# Patient Record
Sex: FEMALE | Race: WHITE | Employment: OTHER | URBAN - METROPOLITAN AREA
[De-identification: names, ages, dates, MRNs, and addresses within clinical notes are randomized per-mention and may not be internally consistent; named-entity substitution may affect disease eponyms.]

---

## 2021-10-24 ENCOUNTER — APPOINTMENT (OUTPATIENT)
Dept: GENERAL RADIOLOGY | Facility: CLINIC | Age: 66
DRG: 233 | End: 2021-10-24
Payer: MEDICARE

## 2021-10-24 ENCOUNTER — APPOINTMENT (OUTPATIENT)
Dept: CT IMAGING | Facility: CLINIC | Age: 66
DRG: 233 | End: 2021-10-24
Payer: MEDICARE

## 2021-10-24 ENCOUNTER — HOSPITAL ENCOUNTER (INPATIENT)
Age: 66
LOS: 17 days | Discharge: NURSING, REHAB OR LONG-TERM CARE | DRG: 233 | End: 2021-11-10
Attending: SPECIALIST | Admitting: STUDENT IN AN ORGANIZED HEALTH CARE EDUCATION/TRAINING PROGRAM
Payer: MEDICARE

## 2021-10-24 DIAGNOSIS — I89.0 LYMPHEDEMA OF BOTH LOWER EXTREMITIES: ICD-10-CM

## 2021-10-24 DIAGNOSIS — M80.88XA PATHOLOGICAL FRACTURE OF THORACIC VERTEBRA DUE TO SECONDARY OSTEOPOROSIS (HCC): ICD-10-CM

## 2021-10-24 DIAGNOSIS — I21.4 NON-ST ELEVATION MI (NSTEMI) (HCC): ICD-10-CM

## 2021-10-24 DIAGNOSIS — M51.35 DDD (DEGENERATIVE DISC DISEASE), THORACOLUMBAR: ICD-10-CM

## 2021-10-24 DIAGNOSIS — Z95.1 S/P CABG X 2: Primary | ICD-10-CM

## 2021-10-24 DIAGNOSIS — M54.50 CHRONIC BILATERAL LOW BACK PAIN WITHOUT SCIATICA: ICD-10-CM

## 2021-10-24 DIAGNOSIS — G89.29 CHRONIC BILATERAL LOW BACK PAIN WITHOUT SCIATICA: ICD-10-CM

## 2021-10-24 DIAGNOSIS — M43.10 DEGENERATIVE SPONDYLOLISTHESIS: ICD-10-CM

## 2021-10-24 DIAGNOSIS — I50.9 ACUTE ON CHRONIC CONGESTIVE HEART FAILURE, UNSPECIFIED HEART FAILURE TYPE (HCC): ICD-10-CM

## 2021-10-24 PROBLEM — E66.09 CLASS 1 OBESITY DUE TO EXCESS CALORIES WITH SERIOUS COMORBIDITY AND BODY MASS INDEX (BMI) OF 33.0 TO 33.9 IN ADULT: Status: ACTIVE | Noted: 2021-10-24

## 2021-10-24 LAB
-: ABNORMAL
ABSOLUTE EOS #: 0.2 K/UL (ref 0–0.4)
ABSOLUTE IMMATURE GRANULOCYTE: ABNORMAL K/UL (ref 0–0.3)
ABSOLUTE LYMPH #: 0.7 K/UL (ref 1–4.8)
ABSOLUTE MONO #: 0.4 K/UL (ref 0.1–1.2)
AMORPHOUS: ABNORMAL
ANION GAP SERPL CALCULATED.3IONS-SCNC: 12 MMOL/L (ref 9–17)
BACTERIA: ABNORMAL
BASOPHILS # BLD: 2 % (ref 0–2)
BASOPHILS ABSOLUTE: 0.2 K/UL (ref 0–0.2)
BILIRUBIN URINE: ABNORMAL
BNP INTERPRETATION: ABNORMAL
BUN BLDV-MCNC: 15 MG/DL (ref 8–23)
BUN/CREAT BLD: ABNORMAL (ref 9–20)
CALCIUM SERPL-MCNC: 9.6 MG/DL (ref 8.6–10.4)
CASTS UA: ABNORMAL /LPF (ref 0–2)
CHLORIDE BLD-SCNC: 102 MMOL/L (ref 98–107)
CO2: 24 MMOL/L (ref 20–31)
COLOR: YELLOW
COMMENT UA: ABNORMAL
CREAT SERPL-MCNC: 0.8 MG/DL (ref 0.5–0.9)
CRYSTALS, UA: ABNORMAL /HPF
D-DIMER QUANTITATIVE: 1.6 MG/L FEU
DIFFERENTIAL TYPE: ABNORMAL
EKG ATRIAL RATE: 88 BPM
EKG P AXIS: 30 DEGREES
EKG P-R INTERVAL: 144 MS
EKG Q-T INTERVAL: 412 MS
EKG QRS DURATION: 122 MS
EKG QTC CALCULATION (BAZETT): 498 MS
EKG R AXIS: -64 DEGREES
EKG T AXIS: -35 DEGREES
EKG VENTRICULAR RATE: 88 BPM
EOSINOPHILS RELATIVE PERCENT: 2 % (ref 1–4)
EPITHELIAL CELLS UA: ABNORMAL /HPF (ref 0–5)
GFR AFRICAN AMERICAN: >60 ML/MIN
GFR NON-AFRICAN AMERICAN: >60 ML/MIN
GFR SERPL CREATININE-BSD FRML MDRD: ABNORMAL ML/MIN/{1.73_M2}
GFR SERPL CREATININE-BSD FRML MDRD: ABNORMAL ML/MIN/{1.73_M2}
GLUCOSE BLD-MCNC: 175 MG/DL (ref 70–99)
GLUCOSE BLD-MCNC: 212 MG/DL (ref 65–105)
GLUCOSE BLD-MCNC: 502 MG/DL (ref 65–105)
GLUCOSE URINE: NEGATIVE
HCT VFR BLD CALC: 35.8 % (ref 36–46)
HEMOGLOBIN: 10.8 G/DL (ref 12–16)
IMMATURE GRANULOCYTES: ABNORMAL %
INR BLD: 1.1
KETONES, URINE: ABNORMAL
LACTIC ACID: 1.1 MMOL/L (ref 0.5–2.2)
LACTIC ACID: 1.4 MMOL/L (ref 0.5–2.2)
LEUKOCYTE ESTERASE, URINE: NEGATIVE
LYMPHOCYTES # BLD: 6 % (ref 24–44)
MCH RBC QN AUTO: 23.9 PG (ref 26–34)
MCHC RBC AUTO-ENTMCNC: 30.2 G/DL (ref 31–37)
MCV RBC AUTO: 79.2 FL (ref 80–100)
MONOCYTES # BLD: 3 % (ref 2–11)
MUCUS: ABNORMAL
NITRITE, URINE: NEGATIVE
NRBC AUTOMATED: ABNORMAL PER 100 WBC
OTHER OBSERVATIONS UA: ABNORMAL
PARTIAL THROMBOPLASTIN TIME: 29 SEC (ref 21.3–31.3)
PARTIAL THROMBOPLASTIN TIME: 37.2 SEC (ref 23.9–33.8)
PDW BLD-RTO: 16.9 % (ref 12.5–15.4)
PH UA: 5.5 (ref 5–8)
PLATELET # BLD: 411 K/UL (ref 140–450)
PLATELET ESTIMATE: ABNORMAL
PMV BLD AUTO: 7.1 FL (ref 6–12)
POTASSIUM SERPL-SCNC: 4.3 MMOL/L (ref 3.7–5.3)
PRO-BNP: 4016 PG/ML
PROTEIN UA: ABNORMAL
PROTHROMBIN TIME: 11.7 SEC (ref 9.4–12.6)
RBC # BLD: 4.53 M/UL (ref 4–5.2)
RBC # BLD: ABNORMAL 10*6/UL
RBC UA: ABNORMAL /HPF (ref 0–2)
RENAL EPITHELIAL, UA: ABNORMAL /HPF
SARS-COV-2, RAPID: NOT DETECTED
SEG NEUTROPHILS: 87 % (ref 36–66)
SEGMENTED NEUTROPHILS ABSOLUTE COUNT: 10.1 K/UL (ref 1.8–7.7)
SODIUM BLD-SCNC: 138 MMOL/L (ref 135–144)
SPECIFIC GRAVITY UA: 1.02 (ref 1–1.03)
SPECIMEN DESCRIPTION: NORMAL
TRICHOMONAS: ABNORMAL
TROPONIN INTERP: ABNORMAL
TROPONIN INTERP: ABNORMAL
TROPONIN T: ABNORMAL NG/ML
TROPONIN T: ABNORMAL NG/ML
TROPONIN, HIGH SENSITIVITY: 103 NG/L (ref 0–14)
TROPONIN, HIGH SENSITIVITY: 104 NG/L (ref 0–14)
TURBIDITY: CLEAR
URINE HGB: ABNORMAL
UROBILINOGEN, URINE: NORMAL
WBC # BLD: 11.6 K/UL (ref 3.5–11)
WBC # BLD: ABNORMAL 10*3/UL
WBC UA: ABNORMAL /HPF (ref 0–5)
YEAST: ABNORMAL

## 2021-10-24 PROCEDURE — 6370000000 HC RX 637 (ALT 250 FOR IP): Performed by: INTERNAL MEDICINE

## 2021-10-24 PROCEDURE — 84484 ASSAY OF TROPONIN QUANT: CPT

## 2021-10-24 PROCEDURE — 6360000002 HC RX W HCPCS: Performed by: INTERNAL MEDICINE

## 2021-10-24 PROCEDURE — 81001 URINALYSIS AUTO W/SCOPE: CPT

## 2021-10-24 PROCEDURE — 83880 ASSAY OF NATRIURETIC PEPTIDE: CPT

## 2021-10-24 PROCEDURE — 96374 THER/PROPH/DIAG INJ IV PUSH: CPT

## 2021-10-24 PROCEDURE — 83605 ASSAY OF LACTIC ACID: CPT

## 2021-10-24 PROCEDURE — 80048 BASIC METABOLIC PNL TOTAL CA: CPT

## 2021-10-24 PROCEDURE — 72100 X-RAY EXAM L-S SPINE 2/3 VWS: CPT

## 2021-10-24 PROCEDURE — 99223 1ST HOSP IP/OBS HIGH 75: CPT | Performed by: INTERNAL MEDICINE

## 2021-10-24 PROCEDURE — 2060000000 HC ICU INTERMEDIATE R&B

## 2021-10-24 PROCEDURE — 85520 HEPARIN ASSAY: CPT

## 2021-10-24 PROCEDURE — 2580000003 HC RX 258: Performed by: EMERGENCY MEDICINE

## 2021-10-24 PROCEDURE — 87635 SARS-COV-2 COVID-19 AMP PRB: CPT

## 2021-10-24 PROCEDURE — 71260 CT THORAX DX C+: CPT

## 2021-10-24 PROCEDURE — 93005 ELECTROCARDIOGRAM TRACING: CPT | Performed by: EMERGENCY MEDICINE

## 2021-10-24 PROCEDURE — 85730 THROMBOPLASTIN TIME PARTIAL: CPT

## 2021-10-24 PROCEDURE — 6370000000 HC RX 637 (ALT 250 FOR IP): Performed by: SPECIALIST

## 2021-10-24 PROCEDURE — 82947 ASSAY GLUCOSE BLOOD QUANT: CPT

## 2021-10-24 PROCEDURE — 85610 PROTHROMBIN TIME: CPT

## 2021-10-24 PROCEDURE — 6370000000 HC RX 637 (ALT 250 FOR IP): Performed by: EMERGENCY MEDICINE

## 2021-10-24 PROCEDURE — 6360000004 HC RX CONTRAST MEDICATION: Performed by: EMERGENCY MEDICINE

## 2021-10-24 PROCEDURE — 2580000003 HC RX 258: Performed by: SPECIALIST

## 2021-10-24 PROCEDURE — 99285 EMERGENCY DEPT VISIT HI MDM: CPT

## 2021-10-24 PROCEDURE — 96375 TX/PRO/DX INJ NEW DRUG ADDON: CPT

## 2021-10-24 PROCEDURE — 85025 COMPLETE CBC W/AUTO DIFF WBC: CPT

## 2021-10-24 PROCEDURE — 36415 COLL VENOUS BLD VENIPUNCTURE: CPT

## 2021-10-24 PROCEDURE — 85379 FIBRIN DEGRADATION QUANT: CPT

## 2021-10-24 PROCEDURE — 71045 X-RAY EXAM CHEST 1 VIEW: CPT

## 2021-10-24 PROCEDURE — 6360000002 HC RX W HCPCS: Performed by: EMERGENCY MEDICINE

## 2021-10-24 RX ORDER — IPRATROPIUM BROMIDE AND ALBUTEROL SULFATE 2.5; .5 MG/3ML; MG/3ML
1 SOLUTION RESPIRATORY (INHALATION) ONCE
Status: COMPLETED | OUTPATIENT
Start: 2021-10-24 | End: 2021-10-24

## 2021-10-24 RX ORDER — HEPARIN SODIUM 10000 [USP'U]/100ML
1000 INJECTION, SOLUTION INTRAVENOUS CONTINUOUS
Status: DISCONTINUED | OUTPATIENT
Start: 2021-10-24 | End: 2021-10-24

## 2021-10-24 RX ORDER — HEPARIN SODIUM 10000 [USP'U]/100ML
5-30 INJECTION, SOLUTION INTRAVENOUS CONTINUOUS
Status: DISCONTINUED | OUTPATIENT
Start: 2021-10-24 | End: 2021-10-28

## 2021-10-24 RX ORDER — SODIUM CHLORIDE 0.9 % (FLUSH) 0.9 %
5-40 SYRINGE (ML) INJECTION EVERY 12 HOURS SCHEDULED
Status: DISCONTINUED | OUTPATIENT
Start: 2021-10-24 | End: 2021-10-28 | Stop reason: SDUPTHER

## 2021-10-24 RX ORDER — ACETAMINOPHEN 650 MG/1
650 SUPPOSITORY RECTAL EVERY 6 HOURS PRN
Status: DISCONTINUED | OUTPATIENT
Start: 2021-10-24 | End: 2021-11-02

## 2021-10-24 RX ORDER — NITROGLYCERIN 0.4 MG/1
0.4 TABLET SUBLINGUAL EVERY 5 MIN PRN
Status: DISCONTINUED | OUTPATIENT
Start: 2021-10-24 | End: 2021-11-02

## 2021-10-24 RX ORDER — ONDANSETRON 2 MG/ML
4 INJECTION INTRAMUSCULAR; INTRAVENOUS EVERY 6 HOURS PRN
Status: DISCONTINUED | OUTPATIENT
Start: 2021-10-24 | End: 2021-11-02

## 2021-10-24 RX ORDER — POLYETHYLENE GLYCOL 3350 17 G/17G
17 POWDER, FOR SOLUTION ORAL DAILY PRN
Status: DISCONTINUED | OUTPATIENT
Start: 2021-10-24 | End: 2021-11-02

## 2021-10-24 RX ORDER — HEPARIN SODIUM 1000 [USP'U]/ML
2000 INJECTION, SOLUTION INTRAVENOUS; SUBCUTANEOUS PRN
Status: DISCONTINUED | OUTPATIENT
Start: 2021-10-24 | End: 2021-10-28

## 2021-10-24 RX ORDER — ONDANSETRON 4 MG/1
4 TABLET, ORALLY DISINTEGRATING ORAL EVERY 8 HOURS PRN
Status: DISCONTINUED | OUTPATIENT
Start: 2021-10-24 | End: 2021-11-02

## 2021-10-24 RX ORDER — SODIUM CHLORIDE 9 MG/ML
1000 INJECTION, SOLUTION INTRAVENOUS CONTINUOUS
Status: DISCONTINUED | OUTPATIENT
Start: 2021-10-24 | End: 2021-10-24

## 2021-10-24 RX ORDER — SODIUM CHLORIDE 9 MG/ML
25 INJECTION, SOLUTION INTRAVENOUS PRN
Status: DISCONTINUED | OUTPATIENT
Start: 2021-10-24 | End: 2021-11-02

## 2021-10-24 RX ORDER — HEPARIN SODIUM 1000 [USP'U]/ML
4000 INJECTION, SOLUTION INTRAVENOUS; SUBCUTANEOUS ONCE
Status: COMPLETED | OUTPATIENT
Start: 2021-10-24 | End: 2021-10-24

## 2021-10-24 RX ORDER — ASPIRIN 81 MG/1
81 TABLET, CHEWABLE ORAL DAILY
Status: DISCONTINUED | OUTPATIENT
Start: 2021-10-25 | End: 2021-11-02

## 2021-10-24 RX ORDER — 0.9 % SODIUM CHLORIDE 0.9 %
70 INTRAVENOUS SOLUTION INTRAVENOUS ONCE
Status: COMPLETED | OUTPATIENT
Start: 2021-10-24 | End: 2021-10-24

## 2021-10-24 RX ORDER — HEPARIN SODIUM 1000 [USP'U]/ML
2000 INJECTION, SOLUTION INTRAVENOUS; SUBCUTANEOUS PRN
Status: DISCONTINUED | OUTPATIENT
Start: 2021-10-24 | End: 2021-10-24

## 2021-10-24 RX ORDER — ACETAMINOPHEN 325 MG/1
650 TABLET ORAL EVERY 6 HOURS PRN
Status: DISCONTINUED | OUTPATIENT
Start: 2021-10-24 | End: 2021-10-28 | Stop reason: SDUPTHER

## 2021-10-24 RX ORDER — HEPARIN SODIUM 1000 [USP'U]/ML
4000 INJECTION, SOLUTION INTRAVENOUS; SUBCUTANEOUS PRN
Status: DISCONTINUED | OUTPATIENT
Start: 2021-10-24 | End: 2021-10-28

## 2021-10-24 RX ORDER — OXYCODONE HYDROCHLORIDE AND ACETAMINOPHEN 5; 325 MG/1; MG/1
1 TABLET ORAL ONCE
Status: COMPLETED | OUTPATIENT
Start: 2021-10-24 | End: 2021-10-24

## 2021-10-24 RX ORDER — FUROSEMIDE 10 MG/ML
20 INJECTION INTRAMUSCULAR; INTRAVENOUS 2 TIMES DAILY
Status: COMPLETED | OUTPATIENT
Start: 2021-10-24 | End: 2021-10-29

## 2021-10-24 RX ORDER — FUROSEMIDE 10 MG/ML
40 INJECTION INTRAMUSCULAR; INTRAVENOUS ONCE
Status: COMPLETED | OUTPATIENT
Start: 2021-10-24 | End: 2021-10-24

## 2021-10-24 RX ORDER — SODIUM CHLORIDE 0.9 % (FLUSH) 0.9 %
10 SYRINGE (ML) INJECTION PRN
Status: DISCONTINUED | OUTPATIENT
Start: 2021-10-24 | End: 2021-10-24

## 2021-10-24 RX ORDER — SODIUM CHLORIDE 0.9 % (FLUSH) 0.9 %
5-40 SYRINGE (ML) INJECTION PRN
Status: DISCONTINUED | OUTPATIENT
Start: 2021-10-24 | End: 2021-10-28 | Stop reason: SDUPTHER

## 2021-10-24 RX ORDER — HEPARIN SODIUM 1000 [USP'U]/ML
4000 INJECTION, SOLUTION INTRAVENOUS; SUBCUTANEOUS ONCE
Status: DISCONTINUED | OUTPATIENT
Start: 2021-10-24 | End: 2021-10-24

## 2021-10-24 RX ORDER — METHYLPREDNISOLONE SODIUM SUCCINATE 125 MG/2ML
125 INJECTION, POWDER, LYOPHILIZED, FOR SOLUTION INTRAMUSCULAR; INTRAVENOUS ONCE
Status: COMPLETED | OUTPATIENT
Start: 2021-10-24 | End: 2021-10-24

## 2021-10-24 RX ORDER — HEPARIN SODIUM 1000 [USP'U]/ML
4000 INJECTION, SOLUTION INTRAVENOUS; SUBCUTANEOUS PRN
Status: DISCONTINUED | OUTPATIENT
Start: 2021-10-24 | End: 2021-10-24

## 2021-10-24 RX ORDER — HEPARIN SODIUM 10000 [USP'U]/100ML
5-30 INJECTION, SOLUTION INTRAVENOUS CONTINUOUS
Status: DISCONTINUED | OUTPATIENT
Start: 2021-10-24 | End: 2021-10-24

## 2021-10-24 RX ORDER — ASPIRIN 81 MG/1
324 TABLET, CHEWABLE ORAL ONCE
Status: COMPLETED | OUTPATIENT
Start: 2021-10-24 | End: 2021-10-24

## 2021-10-24 RX ADMIN — INSULIN LISPRO 9 UNITS: 100 INJECTION, SOLUTION INTRAVENOUS; SUBCUTANEOUS at 22:00

## 2021-10-24 RX ADMIN — Medication 10 ML: at 10:20

## 2021-10-24 RX ADMIN — FUROSEMIDE 40 MG: 10 INJECTION, SOLUTION INTRAMUSCULAR; INTRAVENOUS at 11:04

## 2021-10-24 RX ADMIN — HEPARIN SODIUM 4000 UNITS: 1000 INJECTION INTRAVENOUS; SUBCUTANEOUS at 17:55

## 2021-10-24 RX ADMIN — IOPAMIDOL 80 ML: 755 INJECTION, SOLUTION INTRAVENOUS at 10:19

## 2021-10-24 RX ADMIN — FUROSEMIDE 20 MG: 10 INJECTION, SOLUTION INTRAMUSCULAR; INTRAVENOUS at 17:55

## 2021-10-24 RX ADMIN — IPRATROPIUM BROMIDE AND ALBUTEROL SULFATE 1 AMPULE: .5; 2.5 SOLUTION RESPIRATORY (INHALATION) at 09:33

## 2021-10-24 RX ADMIN — ASPIRIN 324 MG: 81 TABLET, CHEWABLE ORAL at 11:03

## 2021-10-24 RX ADMIN — SODIUM CHLORIDE 70 ML: 9 INJECTION, SOLUTION INTRAVENOUS at 10:20

## 2021-10-24 RX ADMIN — SODIUM CHLORIDE 1000 ML: 9 INJECTION, SOLUTION INTRAVENOUS at 07:21

## 2021-10-24 RX ADMIN — METHYLPREDNISOLONE SODIUM SUCCINATE 125 MG: 125 INJECTION, POWDER, FOR SOLUTION INTRAMUSCULAR; INTRAVENOUS at 09:32

## 2021-10-24 RX ADMIN — HEPARIN SODIUM 11 UNITS/KG/HR: 10000 INJECTION, SOLUTION INTRAVENOUS at 17:55

## 2021-10-24 RX ADMIN — OXYCODONE HYDROCHLORIDE AND ACETAMINOPHEN 1 TABLET: 5; 325 TABLET ORAL at 07:09

## 2021-10-24 RX ADMIN — ACETAMINOPHEN 650 MG: 325 TABLET ORAL at 22:06

## 2021-10-24 ASSESSMENT — PAIN SCALES - GENERAL
PAINLEVEL_OUTOF10: 10
PAINLEVEL_OUTOF10: 5
PAINLEVEL_OUTOF10: 3
PAINLEVEL_OUTOF10: 4
PAINLEVEL_OUTOF10: 4
PAINLEVEL_OUTOF10: 2
PAINLEVEL_OUTOF10: 4
PAINLEVEL_OUTOF10: 10
PAINLEVEL_OUTOF10: 3

## 2021-10-24 ASSESSMENT — PAIN DESCRIPTION - ORIENTATION: ORIENTATION: LEFT;RIGHT;DISTAL

## 2021-10-24 ASSESSMENT — PAIN DESCRIPTION - LOCATION
LOCATION: FOOT;LEG
LOCATION: BACK

## 2021-10-24 ASSESSMENT — PAIN DESCRIPTION - PAIN TYPE: TYPE: ACUTE PAIN

## 2021-10-24 NOTE — ED PROVIDER NOTES
Previous Medications    No medications on file       ALLERGIES     is allergic to amoxicillin, amoxicillin-pot clavulanate, atorvastatin, cefuroxime axetil, clavulanic acid, codeine, dextroamphetamine, adhesive tape, and cephalosporins. FAMILY HISTORY     has no family status information on file. family history is not on file. SOCIAL HISTORY          PHYSICAL EXAM     INITIAL VITALS:  oral temperature is 98.3 °F (36.8 °C). Her pulse is 90. Her oxygen saturation is 91%. Physical Exam  Vitals and nursing note reviewed. Constitutional:       Appearance: She is well-developed. HENT:      Head: Normocephalic and atraumatic. Nose: Nose normal.   Eyes:      Extraocular Movements: Extraocular movements intact. Pupils: Pupils are equal, round, and reactive to light. Cardiovascular:      Rate and Rhythm: Normal rate and regular rhythm. Heart sounds: Normal heart sounds. No murmur heard. Pulmonary:      Effort: Pulmonary effort is normal. No respiratory distress. Breath sounds: Normal breath sounds. Abdominal:      General: Bowel sounds are normal. There is no distension. Palpations: Abdomen is soft. Tenderness: There is no abdominal tenderness. Musculoskeletal:      Cervical back: Normal range of motion and neck supple. Comments: Patient has vague tenderness in the lumbar area and midline as well as paraspinal region. There is no step-off defect. There is no CVA tenderness. No evidence of cauda equina syndrome. Patient is not very cooperative with motor and sensory examination and lower extremities due to pain but there are no focal neurological deficits. Skin:     General: Skin is warm and dry. Neurological:      General: No focal deficit present. Mental Status: She is alert and oriented to person, place, and time. GCS: GCS eye subscore is 4. GCS verbal subscore is 5. GCS motor subscore is 6.            DIFFERENTIAL DIAGNOSIS/ MDM:     Acute exacerbation of chronic low back pain, fracture, UTI    DIAGNOSTIC RESULTS     EKG: All EKG's are interpreted by the Emergency Department Physician who either signs or Co-signs this chart in the absence of a cardiologist.    None obtained    RADIOLOGY:   I reviewed the radiologist interpretations:  XR LUMBAR SPINE (2-3 VIEWS)    (Results Pending)       No results found. LABS:  Labs Reviewed   CBC WITH AUTO DIFFERENTIAL   BASIC METABOLIC PANEL W/ REFLEX TO MG FOR LOW K   URINE RT REFLEX TO CULTURE       Her lab results are pending at this time    EMERGENCY DEPARTMENT COURSE:   Vitals:    Vitals:    10/24/21 0649   Pulse: 90   Temp: 98.3 °F (36.8 °C)   TempSrc: Oral   SpO2: 91%     -------------------------   , Temp: 98.3 °F (36.8 °C), Pulse: 90,      Orders Placed This Encounter   Medications    0.9 % sodium chloride infusion    oxyCODONE-acetaminophen (PERCOCET) 5-325 MG per tablet 1 tablet       During the ED course, patient will be given normal saline infusion and Percocet 1 tablet orally. Her diagnostic work-up is pending at this time. Case will be turned over to oncoming physician at 7:15 AM due to shift change. I have reviewed the disposition diagnosis with the patient and or their family/guardian. I have answered their questions and given discharge instructions. They voiced understanding of these instructions and did not have any further questions or complaints. PROCEDURES:  None    FINAL IMPRESSION    No diagnosis found. DISPOSITION/PLAN   DISPOSITION          PATIENT REFERRED TO:  No follow-up provider specified. DISCHARGE MEDICATIONS:  New Prescriptions    No medications on file       (Please note that portions of this note were completed with a voice recognition program.  Efforts were made to edit the dictations but occasionally words are mis-transcribed.)    Gibran Jackson MD,, MD, F.A.C.E.P.   Attending Emergency Physician      Gibran Jackson MD  10/24/21 4554

## 2021-10-24 NOTE — ED NOTES
Dressings removed from bilat lower extremeties, pt c/o pain with movement , decreased O@ sat at room air, pt unable to tolerate activity,  at bedside     Derrek Ahn RN  10/24/21 0679

## 2021-10-24 NOTE — PROGRESS NOTES
Pt admitted to room from [facility] ED. Oriented to room, call light and bed mechanics. 2L NC in use. Side rails up x2. Call light within reach. Orders reviewed. Will continue to monitor closely.

## 2021-10-24 NOTE — H&P
[facility]  Office: [address], DO, Mary Kate Press, DO, Carol Cushing, DO, Isidro Leon Blood, DO, Porfirio Henry MD, Perlita Robles MD, David Linder MD, Annie Romo MD, Demario Corbin MD, Dee Castillo MD, Ashley Way MD, Colleen Coffman MD, Chung Ron, DO, Mel Terrazas, DO, Asael Evans MD,  Gena Siemens, DO, Everett Yates MD, Abelardo Pineda MD, Graciela Negrete MD, Ana M Tyler MD, Maria Guadalupe Schaffer MD, Carissa Shoemaker MD, Marie Saunders [facility], [facility], CNP, Candice Waller, CNP, Orquidea Singh, CNS, Flaca Moyer, CNP, Chang Ace, CNP, Morteza Tejada, CNP, Tatiana Oconnell, CNP, Jones Jane, CNP, Summer Vaughn, CNP, Roylene Goodell, PA-C, Taylor Salmeron, [facility], Barrett Gee, CNP, Claudia Jimenez, CNP, Nilsa Holland, CNP, Saray Wyatt, CNP, Winsome Rothman, CNP, Mikal Kelly, CNP, Kassie Magdaleno, CNP         [address]    HISTORY AND PHYSICAL EXAMINATION            Date:   10/24/2021  Patient name:  Abdoul Ellsworth  Date of admission:  10/24/2021  6:48 AM  MRN:   [id number]  Account:  [de-identified]  YOB: 1955  PCP:    Saray Wyatt MD  Room:   2034/2034-01  Code Status:    Full Code    Chief Complaint:     Chief Complaint   Patient presents with    Back Pain       History Obtained From:     patient, electronic medical record, Quality of history:  poor historian    History of Present Illness:     Abdoul Ellsworth is a 61 y.o. Non- / non  female who presents with Back Pain   and is admitted to the hospital for the management of NSTEMI (non-ST elevated myocardial infarction) ([facility] [address] This 61 yof presents with low back pain to the point where she could not get out of recliner. She said she felt a pop while transferring from bed to wheelchair. It is unclear when that was. Starting 2 days pta she had cp and sob, along with nausea. Also c/o hip pain and said all her pain was due to fibromyalgia.   Found to have troponin levels~100. Past Medical History:     Past Medical History:   Diagnosis Date    Asthma     Diabetes mellitus ([facility] [address]     Fibromyalgia     Headache     Lymphedema of both lower extremities     Myasthenia gravis ([facility] [address]         Past Surgical History:     Past Surgical History:   Procedure Laterality Date    CARPAL TUNNEL RELEASE      FRACTURE SURGERY      TOE AMPUTATION          Medications Prior to Admission:     Prior to Admission medications    Not on File        Allergies:     Amoxicillin, Amoxicillin-pot clavulanate, Atorvastatin, Cefuroxime axetil, Clavulanic acid, Codeine, Dextroamphetamine, Adhesive tape, and Cephalosporins    Social History:     Tobacco:    reports that she has never smoked. She has never used smokeless tobacco.  Alcohol:      reports previous alcohol use. Drug Use:  reports previous drug use. Family History:     Family History   Problem Relation Age of Onset    Coronary Art Dis Mother     Kidney Disease Mother        Review of Systems:     Positive and Negative as described in HPI.     CONSTITUTIONAL:  negative for fevers, chills, sweats, fatigue, weight loss  HEENT:  negative for vision, hearing changes, runny nose, throat pain  RESPIRATORY:  negative for cough, congestion, wheezing  CARDIOVASCULAR:  negative for palpitations  GASTROINTESTINAL:  negative for vomiting, diarrhea, constipation, change in bowel habits, abdominal pain   GENITOURINARY:  negative for difficulty of urination, burning with urination, frequency   INTEGUMENT:  negative for rash, skin lesions, easy bruising   HEMATOLOGIC/LYMPHATIC:  positive for swelling/edema -sees lymphedema clinic  ALLERGIC/IMMUNOLOGIC:  negative for urticaria , itching  ENDOCRINE:  negative increase in drinking, increase in urination, hot or cold intolerance  MUSCULOSKELETAL:  negative joint pains, muscle aches, swelling of joints  NEUROLOGICAL:  negative for headaches, dizziness, lightheadedness, numbness, pain, tingling MCHC 30.2 (L) 31 - 37 g/dL    RDW 16.9 (H) 12.5 - 15.4 %    Platelets 838 149 - 911 k/uL    MPV 7.1 6.0 - 12.0 fL    NRBC Automated NOT REPORTED per 100 WBC    Differential Type NOT REPORTED     Immature Granulocytes NOT REPORTED 0 %    Absolute Immature Granulocyte NOT REPORTED 0.00 - 0.30 k/uL    WBC Morphology NOT REPORTED     RBC Morphology NOT REPORTED     Platelet Estimate NOT REPORTED     Seg Neutrophils 87 (H) 36 - 66 %    Lymphocytes 6 (L) 24 - 44 %    Monocytes 3 2 - 11 %    Eosinophils % 2 1 - 4 %    Basophils 2 0 - 2 %    Segs Absolute 10.10 (H) 1.8 - 7.7 k/uL    Absolute Lymph # 0.70 (L) 1.0 - 4.8 k/uL    Absolute Mono # 0.40 0.1 - 1.2 k/uL    Absolute Eos # 0.20 0.0 - 0.4 k/uL    Basophils Absolute 0.20 0.0 - 0.2 k/uL   Basic Metabolic Panel w/ Reflex to MG    Collection Time: 10/24/21  7:19 AM   Result Value Ref Range    Glucose 175 (H) 70 - 99 mg/dL    BUN 15 8 - 23 mg/dL    CREATININE 0.80 0.50 - 0.90 mg/dL    Bun/Cre Ratio NOT REPORTED 9 - 20    Calcium 9.6 8.6 - 10.4 mg/dL    Sodium 138 135 - 144 mmol/L    Potassium 4.3 3.7 - 5.3 mmol/L    Chloride 102 98 - 107 mmol/L    CO2 24 20 - 31 mmol/L    Anion Gap 12 9 - 17 mmol/L    GFR Non-African American >60 >60 mL/min    GFR African American >60 >60 mL/min    GFR Comment          GFR Staging NOT REPORTED    Brain Natriuretic Peptide    Collection Time: 10/24/21  7:19 AM   Result Value Ref Range    Pro-BNP 4,016 (H) <300 pg/mL    BNP Interpretation NOT REPORTED    Lactic Acid    Collection Time: 10/24/21  7:19 AM   Result Value Ref Range    Lactic Acid 1.4 0.5 - 2.2 mmol/L   Troponin    Collection Time: 10/24/21  7:19 AM   Result Value Ref Range    Troponin, High Sensitivity 104 (HH) 0 - 14 ng/L    Troponin T NOT REPORTED <0.03 ng/mL    Troponin Interp NOT REPORTED    Urinalysis Reflex to Culture    Collection Time: 10/24/21  8:18 AM    Specimen: Urine, clean catch   Result Value Ref Range    Color, UA Yellow Yellow    Turbidity UA Clear Clear Glucose, Ur NEGATIVE NEGATIVE    Bilirubin Urine MODERATE (A) NEGATIVE    Ketones, Urine LARGE (A) NEGATIVE    Specific Gravity, UA 1.022 1.005 - 1.030    Urine Hgb LARGE (A) NEGATIVE    pH, UA 5.5 5.0 - 8.0    Protein, UA 2+ (A) NEGATIVE    Urobilinogen, Urine Normal Normal    Nitrite, Urine NEGATIVE NEGATIVE    Leukocyte Esterase, Urine NEGATIVE NEGATIVE    Urinalysis Comments Microscopic performed on uncentrifuged urine -     Microscopic Urinalysis    Collection Time: 10/24/21  8:18 AM   Result Value Ref Range    -          WBC, UA None 0 - 5 /HPF    RBC, UA 0 TO 2 0 - 2 /HPF    Casts UA NOT REPORTED 0 - 2 /LPF    Crystals, UA NOT REPORTED None /HPF    Epithelial Cells UA 2 TO 5 0 - 5 /HPF    Renal Epithelial, UA NOT REPORTED 0 /HPF    Bacteria, UA FEW (A) None    Mucus, UA NOT REPORTED None    Trichomonas, UA NOT REPORTED None    Amorphous, UA 1+ (A) None    Other Observations UA (A) NOT REQ. Utilizing a urinalysis as the only screening method to exclude a potential uropathogen can be unreliable in many patient populations. Rapid screening tests are less sensitive than culture and if UTI is a clinical possibility, culture should be considered despite a negative urinalysis.     Yeast, UA NOT REPORTED None   EKG 12 Lead    Collection Time: 10/24/21  9:23 AM   Result Value Ref Range    Ventricular Rate 88 BPM    Atrial Rate 88 BPM    P-R Interval 144 ms    QRS Duration 122 ms    Q-T Interval 412 ms    QTc Calculation (Bazett) 498 ms    P Axis 30 degrees    R Axis -64 degrees    T Axis -35 degrees   D-Dimer, Quantitative    Collection Time: 10/24/21  9:30 AM   Result Value Ref Range    D-Dimer, Quant 1.60 mg/L FEU   Lactic Acid    Collection Time: 10/24/21  9:30 AM   Result Value Ref Range    Lactic Acid 1.1 0.5 - 2.2 mmol/L   Protime-INR    Collection Time: 10/24/21  9:30 AM   Result Value Ref Range    Protime 11.7 9.4 - 12.6 sec    INR 1.1    APTT    Collection Time: 10/24/21  9:30 AM   Result Value Ref Range PTT 29.0 21.3 - 31.3 sec   Troponin    Collection Time: 10/24/21  9:30 AM   Result Value Ref Range    Troponin, High Sensitivity 103 (HH) 0 - 14 ng/L    Troponin T NOT REPORTED <0.03 ng/mL    Troponin Interp NOT REPORTED    COVID-19, Rapid    Collection Time: 10/24/21  9:31 AM    Specimen: Nasopharyngeal Swab   Result Value Ref Range    Specimen Description . NASOPHARYNGEAL SWAB     SARS-CoV-2, Rapid Not Detected Not Detected   POC Glucose Fingerstick    Collection Time: 10/24/21  4:50 PM   Result Value Ref Range    POC Glucose 212 (H) 65 - 105 mg/dL       Imaging/Diagnostics:  XR LUMBAR SPINE (2-3 VIEWS)    Result Date: 10/24/2021  LUMBAR SPINE: 1. Age indeterminate, probably nonacute, compression fracture of L5 vertebral body with about 50% decrease height. Please correlate with point tenderness. MRI may also be considered for age characterization if deemed clinically necessary. 2. T12 and L1 kyphoplasty. CHEST X-RAY: 1. Poor inspiratory afford with significantly decreased lung volumes. 2. Patchy opacities left lower lung probably due to crowding of vessels with or without underlying developing infiltrate. Please correlate with auscultation. Short interval follow-up may also be considered. XR CHEST PORTABLE    Result Date: 10/24/2021  LUMBAR SPINE: 1. Age indeterminate, probably nonacute, compression fracture of L5 vertebral body with about 50% decrease height. Please correlate with point tenderness. MRI may also be considered for age characterization if deemed clinically necessary. 2. T12 and L1 kyphoplasty. CHEST X-RAY: 1. Poor inspiratory afford with significantly decreased lung volumes. 2. Patchy opacities left lower lung probably due to crowding of vessels with or without underlying developing infiltrate. Please correlate with auscultation. Short interval follow-up may also be considered. CT CHEST PULMONARY EMBOLISM W CONTRAST    Result Date: 10/24/2021  1.  No evidence for acute pulmonary embolism. 2. Scattered bibasilar patchy subsegmental atelectasis and trace bilateral pleural effusions. 3. Borderline cardiomegaly. 4. Large areas of geographic ground-glass attenuation interspersed with more lucent areas probably combination of air trapping and pulmonary interstitial edema. 5. Cholelithiasis. Assessment :      Hospital Problems         Last Modified POA    * (Principal) NSTEMI (non-ST elevated myocardial infarction) ([facility] [address] 10/24/2021 Yes    Type 2 diabetes mellitus with hyperglycemia, without long-term current use of insulin ([facility] [address] 10/24/2021 Yes    Class 1 obesity due to excess calories with serious comorbidity and body mass index (BMI) of 33.0 to 33.9 in adult 10/24/2021 Yes    Lymphedema of both lower extremities 10/24/2021 Yes    CHF with unknown LVEF ([facility] [address] 10/24/2021 Yes          Plan:     Patient status inpatient in the Progressive Unit/Step down    1. Cardio consult  2. npo after midnight for cath vs stress  3. Diuresis with lasix  4. Check echo  5. Will need pt/ot once bedrest lifted  6. Heparin drip for nstemi  7. Consider mri back to assess acuity of lumbar fracture, but at present need to address cardiac issues  8. Monitor/control glucose  9. Need accurate home med list-nothing listed at all, pharmacy closed    Consultations:   IP CONSULT TO CARDIOLOGY     Patient is admitted as inpatient status because of co-morbidities listed above, severity of signs and symptoms as outlined, requirement for current medical therapies and most importantly because of direct risk to patient if care not provided in a hospital setting. Expected length of stay > 48 hours.     Ramana Albright DO  10/24/2021  5:25 PM    Copy sent to Dr. Clemencia Mcguire MD

## 2021-10-24 NOTE — ED NOTES
Pt given blankets, updated on plan of care     [facility], RN  10/24/21 [id number]

## 2021-10-24 NOTE — ED PROVIDER NOTES
[facility]  [address] [id number]  Phone: [phone]        Pt Name: Tabatha Pettit  MRN: [id number]  [place] 1955  Date of evaluation: 10/24/21      CHIEF COMPLAINT     Chief Complaint   Patient presents with    Back Pain         HISTORY OF PRESENT ILLNESS  (Location/Symptom, Timing/Onset, Context/Setting, Quality, Duration, Modifying Factors, Severity.)    Tabatha Pettit is a 61 y.o. female who presents low back pain. She has a history of fibromyalgia and chronic back pain. He states the pain has been worse over the last 3 days. She normally takes Percocet to control her pain. Since last night she was not able to get up to take her Percocet. Patient denies any fall or injury. On arrival here she rated her pain as a 10 out of 10. Pain is in the low back and both buttocks area. Is not complaining of having pain over the spinous process. Denies any radiation of the pain to the lower extremities. Patient has a history of lymphedema and had a right great toe amputation January 2021. She is wheelchair-bound. Normally she is able to transfer herself from the wheelchair to the bathroom and wheelchair to bed and bed to wheelchair. With the worsening of her back pain she has not been able to transfer herself. On arrival here she had been incontinent of urine because she had not been able to get to the bathroom. She has not eaten much over the weekend because she has not been able to get food. She states that last night she ran out of the water that she had near her bed stand and so she had no water to drink. She lives at an independent senior living facility. Patient also has had wounds to bilateral lower extremities with ulcerations in the past. On 7/7/2021 she was followed through wound care clinic at [facility]. She had 3 open wounds that resolved 8/27/2021. Since that time she has been going for rehab at [facility] for management of the lymphedema.  She has swelling and pitting edema bilateral lower extremities with seeping blisters. Wounds have healed. She goes Mondays Wednesdays and Fridays and has compression wraps placed on bilateral lower extremities. Because she was not able to get up to go to the bathroom the dressings for the compression wraps became soiled with urine. I resumed care this patient from Dr. Rae Marrero. Patient had x-rays taken of the lumbar spine and blood work drawn for me to follow-up on. I expressed my concerns to the patient that if she is not able to get up and transfer to take care of her self that she really is not safe to go home and live independently until her back pain improves. She does have urinary incontinence but she can tell when she needs to go to the bathroom. REVIEW OF SYSTEMS    (2-9 systems for level 4, 10 or more for level 5)     Review of Systems please refer to Dr. Hernadez [facility] chart for review of systems. PAST MEDICAL HISTORY    has a past medical history of Asthma, Diabetes mellitus ([facility] [address] Fibromyalgia, Headache, Lymphedema of both lower extremities, and Myasthenia gravis ([facility] [address] SURGICAL HISTORY      has a past surgical history that includes Toe amputation; Carpal tunnel release; and fracture surgery. [address]       Current Discharge Medication List      CONTINUE these medications which have NOT CHANGED    Details   gabapentin (NEURONTIN) 300 MG capsule Take 300 mg by mouth 3 times daily.       insulin aspart protamine-insulin aspart (NOVOLOG MIX 70/30 FLEXPEN) (70-30) 100 UNIT/ML injection Inject into the skin 2 times daily (with meals) Inject 55 units at breakfast and 45 units at dinner subcutaneously      predniSONE (DELTASONE) 10 MG tablet Take 10 mg by mouth daily       albuterol sulfate HFA (VENTOLIN HFA) 108 (90 Base) MCG/ACT inhaler Inhale 2 puffs into the lungs every 4 hours as needed for Wheezing or Shortness of Breath      FLUoxetine (PROZAC) 40 MG capsule Take 40 mg by mouth every morning      fluticasone (FLONASE) 50 MCG/ACT nasal spray 1 spray by Each Nostril route daily      metoprolol succinate (TOPROL XL) 25 MG extended release tablet Take 25 mg by mouth daily      nystatin (MYCOSTATIN) 615719 UNIT/GM powder Apply topically 2 times daily Apply to affected area twice daily      furosemide (LASIX) 40 MG tablet Take 40-80 mg by mouth See Admin Instructions Take 1 tablet by mouth every morning, 2 tablets mid day and 1 tablet at bedtime. acetaminophen (TYLENOL) 325 MG tablet Take 650 mg by mouth every 6 hours as needed for Pain      metFORMIN (GLUCOPHAGE) 1000 MG tablet Take 1,000 mg by mouth 2 times daily (with meals)      famotidine (PEPCID) 40 MG tablet Take 40 mg by mouth nightly      vitamin D (CHOLECALCIFEROL) 50 MCG (2000 UT) TABS tablet Take 2,000 Units by mouth daily      ascorbic acid (VITAMIN C) 500 MG tablet Take 500 mg by mouth daily      oxyCODONE-acetaminophen (PERCOCET) 5-325 MG per tablet Take 1 tablet by mouth every 12 hours as needed for Pain. apixaban (ELIQUIS) 5 MG TABS tablet Take 5 mg by mouth 2 times daily      rosuvastatin (CRESTOR) 10 MG tablet Take 10 mg by mouth daily             ALLERGIES     is allergic to amoxicillin, amoxicillin-pot clavulanate, atorvastatin, cefuroxime axetil, clavulanic acid, codeine, dextroamphetamine, adhesive tape, and cephalosporins. FAMILY HISTORY     She indicated that her mother is . family history includes Coronary Art Dis in her mother; Kidney Disease in her mother. SOCIAL HISTORY      reports that she has never smoked. She has never used smokeless tobacco. She reports previous alcohol use. She reports previous drug use. PHYSICAL EXAM    (up to 7 for level 4, 8 or more for level 5)   INITIAL VITALS:  height is 5' 5\" (1.651 m) and weight is 90.3 kg (199 lb). Her oral temperature is 98.4 °F (36.9 °C). Her blood pressure is 101/55 (abnormal) and her pulse is 73. Her respiration is 18 and oxygen saturation is 96%.       Physical Exam  Constitutional:       General: She is in acute distress. Appearance: She is obese. Comments: Patient has low back pain that is limiting her ability to transfer from her bed to wheelchair. She was given Percocet 1 tablet here in the emergency department. She states they brought her pain down from a 10 to a 5. HENT:      Head: Normocephalic and atraumatic. Eyes:      Extraocular Movements: Extraocular movements intact. Pupils: Pupils are equal, round, and reactive to light. Cardiovascular:      Rate and Rhythm: Normal rate and regular rhythm. Pulses:           Dorsalis pedis pulses are 2+ on the right side and 2+ on the left side. Heart sounds: Normal heart sounds. Comments: Chronic lymphedema bilateral lower extremities with pitting edema and seeping blisters. Pulmonary:      Effort: Pulmonary effort is normal. No tachypnea, bradypnea, accessory muscle usage, prolonged expiration, respiratory distress or retractions. Breath sounds: Normal breath sounds and air entry. Abdominal:      General: Bowel sounds are normal.      Palpations: Abdomen is soft. Tenderness: There is no abdominal tenderness. There is no right CVA tenderness, left CVA tenderness, guarding or rebound. Negative signs include Gaffney's sign, Rovsing's sign and McBurney's sign. Musculoskeletal:      Cervical back: Normal range of motion and neck supple. No signs of trauma or tenderness. No pain with movement, spinous process tenderness or muscular tenderness. Lumbar back: Tenderness present. No bony tenderness. Decreased range of motion. Back:       Right lower le+ Pitting Edema present. Left lower le+ Pitting Edema present. Comments: Weakness bilateral lower extremities. Unable to lift them off the bed. Strong dorsalis pedal pulses bilateral to palpation. She has had an amputation of the right great toe. Pressure dressings were removed.  Skin is intact with no new open wounds. Patient does have pitting edema. Neurological:      Mental Status: She is alert. GCS: GCS eye subscore is 4. GCS verbal subscore is 5. GCS motor subscore is 6. Sensory: Sensation is intact. Motor: Weakness present. Gait: Gait abnormal.      Comments: Weakness bilateral lower extremities         DIFFERENTIAL DIAGNOSIS/ MDM:     Chronic back pain. Cauda equina syndrome not present. UTI. Patient developed hypoxia and shortness of breath. Troponin is elevated consistent with a non-ST elevation MI. Congestive heart failure. Possible PE.    DIAGNOSTIC RESULTS     EKG: All EKG's are interpreted by the Emergency Department Physician who either signs or Co-signs this chart in the absence of a cardiologist.      Interpreted by Elva Carr MD     Rhythm: normal sinus   Rate: normal  Axis: normal  Ectopy: none  Conduction: Right bundle branch block, left anterior fascicular block. ST Segments: no acute change  T Waves: Inverted T waves leads II, III, aVF, V1 V2 V3 V4  Q Waves: Leads II, III and aVF, V1 V2 V3 V4. Clinical Impression: normal sinus rhythm with right bundle branch block and left anterior fascicular block. Injury inferior leads age undetermined. No previous EKG to compare to. T wave abnormality with lateral ischemia. Baseline sway V5        RADIOLOGY:      XR LUMBAR SPINE (2-3 VIEWS)    Result Date: 10/24/2021  EXAMINATION: THREE XRAY VIEWS OF THE LUMBAR SPINE; ONE XRAY VIEW OF THE CHEST 10/24/2021 7:04 am COMPARISON: Chest x-ray 03/09/2013 HISTORY: ORDERING SYSTEM PROVIDED HISTORY: Intractable low back pain TECHNOLOGIST PROVIDED HISTORY: Intractable low back pain Reason for Exam: pt states chronic low back pain Acuity: Chronic Type of Exam: Ongoing FINDINGS: Lumbar spine: Normal lumbar lordosis. T12 and L1 kyphoplasty noted. There is about 50% decrease height of L5 vertebral body compatible with compression fracture. Age indeterminate.   Probably nonacute but should be correlated with point tenderness. MRI would be helpful for optimal characterization. Multilevel facet arthropathy. SI joints show degenerative changes. No paraspinal mass. Atherosclerotic calcifications in the aorta. Chest x-ray: Low lung volumes. Patchy opacities left lower lung possibly crowding of vessels but developing infiltrate not excluded. Short interval follow-up advised. Also correlate with auscultation. No pleural effusion or pneumothorax. Bones grossly intact. LUMBAR SPINE: 1. Age indeterminate, probably nonacute, compression fracture of L5 vertebral body with about 50% decrease height. Please correlate with point tenderness. MRI may also be considered for age characterization if deemed clinically necessary. 2. T12 and L1 kyphoplasty. CHEST X-RAY: 1. Poor inspiratory afford with significantly decreased lung volumes. 2. Patchy opacities left lower lung probably due to crowding of vessels with or without underlying developing infiltrate. Please correlate with auscultation. Short interval follow-up may also be considered. XR CHEST PORTABLE    Result Date: 10/24/2021  EXAMINATION: THREE XRAY VIEWS OF THE LUMBAR SPINE; ONE XRAY VIEW OF THE CHEST 10/24/2021 7:04 am COMPARISON: Chest x-ray 03/09/2013 HISTORY: ORDERING SYSTEM PROVIDED HISTORY: Intractable low back pain TECHNOLOGIST PROVIDED HISTORY: Intractable low back pain Reason for Exam: pt states chronic low back pain Acuity: Chronic Type of Exam: Ongoing FINDINGS: Lumbar spine: Normal lumbar lordosis. T12 and L1 kyphoplasty noted. There is about 50% decrease height of L5 vertebral body compatible with compression fracture. Age indeterminate. Probably nonacute but should be correlated with point tenderness. MRI would be helpful for optimal characterization. Multilevel facet arthropathy. SI joints show degenerative changes. No paraspinal mass. Atherosclerotic calcifications in the aorta. Chest x-ray: Low lung volumes. Patchy opacities left lower lung possibly crowding of vessels but developing infiltrate not excluded. Short interval follow-up advised. Also correlate with auscultation. No pleural effusion or pneumothorax. Bones grossly intact. LUMBAR SPINE: 1. Age indeterminate, probably nonacute, compression fracture of L5 vertebral body with about 50% decrease height. Please correlate with point tenderness. MRI may also be considered for age characterization if deemed clinically necessary. 2. T12 and L1 kyphoplasty. CHEST X-RAY: 1. Poor inspiratory afford with significantly decreased lung volumes. 2. Patchy opacities left lower lung probably due to crowding of vessels with or without underlying developing infiltrate. Please correlate with auscultation. Short interval follow-up may also be considered. CT CHEST PULMONARY EMBOLISM W CONTRAST    Result Date: 10/24/2021  EXAMINATION: CTA OF THE CHEST 10/24/2021 10:12 am TECHNIQUE: CTA of the chest was performed after the administration of intravenous contrast.  Multiplanar reformatted images are provided for review. MIP images are provided for review. Dose modulation, iterative reconstruction, and/or weight based adjustment of the mA/kV was utilized to reduce the radiation dose to as low as reasonably achievable. COMPARISON: None. HISTORY: ORDERING SYSTEM PROVIDED HISTORY: Shortness of breath proxy is, elevated D-dimer, TECHNOLOGIST PROVIDED HISTORY: Shortness of breath proxy is, elevated D-dimer, Decision Support Exception - unselect if not a suspected or confirmed emergency medical condition->Emergency Medical Condition (MA) Reason for Exam: d dimer 1.6, sob Acuity: Acute Type of Exam: Initial FINDINGS: Pulmonary Arteries: Pulmonary arteries show no evidence of intraluminal filling defect to suggest pulmonary embolism. Main pulmonary artery is normal in caliber. Mediastinum: Cardiomegaly. Normal caliber aorta. Mild atherosclerotic disease.   No significant lymphadenopathy. Thyroid gland and esophagus grossly normal. Lungs/pleura: Decreased lung volumes. Patchy subsegmental atelectasis at lung bases manifested by scattered thick and thin bandlike consolidative densities along with some confluent subpleural densities. This involve both lower lobes as well as the inferior lingula. There is trace bilateral pleural fluid. The rest of lungs demonstrates geographic areas of ground-glass attenuation with interspersed uninvolved areas. Probable combination of interstitial edema and air trapping. No pneumothorax. Upper Abdomen: Cholelithiasis. No adrenal mass. Soft Tissues/Bones: No acute abnormality of the bones. The superficial soft tissues show no significant abnormalities. 1. No evidence for acute pulmonary embolism. 2. Scattered bibasilar patchy subsegmental atelectasis and trace bilateral pleural effusions. 3. Borderline cardiomegaly. 4. Large areas of geographic ground-glass attenuation interspersed with more lucent areas probably combination of air trapping and pulmonary interstitial edema. 5. Cholelithiasis. Interpretation per the Radiologist below, if available at the time of this note:      XR LUMBAR SPINE (2-3 VIEWS)    Result Date: 10/24/2021  EXAMINATION: THREE XRAY VIEWS OF THE LUMBAR SPINE; ONE XRAY VIEW OF THE CHEST 10/24/2021 7:04 am COMPARISON: Chest x-ray 03/09/2013 HISTORY: ORDERING SYSTEM PROVIDED HISTORY: Intractable low back pain TECHNOLOGIST PROVIDED HISTORY: Intractable low back pain Reason for Exam: pt states chronic low back pain Acuity: Chronic Type of Exam: Ongoing FINDINGS: Lumbar spine: Normal lumbar lordosis. T12 and L1 kyphoplasty noted. There is about 50% decrease height of L5 vertebral body compatible with compression fracture. Age indeterminate. Probably nonacute but should be correlated with point tenderness. MRI would be helpful for optimal characterization. Multilevel facet arthropathy.   SI joints show degenerative auscultation. No pleural effusion or pneumothorax. Bones grossly intact. LUMBAR SPINE: 1. Age indeterminate, probably nonacute, compression fracture of L5 vertebral body with about 50% decrease height. Please correlate with point tenderness. MRI may also be considered for age characterization if deemed clinically necessary. 2. T12 and L1 kyphoplasty. CHEST X-RAY: 1. Poor inspiratory afford with significantly decreased lung volumes. 2. Patchy opacities left lower lung probably due to crowding of vessels with or without underlying developing infiltrate. Please correlate with auscultation. Short interval follow-up may also be considered. LABS:  Results for orders placed or performed during the hospital encounter of 10/24/21   COVID-19, Rapid    Specimen: Nasopharyngeal Swab   Result Value Ref Range    Specimen Description . NASOPHARYNGEAL SWAB     SARS-CoV-2, Rapid Not Detected Not Detected   CBC Auto Differential   Result Value Ref Range    WBC 11.6 (H) 3.5 - 11.0 k/uL    RBC 4.53 4.0 - 5.2 m/uL    Hemoglobin 10.8 (L) 12.0 - 16.0 g/dL    Hematocrit 35.8 (L) 36 - 46 %    MCV 79.2 (L) 80 - 100 fL    MCH 23.9 (L) 26 - 34 pg    MCHC 30.2 (L) 31 - 37 g/dL    RDW 16.9 (H) 12.5 - 15.4 %    Platelets 607 573 - 557 k/uL    MPV 7.1 6.0 - 12.0 fL    NRBC Automated NOT REPORTED per 100 WBC    Differential Type NOT REPORTED     Immature Granulocytes NOT REPORTED 0 %    Absolute Immature Granulocyte NOT REPORTED 0.00 - 0.30 k/uL    WBC Morphology NOT REPORTED     RBC Morphology NOT REPORTED     Platelet Estimate NOT REPORTED     Seg Neutrophils 87 (H) 36 - 66 %    Lymphocytes 6 (L) 24 - 44 %    Monocytes 3 2 - 11 %    Eosinophils % 2 1 - 4 %    Basophils 2 0 - 2 %    Segs Absolute 10.10 (H) 1.8 - 7.7 k/uL    Absolute Lymph # 0.70 (L) 1.0 - 4.8 k/uL    Absolute Mono # 0.40 0.1 - 1.2 k/uL    Absolute Eos # 0.20 0.0 - 0.4 k/uL    Basophils Absolute 0.20 0.0 - 0.2 k/uL   Basic Metabolic Panel w/ Reflex to MG   Result Value Ref Range    Glucose 175 (H) 70 - 99 mg/dL    BUN 15 8 - 23 mg/dL    CREATININE 0.80 0.50 - 0.90 mg/dL    Bun/Cre Ratio NOT REPORTED 9 - 20    Calcium 9.6 8.6 - 10.4 mg/dL    Sodium 138 135 - 144 mmol/L    Potassium 4.3 3.7 - 5.3 mmol/L    Chloride 102 98 - 107 mmol/L    CO2 24 20 - 31 mmol/L    Anion Gap 12 9 - 17 mmol/L    GFR Non-African American >60 >60 mL/min    GFR African American >60 >60 mL/min    GFR Comment          GFR Staging NOT REPORTED    Urinalysis Reflex to Culture    Specimen: Urine, clean catch   Result Value Ref Range    Color, UA Yellow Yellow    Turbidity UA Clear Clear    Glucose, Ur NEGATIVE NEGATIVE    Bilirubin Urine MODERATE (A) NEGATIVE    Ketones, Urine LARGE (A) NEGATIVE    Specific Gravity, UA 1.022 1.005 - 1.030    Urine Hgb LARGE (A) NEGATIVE    pH, UA 5.5 5.0 - 8.0    Protein, UA 2+ (A) NEGATIVE    Urobilinogen, Urine Normal Normal    Nitrite, Urine NEGATIVE NEGATIVE    Leukocyte Esterase, Urine NEGATIVE NEGATIVE    Urinalysis Comments Microscopic performed on uncentrifuged urine -     Microscopic Urinalysis   Result Value Ref Range    -          WBC, UA None 0 - 5 /HPF    RBC, UA 0 TO 2 0 - 2 /HPF    Casts UA NOT REPORTED 0 - 2 /LPF    Crystals, UA NOT REPORTED None /HPF    Epithelial Cells UA 2 TO 5 0 - 5 /HPF    Renal Epithelial, UA NOT REPORTED 0 /HPF    Bacteria, UA FEW (A) None    Mucus, UA NOT REPORTED None    Trichomonas, UA NOT REPORTED None    Amorphous, UA 1+ (A) None    Other Observations UA (A) NOT REQ. Utilizing a urinalysis as the only screening method to exclude a potential uropathogen can be unreliable in many patient populations. Rapid screening tests are less sensitive than culture and if UTI is a clinical possibility, culture should be considered despite a negative urinalysis.     Yeast, UA NOT REPORTED None   D-Dimer, Quantitative   Result Value Ref Range    D-Dimer, Quant 1.60 mg/L FEU   Brain Natriuretic Peptide   Result Value Ref Range    Pro-BNP 4,016 (H) <300 pg/mL    BNP Interpretation NOT REPORTED    Lactic Acid   Result Value Ref Range    Lactic Acid 1.4 0.5 - 2.2 mmol/L   Troponin   Result Value Ref Range    Troponin, High Sensitivity 104 (HH) 0 - 14 ng/L    Troponin T NOT REPORTED <0.03 ng/mL    Troponin Interp NOT REPORTED    Lactic Acid   Result Value Ref Range    Lactic Acid 1.1 0.5 - 2.2 mmol/L   Protime-INR   Result Value Ref Range    Protime 11.7 9.4 - 12.6 sec    INR 1.1    APTT   Result Value Ref Range    PTT 29.0 21.3 - 31.3 sec   Troponin   Result Value Ref Range    Troponin, High Sensitivity 103 (HH) 0 - 14 ng/L    Troponin T NOT REPORTED <0.03 ng/mL    Troponin Interp NOT REPORTED    APTT   Result Value Ref Range    PTT 37.2 (H) 23.9 - 33.8 sec   Anti-Xa Unfract Heparin   Result Value Ref Range    Anti-XA Unfrac Heparin 0.83 (HH) 0.30 - 0.70 IU/L   Anti-Xa Unfract Heparin   Result Value Ref Range    Anti-XA Unfrac Heparin 0.57 0.30 - 0.70 IU/L   CBC   Result Value Ref Range    WBC 11.6 (H) 3.5 - 11.3 k/uL    RBC 3.77 (L) 3.95 - 5.11 m/uL    Hemoglobin 8.8 (L) 11.9 - 15.1 g/dL    Hematocrit 30.7 (L) 36.3 - 47.1 %    MCV 81.4 (L) 82.6 - 102.9 fL    MCH 23.3 (L) 25.2 - 33.5 pg    MCHC 28.7 28.4 - 34.8 g/dL    RDW 15.1 (H) 11.8 - 14.4 %    Platelets 843 763 - 900 k/uL    MPV 9.7 8.1 - 13.5 fL    NRBC Automated 0.2 (H) 0.0 per 100 WBC   Comprehensive Metabolic Panel w/ Reflex to MG   Result Value Ref Range    Glucose 340 (H) 70 - 99 mg/dL    BUN 17 8 - 23 mg/dL    CREATININE 0.91 (H) 0.50 - 0.90 mg/dL    Bun/Cre Ratio 19 9 - 20    Calcium 8.5 (L) 8.6 - 10.4 mg/dL    Sodium 140 135 - 144 mmol/L    Potassium 4.5 3.7 - 5.3 mmol/L    Chloride 102 98 - 107 mmol/L    CO2 23 20 - 31 mmol/L    Anion Gap 15 9 - 17 mmol/L    Alkaline Phosphatase 83 35 - 104 U/L    ALT <5 (L) 5 - 33 U/L    AST 9 <32 U/L    Total Bilirubin 0.34 0.3 - 1.2 mg/dL    Total Protein 5.3 (L) 6.4 - 8.3 g/dL    Albumin 2.9 (L) 3.5 - 5.2 g/dL    Albumin/Globulin Ratio NOT REPORTED 1.0 - 2.5    GFR Non-African American >60 >60 mL/min    GFR African American >60 >60 mL/min    GFR Comment          GFR Staging NOT REPORTED    Lipid Panel   Result Value Ref Range    Cholesterol 128 <200 mg/dL    HDL 40 (L) >40 mg/dL    LDL Cholesterol 67 0 - 130 mg/dL    Chol/HDL Ratio 3.2 <5    Triglycerides 107 <150 mg/dL    VLDL NOT REPORTED 1 - 30 mg/dL   Anti-Xa Unfract Heparin   Result Value Ref Range    Anti-XA Unfrac Heparin 0.49 0.30 - 0.70 IU/L   Troponin   Result Value Ref Range    Troponin, High Sensitivity 82 (HH) 0 - 14 ng/L    Troponin T NOT REPORTED <0.03 ng/mL    Troponin Interp NOT REPORTED    CBC   Result Value Ref Range    WBC 10.8 3.5 - 11.3 k/uL    RBC 3.53 (L) 3.95 - 5.11 m/uL    Hemoglobin 8.2 (L) 11.9 - 15.1 g/dL    Hematocrit 29.4 (L) 36.3 - 47.1 %    MCV 83.3 82.6 - 102.9 fL    MCH 23.2 (L) 25.2 - 33.5 pg    MCHC 27.9 (L) 28.4 - 34.8 g/dL    RDW 15.4 (H) 11.8 - 14.4 %    Platelets 876 025 - 083 k/uL    MPV 10.1 8.1 - 13.5 fL    NRBC Automated 0.2 (H) 0.0 per 100 WBC   Basic Metabolic Panel w/ Reflex to MG   Result Value Ref Range    Glucose 307 (H) 70 - 99 mg/dL    BUN 21 8 - 23 mg/dL    CREATININE 0.77 0.50 - 0.90 mg/dL    Bun/Cre Ratio 27 (H) 9 - 20    Calcium 8.1 (L) 8.6 - 10.4 mg/dL    Sodium 139 135 - 144 mmol/L    Potassium 4.1 3.7 - 5.3 mmol/L    Chloride 105 98 - 107 mmol/L    CO2 26 20 - 31 mmol/L    Anion Gap 8 (L) 9 - 17 mmol/L    GFR Non-African American >60 >60 mL/min    GFR African American >60 >60 mL/min    GFR Comment          GFR Staging NOT REPORTED    Anti-Xa Unfract Heparin   Result Value Ref Range    Anti-XA Unfrac Heparin 0.27 (L) 0.30 - 0.70 IU/L   POC Glucose Fingerstick   Result Value Ref Range    POC Glucose 212 (H) 65 - 105 mg/dL   POC Glucose Fingerstick   Result Value Ref Range    POC Glucose 502 (HH) 65 - 105 mg/dL   POC Glucose Fingerstick   Result Value Ref Range    POC Glucose 299 (H) 65 - 105 mg/dL   POC Glucose Fingerstick   Result Value Ref Range POC Glucose 279 (H) 65 - 105 mg/dL   POC Glucose Fingerstick   Result Value Ref Range    POC Glucose 333 (H) 65 - 105 mg/dL   POC Glucose Fingerstick   Result Value Ref Range    POC Glucose 290 (H) 65 - 105 mg/dL   POC Glucose Fingerstick   Result Value Ref Range    POC Glucose 274 (H) 65 - 105 mg/dL   POC Glucose Fingerstick   Result Value Ref Range    POC Glucose 335 (H) 65 - 105 mg/dL   EKG 12 Lead   Result Value Ref Range    Ventricular Rate 88 BPM    Atrial Rate 88 BPM    P-R Interval 144 ms    QRS Duration 122 ms    Q-T Interval 412 ms    QTc Calculation (Bazett) 498 ms    P Axis 30 degrees    R Axis -64 degrees    T Axis -35 degrees   EKG 12 lead   Result Value Ref Range    Ventricular Rate 83 BPM    Atrial Rate 83 BPM    P-R Interval 152 ms    QRS Duration 128 ms    Q-T Interval 420 ms    QTc Calculation (Bazett) 493 ms    P Axis 34 degrees    R Axis -38 degrees    T Axis -33 degrees         EMERGENCY DEPARTMENT COURSE:   Vitals:    Vitals:    10/26/21 0820 10/26/21 0900 10/26/21 1000 10/26/21 1100   BP: 130/61 132/66 (!) 91/50 (!) 101/55   Pulse: 74 78 75 73   Resp:       Temp:       TempSrc:       SpO2:  98% 98% 96%   Weight:       Height:         -------------------------  BP: (!) 101/55, Temp: 98.4 °F (36.9 °C), Pulse: 73, Resp: 18      RE-EVALUATION:  9:12 AM EDT when we were getting the patient up to do a trial ambulation and removed her oxygen her O2 sats dropped to 83-84%. This was associated with some expiratory wheezing bilateral.  Patient now reports that she has been having some shortness of breath since Wednesday, 10/20/2021. Is associated with a dry cough and postnasal drip. She also has felt a little tickle in her throat. She has been Covid vaccinated both doses. She states today she noticed a little bit of left upper chest pain. This is worse with her turning or moving. She thought it was part of her fibromyalgia. Patient does have a history of asthma.   We did not get the patient up to ambulate because of the hypoxia. She also had increased pain in her low back just with sitting her up in the bed. 10:33 AM EDT patient's troponin is elevated at 104. EKG did show inferior cardiac injury with ischemia lateral leads. Patient has a non-ST elevation MI. She was started on cardiac heparin protocol. Has elevated D-dimer. We will get a CTA of the chest to rule out PE secondary to her hypoxia. 10:37 AM EDT patient takes Eliquis so the heparin order is being held until we have the results of the PTT PT/INR. Patient was given Lasix 40 mg IV push. Also give her some aspirin. Patient stopped taking her Lasix because she was having difficulty getting up to go to the bathroom. 1100 no beds available at [facility], [address] [place] [address], or [address] [place] [address].  11:36 AM EDT with the house supervisor at [facility].  They will try and find a bed for the patient. Page placed for the hospitalist.  CONSULTS:  12:13 PM EDT blood accepted patient for transfer to [facility].  We are waiting for an available bed. There may be a delay in having an available bed. PROCEDURES:  CRITICAL CARE: There was a high probability of clinically significant/life threatening deterioration in this patient's condition which required my urgent intervention. Total critical care time was 45 minutes. This excludes any time for separately reportable procedures. FINAL IMPRESSION      1. Non-ST elevation MI (NSTEMI) ([address]    2. Chronic bilateral low back pain without sciatica    3. Lymphedema of both lower extremities    4. Acute on chronic congestive heart failure, unspecified heart failure type ([address]          DISPOSITION/PLAN   DISPOSITION    Transferred to [facility]    CONDITION ON DISPOSITION:   stable    PATIENT REFERRED TO:  No follow-up provider specified.  Pt was transferred to [facility] and will follow up with her PCP when discharged    DISCHARGE MEDICATIONS:  Current Discharge Medication List          (Please note that portions of this note were completed with a voicerecognition program.  Efforts were made to edit the dictations but occasionally words are mis-transcribed. )    Sun Finley DO, , MD, F.A.C.E.P.   Attending Emergency Medicine Physician        Rashad Medrano DO  10/26/21 0766

## 2021-10-25 LAB
ALBUMIN SERPL-MCNC: 2.9 G/DL (ref 3.5–5.2)
ALBUMIN/GLOBULIN RATIO: ABNORMAL (ref 1–2.5)
ALP BLD-CCNC: 83 U/L (ref 35–104)
ALT SERPL-CCNC: <5 U/L (ref 5–33)
ANION GAP SERPL CALCULATED.3IONS-SCNC: 15 MMOL/L (ref 9–17)
ANTI-XA UNFRAC HEPARIN: 0.49 IU/L (ref 0.3–0.7)
ANTI-XA UNFRAC HEPARIN: 0.57 IU/L (ref 0.3–0.7)
ANTI-XA UNFRAC HEPARIN: 0.83 IU/L (ref 0.3–0.7)
AST SERPL-CCNC: 9 U/L
BILIRUB SERPL-MCNC: 0.34 MG/DL (ref 0.3–1.2)
BUN BLDV-MCNC: 17 MG/DL (ref 8–23)
BUN/CREAT BLD: 19 (ref 9–20)
CALCIUM SERPL-MCNC: 8.5 MG/DL (ref 8.6–10.4)
CHLORIDE BLD-SCNC: 102 MMOL/L (ref 98–107)
CHOLESTEROL/HDL RATIO: 3.2
CHOLESTEROL: 128 MG/DL
CO2: 23 MMOL/L (ref 20–31)
CREAT SERPL-MCNC: 0.91 MG/DL (ref 0.5–0.9)
EKG ATRIAL RATE: 83 BPM
EKG P AXIS: 34 DEGREES
EKG P-R INTERVAL: 152 MS
EKG Q-T INTERVAL: 420 MS
EKG QRS DURATION: 128 MS
EKG QTC CALCULATION (BAZETT): 493 MS
EKG R AXIS: -38 DEGREES
EKG T AXIS: -33 DEGREES
EKG VENTRICULAR RATE: 83 BPM
GFR AFRICAN AMERICAN: >60 ML/MIN
GFR NON-AFRICAN AMERICAN: >60 ML/MIN
GFR SERPL CREATININE-BSD FRML MDRD: ABNORMAL ML/MIN/{1.73_M2}
GFR SERPL CREATININE-BSD FRML MDRD: ABNORMAL ML/MIN/{1.73_M2}
GLUCOSE BLD-MCNC: 279 MG/DL (ref 65–105)
GLUCOSE BLD-MCNC: 290 MG/DL (ref 65–105)
GLUCOSE BLD-MCNC: 299 MG/DL (ref 65–105)
GLUCOSE BLD-MCNC: 333 MG/DL (ref 65–105)
GLUCOSE BLD-MCNC: 340 MG/DL (ref 70–99)
HCT VFR BLD CALC: 30.7 % (ref 36.3–47.1)
HDLC SERPL-MCNC: 40 MG/DL
HEMOGLOBIN: 8.8 G/DL (ref 11.9–15.1)
LDL CHOLESTEROL: 67 MG/DL (ref 0–130)
LV EF: 70 %
LVEF MODALITY: NORMAL
MCH RBC QN AUTO: 23.3 PG (ref 25.2–33.5)
MCHC RBC AUTO-ENTMCNC: 28.7 G/DL (ref 28.4–34.8)
MCV RBC AUTO: 81.4 FL (ref 82.6–102.9)
NRBC AUTOMATED: 0.2 PER 100 WBC
PDW BLD-RTO: 15.1 % (ref 11.8–14.4)
PLATELET # BLD: 318 K/UL (ref 138–453)
PMV BLD AUTO: 9.7 FL (ref 8.1–13.5)
POTASSIUM SERPL-SCNC: 4.5 MMOL/L (ref 3.7–5.3)
RBC # BLD: 3.77 M/UL (ref 3.95–5.11)
SODIUM BLD-SCNC: 140 MMOL/L (ref 135–144)
TOTAL PROTEIN: 5.3 G/DL (ref 6.4–8.3)
TRIGL SERPL-MCNC: 107 MG/DL
TROPONIN INTERP: ABNORMAL
TROPONIN T: ABNORMAL NG/ML
TROPONIN, HIGH SENSITIVITY: 82 NG/L (ref 0–14)
VLDLC SERPL CALC-MCNC: ABNORMAL MG/DL (ref 1–30)
WBC # BLD: 11.6 K/UL (ref 3.5–11.3)

## 2021-10-25 PROCEDURE — 80061 LIPID PANEL: CPT

## 2021-10-25 PROCEDURE — 36415 COLL VENOUS BLD VENIPUNCTURE: CPT

## 2021-10-25 PROCEDURE — 6370000000 HC RX 637 (ALT 250 FOR IP): Performed by: INTERNAL MEDICINE

## 2021-10-25 PROCEDURE — 2060000000 HC ICU INTERMEDIATE R&B

## 2021-10-25 PROCEDURE — 99232 SBSQ HOSP IP/OBS MODERATE 35: CPT | Performed by: INTERNAL MEDICINE

## 2021-10-25 PROCEDURE — 80053 COMPREHEN METABOLIC PANEL: CPT

## 2021-10-25 PROCEDURE — 93306 TTE W/DOPPLER COMPLETE: CPT

## 2021-10-25 PROCEDURE — 85027 COMPLETE CBC AUTOMATED: CPT

## 2021-10-25 PROCEDURE — 94761 N-INVAS EAR/PLS OXIMETRY MLT: CPT

## 2021-10-25 PROCEDURE — 82947 ASSAY GLUCOSE BLOOD QUANT: CPT

## 2021-10-25 PROCEDURE — 2700000000 HC OXYGEN THERAPY PER DAY

## 2021-10-25 PROCEDURE — 85520 HEPARIN ASSAY: CPT

## 2021-10-25 PROCEDURE — 93010 ELECTROCARDIOGRAM REPORT: CPT | Performed by: INTERNAL MEDICINE

## 2021-10-25 PROCEDURE — 93005 ELECTROCARDIOGRAM TRACING: CPT | Performed by: INTERNAL MEDICINE

## 2021-10-25 PROCEDURE — 84484 ASSAY OF TROPONIN QUANT: CPT

## 2021-10-25 PROCEDURE — 6360000002 HC RX W HCPCS: Performed by: INTERNAL MEDICINE

## 2021-10-25 RX ORDER — ASCORBIC ACID 500 MG
500 TABLET ORAL DAILY
COMMUNITY

## 2021-10-25 RX ORDER — ROSUVASTATIN CALCIUM 10 MG/1
10 TABLET, COATED ORAL DAILY
COMMUNITY

## 2021-10-25 RX ORDER — CHOLECALCIFEROL (VITAMIN D3) 50 MCG
2000 TABLET ORAL DAILY
COMMUNITY

## 2021-10-25 RX ORDER — INSULIN ASPART 100 [IU]/ML
INJECTION, SUSPENSION SUBCUTANEOUS 2 TIMES DAILY WITH MEALS
COMMUNITY

## 2021-10-25 RX ORDER — OXYCODONE HYDROCHLORIDE AND ACETAMINOPHEN 5; 325 MG/1; MG/1
1 TABLET ORAL EVERY 12 HOURS PRN
Status: ON HOLD | COMMUNITY
End: 2021-11-10 | Stop reason: SDUPTHER

## 2021-10-25 RX ORDER — ALBUTEROL SULFATE 90 UG/1
2 AEROSOL, METERED RESPIRATORY (INHALATION) EVERY 4 HOURS PRN
COMMUNITY

## 2021-10-25 RX ORDER — FLUOXETINE HYDROCHLORIDE 40 MG/1
40 CAPSULE ORAL EVERY MORNING
COMMUNITY

## 2021-10-25 RX ORDER — METOPROLOL SUCCINATE 25 MG/1
25 TABLET, EXTENDED RELEASE ORAL DAILY
COMMUNITY

## 2021-10-25 RX ORDER — DEXTROSE MONOHYDRATE 25 G/50ML
12.5 INJECTION, SOLUTION INTRAVENOUS PRN
Status: DISCONTINUED | OUTPATIENT
Start: 2021-10-25 | End: 2021-11-02

## 2021-10-25 RX ORDER — FUROSEMIDE 40 MG/1
40-80 TABLET ORAL SEE ADMIN INSTRUCTIONS
Status: ON HOLD | COMMUNITY
End: 2021-11-09 | Stop reason: HOSPADM

## 2021-10-25 RX ORDER — GABAPENTIN 300 MG/1
300 CAPSULE ORAL 3 TIMES DAILY
COMMUNITY

## 2021-10-25 RX ORDER — DEXTROSE MONOHYDRATE 50 MG/ML
100 INJECTION, SOLUTION INTRAVENOUS PRN
Status: DISCONTINUED | OUTPATIENT
Start: 2021-10-25 | End: 2021-11-02

## 2021-10-25 RX ORDER — NICOTINE POLACRILEX 4 MG
15 LOZENGE BUCCAL PRN
Status: DISCONTINUED | OUTPATIENT
Start: 2021-10-25 | End: 2021-11-02

## 2021-10-25 RX ORDER — FAMOTIDINE 40 MG/1
40 TABLET, FILM COATED ORAL NIGHTLY
COMMUNITY

## 2021-10-25 RX ORDER — FLUTICASONE PROPIONATE 50 MCG
1 SPRAY, SUSPENSION (ML) NASAL DAILY
COMMUNITY

## 2021-10-25 RX ORDER — PREDNISONE 10 MG/1
10 TABLET ORAL DAILY
COMMUNITY

## 2021-10-25 RX ORDER — ROSUVASTATIN CALCIUM 10 MG/1
20 TABLET, COATED ORAL NIGHTLY
Status: DISCONTINUED | OUTPATIENT
Start: 2021-10-25 | End: 2021-11-05

## 2021-10-25 RX ORDER — NYSTATIN 100000 [USP'U]/G
POWDER TOPICAL 2 TIMES DAILY
COMMUNITY

## 2021-10-25 RX ORDER — ACETAMINOPHEN 325 MG/1
650 TABLET ORAL EVERY 6 HOURS PRN
COMMUNITY

## 2021-10-25 RX ADMIN — INSULIN LISPRO 9 UNITS: 100 INJECTION, SOLUTION INTRAVENOUS; SUBCUTANEOUS at 08:15

## 2021-10-25 RX ADMIN — ACETAMINOPHEN 650 MG: 325 TABLET ORAL at 20:10

## 2021-10-25 RX ADMIN — METOPROLOL TARTRATE 25 MG: 25 TABLET, FILM COATED ORAL at 11:42

## 2021-10-25 RX ADMIN — FUROSEMIDE 20 MG: 10 INJECTION, SOLUTION INTRAMUSCULAR; INTRAVENOUS at 17:02

## 2021-10-25 RX ADMIN — HEPARIN SODIUM 9 UNITS/KG/HR: 10000 INJECTION, SOLUTION INTRAVENOUS at 13:21

## 2021-10-25 RX ADMIN — METOPROLOL TARTRATE 25 MG: 25 TABLET, FILM COATED ORAL at 20:10

## 2021-10-25 RX ADMIN — INSULIN LISPRO 9 UNITS: 100 INJECTION, SOLUTION INTRAVENOUS; SUBCUTANEOUS at 12:16

## 2021-10-25 RX ADMIN — INSULIN LISPRO 5 UNITS: 100 INJECTION, SOLUTION INTRAVENOUS; SUBCUTANEOUS at 20:13

## 2021-10-25 RX ADMIN — FUROSEMIDE 20 MG: 10 INJECTION, SOLUTION INTRAMUSCULAR; INTRAVENOUS at 08:14

## 2021-10-25 RX ADMIN — INSULIN LISPRO 12 UNITS: 100 INJECTION, SOLUTION INTRAVENOUS; SUBCUTANEOUS at 16:57

## 2021-10-25 ASSESSMENT — PAIN SCALES - GENERAL: PAINLEVEL_OUTOF10: 10

## 2021-10-25 NOTE — CONSULTS
Section of Cardiology   Consult Note      Reason for Consult: Elevated troponin  Requesting Physician: Sofiya Albright DO    CHIEF COMPLAINT: Severe back pain    History Obtained From:  patient, electronic medical record, patient's nurse    HISTORY OF PRESENT ILLNESS:      The patient is a 61 y.o. female with significant past medical history of diabetes who presents with severe back pain. Patient lives alone and she was found sitting in her chair. Apparently she has been complaining of severe back pain for couple of days. She denies recent trauma or falls. Incidentally she was found to have vertebral fracture. For some reason her troponin was checked and came back elevated at the same level at 104 and 103. At the time I saw her she was sleepy but arousable. She denies any kind of chest pain or shortness of breath. No fever chills or cough. Denies any history of bleeding. The patient has multiple sclerosis and myasthenia gravis which affect her strength and ambulation. The patient does not see a cardiologist and never been told to have any specific heart disease. She was seen by Dr. Jessica Garcia from my office few years back for clearance for procedure on her legs? .  Previous diagnostic testing for coronary artery disease includes: none. Previous history of cardiac disease includes: hypertension. Coronary artery disease risk factors include: advanced age (older than 54 for men, 72 for women), dyslipidemia, hypertension, obesity (BMI >= 30 kg/m2) and sedentary lifestyle.     Past Medical History:    Past Medical History:   Diagnosis Date    Asthma     Diabetes mellitus ([facility]     Fibromyalgia     Headache     Lymphedema of both lower extremities     Myasthenia gravis ([facility]      Past Surgical History:    Past Surgical History:   Procedure Laterality Date    CARPAL TUNNEL RELEASE      FRACTURE SURGERY      TOE AMPUTATION       Home Medications:  Prior to Admission medications    Not on File Current Medications:    Current Facility-Administered Medications   Medication Dose Route Frequency Provider Last Rate Last Admin    rosuvastatin (CRESTOR) tablet 20 mg  20 mg Oral Nightly Triny Lord MD        metoprolol tartrate (LOPRESSOR) tablet 25 mg  25 mg Oral BID Triny Lord MD        sodium chloride flush 0.9 % injection 5-40 mL  5-40 mL IntraVENous 2 times per day Ramana P Blood, DO        sodium chloride flush 0.9 % injection 5-40 mL  5-40 mL IntraVENous PRN Ramana P Blood, DO        0.9 % sodium chloride infusion  25 mL IntraVENous PRN Ramana P Blood, DO        ondansetron (ZOFRAN-ODT) disintegrating tablet 4 mg  4 mg Oral Q8H PRN Ramana P Blood, DO        Or    ondansetron (ZOFRAN) injection 4 mg  4 mg IntraVENous Q6H PRN Ramana P Blood, DO        acetaminophen (TYLENOL) tablet 650 mg  650 mg Oral Q6H PRN Ramana P Blood, DO   650 mg at 10/24/21 2206    Or    acetaminophen (TYLENOL) suppository 650 mg  650 mg Rectal Q6H PRN Ramana P Blood, DO        polyethylene glycol (GLYCOLAX) packet 17 g  17 g Oral Daily PRN Ramana P Blood, DO        aspirin chewable tablet 81 mg  81 mg Oral Daily Ramana P Blood, DO        heparin (porcine) injection 4,000 Units  4,000 Units IntraVENous PRN Ramana P Blood, DO        heparin (porcine) injection 2,000 Units  2,000 Units IntraVENous PRN Ramana P Blood, DO        heparin 25,000 units in dextrose 5% 250 mL (premix) infusion  5-30 Units/kg/hr IntraVENous Continuous Ramana P Blood, DO 8.1 mL/hr at 10/25/21 0811 9 Units/kg/hr at 10/25/21 0811    nitroGLYCERIN (NITROSTAT) SL tablet 0.4 mg  0.4 mg SubLINGual Q5 Min PRN Ramana P Blood, DO        perflutren lipid microspheres (DEFINITY) injection 1.65 mg  1.5 mL IntraVENous ONCE PRN Ramana P Blood, DO        furosemide (LASIX) injection 20 mg  20 mg IntraVENous BID Ramana P Blood, DO   20 mg at 10/25/21 0814    insulin lispro (HUMALOG) injection vial 0-18 Units  0-18 Units SubCUTAneous TID WC Jus Mckeon MD   9 Units at 10/25/21 0815    insulin lispro (HUMALOG) injection vial 0-9 Units  0-9 Units SubCUTAneous Nightly Jus Mckeon MD   9 Units at 10/24/21 2200    influenza quadrivalent split vaccine (FLUZONE;FLUARIX;FLULAVAL;AFLURIA) injection 0.5 mL  0.5 mL IntraMUSCular Prior to discharge Roderickle Vickey Blood, DO         Allergies:  Amoxicillin, Amoxicillin-pot clavulanate, Atorvastatin, Cefuroxime axetil, Clavulanic acid, Codeine, Dextroamphetamine, Adhesive tape, and Cephalosporins    Social History:    Social History     Socioeconomic History    Marital status: Single     Spouse name: Not on file    Number of children: Not on file    Years of education: Not on file    Highest education level: Not on file   Occupational History    Not on file   Tobacco Use    Smoking status: Never Smoker    Smokeless tobacco: Never Used   Substance and Sexual Activity    Alcohol use: Not Currently    Drug use: Not Currently    Sexual activity: Not on file   Other Topics Concern    Not on file   Social History Narrative    Not on file     Social Determinants of Health     Financial Resource Strain:     Difficulty of Paying Living Expenses:    Food Insecurity:     Worried About Running Out of Food in the Last Year:     [address] in the Last Year:    Transportation Needs:     Lack of Transportation (Medical):      Lack of Transportation (Non-Medical):    Physical Activity:     Days of Exercise per Week:     Minutes of Exercise per Session:    Stress:     Feeling of Stress :    Social Connections:     Frequency of Communication with Friends and Family:     Frequency of Social Gatherings with Friends and Family:     Attends Judaism Services:     Active Member of Clubs or Organizations:     Attends Club or Organization Meetings:     Marital Status:    Intimate Partner Violence:     Fear of Current or Ex-Partner:     Emotionally Abused:     Physically Abused:     Sexually Abused:      Family History:   Family History   Problem Relation Age of Onset    Coronary Art Dis Mother     Kidney Disease Mother        · REVIEW OF SYSTEMS   Pertinent information as above. PHYSICAL EXAM:    Vitals:    VITALS:  BP (!) 99/49   Pulse 82   Temp 98.7 °F (37.1 °C) (Oral)   Resp 14   Ht 5' 5\" (1.651 m)   Wt 199 lb (90.3 kg)   SpO2 94%   BMI 33.12 kg/m²   24HR INTAKE/OUTPUT:      Intake/Output Summary (Last 24 hours) at 10/25/2021 1104  Last data filed at 10/25/2021 0830  Gross per 24 hour   Intake 720 ml   Output 1750 ml   Net -1030 ml       CONSTITUTIONAL:  awake, alert, cooperative, no apparent distress, and appears stated age  EYES: Pupils equal, round and reactive to light, extra ocular muscles intact, sclera clear, conjunctiva normal  ENT:  normocepalic, without obvious abnormality  NECK:  supple, symmetrical, trachea midline, no carotid bruit ,   No  JVD  BACK:  symmetric  LUNGS: Non-labored, good air exchange, clear to auscultation bilaterally, no crackles or wheezing  CARDIOVASCULAR:  Normal apical impulse, regular rate and rhythm, normal S1 and S2, no S3 or S4, and no murmur noted, no rub.  radial and bilateralpresent 2+  ABDOMEN:  No scars, normal bowel sounds, soft, non-distended, non-tender,   MUSCULOSKELETAL:  there is no redness, warmth, or swelling of the joints  Minimal leg edema. NEUROLOGIC:  Awake, alert, oriented to name, place and time. SKIN:  no bruising or bleeding, normal skin color, texture, turgor and no jaundice    DATA:   ECG: Normal sinus rhythm, right bundle branch block, no new ST-T changes.   ECHO: Date:  Ordered but still pending  Stress Test:  Not performed to date  Angiography:  Not performed to date    Cardiology Labs:  Recent Labs     10/24/21  0719 10/24/21  0930   TROPONINT NOT REPORTED NOT REPORTED     Warfarin PT/INR:  Lab Results   Component Value Date    PROTIME 11.7 10/24/2021    INR 1.1 10/24/2021     CBC:  Lab Results   Component Value Date    WBC 11.6 10/25/2021    RBC 3.77 10/25/2021    HGB 8.8 10/25/2021    HCT 30.7 10/25/2021    MCV 81.4 10/25/2021    MCH 23.3 10/25/2021    MCHC 28.7 10/25/2021    RDW 15.1 10/25/2021     10/25/2021    MPV 9.7 10/25/2021     CMP:  Lab Results   Component Value Date     10/25/2021    K 4.5 10/25/2021     10/25/2021    CO2 23 10/25/2021    BUN 17 10/25/2021    CREATININE 0.91 10/25/2021    GFRAA >60 10/25/2021    LABGLOM >60 10/25/2021    GLUCOSE 340 10/25/2021    CALCIUM 8.5 10/25/2021     Magnesium:  No results found for: MG  PTT:    Lab Results   Component Value Date    APTT 37.2 10/24/2021     TSH:  No results found for: TSH  BMP:  Lab Results   Component Value Date     10/25/2021    K 4.5 10/25/2021     10/25/2021    CO2 23 10/25/2021    BUN 17 10/25/2021    LABALBU 2.9 10/25/2021    CREATININE 0.91 10/25/2021    CALCIUM 8.5 10/25/2021    GFRAA >60 10/25/2021    LABGLOM >60 10/25/2021    GLUCOSE 340 10/25/2021     LIVER PROFILE:  Recent Labs     10/25/21  0712   AST 9   ALT <5*   LABALBU 2.9*   ALKPHOS 83   BILITOT 0.34   PROT 5.3*   ALBUMIN NOT REPORTED     FLP:    Lab Results   Component Value Date    CHOL 128 10/25/2021    TRIG 107 10/25/2021    HDL 40 10/25/2021    LDLCHOLESTEROL 67 10/25/2021             IMPRESSION  1. Elevated troponin from unknown significance and etiology, patient has no angina and the EKG appears to be stable without ST-T changes. 2.  Insulin-dependent diabetes mellitus  3. Hyperlipidemia  4.   Peripheral vascular disease    Patient Active Problem List   Diagnosis    [facility] (non-ST elevated myocardial infarction) [facility]    Type 2 diabetes mellitus with hyperglycemia, without long-term current use of insulin (HCC)    Class 1 obesity due to excess calories with serious comorbidity and body mass index (BMI) of 33.0 to 33.9 in adult    Lymphedema of both lower extremities    CHF with unknown LVEF ([facility]           RECOMMENDATIONS:     Continue current medications  Management plan was discussed with patient     Troponin was checked twice in a row yesterday morning and then for the last 24 hours normal troponin level so I will added to the morning lab and depending on the results we will decide the next step. In the meanwhile keep her on heparin and aspirin and add Crestor and Lopressor to her regimen. Awaiting echocardiogram.  I do not see immediate need for invasive cardiac work-up however the patient has multiple risk factors for coronary artery disease and may benefit from stress testing after stabilization of her overall status  I discussed the plan with the patient and asked if she would go for invasive cardiac work-up if that needed and she said she wants to think about it. Discussed with the patient's nurse  Prognosis is guarded.       Electronically signed by Triny Lord MD on 10/25/2021 at 11:04 AM     CC: Shelby Ballesteros MD

## 2021-10-25 NOTE — PROGRESS NOTES
Transitions of Care Pharmacy Service   Medication Review    The patient's list of current home medications has been reviewed. Source(s) of information: Patient, Violet, [facility] pharmacy, PCP med list    Based on information provided by the above source(s), I have updated the patient's home med list as described below. Please review the ACTION REQUESTED section of this note below for any discrepancies on current hospital orders. PROVIDER ACTION REQUESTED  Medications that need to be addressed by a physician/nurse practitioner:    Medication Action Requested        Home med list is ready for provider review. Please feel free to call me with any questions about this encounter. Thank you. Juanjose Villa, [facility]   Transitions of Care Pharmacy Service  Phone:  [phone]  Fax: [phone]      Electronically signed by Juanjose Villa [facility] on 10/25/2021 at 6:45 PM       Medications Prior to Admission: gabapentin (NEURONTIN) 300 MG capsule, Take 300 mg by mouth 3 times daily.   insulin aspart protamine-insulin aspart (NOVOLOG MIX 70/30 FLEXPEN) (70-30) 100 UNIT/ML injection, Inject into the skin 2 times daily (with meals) Inject 55 units at breakfast and 45 units at dinner subcutaneously  predniSONE (DELTASONE) 10 MG tablet, Take 10 mg by mouth daily   albuterol sulfate HFA (VENTOLIN HFA) 108 (90 Base) MCG/ACT inhaler, Inhale 2 puffs into the lungs every 4 hours as needed for Wheezing or Shortness of Breath  FLUoxetine (PROZAC) 40 MG capsule, Take 40 mg by mouth every morning  fluticasone (FLONASE) 50 MCG/ACT nasal spray, 1 spray by Each Nostril route daily  metoprolol succinate (TOPROL XL) 25 MG extended release tablet, Take 25 mg by mouth daily  nystatin (MYCOSTATIN) 557730 UNIT/GM powder, Apply topically 2 times daily Apply to affected area twice daily  furosemide (LASIX) 40 MG tablet, Take 40-80 mg by mouth See Admin Instructions Take 1 tablet by mouth every morning, 2 tablets mid day and 1 tablet at bedtime. acetaminophen (TYLENOL) 325 MG tablet, Take 650 mg by mouth every 6 hours as needed for Pain  metFORMIN (GLUCOPHAGE) 1000 MG tablet, Take 1,000 mg by mouth 2 times daily (with meals)  famotidine (PEPCID) 40 MG tablet, Take 40 mg by mouth nightly  vitamin D (CHOLECALCIFEROL) 50 MCG (2000 UT) TABS tablet, Take 2,000 Units by mouth daily  ascorbic acid (VITAMIN C) 500 MG tablet, Take 500 mg by mouth daily  oxyCODONE-acetaminophen (PERCOCET) 5-325 MG per tablet, Take 1 tablet by mouth every 12 hours as needed for Pain.   apixaban (ELIQUIS) 5 MG TABS tablet, Take 5 mg by mouth 2 times daily  rosuvastatin (CRESTOR) 10 MG tablet, Take 10 mg by mouth daily

## 2021-10-25 NOTE — CARE COORDINATION
Case Management Initial Discharge Plan  Priya Diane,             Met with:patient to discuss discharge plans. Information verified: address, contacts, phone number, , insurance Yes  Insurance Provider: Cait    Emergency Contact/Next of Kin name & number: bernarda/cousin     Who are involved in patient's support system? self    PCP: Dale Dobson MD  Date of last visit: 3 weeks ago      Discharge Planning    Living Arrangements:        Home has 1 stories  elevator stairs to climb to get into front door, 0stairs to climb to reach second floor  Location of bedroom/bathroom in home main    Patient able to perform ADL's:Independent    Current Services (outpatient & in home) none  DME equipment: Power w/c, power chair, walker, rollator  DME provider: -    Is patient receiving oral anticoagulation therapy? No    If indicated:   Physician managing anticoagulation treatment:   Where does patient obtain lab work for ATC treatment? Potential Assistance Needed:       Patient agreeable to home care: Yes  Freedom of choice provided:  yes    Prior SNF/Rehab Placement and Facility: [address]  Agreeable to SNF/Rehab: Yes  [place] of choice provided: yes     Evaluation: yes    Expected Discharge date:       Patient expects to be discharged to: If home: is the family and/or caregiver wiling & able to provide support at home? no  Who will be providing this support? self    Follow Up Appointment: Best Day/ Time:      Transportation provider: tyson  Transportation arrangements needed for discharge: Yes    Readmission Risk              Risk of Unplanned Readmission:  12             Does patient have a readmission risk score greater than 14?: No  If yes, follow-up appointment must be made within 7 days of discharge.      Goals of Care:       Educated patient on transitional options, provided freedom of choice and are agreeable with plan      Discharge Plan: NSTEMI  Patient lives alone in a senior independence community. Has an elevator to get in residence. All on main level. Patient uses a power W/C, walker, rollator, and power chair. Has had HC in [place] before. SNF's are [facility] and [address] [place] [address] in [facility]. [facility] is [facility] [address]. Patient is agreeable to a SNF. List provided. Did not want to discuss ACP at this time. Will continue to follow. PT/OT to eval but patient is W/C bound, for the most part. Cardiology is managing medically for now. Continue to follow.           Electronically signed by Gene Hernadez RN on 10/25/21 at 12:50 PM EDT

## 2021-10-25 NOTE — PROGRESS NOTES
[facility]  Office: [address], DO, Kourtney Walker, DO, Melchor aFlcon, DO, Jame Tayla Blood, DO, Gianna Rodriguez MD, Ainsley Burnette MD, Shantanu Velez MD, Aime Saenz MD, Hugo Schulte MD, Lisa Carrillo MD, Sima Wong MD, Farrah Berg MD, Fran Peraza, DO, Terrie Duran, DO, Lillian Naranjo MD,  Gab Argueta DO, Yomi Beltre MD, Rae Beltran MD, Litzy Rhodes MD, Abdulaziz Larios MD, Lisa Johnston MD, Sahara Salcedo MD, [facility], [facility], [facility], [facility], Sumaya Mane, CNP, Emerita Gill, CNS, Shireen Ramos, CNP, Mayur Castorena, CNP, Octavio Farfan, CNP, Brendan Mena, CNP, Dewey Chang, CNP, Mely Carpenter, CNP, Gonzalez Campos PA-C, Prateek Rankin, [facility], Starr Joiner, CNP, Julia Garza, CNP, Arnulfo Spencer, CNP, Myriam Allen, CNP, Rox Rutherford, CNP, Saranya Duvall, CNP, Kristi Gandhi [facility]    Progress Note    10/25/2021    9:51 AM    Name:   Bebeto Louis  MRN:     [id number]     Acct:      [de-identified]   Room:   2034/2034-01   Day:  1  Admit Date:  10/24/2021  6:48 AM    PCP:   Myriam Allen MD  Code Status:  Full Code    Subjective:     C/C:   Chief Complaint   Patient presents with    Back Pain     Interval History Status: improved. No cp/sob  Still has some back pain    Brief History:     Bebeto Louis is a 61 y.o. Non- / non  female who presents with Back Pain   and is admitted to the hospital for the management of NSTEMI (non-ST elevated myocardial infarction) ([facility]    This 61 yof presents with low back pain to the point where she could not get out of recliner. She said she felt a pop while transferring from bed to wheelchair. It is unclear when that was. Starting 2 days pta she had cp and sob, along with nausea. Also c/o hip pain and said all her pain was due to fibromyalgia. Found to have troponin levels~100.     Review of Systems:     Constitutional:  negative for chills, fevers, sweats  Respiratory:  negative for cough, dyspnea on exertion, shortness of breath, wheezing  Cardiovascular:  negative for chest pain, chest pressure/discomfort, lower extremity edema, palpitations  Gastrointestinal:  negative for abdominal pain, constipation, diarrhea, nausea, vomiting  Neurological:  negative for dizziness, headache    Medications: Allergies: Allergies   Allergen Reactions    Amoxicillin Diarrhea and Other (See Comments)    Amoxicillin-Pot Clavulanate Diarrhea    Atorvastatin Other (See Comments)     Other reaction(s): Myalgia  Weakness      Cefuroxime Axetil Other (See Comments)    Clavulanic Acid Diarrhea and Other (See Comments)    Codeine Other (See Comments)     Unless suspended in syrup      Dextroamphetamine     Adhesive Tape Rash     Itching     Cephalosporins Rash       Current Meds:   Scheduled Meds:    sodium chloride flush  5-40 mL IntraVENous 2 times per day    aspirin  81 mg Oral Daily    furosemide  20 mg IntraVENous BID    insulin lispro  0-18 Units SubCUTAneous TID WC    insulin lispro  0-9 Units SubCUTAneous Nightly    influenza virus vaccine  0.5 mL IntraMUSCular Prior to discharge     Continuous Infusions:    sodium chloride      heparin (PORCINE) Infusion 9 Units/kg/hr (10/25/21 0811)     PRN Meds: sodium chloride flush, sodium chloride, ondansetron **OR** ondansetron, acetaminophen **OR** acetaminophen, polyethylene glycol, heparin (porcine), heparin (porcine), nitroGLYCERIN, perflutren lipid microspheres    Data:     Past Medical History:   has a past medical history of Asthma, Diabetes mellitus ([facility] Fibromyalgia, Headache, Lymphedema of both lower extremities, and Myasthenia gravis ([facility] Social History:   reports that she has never smoked. She has never used smokeless tobacco. She reports previous alcohol use. She reports previous drug use.      Family History:   Family History   Problem Relation Age of Onset    Coronary Art Dis Mother     Kidney Disease Mother        Vitals:  BP (!) 99/49   Pulse 82   Temp 98.7 °F (37.1 °C) (Oral)   Resp 14   Ht 5' 5\" (1.651 m)   Wt 199 lb (90.3 kg)   SpO2 94%   BMI 33.12 kg/m²   Temp (24hrs), Av.3 °F (36.8 °C), Min:97.5 °F (36.4 °C), Max:98.7 °F (37.1 °C)    Recent Labs     10/24/21  1650 10/24/21  2115 10/25/21  0752   POCGLU 212* 502* 299*       I/O (24Hr): Intake/Output Summary (Last 24 hours) at 10/25/2021 0951  Last data filed at 10/25/2021 0830  Gross per 24 hour   Intake 720 ml   Output 1750 ml   Net -1030 ml       Labs:  Hematology:  Recent Labs     10/24/21  0719 10/24/21  0930 10/25/21  0712   WBC 11.6*  --  11.6*   RBC 4.53  --  3.77*   HGB 10.8*  --  8.8*   HCT 35.8*  --  30.7*   MCV 79.2*  --  81.4*   MCH 23.9*  --  23.3*   MCHC 30.2*  --  28.7   RDW 16.9*  --  15.1*     --  318   MPV 7.1  --  9.7   INR  --  1.1  --    DDIMER  --  1.60  --      Chemistry:  Recent Labs     10/24/21  0719 10/24/21  0930 10/25/21  07     --  140   K 4.3  --  4.5     --  102   CO2 24  --  23   GLUCOSE 175*  --  340*   BUN 15  --  17   CREATININE 0.80  --  0.91*   ANIONGAP 12  --  15   LABGLOM >60  --  >60   GFRAA >60  --  >60   CALCIUM 9.6  --  8.5*   PROBNP 4,016*  --   --    TROPHS 104* 103*  --      Recent Labs     10/24/21  1650 10/24/21  2115 10/25/21  0712 10/25/21  0752   PROT  --   --  5.3*  --    LABALBU  --   --  2.9*  --    AST  --   --  9  --    ALT  --   --  <5*  --    ALKPHOS  --   --  83  --    BILITOT  --   --  0.34  --    POCGLU 212* 502*  --  299*     ABG:No results found for: POCPH, PHART, PH, POCPCO2, NDB8DWL, PCO2, POCPO2, PO2ART, PO2, POCHCO3, REX2DLK, HCO3, NBEA, PBEA, BEART, BE, THGBART, THB, BWO5TDP, YICG2KFG, Y0MWBHEO, O2SAT, FIO2  No results found for: SPECIAL  No results found for: CULTURE    Radiology:  XR LUMBAR SPINE (2-3 VIEWS)    Result Date: 10/24/2021  LUMBAR SPINE: 1.  Age indeterminate, probably nonacute, compression fracture of L5 vertebral body with about 50% decrease height. Please correlate with point tenderness. MRI may also be considered for age characterization if deemed clinically necessary. 2. T12 and L1 kyphoplasty. CHEST X-RAY: 1. Poor inspiratory afford with significantly decreased lung volumes. 2. Patchy opacities left lower lung probably due to crowding of vessels with or without underlying developing infiltrate. Please correlate with auscultation. Short interval follow-up may also be considered. XR CHEST PORTABLE    Result Date: 10/24/2021  LUMBAR SPINE: 1. Age indeterminate, probably nonacute, compression fracture of L5 vertebral body with about 50% decrease height. Please correlate with point tenderness. MRI may also be considered for age characterization if deemed clinically necessary. 2. T12 and L1 kyphoplasty. CHEST X-RAY: 1. Poor inspiratory afford with significantly decreased lung volumes. 2. Patchy opacities left lower lung probably due to crowding of vessels with or without underlying developing infiltrate. Please correlate with auscultation. Short interval follow-up may also be considered. CT CHEST PULMONARY EMBOLISM W CONTRAST    Result Date: 10/24/2021  1. No evidence for acute pulmonary embolism. 2. Scattered bibasilar patchy subsegmental atelectasis and trace bilateral pleural effusions. 3. Borderline cardiomegaly. 4. Large areas of geographic ground-glass attenuation interspersed with more lucent areas probably combination of air trapping and pulmonary interstitial edema. 5. Cholelithiasis.        Physical Examination:        General appearance:  alert, cooperative and no distress  Mental Status:  oriented to person, place and time and normal affect  Lungs:  crackles bilaterally, normal effort  Heart:  regular rate and rhythm, no murmur  Abdomen:  soft, nontender, nondistended, normal bowel sounds, no masses, hepatomegaly, splenomegaly  Extremities:  no redness, tenderness in the calves; chronic edema  Skin:  no gross lesions, rashes, induration    Assessment:        Hospital Problems         Last Modified POA    * (Principal) NSTEMI (non-ST elevated myocardial infarction) ([facility] 10/24/2021 Yes    Type 2 diabetes mellitus with hyperglycemia, without long-term current use of insulin ([facility] 10/24/2021 Yes    Class 1 obesity due to excess calories with serious comorbidity and body mass index (BMI) of 33.0 to 33.9 in adult 10/24/2021 Yes    Lymphedema of both lower extremities 10/24/2021 Yes    CHF with unknown LVEF ([facility] 10/24/2021 Yes          Plan:        1. Cardio eval pending, may need cardiac cath  2. Resume home meds as appropriate-med rec pharmacist is trying to get list  3.  Pt/ot  4. diuresis    Ramana Albright DO  10/25/2021  9:51 AM

## 2021-10-25 NOTE — FLOWSHEET NOTE
Patient sleeping. Offered silent prayer.         10/25/21 1557   Encounter Summary   Services provided to: Patient   Referral/Consult From: [facility] Unknown   Continue Visiting   (10/25/21)   Complexity of Encounter Low   Length of Encounter 15 minutes   Routine   Type Initial   Assessment Sleeping   Intervention Sustaining presence/ Ministry of presence   Outcome Did not respond

## 2021-10-25 NOTE — PLAN OF CARE
Problem: Falls - Risk of:  Goal: Will remain free from falls  Description: Will remain free from falls  Outcome: Ongoing     Problem: Falls - Risk of:  Goal: Absence of physical injury  Description: Absence of physical injury  Outcome: Ongoing     Problem: Cardiac Output - Decreased:  Goal: Hemodynamic stability will improve  Description: Hemodynamic stability will improve  Outcome: Ongoing     Problem: Pain:  Goal: Pain level will decrease  Description: Pain level will decrease  Outcome: Ongoing     Problem: Pain:  Goal: Control of acute pain  Description: Control of acute pain  Outcome: Ongoing     Problem: Pain:  Goal: Control of chronic pain  Description: Control of chronic pain  Outcome: Ongoing     Problem: Tissue Perfusion - Cardiopulmonary, Altered:  Goal: Circulatory function within specified parameters  Description: Circulatory function within specified parameters  Outcome: Ongoing

## 2021-10-25 NOTE — PLAN OF CARE
Problem: Falls - Risk of:  Goal: Will remain free from falls  Description: Will remain free from falls  10/25/2021 0926 by Peggy Orosco RN  Outcome: Ongoing     Problem: Discharge Planning:  Goal: Discharged to appropriate level of care  Description: Discharged to appropriate level of care  Outcome: Ongoing     Problem: Cardiac Output - Decreased:  Goal: Hemodynamic stability will improve  Description: Hemodynamic stability will improve  10/25/2021 0926 by Peggy Orosco RN  Outcome: Ongoing     Problem: Pain:  Goal: Control of acute pain  Description: Control of acute pain  10/25/2021 0926 by Peggy Orosco RN  Outcome: Ongoing

## 2021-10-26 ENCOUNTER — APPOINTMENT (OUTPATIENT)
Dept: MRI IMAGING | Age: 66
DRG: 233 | End: 2021-10-26
Payer: MEDICARE

## 2021-10-26 PROBLEM — M54.9 INTRACTABLE BACK PAIN: Status: ACTIVE | Noted: 2021-10-26

## 2021-10-26 PROBLEM — Z98.890 HISTORY OF KYPHOPLASTY: Status: ACTIVE | Noted: 2021-10-26

## 2021-10-26 PROBLEM — M47.816 FACET ARTHRITIS, DEGENERATIVE, LUMBAR SPINE: Status: ACTIVE | Noted: 2021-10-26

## 2021-10-26 PROBLEM — M80.00XA AGE-RELATED OSTEOPOROSIS WITH CURRENT PATHOLOGICAL FRACTURE: Status: ACTIVE | Noted: 2021-10-26

## 2021-10-26 PROBLEM — I50.33 ACUTE ON CHRONIC DIASTOLIC CONGESTIVE HEART FAILURE (HCC): Status: ACTIVE | Noted: 2021-10-24

## 2021-10-26 PROBLEM — M43.10 DEGENERATIVE SPONDYLOLISTHESIS: Status: ACTIVE | Noted: 2021-10-26

## 2021-10-26 PROBLEM — M80.88XA PATHOLOGICAL FRACTURE OF THORACIC VERTEBRA DUE TO SECONDARY OSTEOPOROSIS (HCC): Status: ACTIVE | Noted: 2021-10-26

## 2021-10-26 PROBLEM — M80.88XA PATHOLOGICAL FRACTURE OF LUMBAR VERTEBRA DUE TO SECONDARY OSTEOPOROSIS (HCC): Status: ACTIVE | Noted: 2021-10-26

## 2021-10-26 PROBLEM — M51.35 DDD (DEGENERATIVE DISC DISEASE), THORACOLUMBAR: Status: ACTIVE | Noted: 2021-10-26

## 2021-10-26 LAB
ANION GAP SERPL CALCULATED.3IONS-SCNC: 8 MMOL/L (ref 9–17)
ANTI-XA UNFRAC HEPARIN: 0.25 IU/L (ref 0.3–0.7)
ANTI-XA UNFRAC HEPARIN: 0.27 IU/L (ref 0.3–0.7)
ANTI-XA UNFRAC HEPARIN: 0.41 IU/L (ref 0.3–0.7)
BUN BLDV-MCNC: 21 MG/DL (ref 8–23)
BUN/CREAT BLD: 27 (ref 9–20)
CALCIUM SERPL-MCNC: 8.1 MG/DL (ref 8.6–10.4)
CHLORIDE BLD-SCNC: 105 MMOL/L (ref 98–107)
CO2: 26 MMOL/L (ref 20–31)
CREAT SERPL-MCNC: 0.77 MG/DL (ref 0.5–0.9)
GFR AFRICAN AMERICAN: >60 ML/MIN
GFR NON-AFRICAN AMERICAN: >60 ML/MIN
GFR SERPL CREATININE-BSD FRML MDRD: ABNORMAL ML/MIN/{1.73_M2}
GFR SERPL CREATININE-BSD FRML MDRD: ABNORMAL ML/MIN/{1.73_M2}
GLUCOSE BLD-MCNC: 267 MG/DL (ref 65–105)
GLUCOSE BLD-MCNC: 274 MG/DL (ref 65–105)
GLUCOSE BLD-MCNC: 307 MG/DL (ref 70–99)
GLUCOSE BLD-MCNC: 335 MG/DL (ref 65–105)
GLUCOSE BLD-MCNC: 335 MG/DL (ref 65–105)
HCT VFR BLD CALC: 29.4 % (ref 36.3–47.1)
HEMOGLOBIN: 8.2 G/DL (ref 11.9–15.1)
MCH RBC QN AUTO: 23.2 PG (ref 25.2–33.5)
MCHC RBC AUTO-ENTMCNC: 27.9 G/DL (ref 28.4–34.8)
MCV RBC AUTO: 83.3 FL (ref 82.6–102.9)
NRBC AUTOMATED: 0.2 PER 100 WBC
PDW BLD-RTO: 15.4 % (ref 11.8–14.4)
PLATELET # BLD: 322 K/UL (ref 138–453)
PMV BLD AUTO: 10.1 FL (ref 8.1–13.5)
POTASSIUM SERPL-SCNC: 4.1 MMOL/L (ref 3.7–5.3)
RBC # BLD: 3.53 M/UL (ref 3.95–5.11)
SODIUM BLD-SCNC: 139 MMOL/L (ref 135–144)
WBC # BLD: 10.8 K/UL (ref 3.5–11.3)

## 2021-10-26 PROCEDURE — 99233 SBSQ HOSP IP/OBS HIGH 50: CPT | Performed by: NURSE PRACTITIONER

## 2021-10-26 PROCEDURE — 97530 THERAPEUTIC ACTIVITIES: CPT

## 2021-10-26 PROCEDURE — 2700000000 HC OXYGEN THERAPY PER DAY

## 2021-10-26 PROCEDURE — 72146 MRI CHEST SPINE W/O DYE: CPT

## 2021-10-26 PROCEDURE — 6370000000 HC RX 637 (ALT 250 FOR IP)

## 2021-10-26 PROCEDURE — 6360000002 HC RX W HCPCS: Performed by: INTERNAL MEDICINE

## 2021-10-26 PROCEDURE — 85027 COMPLETE CBC AUTOMATED: CPT

## 2021-10-26 PROCEDURE — 6370000000 HC RX 637 (ALT 250 FOR IP): Performed by: NURSE PRACTITIONER

## 2021-10-26 PROCEDURE — 6370000000 HC RX 637 (ALT 250 FOR IP): Performed by: INTERNAL MEDICINE

## 2021-10-26 PROCEDURE — 82947 ASSAY GLUCOSE BLOOD QUANT: CPT

## 2021-10-26 PROCEDURE — 94761 N-INVAS EAR/PLS OXIMETRY MLT: CPT

## 2021-10-26 PROCEDURE — 6360000002 HC RX W HCPCS: Performed by: NURSE PRACTITIONER

## 2021-10-26 PROCEDURE — 72148 MRI LUMBAR SPINE W/O DYE: CPT

## 2021-10-26 PROCEDURE — 97167 OT EVAL HIGH COMPLEX 60 MIN: CPT

## 2021-10-26 PROCEDURE — 82306 VITAMIN D 25 HYDROXY: CPT

## 2021-10-26 PROCEDURE — 36415 COLL VENOUS BLD VENIPUNCTURE: CPT

## 2021-10-26 PROCEDURE — 97163 PT EVAL HIGH COMPLEX 45 MIN: CPT

## 2021-10-26 PROCEDURE — 2060000000 HC ICU INTERMEDIATE R&B

## 2021-10-26 PROCEDURE — 2580000003 HC RX 258: Performed by: INTERNAL MEDICINE

## 2021-10-26 PROCEDURE — 80048 BASIC METABOLIC PNL TOTAL CA: CPT

## 2021-10-26 PROCEDURE — 97535 SELF CARE MNGMENT TRAINING: CPT

## 2021-10-26 PROCEDURE — 85520 HEPARIN ASSAY: CPT

## 2021-10-26 RX ORDER — INSULIN ASPART 100 [IU]/ML
45 INJECTION, SUSPENSION SUBCUTANEOUS
Status: DISCONTINUED | OUTPATIENT
Start: 2021-10-26 | End: 2021-10-26 | Stop reason: CLARIF

## 2021-10-26 RX ORDER — PREDNISONE 1 MG/1
10 TABLET ORAL DAILY
Status: DISCONTINUED | OUTPATIENT
Start: 2021-10-26 | End: 2021-11-03

## 2021-10-26 RX ORDER — FLUOXETINE HYDROCHLORIDE 20 MG/1
40 CAPSULE ORAL EVERY MORNING
Status: DISCONTINUED | OUTPATIENT
Start: 2021-10-26 | End: 2021-11-10 | Stop reason: HOSPADM

## 2021-10-26 RX ORDER — INSULIN GLARGINE 100 [IU]/ML
56 INJECTION, SOLUTION SUBCUTANEOUS DAILY
Status: DISCONTINUED | OUTPATIENT
Start: 2021-10-26 | End: 2021-11-07

## 2021-10-26 RX ORDER — INSULIN ASPART 100 [IU]/ML
55 INJECTION, SUSPENSION SUBCUTANEOUS
Status: DISCONTINUED | OUTPATIENT
Start: 2021-10-27 | End: 2021-10-26 | Stop reason: CLARIF

## 2021-10-26 RX ORDER — LORAZEPAM 2 MG/ML
0.5 INJECTION INTRAMUSCULAR ONCE
Status: COMPLETED | OUTPATIENT
Start: 2021-10-26 | End: 2021-10-26

## 2021-10-26 RX ORDER — OXYCODONE HYDROCHLORIDE AND ACETAMINOPHEN 5; 325 MG/1; MG/1
1 TABLET ORAL EVERY 12 HOURS PRN
Status: DISCONTINUED | OUTPATIENT
Start: 2021-10-26 | End: 2021-11-02

## 2021-10-26 RX ORDER — GABAPENTIN 300 MG/1
300 CAPSULE ORAL 3 TIMES DAILY
Status: DISCONTINUED | OUTPATIENT
Start: 2021-10-26 | End: 2021-11-10 | Stop reason: HOSPADM

## 2021-10-26 RX ORDER — METOPROLOL SUCCINATE 25 MG/1
25 TABLET, EXTENDED RELEASE ORAL DAILY
Status: DISCONTINUED | OUTPATIENT
Start: 2021-10-27 | End: 2021-11-02

## 2021-10-26 RX ADMIN — INSULIN LISPRO 9 UNITS: 100 INJECTION, SOLUTION INTRAVENOUS; SUBCUTANEOUS at 08:19

## 2021-10-26 RX ADMIN — HEPARIN SODIUM 11 UNITS/KG/HR: 10000 INJECTION, SOLUTION INTRAVENOUS at 17:34

## 2021-10-26 RX ADMIN — METOPROLOL TARTRATE 25 MG: 25 TABLET, FILM COATED ORAL at 21:22

## 2021-10-26 RX ADMIN — FLUOXETINE 40 MG: 20 CAPSULE ORAL at 10:10

## 2021-10-26 RX ADMIN — INSULIN LISPRO 10 UNITS: 100 INJECTION, SOLUTION INTRAVENOUS; SUBCUTANEOUS at 17:35

## 2021-10-26 RX ADMIN — FUROSEMIDE 20 MG: 10 INJECTION, SOLUTION INTRAMUSCULAR; INTRAVENOUS at 17:35

## 2021-10-26 RX ADMIN — GABAPENTIN 300 MG: 300 CAPSULE ORAL at 21:22

## 2021-10-26 RX ADMIN — ASPIRIN 81 MG: 81 TABLET, CHEWABLE ORAL at 08:20

## 2021-10-26 RX ADMIN — SODIUM CHLORIDE, PRESERVATIVE FREE 10 ML: 5 INJECTION INTRAVENOUS at 08:20

## 2021-10-26 RX ADMIN — GABAPENTIN 300 MG: 300 CAPSULE ORAL at 14:47

## 2021-10-26 RX ADMIN — METFORMIN HYDROCHLORIDE 1000 MG: 500 TABLET ORAL at 17:35

## 2021-10-26 RX ADMIN — PREDNISONE 10 MG: 5 TABLET ORAL at 10:10

## 2021-10-26 RX ADMIN — GABAPENTIN 300 MG: 300 CAPSULE ORAL at 00:55

## 2021-10-26 RX ADMIN — INSULIN LISPRO 12 UNITS: 100 INJECTION, SOLUTION INTRAVENOUS; SUBCUTANEOUS at 12:50

## 2021-10-26 RX ADMIN — HEPARIN SODIUM 2000 UNITS: 1000 INJECTION INTRAVENOUS; SUBCUTANEOUS at 20:25

## 2021-10-26 RX ADMIN — GABAPENTIN 300 MG: 300 CAPSULE ORAL at 08:21

## 2021-10-26 RX ADMIN — FUROSEMIDE 20 MG: 10 INJECTION, SOLUTION INTRAMUSCULAR; INTRAVENOUS at 08:20

## 2021-10-26 RX ADMIN — LORAZEPAM 0.5 MG: 2 INJECTION INTRAMUSCULAR; INTRAVENOUS at 15:07

## 2021-10-26 RX ADMIN — METOPROLOL TARTRATE 25 MG: 25 TABLET, FILM COATED ORAL at 08:20

## 2021-10-26 RX ADMIN — HEPARIN SODIUM 2000 UNITS: 1000 INJECTION INTRAVENOUS; SUBCUTANEOUS at 08:19

## 2021-10-26 RX ADMIN — ACETAMINOPHEN 650 MG: 325 TABLET ORAL at 08:57

## 2021-10-26 RX ADMIN — INSULIN GLARGINE 45 UNITS: 100 INJECTION, SOLUTION SUBCUTANEOUS at 17:36

## 2021-10-26 ASSESSMENT — PAIN SCALES - GENERAL
PAINLEVEL_OUTOF10: 5
PAINLEVEL_OUTOF10: 5

## 2021-10-26 ASSESSMENT — PAIN DESCRIPTION - ONSET: ONSET: GRADUAL

## 2021-10-26 ASSESSMENT — PAIN DESCRIPTION - DESCRIPTORS: DESCRIPTORS: HEADACHE

## 2021-10-26 ASSESSMENT — PAIN - FUNCTIONAL ASSESSMENT: PAIN_FUNCTIONAL_ASSESSMENT: ACTIVITIES ARE NOT PREVENTED

## 2021-10-26 ASSESSMENT — PAIN DESCRIPTION - PAIN TYPE: TYPE: ACUTE PAIN

## 2021-10-26 NOTE — PLAN OF CARE
Problem: Falls - Risk of:  Goal: Will remain free from falls  Description: Will remain free from falls  Outcome: Ongoing  Goal: Absence of physical injury  Description: Absence of physical injury  Outcome: Ongoing     Problem: Discharge Planning:  Goal: Discharged to appropriate level of care  Description: Discharged to appropriate level of care  Outcome: Ongoing     Problem: Cardiac Output - Decreased:  Goal: Hemodynamic stability will improve  Description: Hemodynamic stability will improve  Outcome: Ongoing     Problem: Pain:  Goal: Pain level will decrease  Description: Pain level will decrease  Outcome: Ongoing  Goal: Control of acute pain  Description: Control of acute pain  Outcome: Ongoing  Goal: Control of chronic pain  Description: Control of chronic pain  Outcome: Ongoing     Problem: Tissue Perfusion - Cardiopulmonary, Altered:  Goal: Circulatory function within specified parameters  Description: Circulatory function within specified parameters  Outcome: Ongoing     Problem: Skin Integrity:  Goal: Will show no infection signs and symptoms  Description: Will show no infection signs and symptoms  Outcome: Ongoing  Goal: Absence of new skin breakdown  Description: Absence of new skin breakdown  Outcome: Ongoing     Problem: Pain:  Goal: Pain level will decrease  Description: Pain level will decrease  Outcome: Ongoing  Goal: Control of acute pain  Description: Control of acute pain  Outcome: Ongoing  Goal: Control of chronic pain  Description: Control of chronic pain  Outcome: Ongoing     Problem: IP BALANCE  Goal: LTG - patient will maintain standing balance to allow for completion of daily activities  10/26/2021 1817 by Humble Jean-Baptiste RN  Outcome: Ongoing  10/26/2021 1321 by Jaqui Escobar OT  Outcome: Ongoing

## 2021-10-26 NOTE — CONSULTS
Orthopedic Consult    Requesting Physician: Jenny    CHIEF COMPLAINT: Spine pain    HISTORY OF PRESENT ILLNESS:      The patient is a 61 y.o. female  who presents with evaluated second floor [facility] having required ER admission with myocardial infarction, spine pain. Her spine symptoms began without trauma 1 week ago. She reports lumbar pain with radiation into both lower extremities in a sciatic distribution to her knees. There is been no bowel or bladder sphincter incontinence. Her spine history includes fibromyalgia, osteoporosis, fragility fractures and kyphoplasty. No current complaints in her right or left upper extremity. Also no acute symptoms in her right or left lower extremity although she has diabetic neuropathy in the stocking glove distribution. Her past spine history includes [facility] kyphoplasty vertebral augmentation T12-L1. Rheumatology has also helped her with osteoporosis treatment, evaluation including Prolia injections. No rheumatoid arthritis, lupus, gout.     Past Medical History:    Past Medical History:   Diagnosis Date    Asthma     Diabetes mellitus ([facility]     Fibromyalgia     Headache     Lymphedema of both lower extremities     Myasthenia gravis (HCC)        Past Surgical History:    Past Surgical History:   Procedure Laterality Date    CARPAL TUNNEL RELEASE      FRACTURE SURGERY      TOE AMPUTATION         Medications Prior to Admission:   Current Facility-Administered Medications   Medication Dose Route Frequency Provider Last Rate Last Admin    gabapentin (NEURONTIN) capsule 300 mg  300 mg Oral TID [employer], APRN - CNP   300 mg at 10/26/21 1447    FLUoxetine (PROZAC) capsule 40 mg  40 mg Oral QAM Cherelle Wright APRN - NP   40 mg at 10/26/21 1010    [START ON 10/27/2021] metoprolol succinate (TOPROL XL) extended release tablet 25 mg  25 mg Oral Daily Cherelle Wright APRN - NP        metoprolol tartrate (LOPRESSOR) tablet 25 mg  25 mg Oral BID Cherelle Wright, APRN - NP        predniSONE (DELTASONE) tablet 10 mg  10 mg Oral Daily Cherelle Wright, APRN - NP   10 mg at 10/26/21 1010    miconazole (MICOTIN) 2 % powder   Topical BID PRN Cherelle Wright, APRN - NP        oxyCODONE-acetaminophen (PERCOCET) 5-325 MG per tablet 1 tablet  1 tablet Oral Q12H PRN Cherelle Wright, APRN - NP        insulin glargine (LANTUS) injection vial 56 Units  56 Units SubCUTAneous Daily Cherelle Wright, APRN - NP        And    insulin lispro (HUMALOG) injection vial 10 Units  10 Units SubCUTAneous TID WC Cherelle Wright, APRN - NP        rosuvastatin (CRESTOR) tablet 20 mg  20 mg Oral Nightly Jose Alexander MD   20 mg at 10/25/21 2010    glucose (GLUTOSE) 40 % oral gel 15 g  15 g Oral PRN Ramana P Blood, DO        dextrose 50 % IV solution  12.5 g IntraVENous PRN Ramana P Blood, DO        glucagon (rDNA) injection 1 mg  1 mg IntraMUSCular PRN Ramana P Blood, DO        dextrose 5 % solution  100 mL/hr IntraVENous PRN Ramana P Blood, DO        sodium chloride flush 0.9 % injection 5-40 mL  5-40 mL IntraVENous 2 times per day Ramana P Blood, DO   10 mL at 10/26/21 0820    sodium chloride flush 0.9 % injection 5-40 mL  5-40 mL IntraVENous PRN Ramana P Blood, DO        0.9 % sodium chloride infusion  25 mL IntraVENous PRN Ramana P Blood, DO        ondansetron (ZOFRAN-ODT) disintegrating tablet 4 mg  4 mg Oral Q8H PRN Ramana P Blood, DO        Or    ondansetron (ZOFRAN) injection 4 mg  4 mg IntraVENous Q6H PRN Ramana P Blood, DO        acetaminophen (TYLENOL) tablet 650 mg  650 mg Oral Q6H PRN Ramana P Blood, DO   650 mg at 10/26/21 0857    Or    acetaminophen (TYLENOL) suppository 650 mg  650 mg Rectal Q6H PRN Ramana P Blood, DO        polyethylene glycol (GLYCOLAX) packet 17 g  17 g Oral Daily PRN Ramana P Blood, DO        aspirin chewable tablet 81 mg  81 mg Oral Daily Ramana P Blood, DO   81 mg at 10/26/21 0820  heparin (porcine) injection 4,000 Units  4,000 Units IntraVENous PRN Ramana P Blood, DO        heparin (porcine) injection 2,000 Units  2,000 Units IntraVENous PRN Ramana P Blood, DO   2,000 Units at 10/26/21 0819    heparin 25,000 units in dextrose 5% 250 mL (premix) infusion  5-30 Units/kg/hr IntraVENous Continuous Ramana P Blood, DO 9.9 mL/hr at 10/26/21 1358 11 Units/kg/hr at 10/26/21 1358    nitroGLYCERIN (NITROSTAT) SL tablet 0.4 mg  0.4 mg SubLINGual Q5 Min PRN Ramana P Blood, DO        perflutren lipid microspheres (DEFINITY) injection 1.65 mg  1.5 mL IntraVENous ONCE PRN Ramana P Blood, DO        furosemide (LASIX) injection 20 mg  20 mg IntraVENous BID Raamna P Blood, DO   20 mg at 10/26/21 0820    influenza quadrivalent split vaccine (FLUZONE;FLUARIX;FLULAVAL;AFLURIA) injection 0.5 mL  0.5 mL IntraMUSCular Prior to discharge Mary Novoa Blood, DO             Allergies:  Amoxicillin, Amoxicillin-pot clavulanate, Atorvastatin, Cefuroxime axetil, Clavulanic acid, Codeine, Dextroamphetamine, Adhesive tape, and Cephalosporins    Social History:   Social History     Tobacco Use   Smoking Status Never Smoker   Smokeless Tobacco Never Used     Social History     Substance and Sexual Activity   Alcohol Use Not Currently     Social History     Substance and Sexual Activity   Drug Use Not Currently       Family History:  Family History   Problem Relation Age of Onset    Coronary Art Dis Mother     Kidney Disease Mother          REVIEW OF SYSTEMS:  Gen: Negative for nausea, vomiting, diarrhea, fever, chills, night sweats  Heart: Negative for HTN, palpitations, chest pain  Lungs: Negative for wheezes, asthma or SOB  GI: Negative for nausea, vomiting  Endo: Negative for diabetes  Heme: Negative for DVT       PHYSICAL EXAM:  Patient Vitals for the past 24 hrs:   BP Temp Temp src Pulse Resp SpO2 Weight   10/26/21 1305 -- -- -- -- -- 95 % --   10/26/21 1252 -- -- -- -- -- 96 % --   10/26/21 1215 -- 97.6 °F (36.4 °C) Axillary -- -- -- --   10/26/21 1200 (!) 108/58 97.6 °F (36.4 °C) Axillary 75 16 96 % --   10/26/21 1100 (!) 101/55 -- -- 73 -- 96 % --   10/26/21 1000 (!) 91/50 -- -- 75 -- 98 % --   10/26/21 0900 132/66 -- -- 78 -- 98 % --   10/26/21 0820 130/61 -- -- 74 -- -- --   10/26/21 0800 130/61 98.4 °F (36.9 °C) Oral 72 18 99 % --   10/26/21 0700 (!) 113/55 -- -- 75 -- 98 % --   10/26/21 0400 -- 98.7 °F (37.1 °C) Oral -- 16 -- 199 lb (90.3 kg)   10/26/21 0000 118/75 98.8 °F (37.1 °C) Oral 77 16 99 % --   10/25/21 2000 120/64 98.2 °F (36.8 °C) Oral 84 16 98 % --   10/25/21 1627 -- -- -- -- -- 95 % --   10/25/21 1600 -- 98.6 °F (37 °C) Oral -- -- -- --     Gen: alert and oriented  Head: normorcephalic, atraumatic  Neck: supple  Heart: RRR  Lungs: No audible wheezes  Abdomen: soft  Pelvis: stable  Extremity she shows no evidence of lateralizing radiculopathy or myopathy motor or sensory and reflex C5-S1. Well-healed thoracolumbar kyphoplasty incisions. Thoracic hyperkyphosis. Motion deferred due to pain and cardiac reasons. Right and left upper extremity satisfactory. Shoulder, elbow, wrist, hand without edema, ecchymosis, crepitus, pain with active range. Right and left lower extremity also satisfactory. Hip, knee, ankle, foot without edema, ecchymosis, crepitus, pain with active range. DATA:  CBC:   Lab Results   Component Value Date    WBC 10.8 10/26/2021    HGB 8.2 10/26/2021     10/26/2021     BMP:    Lab Results   Component Value Date     10/26/2021    K 4.1 10/26/2021     10/26/2021    CO2 26 10/26/2021    BUN 21 10/26/2021    CREATININE 0.77 10/26/2021    CALCIUM 8.1 10/26/2021    GLUCOSE 307 10/26/2021     PT/INR:    Lab Results   Component Value Date    PROTIME 11.7 10/24/2021    INR 1.1 10/24/2021     Troponin:  No results found for: [address] [place] [address]    Radiology:     Imaging reviewed. 10-24 CT of the chest for bone windows, lumbar radiographs.   They show kyphoplasty vertebral augmentation T12, L1 with superior T12 MMC extrusion and anterior, inferior T11 vertebral body changes. Not bridging spondylophyhts. Degenerative disc disease. Osteoporosis. Lumbar facet arthritis with degenerative spondylolisthesis L4-5. Acute superior, inferior endplate compression L5 with callus. ASSESSMENT:  Principal Problem:    NSTEMI (non-ST elevated myocardial infarction) ([address]  Active Problems:    Type 2 diabetes mellitus with hyperglycemia, without long-term current use of insulin (HCC)    Class 1 obesity due to excess calories with serious comorbidity and body mass index (BMI) of 33.0 to 33.9 in adult    Lymphedema of both lower extremities    CHF with unknown LVEF ([address]    Pathological fracture of thoracic vertebra due to secondary osteoporosis ([address]    Pathological fracture of lumbar vertebra due to secondary osteoporosis (HCC)    Intractable back pain    Age-related osteoporosis with current pathological fracture    History of kyphoplasty  Resolved Problems:    * No resolved hospital problems. *       PLAN:  1) MRI thoracic and lumbar spine without contrast.  Patient requesting sedation so Ativan available    2. Vitamin D 25-hydroxy level    3. Treatment options for acute fracture reviewed. Nonoperative care via LSO versus surgery. Kyphoplasty vertebral augmentation, biopsy could be beneficial to decrease pain, decrease kyphotic deformity, eliminate the need for spine orthosis, obtain tissue diagnosis. We would only proceed in scheduling if patient wishes to and if medically cleared per cardiology. 4.  Pain control    5. Follow closely with you.   Thank you very much for the consultation      Nivia Noguera MD

## 2021-10-26 NOTE — PROGRESS NOTES
Physical Therapy    Facility/Department: [facility]  Initial Assessment    NAME: Carol Martinez  : 1955  MRN: [id number]    Date of Service: 10/26/2021    Discharge Recommendations:    Pt currently functioning below baseline. Would suggest additional therapy at time of discharge to maximize long term outcomes and prevent re-admission. Please refer to AM-PAC score for current level of function. Carol Martinez is a 61 y.o. female who presents low back pain. She has a history of fibromyalgia and chronic back pain. He states the pain has been worse over the last 3 days. She normally takes Percocet to control her pain. Since last night she was not able to get up to take her Percocet. Patient denies any fall or injury. On arrival here she rated her pain as a 10 out of 10. Pain is in the low back and both buttocks area. Is not complaining of having pain over the spinous process. Denies any radiation of the pain to the lower extremities. Patient has a history of lymphedema and had a right great toe amputation 2021. She is wheelchair-bound. Normally she is able to transfer herself from the wheelchair to the bathroom and wheelchair to bed and bed to wheelchair. With the worsening of her back pain she has not been able to transfer herself. On arrival here she had been incontinent of urine because she had not been able to get to the bathroom. She has not eaten much over the weekend because she has not been able to get food. She states that last night she ran out of the water that she had near her bed stand and so she had no water to drink. She lives at an independent senior living facility. Patient also has had wounds to bilateral lower extremities with ulcerations in the past. On 2021 she was followed through wound care clinic at [facility]. She had 3 open wounds that resolved 2021. Since that time she has been going for rehab at [facility] for management of the lymphedema.  She has swelling and pitting edema bilateral lower extremities with seeping blisters. Wounds have healed. She goes Mondays Wednesdays and Fridays and has compression wraps placed on bilateral lower extremities. Because she was not able to get up to go to the bathroom the dressings for the compression wraps became soiled with urine. I resumed care this patient from Dr. Mauricio Caballero. Patient had x-rays taken of the lumbar spine and blood work drawn for me to follow-up on. I expressed my concerns to the patient that if she is not able to get up and transfer to take care of her self that she really is not safe to go home and live independently until her back pain improves. She does have urinary incontinence but she can tell when she needs to go to the bathroom. PER Pt., she states she has MS. Not in [facility] 82 Hx list.  Informed RN. Assessment   Body structures, Functions, Activity limitations: Decreased functional mobility ; Decreased balance;Decreased cognition;Decreased ROM; Decreased strength;Decreased endurance; Increased pain;Decreased posture;Decreased sensation  Assessment: Pt. is very deconditioned and if social functional info. is correct, is far from her baseline. Will continue to progress as able. Specific instructions for Next Treatment: Co-Tx  Prognosis: Good  Decision Making: High Complexity  PT Education: Goals;PT Role;Plan of Care;General Safety;Home Exercise Program;Gait Training;Transfer Training  Barriers to Learning: questionable cognition  REQUIRES PT FOLLOW UP: Yes  Activity Tolerance  Activity Tolerance: Patient limited by fatigue;Patient limited by pain; Patient limited by endurance  Activity Tolerance: Per chart, pt. has compression fx L5. Pt. c/o back pain during sit to stand transfer. States if she does not have surgery she is to get a back brace for pain control. Patient Diagnosis(es): The primary encounter diagnosis was Non-ST elevation MI (NSTEMI) ([facility]  Diagnoses of Chronic bilateral low back pain without sciatica, Lymphedema of both lower extremities, and Acute on chronic congestive heart failure, unspecified heart failure type [facility]) were also pertinent to this visit. has a past medical history of Asthma, Diabetes mellitus ([facility] Fibromyalgia, Headache, Lymphedema of both lower extremities, and Myasthenia gravis ([facility] has a past surgical history that includes Toe amputation; Carpal tunnel release; and fracture surgery. Restrictions  Restrictions/Precautions  Restrictions/Precautions: Fall Risk  Position Activity Restriction  Other position/activity restrictions: purwick catheter, 2L O2, RUE IV, telemetry, act as ksenia, maintain heels off bed AAT's, alarms  Vision/Hearing  Vision: Impaired (pt states she has drainage/ blurry B eyes (L >Rt) Pt states \"eyelash duct problem. \")  Vision Exceptions: Wears glasses for reading (Note pt is unable to fully open B eyes)  Hearing: Exceptions to [facility]  Hearing Exceptions: Hard of hearing/hearing concerns     Subjective  General  Patient assessed for rehabilitation services?: Yes  Pain Screening  Patient Currently in Pain: Yes          Orientation  Orientation  Overall Orientation Status: Within Functional Limits  Social/Functional History  Social/Functional History  Lives With: Alone  Type of Home: Apartment ([facility] senior apt.)  Home Layout: One level  Home Access: Level entry  Bathroom Shower/Tub: Walk-in shower  Bathroom Toilet: Standard  Bathroom Equipment: Grab bars in shower, Shower chair, Toilet raiser, Grab bars around toilet  Bathroom Accessibility: Wheelchair accessible  Home Equipment: [address] bed, Rolling walker, Alert Button, Reacher, Lift chair  Receives Help From: Family (pt states she has a supportive cousin locally)  ADL Assistance: Independent  Homemaking Assistance: Independent (pt states indep with cooking, cleaning and laundry.   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 (*question accuracy of above information based on eval findings today)  Cognition        Objective     Observation/Palpation  Observation: difficulty opening eyes due to \"eyelash duct\" problem (see VISION section);  large bruise L medial elbow  Edema: bilat. LE reddness/ R > L edema/ dry and flaky skin    PROM RLE (degrees)  RLE General PROM: WFL  AROM RLE (degrees)  RLE General AROM: minimal AROM at ankle;  unable to initiate heel slide in supine  AROM LLE (degrees)  LLE AROM : WFL  Strength RLE  Comment: unable to initiate heel slide supine;  DF 2-  Strength LLE  Comment: 3- /5  Strength Other  Other: * See OT eval for UE ROM/MMT    Tone RLE  RLE Tone: Normotonic  Tone LLE  LLE Tone: Hypotonic  Sensation  Overall Sensation Status: Impaired (N/T hands/feet; neuropathy;  states can feel floor when ambulation)  Bed mobility  Supine to Sit: Maximum assistance;2 Person assistance  Sit to Supine: Maximum assistance;2 Person assistance  Scooting: Maximal assistance;2 Person assistance (sitting EOB to scoot fwd. to square up and get feet on floor, as well as in supine)  Comment: MAX verbal instruction/ tactile asist for hand placement on bed rail to assist, proper log rolling tech, pursed lip breathing, scooting fully to EOB and place BLE's on floor and awareness/assist with lines to increase safety.   Transfers  Sit to Stand: Maximum Assistance;2 Person Assistance (x2 from raised bed with LUCERO GABRIELA)  Stand to sit: Moderate Assistance;2 Person Assistance  Comment: Able to achieve full stand within LUCERO STEDY and max x 2, however difficulty with hip extension and unable to maintain; Unable to transfer pt. to chair as initially planned. [place] pt. is used to transferring from her lift chair, which she states she \"slides\" down into standing and gets over to w/c, which is \"higher than a typical w/c\". Balance  Posture: Fair (kyphotic; fwd. head)  Sitting - Static:  (Max A initially to maintain balance; progressed to min A.)  Standing - Static: Poor  Exercises  Comments: edu. for AROM in bed as able, bilat. UEs/ LEs as able     Plan   Plan  Times per week: 1-2x/day; 5-6days/wk  Specific instructions for Next Treatment: Co-Tx  Current Treatment Recommendations: Strengthening, ROM, Balance Training, Functional Mobility Training, Transfer Training, Gait Training, Endurance Training, Stair training, Safety Education & Training, Home Exercise Program, Patient/Caregiver Education & Training  Safety Devices  Type of devices: All fall risk precautions in place, Gait belt, Bed alarm in place, Patient at risk for falls, Call light within reach, Left in bed, Nurse notified    G-Code       OutComes Score                                                  AM-PAC Score   Raw score- 8           Goals  Short term goals  Time Frame for Short term goals: 12 visits:  Short term goal 1: Pt. to require mod x2 for bed mob. (sleeps in lift chair at home)  Short term goal 2: Pt. to require min Ax2 from raised bed with LUCERO OCHOA  (pt. uses lift chair at home)  Short term goal 3: Pt. to have good initial static sitting balance EOB  Short term goal 4: Pt. to have fair dynamic sitting balance  Short term goal 5: Pt. to tolerate 25+ min. of PT for ther ex x 4ext, core strengthening, mobility training  Patient Goals   Patient goals : regain independence       Therapy Time   Individual Concurrent Group Co-treatment   Time In 1141         Time Out 1238         Minutes 57           Treatment time:  47min.      Co-treatment with OT warranted secondary to decreased safety and independence requiring 2 skilled therapy professionals to address individual discipline's goals. PT addressing pre gait trunk strengthening, weight shifting prior to transfers, transfer training and postural control in sitting.          Sheree Eugene, PT

## 2021-10-26 NOTE — PROGRESS NOTES
[facility]  Office: [address], DO, Дмитрий Miller, DO, Kat Abreu, DO, Levon Albright, DO, Fletcher Gomez MD, Lalitha Sesay MD, Madina Hale MD, Flaco Polk MD, Cj Willis MD, Jenni Young MD, Meghana Gilliam MD, Walter Best MD, Carlos Torres DO, Barrett Reveles DO, Jossy Ibarra MD,  Jocelyn Orona DO, Nicho Miranda MD, Ion Montiel MD, Arnoldo Hoyt MD, Faizan Coello MD, Claudeen Hook, MD, Carlo Blunt MD, Neri Jarrett [facility], [facility], [facility], Criselda Elder, CNP, Marylene Bucy, CNS, Gretchen Bailey, CNP, Jah Garcia, Christopher Garcia, CNP, Paola Fitzgerald, CNP, Fredy Baumann, CNP, Shantel Jerry, CNP, SELMA GradyC, Adriana Bruner, [facility], Ziggy Hadley, CNP, Bogdan Briones, CNP, Annabel Mohan, CNP, Dale Dobson CNP, Morena White, CNP, Lainey Anthony CNP, Deejay Rooney    Progress Note    10/26/2021    4:08 PM    Name:   Priya Diane  MRN:     [id number]     Acct:      [de-identified]   Room:   2034/2034-01  IP Day:  2  Admit Date:  10/24/2021  6:48 AM    PCP:   Dale Dobson MD  Code Status:  Full Code    Subjective:     C/C:   Chief Complaint   Patient presents with    Back Pain     Interval History Status: improved. Patient is still having some back pain but denies any other complaints. VSS     Brief History:     Priya Diane is a 61 y.o. Non- / non  female who presents with low Back Pain and is admitted to the hospital for the management of NSTEMI   She said she felt a pop while transferring from bed to wheelchair. It is unclear when that was but was unable to get out of her recliner. Starting 2 days pta she had cp and sob, along with nausea. Also c/o hip pain and said all her pain was due to fibromyalgia.  troponins were elevated     Review of Systems:     Constitutional:  negative for chills, fevers, sweats  Respiratory:  negative for cough, dyspnea on exertion, shortness of breath, wheezing  Cardiovascular:  negative for chest pain, chest pressure/discomfort, lower extremity edema, palpitations  Gastrointestinal:  negative for abdominal pain, constipation, diarrhea, nausea, vomiting  Neurological:  negative for dizziness, headache  + for back pain   Medications: Allergies:     Allergies   Allergen Reactions    Amoxicillin Diarrhea and Other (See Comments)    Amoxicillin-Pot Clavulanate Diarrhea    Atorvastatin Other (See Comments)     Other reaction(s): Myalgia  Weakness      Cefuroxime Axetil Other (See Comments)    Clavulanic Acid Diarrhea and Other (See Comments)    Codeine Other (See Comments)     Unless suspended in syrup      Dextroamphetamine     Adhesive Tape Rash     Itching     Cephalosporins Rash       Current Meds:   Scheduled Meds:    gabapentin  300 mg Oral TID    FLUoxetine  40 mg Oral QAM    [START ON 10/27/2021] metoprolol succinate  25 mg Oral Daily    metoprolol tartrate  25 mg Oral BID    predniSONE  10 mg Oral Daily    insulin glargine  56 Units SubCUTAneous Daily    And    insulin lispro  10 Units SubCUTAneous TID WC    rosuvastatin  20 mg Oral Nightly    sodium chloride flush  5-40 mL IntraVENous 2 times per day    aspirin  81 mg Oral Daily    furosemide  20 mg IntraVENous BID    influenza virus vaccine  0.5 mL IntraMUSCular Prior to discharge     Continuous Infusions:    dextrose      sodium chloride      heparin (PORCINE) Infusion 11 Units/kg/hr (10/26/21 1358)     PRN Meds: miconazole, oxyCODONE-acetaminophen, glucose, dextrose, glucagon (rDNA), dextrose, sodium chloride flush, sodium chloride, ondansetron **OR** ondansetron, acetaminophen **OR** acetaminophen, polyethylene glycol, heparin (porcine), heparin (porcine), nitroGLYCERIN, perflutren lipid microspheres    Data:     Past Medical History:   has a past medical history of Asthma, Diabetes mellitus ([facility] [address] Fibromyalgia, Headache, Lymphedema of both lower extremities, and Myasthenia gravis ([address] Social History:   reports that she has never smoked. She has never used smokeless tobacco. She reports previous alcohol use. She reports previous drug use. Family History:   Family History   Problem Relation Age of Onset    Coronary Art Dis Mother     Kidney Disease Mother        Vitals:  BP (!) 108/58   Pulse 75   Temp 97.6 °F (36.4 °C) (Axillary)   Resp 16   Ht 5' 5\" (1.651 m)   Wt 199 lb (90.3 kg)   SpO2 95%   BMI 33.12 kg/m²   Temp (24hrs), Av.2 °F (36.8 °C), Min:97.6 °F (36.4 °C), Max:98.8 °F (37.1 °C)    Recent Labs     10/25/21  1618 10/25/21  1939 10/26/21  0739 10/26/21  1150   POCGLU 333* 290* 274* 335*       I/O (24Hr):     Intake/Output Summary (Last 24 hours) at 10/26/2021 1608  Last data filed at 10/26/2021 0643  Gross per 24 hour   Intake 886 ml   Output 900 ml   Net -14 ml       Labs:  Hematology:  Recent Labs     10/24/21  0719 10/24/21  0930 10/25/21  0712 10/26/21  0614   WBC 11.6*  --  11.6* 10.8   RBC 4.53  --  3.77* 3.53*   HGB 10.8*  --  8.8* 8.2*   HCT 35.8*  --  30.7* 29.4*   MCV 79.2*  --  81.4* 83.3   MCH 23.9*  --  23.3* 23.2*   MCHC 30.2*  --  28.7 27.9*   RDW 16.9*  --  15.1* 15.4*     --  318 322   MPV 7.1  --  9.7 10.1   INR  --  1.1  --   --    DDIMER  --  1.60  --   --      Chemistry:  Recent Labs     10/24/21  0719 10/24/21  0930 10/25/21  0712 10/25/21  1113 10/26/21  0614     --  140  --  139   K 4.3  --  4.5  --  4.1     --  102  --  105   CO2 24  --    --     GLUCOSE 175*  --  340*  --  307*   BUN 15  --  -     CREATININE 0.80  --  0.91*  --  0.77   ANIONGAP 12  --  15  --  8*   LABGLOM >60  --  >60  --  >60   GFRAA >60  --  >60  --  >60   CALCIUM 9.6  --  8.5*  --  8.1*   PROBNP 4,016*  --   --   --   --    TROPHS 104* 103*  --  82*  --      Recent Labs     10/24/21  2115 10/25/21  0712 10/25/21  0752 10/25/21  1148 10/25/21  1618 10/25/21  1939 10/26/21  0739 10/26/21  1150   PROT  -- 5.3*  --   --   --   --   --   --    LABALBU  --  2.9*  --   --   --   --   --   --    AST  --  9  --   --   --   --   --   --    ALT  --  <5*  --   --   --   --   --   --    ALKPHOS  --  83  --   --   --   --   --   --    BILITOT  --  0.34  --   --   --   --   --   --    CHOL  --  128  --   --   --   --   --   --    HDL  --  40*  --   --   --   --   --   --    LDLCHOLESTEROL  --  67  --   --   --   --   --   --    CHOLHDLRATIO  --  3.2  --   --   --   --   --   --    TRIG  --  107  --   --   --   --   --   --    VLDL  --  NOT REPORTED  --   --   --   --   --   --    POCGLU   < >  --  299* 279* 333* 290* 274* 335*    < > = values in this interval not displayed. ABG:No results found for: POCPH, PHART, PH, POCPCO2, SGA7VMR, PCO2, POCPO2, PO2ART, PO2, POCHCO3, FXJ9KZF, HCO3, NBEA, PBEA, BEART, BE, THGBART, THB, HDC4YVA, TBAZ2EQK, H9RCWPQG, O2SAT, FIO2  No results found for: SPECIAL  No results found for: CULTURE    Radiology:  XR LUMBAR SPINE (2-3 VIEWS)    Result Date: 10/24/2021  LUMBAR SPINE: 1. Age indeterminate, probably nonacute, compression fracture of L5 vertebral body with about 50% decrease height. Please correlate with point tenderness. MRI may also be considered for age characterization if deemed clinically necessary. 2. T12 and L1 kyphoplasty. CHEST X-RAY: 1. Poor inspiratory afford with significantly decreased lung volumes. 2. Patchy opacities left lower lung probably due to crowding of vessels with or without underlying developing infiltrate. Please correlate with auscultation. Short interval follow-up may also be considered. XR CHEST PORTABLE    Result Date: 10/24/2021  LUMBAR SPINE: 1. Age indeterminate, probably nonacute, compression fracture of L5 vertebral body with about 50% decrease height. Please correlate with point tenderness. MRI may also be considered for age characterization if deemed clinically necessary. 2. T12 and L1 kyphoplasty. CHEST X-RAY: 1.  Poor inspiratory afford with significantly decreased lung volumes. 2. Patchy opacities left lower lung probably due to crowding of vessels with or without underlying developing infiltrate. Please correlate with auscultation. Short interval follow-up may also be considered. CT CHEST PULMONARY EMBOLISM W CONTRAST    Result Date: 10/24/2021  1. No evidence for acute pulmonary embolism. 2. Scattered bibasilar patchy subsegmental atelectasis and trace bilateral pleural effusions. 3. Borderline cardiomegaly. 4. Large areas of geographic ground-glass attenuation interspersed with more lucent areas probably combination of air trapping and pulmonary interstitial edema. 5. Cholelithiasis.        Physical Examination:        General appearance:  alert, cooperative and no distress  Mental Status:  oriented to person, place and time and normal affect  Lungs:  crackles bilaterally, normal effort  Heart:  regular rate and rhythm, no murmur  Abdomen:  soft, nontender, nondistended, normal bowel sounds, no masses, hepatomegaly, splenomegaly  Extremities:  no redness, tenderness in the calves; chronic edema  Skin:  no gross lesions, rashes, induration    Assessment:        Hospital Problems         Last Modified POA    * (Principal) NSTEMI (non-ST elevated myocardial infarction) ([address] 10/24/2021 Yes    Type 2 diabetes mellitus with hyperglycemia, without long-term current use of insulin ([address] 10/24/2021 Yes    Class 1 obesity due to excess calories with serious comorbidity and body mass index (BMI) of 33.0 to 33.9 in adult 10/24/2021 Yes    Lymphedema of both lower extremities 10/24/2021 Yes    Acute on chronic diastolic congestive heart failure ([address] 10/26/2021 Yes    Pathological fracture of thoracic vertebra due to secondary osteoporosis ([address] 10/26/2021 Yes    Pathological fracture of lumbar vertebra due to secondary osteoporosis ([address] 10/26/2021 Yes    Intractable back pain 10/26/2021 Yes    Age-related osteoporosis with current pathological fracture 10/26/2021 Yes    History of kyphoplasty 10/26/2021 Yes    Facet arthritis, degenerative, lumbar spine 10/26/2021 Yes    Degenerative spondylolisthesis 10/26/2021 Yes    DDD (degenerative disc disease), thoracolumbar 10/26/2021 Yes          Plan:        1. Cardiology consult reviewed, no plans for stress test or cardiac cath. trops have started to trend down and she is having no further pain   2. Home med list updated, select home meds continued   3. L5 compression fracture on xray, age indeterminate. Will consult ortho surgery for further evaluation   4. Monitor and control blood sugars: home antihyperglycemic meds continued, will monitor  5. Continue PT/OT   6. Monitor labs, replace electrolytes as needed   7. Continue telemetry monitoring   8. Continue IV lasix 20mg BID for diuresis.  EF 70%, mild LVH and mild diastolic dysfunction on ECHO  9. Plan discussed with patient and staff     CYNDY Sultana NP  10/26/2021  4:08 PM

## 2021-10-26 NOTE — PROGRESS NOTES
[START ON 10/27/2021] metoprolol succinate  25 mg Oral Daily    metoprolol tartrate  25 mg Oral BID    predniSONE  10 mg Oral Daily    insulin glargine  56 Units SubCUTAneous Daily    And    insulin lispro  10 Units SubCUTAneous TID     metFORMIN  1,000 mg Oral BID     rosuvastatin  20 mg Oral Nightly    sodium chloride flush  5-40 mL IntraVENous 2 times per day    aspirin  81 mg Oral Daily    furosemide  20 mg IntraVENous BID    influenza virus vaccine  0.5 mL IntraMUSCular Prior to discharge       IV Infusions (if any):   dextrose      sodium chloride      heparin (PORCINE) Infusion 11 Units/kg/hr (10/26/21 0940)       Diagnostics:   EKG: . ECHO: . Normal LV systolic function with presence of diastolic dysfunction  Ejection fraction: 70%  Stress Test: not obtained. Cardiac Angiography: not obtained. Labs:   CBC:   Recent Labs     10/25/21  0712 10/26/21  0614   WBC 11.6* 10.8   HGB 8.8* 8.2*   HCT 30.7* 29.4*    322     BMP:   Recent Labs     10/25/21  0712 10/26/21  0614    139   K 4.5 4.1   CO2 23 26   BUN 17 21   CREATININE 0.91* 0.77   LABGLOM >60 >60   GLUCOSE 340* 307*     No results found for: BNP  PT/INR:   Recent Labs     10/24/21  0930   PROTIME 11.7   INR 1.1     APTT:  Recent Labs     10/24/21  0930 10/24/21  1657   APTT 29.0 37.2*     CARDIAC ENZYMES:  Recent Labs     10/24/21  0719 10/24/21  0930 10/25/21  1113   TROPONINT NOT REPORTED NOT REPORTED NOT REPORTED     FASTING LIPID PANEL:  Lab Results   Component Value Date    HDL 40 10/25/2021    TRIG 107 10/25/2021     LIVER PROFILE:  Recent Labs     10/25/21  0712   AST 9   ALT <5*   LABALBU 2.9*       ASSESSMENT:  1. Elevated troponin from unknown significance and etiology, patient has no angina and the EKG appears to be stable without ST-T changes. 2.  Insulin-dependent diabetes mellitus  3. Hyperlipidemia  4.   Peripheral vascular disease  Patient Active Problem List   Diagnosis    [facility]    Type 2 diabetes mellitus with hyperglycemia, without long-term current use of insulin (HCC)    Class 1 obesity due to excess calories with serious comorbidity and body mass index (BMI) of 33.0 to 33.9 in adult    Lymphedema of both lower extremities    Acute on chronic diastolic congestive heart failure (HCC)    Pathological fracture of thoracic vertebra due to secondary osteoporosis ([facility]    Pathological fracture of lumbar vertebra due to secondary osteoporosis (HCC)    Intractable back pain    Age-related osteoporosis with current pathological fracture    History of kyphoplasty    Facet arthritis, degenerative, lumbar spine    Degenerative spondylolisthesis    DDD (degenerative disc disease), thoracolumbar       PLAN:    1. Patient admitted to have a stress test and if it is abnormal to proceed with cardiac catheterization. Please see orders. Discussed with patient and nursing.     Yordan Garsia MD, MD

## 2021-10-26 NOTE — FLOWSHEET NOTE
10/26/21 1349   Provider Notification   Reason for Communication Evaluate  (new consult)   Provider Name Dr Yuki Hay   Provider Notification Physician   Method of Communication Page   Response Waiting for response   Dr Yuki Hay paged for notification of new consult. MD returned page, updated by RN.   New order for MRI thoracic spine wo contrast, MRI lumbar spine wo contrast.

## 2021-10-26 NOTE — PROGRESS NOTES
Occupational Therapy   Occupational Therapy Initial Assessment  Date: 10/26/2021   Patient Name: Madhu Quispe  MRN: [id number]     : 1955      RN Yazmin Karimi reports patient is medically stable for therapy treatment this date. Chart reviewed prior to treatment and patient is agreeable for therapy. All lines intact and patient positioned comfortably at end of treatment. All patient needs addressed prior to ending therapy session. Pt currently functioning below baseline. Would suggest additional therapy at time of discharge to maximize long term outcomes and prevent re-admission. Please refer to AM-PAC score for current level of function. Date of Service: 10/26/2021    Discharge Recommendations:  Patient would benefit from continued therapy after discharge  OT Equipment Recommendations  Equipment Needed: Yes  Mobility Devices: ADL Assistive Devices  ADL Assistive Devices: Toileting - 3-in-1 Commode;Sock-Aid Soft;Long-handled Shoe Horn;Long-handled Sponge    Assessment   Performance deficits / Impairments: Decreased functional mobility ; Decreased safe awareness;Decreased balance;Decreased coordination;Decreased ADL status; Decreased strength;Decreased sensation;Decreased fine motor control;Decreased endurance;Decreased high-level IADLs;Decreased ROM; Decreased cognition;Decreased vision/visual deficit; Decreased posture  Assessment: At this time, pt is a high fall risk and requires max assist fo 2 staff with marciano fairbanks when up. Skilled OT is indicated for increasing I and safety as able with ADL and functional performance.   Prognosis: Fair  Decision Making: High Complexity  OT Education: OT Role;Plan of Care;Energy Conservation;Transfer Training  Patient Education: proper bed mob tech, pursed lip breathing, postural control, safety in function, recommendations for continued therapy, benefits of being up OOB as able  REQUIRES OT FOLLOW UP: Yes  Activity Tolerance  Activity Tolerance: Patient limited by fatigue;Patient limited by pain  Activity Tolerance: poor  Safety Devices  Safety Devices in place: Yes  Type of devices: Bed alarm in place;Call light within reach; Left in bed;Patient at risk for falls;Gait belt;Nurse notified (BLE's elevated in bed and pillow under to reduce edema and skin issues as able)           Patient Diagnosis(es): The primary encounter diagnosis was Non[facility] Diagnoses of Chronic bilateral low back pain without sciatica, Lymphedema of both lower extremities, and Acute on chronic congestive heart failure, unspecified heart failure type [facility]) were also pertinent to this visit. has a past medical history of Asthma, Diabetes mellitus ([facility] Fibromyalgia, Headache, Lymphedema of both lower extremities, and Myasthenia gravis ([facility] has a past surgical history that includes Toe amputation; Carpal tunnel release; and fracture surgery. PER H&P: Olivia Cook is a 61 y.o. Non- / non  female who presents with Back Pain   and is admitted to the hospital for the management of NSTEMI (non-ST elevated myocardial infarction) ([facility]    This 61 yof presents with low back pain to the point where she could not get out of recliner. She said she felt a pop while transferring from bed to wheelchair. It is unclear when that was. Starting 2 days pta she had cp and sob, along with nausea. Also c/o hip pain and said all her pain was due to fibromyalgia.   Found to have troponin levels~100     Restrictions  Restrictions/Precautions  Restrictions/Precautions: Fall Risk  Position Activity Restriction  Other position/activity restrictions: purwick catheter, 2L O2, RUE IV, telemetry, act as ksenia, maintain heels off bed AAT's, alarms    Subjective   General  Chart Reviewed: Yes  Patient assessed for rehabilitation services?: Yes  Family / Caregiver Present: No  Patient Currently in Pain: Yes  Pre Treatment Pain Screening  Intervention List: Nurse/Physician assistance (max assist initially to min assist EOB)  Standing Balance: Maximum assistance (x2 in marciano stedy)  Standing Balance  Time: stand ksenia < 15 seconds in marciano stedy  Functional Mobility  Functional Mobility Comments: N/T and not safe or appropriate; sit to stand with max assist of 2 in marciano stedy and lift used for repositioning pt up in bed  Toilet Transfers  Toilet Transfers Comments: N/T and pt with ext cath     ADL  Feeding: Setup (for lunch tray)  Grooming: Setup; Moderate assistance  UE Bathing: Setup; Moderate assistance  LE Bathing: Setup;Dependent/Total  UE Dressing: Setup;Maximum assistance (with hosp gown)  LE Dressing: Setup;Maximum assistance;Dependent/Total (max assist for B socks supine in bed)  Toileting: Dependent/Total (ext cath)  Additional Comments: *pt is DEP when up with use of marciano stedy and max assist of 2 for safety/balance support. Tone RUE  RUE Tone: Normotonic  Tone LUE  LUE Tone: Normotonic  Coordination  Movements Are Fluid And Coordinated: No  Coordination and Movement description: Fine motor impairments     Bed mobility  Supine to Sit: Maximum assistance;2 Person assistance  Sit to Supine: Maximum assistance;2 Person assistance  Scooting: Maximal assistance;2 Person assistance  Comment: MAX verbal instruction/tactile assist for hand placement on bed rail to assist, proper log rolling tech, pursed lip breathing, scooting fully to EOB and place in BLE's on floor, and awareness/assist with lines to increase safety.   Transfers  Stand Step Transfers: Unable to assess (pt not safe or appropriate to sit in chair; max assist of 2 with use of marciano stedy only for repositioning pt up in bed)  Sit to stand: Maximum assistance;2 Person assistance (with bed elevated and sit to stand with use of marciano stedy)  Stand to sit: Maximum assistance;2 Person assistance  Transfer Comments: MAX verbal instruction/tactile assist for B hand placement on marciano stedy, safety features of lift, nose over toes tech,  pursed lip breathing, upright posture, weight shiting, controlled stand to sit to increase overall safety. Cognition  Overall Cognitive Status: Exceptions  Arousal/Alertness: Delayed responses to stimuli  Following Commands: Follows multistep commands with increased time; Follows multistep commands with repitition  Attention Span: Appears intact  Memory: Decreased short term memory  Safety Judgement: Decreased awareness of need for assistance;Decreased awareness of need for safety  Problem Solving: Assistance required to identify errors made;Assistance required to implement solutions;Assistance required to correct errors made;Assistance required to generate solutions;Decreased awareness of errors  Insights: Decreased awareness of deficits  Initiation: Requires cues for some  Sequencing: Requires cues for some  Perception  Overall Perceptual Status: WFL     Sensation  Overall Sensation Status: Impaired (pt c/o B hands/feet paresthesias/neuropathy;  pt states can feel her floor when ambulation)        LUE AROM (degrees)  LUE AROM : WFL  RUE AROM (degrees)  RUE AROM : Exceptions  RUE General AROM: pt's R elbow ROM was restricted due to IV placement; rest WFLS  LUE Strength  Gross LUE Strength: Exceptions to [facility]  LUE Strength Comment: grossly 4/5  RUE Strength  Gross RUE Strength: Exceptions to [facility]  RUE Strength Comment: grossly 4/5 and elbow flexion N/T                   Plan   Plan  Times per week: 4-5x/week 1x/day as ksenia  Current Treatment Recommendations: Strengthening, ROM, Balance Training, Safety Education & Training, Positioning, Endurance Training, Neuromuscular Re-education, Patient/Caregiver Education & Training, Equipment Evaluation, Education, & procurement, Self-Care / ADL, Cognitive/Perceptual Training, Home Management Training, Pain Management                                                    AM-PAC Score   11    Co-treatment with PT warranted secondary to decreased safety and independence requiring 2 skilled therapy professionals to address individual discipline's goals. OT addressing preparation for ADL transfer, sitting and standing balance for increased ADL performance, sitting/activity tolerance, functional reaching, environmental safety/scanning, fall prevention, proper bed mob tech, ability to sequence and follow directions and functional UE strength. Goals  Short term goals  Time Frame for Short term goals: by discharge, pt to demo  Short term goal 1: bed mob tasks with use of rail as needed to max assist of 1. Short term goal 2: UB ADL to min assist/set up and LB ADL to max assist of 1 supine in bed as needed and with bed rail/AE. Short term goal 3: postural control EOB to SBA and ksenia > 15 mins to increase core strength/reduce falls in function. Short term goal 4: ADL transfers with lift/AD as needed to max-mod assist of 1. Short term goal 5: increase in BUE strength by a 1/2 grade to assist with self care tasks and be I with simple BUE HEP with use of handouts. Long term goals  Long term goal 1: Pt to stand with AD and min assist ksenia > 5 mins as able to reduce fall risk with ADL and functional tasks. Long term goal 2: Pt/caregivers to be I with edema mgt, proper positioning, pressure relief, EC/WS and fall prevention tech as well as DME/AE and AD recommendations with use of handouts. Patient Goals   Patient goals : Pt states she wants to be able to transfer on her own!        Therapy Time   Individual Concurrent Group Co-treatment   Time In 2032 (plus 10 min chart review/nursing communication)         Time Out 1225         Minutes 43         Timed Code Treatment Minutes: ERIKA Quiñones

## 2021-10-27 ENCOUNTER — APPOINTMENT (OUTPATIENT)
Dept: NUCLEAR MEDICINE | Age: 66
DRG: 233 | End: 2021-10-27
Payer: MEDICARE

## 2021-10-27 LAB
ANION GAP SERPL CALCULATED.3IONS-SCNC: 11 MMOL/L (ref 9–17)
ANTI-XA UNFRAC HEPARIN: 0.46 IU/L (ref 0.3–0.7)
ANTI-XA UNFRAC HEPARIN: 0.51 IU/L (ref 0.3–0.7)
BUN BLDV-MCNC: 19 MG/DL (ref 8–23)
BUN/CREAT BLD: 24 (ref 9–20)
CALCIUM SERPL-MCNC: 8.2 MG/DL (ref 8.6–10.4)
CHLORIDE BLD-SCNC: 102 MMOL/L (ref 98–107)
CO2: 27 MMOL/L (ref 20–31)
CREAT SERPL-MCNC: 0.78 MG/DL (ref 0.5–0.9)
GFR AFRICAN AMERICAN: >60 ML/MIN
GFR NON-AFRICAN AMERICAN: >60 ML/MIN
GFR SERPL CREATININE-BSD FRML MDRD: ABNORMAL ML/MIN/{1.73_M2}
GFR SERPL CREATININE-BSD FRML MDRD: ABNORMAL ML/MIN/{1.73_M2}
GLUCOSE BLD-MCNC: 142 MG/DL (ref 65–105)
GLUCOSE BLD-MCNC: 143 MG/DL (ref 65–105)
GLUCOSE BLD-MCNC: 145 MG/DL (ref 65–105)
GLUCOSE BLD-MCNC: 206 MG/DL (ref 65–105)
GLUCOSE BLD-MCNC: 217 MG/DL (ref 70–99)
LV EF: 72 %
LVEF MODALITY: NORMAL
POTASSIUM SERPL-SCNC: 4 MMOL/L (ref 3.7–5.3)
SODIUM BLD-SCNC: 140 MMOL/L (ref 135–144)
TROPONIN INTERP: ABNORMAL
TROPONIN T: ABNORMAL NG/ML
TROPONIN, HIGH SENSITIVITY: 50 NG/L (ref 0–14)
VITAMIN D 25-HYDROXY: 23.8 NG/ML (ref 30–100)

## 2021-10-27 PROCEDURE — A9500 TC99M SESTAMIBI: HCPCS | Performed by: INTERNAL MEDICINE

## 2021-10-27 PROCEDURE — 36415 COLL VENOUS BLD VENIPUNCTURE: CPT

## 2021-10-27 PROCEDURE — 6370000000 HC RX 637 (ALT 250 FOR IP): Performed by: NURSE PRACTITIONER

## 2021-10-27 PROCEDURE — 2580000003 HC RX 258: Performed by: INTERNAL MEDICINE

## 2021-10-27 PROCEDURE — 97535 SELF CARE MNGMENT TRAINING: CPT

## 2021-10-27 PROCEDURE — 80048 BASIC METABOLIC PNL TOTAL CA: CPT

## 2021-10-27 PROCEDURE — 99232 SBSQ HOSP IP/OBS MODERATE 35: CPT | Performed by: FAMILY MEDICINE

## 2021-10-27 PROCEDURE — 6370000000 HC RX 637 (ALT 250 FOR IP): Performed by: INTERNAL MEDICINE

## 2021-10-27 PROCEDURE — 78452 HT MUSCLE IMAGE SPECT MULT: CPT

## 2021-10-27 PROCEDURE — APPSS30 APP SPLIT SHARED TIME 16-30 MINUTES: Performed by: NURSE PRACTITIONER

## 2021-10-27 PROCEDURE — 97530 THERAPEUTIC ACTIVITIES: CPT

## 2021-10-27 PROCEDURE — 2500000003 HC RX 250 WO HCPCS: Performed by: NURSE PRACTITIONER

## 2021-10-27 PROCEDURE — 2060000000 HC ICU INTERMEDIATE R&B

## 2021-10-27 PROCEDURE — 93017 CV STRESS TEST TRACING ONLY: CPT

## 2021-10-27 PROCEDURE — 3430000000 HC RX DIAGNOSTIC RADIOPHARMACEUTICAL: Performed by: INTERNAL MEDICINE

## 2021-10-27 PROCEDURE — 6360000002 HC RX W HCPCS: Performed by: INTERNAL MEDICINE

## 2021-10-27 PROCEDURE — 84484 ASSAY OF TROPONIN QUANT: CPT

## 2021-10-27 PROCEDURE — 82947 ASSAY GLUCOSE BLOOD QUANT: CPT

## 2021-10-27 PROCEDURE — 94761 N-INVAS EAR/PLS OXIMETRY MLT: CPT

## 2021-10-27 PROCEDURE — 97112 NEUROMUSCULAR REEDUCATION: CPT

## 2021-10-27 PROCEDURE — 85520 HEPARIN ASSAY: CPT

## 2021-10-27 RX ORDER — NITROGLYCERIN 0.4 MG/1
0.4 TABLET SUBLINGUAL EVERY 5 MIN PRN
Status: ACTIVE | OUTPATIENT
Start: 2021-10-27 | End: 2021-10-27

## 2021-10-27 RX ORDER — SODIUM CHLORIDE 9 MG/ML
500 INJECTION, SOLUTION INTRAVENOUS CONTINUOUS PRN
Status: ACTIVE | OUTPATIENT
Start: 2021-10-27 | End: 2021-10-27

## 2021-10-27 RX ORDER — LIDOCAINE 4 G/G
1 PATCH TOPICAL DAILY
Status: DISCONTINUED | OUTPATIENT
Start: 2021-10-27 | End: 2021-11-02

## 2021-10-27 RX ORDER — SODIUM CHLORIDE 0.9 % (FLUSH) 0.9 %
5-40 SYRINGE (ML) INJECTION PRN
Status: ACTIVE | OUTPATIENT
Start: 2021-10-27 | End: 2021-10-27

## 2021-10-27 RX ORDER — AMINOPHYLLINE DIHYDRATE 25 MG/ML
50 INJECTION, SOLUTION INTRAVENOUS PRN
Status: ACTIVE | OUTPATIENT
Start: 2021-10-27 | End: 2021-10-27

## 2021-10-27 RX ORDER — ATROPINE SULFATE 0.1 MG/ML
0.5 INJECTION INTRAVENOUS EVERY 5 MIN PRN
Status: ACTIVE | OUTPATIENT
Start: 2021-10-27 | End: 2021-10-27

## 2021-10-27 RX ORDER — METOPROLOL TARTRATE 5 MG/5ML
5 INJECTION INTRAVENOUS EVERY 5 MIN PRN
Status: ACTIVE | OUTPATIENT
Start: 2021-10-27 | End: 2021-10-27

## 2021-10-27 RX ORDER — DEXTROMETHORPHAN POLISTIREX 30 MG/5ML
30 SUSPENSION ORAL EVERY 12 HOURS SCHEDULED
Status: DISCONTINUED | OUTPATIENT
Start: 2021-10-27 | End: 2021-11-02

## 2021-10-27 RX ADMIN — ANTI-FUNGAL POWDER MICONAZOLE NITRATE TALC FREE: 1.42 POWDER TOPICAL at 09:22

## 2021-10-27 RX ADMIN — Medication 30 MG: at 21:01

## 2021-10-27 RX ADMIN — Medication 30 MG: at 11:50

## 2021-10-27 RX ADMIN — FUROSEMIDE 20 MG: 10 INJECTION, SOLUTION INTRAMUSCULAR; INTRAVENOUS at 18:28

## 2021-10-27 RX ADMIN — HEPARIN SODIUM 13 UNITS/KG/HR: 10000 INJECTION, SOLUTION INTRAVENOUS at 14:29

## 2021-10-27 RX ADMIN — PREDNISONE 10 MG: 5 TABLET ORAL at 09:23

## 2021-10-27 RX ADMIN — GABAPENTIN 300 MG: 300 CAPSULE ORAL at 13:44

## 2021-10-27 RX ADMIN — INSULIN LISPRO 10 UNITS: 100 INJECTION, SOLUTION INTRAVENOUS; SUBCUTANEOUS at 09:45

## 2021-10-27 RX ADMIN — GABAPENTIN 300 MG: 300 CAPSULE ORAL at 09:23

## 2021-10-27 RX ADMIN — TETRAKIS(2-METHOXYISOBUTYLISOCYANIDE)COPPER(I) TETRAFLUOROBORATE 16.4 MILLICURIE: 1 INJECTION, POWDER, LYOPHILIZED, FOR SOLUTION INTRAVENOUS at 08:23

## 2021-10-27 RX ADMIN — SODIUM CHLORIDE, PRESERVATIVE FREE 10 ML: 5 INJECTION INTRAVENOUS at 08:21

## 2021-10-27 RX ADMIN — SODIUM CHLORIDE, PRESERVATIVE FREE 10 ML: 5 INJECTION INTRAVENOUS at 09:27

## 2021-10-27 RX ADMIN — TETRAKIS(2-METHOXYISOBUTYLISOCYANIDE)COPPER(I) TETRAFLUOROBORATE 39.7 MILLICURIE: 1 INJECTION, POWDER, LYOPHILIZED, FOR SOLUTION INTRAVENOUS at 13:08

## 2021-10-27 RX ADMIN — INSULIN GLARGINE 56 UNITS: 100 INJECTION, SOLUTION SUBCUTANEOUS at 09:45

## 2021-10-27 RX ADMIN — FLUOXETINE 40 MG: 20 CAPSULE ORAL at 09:23

## 2021-10-27 RX ADMIN — OXYCODONE AND ACETAMINOPHEN 1 TABLET: 5; 325 TABLET ORAL at 21:59

## 2021-10-27 RX ADMIN — REGADENOSON 0.4 MG: 0.08 INJECTION, SOLUTION INTRAVENOUS at 08:20

## 2021-10-27 RX ADMIN — INSULIN LISPRO 10 UNITS: 100 INJECTION, SOLUTION INTRAVENOUS; SUBCUTANEOUS at 18:29

## 2021-10-27 RX ADMIN — ACETAMINOPHEN 650 MG: 325 TABLET ORAL at 09:23

## 2021-10-27 RX ADMIN — INSULIN LISPRO 10 UNITS: 100 INJECTION, SOLUTION INTRAVENOUS; SUBCUTANEOUS at 11:50

## 2021-10-27 RX ADMIN — GABAPENTIN 300 MG: 300 CAPSULE ORAL at 21:01

## 2021-10-27 RX ADMIN — FUROSEMIDE 20 MG: 10 INJECTION, SOLUTION INTRAMUSCULAR; INTRAVENOUS at 09:22

## 2021-10-27 RX ADMIN — ASPIRIN 81 MG: 81 TABLET, CHEWABLE ORAL at 09:22

## 2021-10-27 RX ADMIN — METOPROLOL SUCCINATE 25 MG: 25 TABLET, EXTENDED RELEASE ORAL at 09:23

## 2021-10-27 ASSESSMENT — PAIN SCALES - GENERAL
PAINLEVEL_OUTOF10: 0
PAINLEVEL_OUTOF10: 0
PAINLEVEL_OUTOF10: 10
PAINLEVEL_OUTOF10: 4

## 2021-10-27 ASSESSMENT — PAIN DESCRIPTION - PAIN TYPE: TYPE: CHRONIC PAIN

## 2021-10-27 ASSESSMENT — PAIN DESCRIPTION - LOCATION: LOCATION: BACK

## 2021-10-27 NOTE — PLAN OF CARE
Problem: Falls - Risk of:  Goal: Will remain free from falls  Description: Will remain free from falls  10/27/2021 0546 by Aebba Parikh RN  Outcome: Ongoing     Problem: Falls - Risk of:  Goal: Absence of physical injury  Description: Absence of physical injury  10/27/2021 0546 by Abeba Parikh RN  Outcome: Ongoing     Problem: Cardiac Output - Decreased:  Goal: Hemodynamic stability will improve  Description: Hemodynamic stability will improve  10/27/2021 0546 by Abeba Parikh RN  Outcome: Ongoing     Problem: Pain:  Goal: Pain level will decrease  Description: Pain level will decrease  10/27/2021 0546 by Abeba Parikh RN  Outcome: Ongoing     Problem: Pain:  Goal: Control of acute pain  Description: Control of acute pain  10/27/2021 0546 by Abeba Parikh RN  Outcome: Ongoing     Problem: Pain:  Goal: Control of chronic pain  Description: Control of chronic pain  10/27/2021 0546 by Abeba Parikh RN  Outcome: Ongoing     Problem: Skin Integrity:  Goal: Will show no infection signs and symptoms  Description: Will show no infection signs and symptoms  10/27/2021 0546 by Abeba Parikh RN  Outcome: Ongoing     Problem: Skin Integrity:  Goal: Absence of new skin breakdown  Description: Absence of new skin breakdown  10/27/2021 0546 by Abeba Parikh RN  Outcome: Ongoing     Problem: Pain:  Goal: Pain level will decrease  Description: Pain level will decrease  10/27/2021 0546 by Abeba Parikh RN  Outcome: Ongoing

## 2021-10-27 NOTE — FLOWSHEET NOTE
Dr. Lesley Manuel arrived to the patient's bedside for evaluation and was updated on the patient's current status via RN. Patient has completed the first portion of stress test and plan to undergo second portion this afternoon. Awaiting results of stress test to determine plan of care. Will continue to monitor closely.

## 2021-10-27 NOTE — PROGRESS NOTES
Patient tolerated lexiscan stress test with mils side effect. Recovered on cart. Patient awaiting further testing.

## 2021-10-27 NOTE — PROGRESS NOTES
Progress Note    10/27/2021 6:45 AM  Subjective:   Admit Date: 10/24/2021  PCP: Annabel Pulido MD  Date of Discharge: Unknown    Medications:   Scheduled Meds:   gabapentin  300 mg Oral TID    FLUoxetine  40 mg Oral QAM    metoprolol succinate  25 mg Oral Daily    predniSONE  10 mg Oral Daily    insulin glargine  56 Units SubCUTAneous Daily    And    insulin lispro  10 Units SubCUTAneous TID WC    metFORMIN  1,000 mg Oral BID WC    rosuvastatin  20 mg Oral Nightly    sodium chloride flush  5-40 mL IntraVENous 2 times per day    aspirin  81 mg Oral Daily    furosemide  20 mg IntraVENous BID    influenza virus vaccine  0.5 mL IntraMUSCular Prior to discharge     Continuous Infusions:   dextrose      sodium chloride      heparin (PORCINE) Infusion 13 Units/kg/hr (10/26/21 2024)     PRN Meds:miconazole, oxyCODONE-acetaminophen, glucose, dextrose, glucagon (rDNA), dextrose, sodium chloride flush, sodium chloride, ondansetron **OR** ondansetron, acetaminophen **OR** acetaminophen, polyethylene glycol, heparin (porcine), heparin (porcine), nitroGLYCERIN, perflutren lipid microspheres    Diet:   Diet: ADULT DIET; Regular; 4 carb choices (60 gm/meal);  Low Fat/Low Chol/High Fiber/2 gm Na    Subjective:   Systemic or Specific Complaints: Spine pain    Objective:     Patient Vitals for the past 24 hrs:   BP Temp Temp src Pulse Resp SpO2 Weight   10/27/21 0500 -- -- -- -- -- -- 192 lb 9.6 oz (87.4 kg)   10/27/21 0418 121/61 -- -- 74 18 96 % --   10/27/21 0400 -- 97.2 °F (36.2 °C) Temporal -- -- -- --   10/27/21 0300 -- -- -- 76 -- 90 % --   10/27/21 0200 -- -- -- 78 -- 91 % --   10/27/21 0100 -- -- -- 78 -- 91 % --   10/27/21 0000 (!) 141/73 96.6 °F (35.9 °C) Temporal 80 -- 90 % --   10/26/21 2200 -- -- -- 77 -- 92 % --   10/26/21 2100 -- -- -- 77 -- 91 % --   10/26/21 2000 (!) 120/56 96.6 °F (35.9 °C) Temporal 79 -- 90 % --   10/26/21 1644 128/64 98.9 °F (37.2 °C) Oral 83 16 93 % --   10/26/21 1628 128/64 -- -- 83 -- 93 % --   10/26/21 1305 -- -- -- -- -- 95 % --   10/26/21 1252 -- -- -- -- -- 96 % --   10/26/21 1215 -- 97.6 °F (36.4 °C) Axillary -- -- -- --   10/26/21 1200 (!) 108/58 97.6 °F (36.4 °C) Axillary 75 16 96 % --   10/26/21 1100 (!) 101/55 -- -- 73 -- 96 % --   10/26/21 1000 (!) 91/50 -- -- 75 -- 98 % --   10/26/21 0900 132/66 -- -- 78 -- 98 % --   10/26/21 0820 130/61 -- -- 74 -- -- --   10/26/21 0800 130/61 98.4 °F (36.9 °C) Oral 72 18 99 % --   10/26/21 0700 (!) 113/55 -- -- 75 -- 98 % --     I/O last 3 completed shifts: In: 5 [P.O.:240; I.V.:246]  Out: 2150 [Urine:2150]  I/O this shift:  In: -   Out: 550 [Urine:550]      General: alert, appears stated age, cooperative and moderate distress   Wound: Motion: Painful range of Motion in affected extremity   DVT Exam: No evidence of DVT seen on physical exam.  Negative Erwin's sign. No cords or calf tenderness. Additional exam:     Data Review  CBC:   Recent Labs     10/24/21  0719 10/25/21  0712 10/26/21  0614   WBC 11.6* 11.6* 10.8   HGB 10.8* 8.8* 8.2*    318 322     BMP:    Recent Labs     10/25/21  0712 10/26/21  0614 10/27/21  0235    139 140   K 4.5 4.1 4.0    105 102   CO2 23 26 27   BUN 17 21 19   CREATININE 0.91* 0.77 0.78   GLUCOSE 340* 307* 217*     Hepatic:   Recent Labs     10/25/21  0712   AST 9   ALT <5*   BILITOT 0.34   ALKPHOS 83     Troponin: No results for input(s): TROPONINI in the last 72 hours. BNP: No results for input(s): BNP in the last 72 hours. Lipids:   Recent Labs     10/25/21  0712   CHOL 128   HDL 40*     INR:   Recent Labs     10/24/21  0930   INR 1.1         Assessment:   [place] is unchanged from yesterday. Pain is not well controlled. She has no nausea and no vomiting. Current activity is up with assistance  Incision:       Plan:      1: MRI confirms acute pathological osteoporotic L3 fracture. Low signal T1, high signal STIR changes.   L5 fracture is chronic, previous kyphoplasty vertebral augmentation T12, L1.  2:  Continue Deep venous thrombosis prophylaxis  3:  Continue Pain Control  4.   We will schedule for kyphoplasty vertebral augmentation, biopsy if  cleared by cardiology    Electronically signed by Mekhi Nichols MD on 10/27/2021 at 6:45 AM

## 2021-10-27 NOTE — PROGRESS NOTES
fevers, sweats  Respiratory:  negative for dyspnea on exertion, shortness of breath, wheezing. Positive for cough  Cardiovascular:  negative for chest pain, chest pressure/discomfort, lower extremity edema, palpitations  Gastrointestinal:  negative for abdominal pain, constipation, diarrhea, nausea, vomiting  Neurological:  negative for dizziness, headache  + for back pain   Medications: Allergies:     Allergies   Allergen Reactions    Amoxicillin Diarrhea and Other (See Comments)    Amoxicillin-Pot Clavulanate Diarrhea    Atorvastatin Other (See Comments)     Other reaction(s): Myalgia  Weakness      Cefuroxime Axetil Other (See Comments)    Clavulanic Acid Diarrhea and Other (See Comments)    Codeine Other (See Comments)     Unless suspended in syrup      Dextroamphetamine     Adhesive Tape Rash     Itching     Cephalosporins Rash       Current Meds:   Scheduled Meds:    dextromethorphan  30 mg Oral 2 times per day    gabapentin  300 mg Oral TID    FLUoxetine  40 mg Oral QAM    metoprolol succinate  25 mg Oral Daily    predniSONE  10 mg Oral Daily    insulin glargine  56 Units SubCUTAneous Daily    And    insulin lispro  10 Units SubCUTAneous TID WC    metFORMIN  1,000 mg Oral BID WC    rosuvastatin  20 mg Oral Nightly    sodium chloride flush  5-40 mL IntraVENous 2 times per day    aspirin  81 mg Oral Daily    furosemide  20 mg IntraVENous BID    influenza virus vaccine  0.5 mL IntraMUSCular Prior to discharge     Continuous Infusions:    sodium chloride      dextrose      sodium chloride      heparin (PORCINE) Infusion 13 Units/kg/hr (10/26/21 2024)     PRN Meds: sodium chloride flush, sodium chloride, atropine, nitroGLYCERIN, metoprolol, aminophylline, miconazole, oxyCODONE-acetaminophen, glucose, dextrose, glucagon (rDNA), dextrose, sodium chloride flush, sodium chloride, ondansetron **OR** ondansetron, acetaminophen **OR** acetaminophen, polyethylene glycol, heparin (porcine), heparin (porcine), nitroGLYCERIN, perflutren lipid microspheres    Data:     Past Medical History:   has a past medical history of Asthma, Diabetes mellitus ([facility] [address] Fibromyalgia, Headache, Lymphedema of both lower extremities, and Myasthenia gravis ([facility] [address] Social History:   reports that she has never smoked. She has never used smokeless tobacco. She reports previous alcohol use. She reports previous drug use. Family History:   Family History   Problem Relation Age of Onset    Coronary Art Dis Mother     Kidney Disease Mother        Vitals:  BP (!) 128/56   Pulse 93   Temp 96.9 °F (36.1 °C) (Temporal)   Resp 16   Ht 5' 5\" (1.651 m)   Wt 192 lb 9.6 oz (87.4 kg)   SpO2 96%   BMI 32.05 kg/m²   Temp (24hrs), Av.3 °F (36.3 °C), Min:96.6 °F (35.9 °C), Max:98.9 °F (37.2 °C)    Recent Labs     10/26/21  1150 10/26/21  1642 10/26/21  1920 10/27/21  0750   POCGLU 335* 267* 335* 143*       I/O (24Hr):     Intake/Output Summary (Last 24 hours) at 10/27/2021 0948  Last data filed at 10/27/2021 5269  Gross per 24 hour   Intake --   Output 2300 ml   Net -2300 ml       Labs:  Hematology:  Recent Labs     10/25/21  0712 10/26/21  0614   WBC 11.6* 10.8   RBC 3.77* 3.53*   HGB 8.8* 8.2*   HCT 30.7* 29.4*   MCV 81.4* 83.3   MCH 23.3* 23.2*   MCHC 28.7 27.9*   RDW 15.1* 15.4*    322   MPV 9.7 10.1     Chemistry:  Recent Labs     10/25/21  0712 10/25/21  1113 10/26/21  0614 10/27/21  0235     --  139 140   K 4.5  --  4.1 4.0     --  105 102   CO2 23  --  26 27   GLUCOSE 340*  --  307* 217*   BUN 17  --  21 19   CREATININE 0.91*  --  0.77 0.78   ANIONGAP 15  --  8* 11   LABGLOM >60  --  >60 >60   GFRAA >60  --  >60 >60   CALCIUM 8.5*  --  8.1* 8.2*   TROPHS  --  82*  --   --      Recent Labs     10/25/21  0712 10/25/21  0752 10/25/21  1939 10/26/21  0739 10/26/21  1150 10/26/21  1642 10/26/21  1920 10/27/21  0750   PROT 5.3*  --   --   --   --   --   --   --    LABALBU 2.9*  --   --   --   --   --   -- --    AST 9  --   --   --   --   --   --   --    ALT <5*  --   --   --   --   --   --   --    ALKPHOS 83  --   --   --   --   --   --   --    BILITOT 0.34  --   --   --   --   --   --   --    CHOL 128  --   --   --   --   --   --   --    HDL 40*  --   --   --   --   --   --   --    LDLCHOLESTEROL 67  --   --   --   --   --   --   --    CHOLHDLRATIO 3.2  --   --   --   --   --   --   --    TRIG 107  --   --   --   --   --   --   --    VLDL NOT REPORTED  --   --   --   --   --   --   --    POCGLU  --    < > 290* 274* 335* 267* 335* 143*    < > = values in this interval not displayed. ABG:No results found for: POCPH, PHART, PH, POCPCO2, QIC6SWJ, PCO2, POCPO2, PO2ART, PO2, POCHCO3, CKH9SYT, HCO3, NBEA, PBEA, BEART, BE, THGBART, THB, GBG9GHI, YTWM6ZRS, H9LPCYYZ, O2SAT, FIO2  No results found for: SPECIAL  No results found for: CULTURE    Radiology:  XR LUMBAR SPINE (2-3 VIEWS)    Result Date: 10/24/2021  LUMBAR SPINE: 1. Age indeterminate, probably nonacute, compression fracture of L5 vertebral body with about 50% decrease height. Please correlate with point tenderness. MRI may also be considered for age characterization if deemed clinically necessary. 2. T12 and L1 kyphoplasty. CHEST X-RAY: 1. Poor inspiratory afford with significantly decreased lung volumes. 2. Patchy opacities left lower lung probably due to crowding of vessels with or without underlying developing infiltrate. Please correlate with auscultation. Short interval follow-up may also be considered. XR CHEST PORTABLE    Result Date: 10/24/2021  LUMBAR SPINE: 1. Age indeterminate, probably nonacute, compression fracture of L5 vertebral body with about 50% decrease height. Please correlate with point tenderness. MRI may also be considered for age characterization if deemed clinically necessary. 2. T12 and L1 kyphoplasty. CHEST X-RAY: 1. Poor inspiratory afford with significantly decreased lung volumes.  2. Patchy opacities left lower lung probably due to crowding of vessels with or without underlying developing infiltrate. Please correlate with auscultation. Short interval follow-up may also be considered. CT CHEST PULMONARY EMBOLISM W CONTRAST    Result Date: 10/24/2021  1. No evidence for acute pulmonary embolism. 2. Scattered bibasilar patchy subsegmental atelectasis and trace bilateral pleural effusions. 3. Borderline cardiomegaly. 4. Large areas of geographic ground-glass attenuation interspersed with more lucent areas probably combination of air trapping and pulmonary interstitial edema. 5. Cholelithiasis.        Physical Examination:        General appearance:  alert, cooperative and no distress  Mental Status:  oriented to person, place and time and normal affect  Lungs:  Fine crackles bilaterally-less prominent, normal effort  Heart:  regular rate and rhythm, no murmur  Abdomen:  soft, nontender, nondistended, normal bowel sounds, no masses, hepatomegaly, splenomegaly  Extremities:  no redness, tenderness in the calves; chronic edema  Skin:  no gross lesions, rashes, induration    Assessment:        Hospital Problems         Last Modified POA    * (Principal) NSTEMI (non-ST elevated myocardial infarction) ([address] 10/24/2021 Yes    Type 2 diabetes mellitus with hyperglycemia, without long-term current use of insulin ([address] 10/24/2021 Yes    Class 1 obesity due to excess calories with serious comorbidity and body mass index (BMI) of 33.0 to 33.9 in adult 10/24/2021 Yes    Lymphedema of both lower extremities 10/24/2021 Yes    Acute on chronic diastolic congestive heart failure ([address] 10/26/2021 Yes    Pathological fracture of thoracic vertebra due to secondary osteoporosis ([address] 10/26/2021 Yes    Pathological fracture of lumbar vertebra due to secondary osteoporosis ([address] 10/26/2021 Yes    Intractable back pain 10/26/2021 Yes    Age-related osteoporosis with current pathological fracture 10/26/2021 Yes    History of kyphoplasty 10/26/2021 Yes    Facet arthritis, degenerative, lumbar spine 10/26/2021 Yes    Degenerative spondylolisthesis 10/26/2021 Yes    DDD (degenerative disc disease), thoracolumbar 10/26/2021 Yes          Plan:        1. Ortho consult reviewed: MRI confirms acute pathological L3 fracture likely due to osteoporosis and chronic L5 fracture, plans for kyphoplasty if cleared by cardiology  2. Stress test in process as ordered per cardiology   3. Monitor and control blood sugars: blood sugars better today after resuming home antihyperglycemic meds yesterday   4. Continue PT/OT   5. Monitor labs, replace electrolytes as needed   6. Continue telemetry monitoring   7. Acute on chronic diastolic CHF-improving, continue IV lasix 20mg BID for diuresis. EF 70%, mild LVH and mild diastolic dysfunction on ECHO   8. Will add delsym BID for cough   9. Continue Heparin gtt per protocol   10.  Plan discussed with patient and staff     CYNDY Uribe NP  10/27/2021  9:48 AM

## 2021-10-27 NOTE — PROGRESS NOTES
Occupational Therapy  Facility/Department: [facility]  Daily Treatment Note  NAME: Willie Pineda  : 1955  MRN: [id number]      RN Cherie Walters reports patient is medically stable for therapy treatment this date. Chart reviewed prior to treatment and patient is agreeable for therapy. All lines intact and patient positioned comfortably at end of treatment. All patient needs addressed prior to ending therapy session. Pt currently functioning below baseline. Would suggest additional therapy at time of discharge to maximize long term outcomes and prevent re-admission. Please refer to AM-PAC score for current level of function. Date of Service: 10/27/2021    Discharge Recommendations:  Patient would benefit from continued therapy after discharge  OT Equipment Recommendations  ADL Assistive Devices: Toileting - 3-in-1 Commode;Sock-Aid Soft;Long-handled Shoe [place];Long-handled Sponge    Assessment   Performance deficits / Impairments: Decreased functional mobility ; Decreased safe awareness;Decreased balance;Decreased coordination;Decreased ADL status; Decreased strength;Decreased sensation;Decreased fine motor control;Decreased endurance;Decreased high-level IADLs;Decreased ROM; Decreased cognition;Decreased vision/visual deficit; Decreased posture  Assessment: Pt's low back pain is a barrier in tx. Pt is unable to sit up in chair as surfaces of furniture at [address] are too low. At this time, pt requires max assist of 2 with bed elevated to increase height and use of marciano stedy lift. Self care completed with pt this date and requires Mod-Max to DEP with use of 2 staff for safety/balance support and use of lift. Continue with OT POC for maximixing functional I and safety as able and complete teach and train of AE use as apropriate to assist with daily tasks.   Prognosis: Fair  OT Education: OT Role;Plan of Care;Energy Conservation;Transfer Training  Patient Education: edema mgt tech, proper bed mob tech, pursed lip breathing, postural control, safety in function, recommendations for continued therapy, benefits of being up OOB as able, call light use/fall prevention  REQUIRES OT FOLLOW UP: Yes  Activity Tolerance  Activity Tolerance: Patient limited by fatigue;Patient limited by pain  Activity Tolerance: poor  Safety Devices  Safety Devices in place: Yes  Type of devices: Bed alarm in place;Call light within reach; Left in bed;Patient at risk for falls;Gait belt;Nurse notified (BLE's elevated in bed and pillow under each LE to reduce edema and skin issues as able. Pt was edu to complete B ankle AROM/pumping as able and continue to elevate to reduce swelling. Pt with fair understanding.)         Patient Diagnosis(es): The primary encounter diagnosis was Non-ST elevation MI (NSTEMI) ([facility] [address] Diagnoses of Chronic bilateral low back pain without sciatica, Lymphedema of both lower extremities, and Acute on chronic congestive heart failure, unspecified heart failure type [facility]) were also pertinent to this visit. has a past medical history of Asthma, Diabetes mellitus ([facility] [address] Fibromyalgia, Headache, Lymphedema of both lower extremities, and Myasthenia gravis ([facility] [address] has a past surgical history that includes Toe amputation; Carpal tunnel release; and fracture surgery. Restrictions  Restrictions/Precautions  Restrictions/Precautions: Fall Risk  Required Braces or Orthoses?: No  Position Activity Restriction  Other position/activity restrictions: purwick catheter, 3L O2, RUE IV, telemetry, act as ksenia, maintain heels off bed AAT's, alarms, Chronic LBP  Subjective   General  Chart Reviewed: Yes  Patient assessed for rehabilitation services?: Yes  Family / Caregiver Present: No  Pre Treatment Pain Screening  Intervention List: Nurse/Physician notified  Comments / Details: Pt states her back hurts and rates 6-7/10.   Vital Signs  Patient Currently in Pain: Yes   Orientation  Orientation  Overall Orientation Status: Within Functional Limits (slow and dealyed processing; flat affect at times in session and writer was able to get pt to laugh and smile at times as well)  Objective    ADL  Grooming: Maximum assistance (to apply powder under pt's B breasts and abd folds per pt request to redue skin issues)  UE Bathing: Setup; Moderate assistance (for sponge bath seated EOB)  LE Bathing: Unable to assess  UE Dressing: Setup;Maximum assistance (donning clean hosp gown)  LE Dressing: Setup;Dependent/Total;Maximum assistance (max assist for donning B socks supine in bed; x2 staff to  marciano stedy and doff/latesha clean brief)  Toileting: Dependent/Total (ext cath)        Balance  Sitting Balance: Minimal assistance (min to CG due to low back pain seated EOB)  Standing Balance: Maximum assistance (x2 in marciano stedy)  Standing Balance  Time: stand ksenia < 15 seconds in marciano stedy  Functional Mobility  Functional Mobility Comments: N/T and not safe or appropriate; sit to stand with max assist of 2 in marciano stedy and lift used for repositioning pt up in bed only  Toilet Transfers  Toilet Transfers Comments: N/T and pt with ext cath  Bed mobility  Supine to Sit: Maximum assistance;2 Person assistance  Sit to Supine: Maximum assistance;2 Person assistance  Scooting: Maximal assistance;2 Person assistance;Dependent/Total (to boost pt up in bed)  Comment: MAX verbal instruction/tactile assist for hand placement on bed rail, proper log rolling tech, pursed lip breathing, full scoot to EOB to place BLE's on floor, and awareness/assist with all lines to increase safety.   Transfers  Stand Step Transfers: Unable to assess (pt not safe or appropriate to sit in chair/surfaces too low and RN was informed and in agreement; max assist of 2 with use of marciano stedy only for repositioning pt up in bed only)  Sit to stand: Maximum assistance;2 Person assistance (with bed elevated and sit to stand with use of marciano stedy)  Stand to sit: Maximum assistance;2 Person assistance  Transfer Comments: MAX verbal instruction/tactile assist for B hand placement on marciano [place], safety features of lift, nose over toes tech,  pursed lip breathing, upright posture, weight shiting, controlled stand to sit to increase overall safety. Cognition  Overall Cognitive Status: Exceptions  Arousal/Alertness: Delayed responses to stimuli  Following Commands: Follows multistep commands with increased time; Follows multistep commands with repitition  Attention Span: Appears intact  Memory: Decreased short term memory  Safety Judgement: Decreased awareness of need for assistance;Decreased awareness of need for safety  Problem Solving: Assistance required to identify errors made;Assistance required to implement solutions;Assistance required to correct errors made;Assistance required to generate solutions;Decreased awareness of errors  Insights: Decreased awareness of deficits  Initiation: Requires cues for all  Sequencing: Requires cues for some                                         Plan   Plan  Times per week: 4-5x/week 1x/day as ksenia  Current Treatment Recommendations: Strengthening, ROM, Balance Training, Safety Education & Training, Positioning, Endurance Training, Neuromuscular Re-education, Patient/Caregiver Education & Training, Equipment Evaluation, Education, & procurement, Self-Care / ADL, Cognitive/Perceptual Training, Home Management Training, Pain Management                                                    AM-PAC Score   10    Co-treatment with PT warranted secondary to decreased safety and independence requiring 2 skilled therapy professionals to address individual discipline's goals.  OT addressing preparation for ADL transfer, sitting and standing balance for increased ADL performance, sitting/activity tolerance, functional reaching, environmental safety/scanning, fall prevention, proper bed mob tech, ability to sequence and follow directions and functional UE strength. Goals  Short term goals  Time Frame for Short term goals: by discharge, pt to demo  Short term goal 1: bed mob tasks with use of rail as needed to max assist of 1. Short term goal 2: UB ADL to min assist/set up and LB ADL to max assist of 1 supine in bed as needed and with bed rail/AE. Short term goal 3: postural control EOB to SBA and ksenia > 15 mins to increase core strength/reduce falls in function. Short term goal 4: ADL transfers with lift/AD as needed to max-mod assist of 1. Short term goal 5: increase in BUE strength by a 1/2 grade to assist with self care tasks and be I with simple BUE HEP with use of handouts. Long term goals  Long term goal 1: Pt to stand with AD and min assist ksenia > 5 mins as able to reduce fall risk with ADL and functional tasks. Long term goal 2: Pt/caregivers to be I with edema mgt, proper positioning, pressure relief, EC/WS and fall prevention tech as well as DME/AE and AD recommendations with use of handouts. Patient Goals   Patient goals : Pt states she wants to be able to transfer on her own!        Therapy Time   Individual Concurrent Group Co-treatment   Time In [facility] 136         Time Out 1116         Minutes [address], [place]

## 2021-10-27 NOTE — PROGRESS NOTES
Physical Therapy  Facility/Department: [facility] CVICU  Daily Treatment Note  NAME: Tabatah Pettit  : 1955  MRN: [id number]    Date of Service: 10/27/2021    Discharge Recommendations:  Patient would benefit from continued therapy after discharge   Pt currently functioning below baseline. Would suggest additional therapy at time of discharge to maximize long term outcomes and prevent re-admission. Please refer to AM-PAC score for current level of function. Assessment   Body structures, Functions, Activity limitations: Decreased functional mobility ; Decreased balance;Decreased cognition;Decreased ROM; Decreased strength;Decreased endurance; Increased pain;Decreased posture;Decreased sensation  Assessment: Patient demo'd decreased functional mobility, ROM, strength, posture and balance. Patient is appropriate for for continued skilled PT services to address these deficits and help patient return to OF. Prognosis: Good  Decision Making: High Complexity  PT Education: Goals;PT Role;Plan of Care;General Safety;Home Exercise Program;Transfer Training;Pressure Relief;Energy Conservation; Functional Mobility Training  Barriers to Learning: questionable cognition  REQUIRES PT FOLLOW UP: Yes  Activity Tolerance  Activity Tolerance: Patient limited by fatigue;Patient limited by pain; Patient limited by endurance  Activity Tolerance: Per chart, pt. has compression fx L5. Pt. c/o back pain during sit to stand transfer. States if she does not have surgery she is to get a back brace for pain control. Patient Diagnosis(es): The primary encounter diagnosis was [facility] Diagnoses of Chronic bilateral low back pain without sciatica, Lymphedema of both lower extremities, and Acute on chronic congestive heart failure, unspecified heart failure type [facility]) were also pertinent to this visit.      has a past medical history of Asthma, Diabetes mellitus ([facility] Fibromyalgia, Headache, Lymphedema of both lower extremities, and Myasthenia gravis ([facility] has a past surgical history that includes Toe amputation; Carpal tunnel release; and fracture surgery. Restrictions  Restrictions/Precautions  Restrictions/Precautions: Fall Risk  Required Braces or Orthoses?: No  Position Activity Restriction  Other position/activity restrictions: purwick catheter, 2L O2, RUE IV, telemetry, act as ksenia, maintain heels off bed AAT's, alarms, Chronic LBP  Subjective   General  Chart Reviewed: Yes  Response To Previous Treatment: Patient reporting fatigue but able to participate. Family / Caregiver Present: No  Subjective  Subjective: Patient reported she has the 1st part of her stress test this AM already and then 2nd part in the afternoon. General Comment  Comments: RN, Leon Jarrett reported patient was medically stable for PT treatment. Reported patient was a Max x 2 A to transfer to W/C this am with marciano fairbanks. Pain Screening  Patient Currently in Pain: Denies  Vital Signs  Patient Currently in Pain: Denies       Orientation  Orientation  Overall Orientation Status: Within Functional Limits  Cognition   Cognition  Overall Cognitive Status: Exceptions  Following Commands: Follows multistep commands with increased time; Follows multistep commands with repitition  Attention Span: Appears intact  Memory: Decreased short term memory  Safety Judgement: Decreased awareness of need for assistance;Decreased awareness of need for safety  Problem Solving: Assistance required to identify errors made;Assistance required to implement solutions;Assistance required to correct errors made;Assistance required to generate solutions;Decreased awareness of errors  Insights: Decreased awareness of deficits  Initiation: Requires cues for some  Sequencing: Requires cues for some  Objective   Bed mobility  Supine to Sit: Maximum assistance;2 Person assistance  Sit to Supine: Maximum assistance;2 Person assistance  Scooting: Maximal assistance;2 Person assistance  Comment: MAX VCs and tactile assist for hand placement, use of bed rail, log roll tech, pacing, pursed lip breathing, body mechanics to improve tech, scooting fully to the EOB and getting BLE on floor for improved balance. Full Assist for line mgmt. Patient sat EOb for gown change and wash up x 6-7 mins focusing on core strength and postural control. Once assist back to bed Rolled LT<>RT to apply clean brief. Transfers  Sit to Stand: Maximum Assistance;2 Person Assistance  Stand to sit: Maximum Assistance;2 Person Assistance  Comment: Able to achieve full stand within LUCERO STEDY and max x 2, however difficulty with hip extension and unable to maintain; Unable to transfer pt. to chair as initially planned. Chance pt. is used to transferring from her lift chair, which she states she \"slides\" down into standing and gets over to w/c, which is \"higher than a typical w/c\". Ambulation  Ambulation?: No  More Ambulation?: No  Neuromuscular Education  NDT Treatment: Sitting;Standing  Neuromuscular Comments: VCs req for proper breathing henrry (pursed lip breathing) during functional mobility. Tactile and VCs req for postural control during sit<>stands & amb to promote abdominal and erector spinae mm facilitation for increased stability and balance, decreasing kyphosis of the spine. Pt req VCs to correct for anterior translation of femur with squatting in addition to pressing firmly into ground with feet, to promote the appropriate body mechanics for sit<>stand transfers. Balance  Posture: Fair (Kyphotic; head fwd)  Sitting - Static: Fair  Sitting - Dynamic: Fair;-  Standing - Static: Poor  Exercises  Comments: edu. for AROM in bed as able, bilat.  UEs/ LEs as able        AM-PAC Score  AM-PAC Inpatient Mobility Raw Score : 8 (10/27/21 1347)  AM-PAC Inpatient T-Scale Score : 28.52 (10/27/21 1347)  Mobility Inpatient CMS 0-100% Score: 86.62 (10/27/21 1347)  Mobility Inpatient CMS G-Code Modifier : CM (10/27/21 3577)          Goals  Short term goals  Time Frame for Short term goals: 12 visits:  Short term goal 1: Pt. to require mod x2 for bed mob. (sleeps in lift chair at home)  Short term goal 2: Pt. to require min Ax2 from raised bed with LUCERO OCHOA  (pt. uses lift chair at home)  Short term goal 3: Pt. to have good initial static sitting balance EOB  Short term goal 4: Pt. to have fair dynamic sitting balance  Short term goal 5: Pt. to tolerate 25+ min. of PT for ther ex x 4 ext, core strengthening, mobility training  Patient Goals   Patient goals : regain independence    Plan    Plan  Times per week: 1-2x/day; 5-6days/wk  Specific instructions for Next Treatment: Co-Tx  Current Treatment Recommendations: Strengthening, ROM, Balance Training, Functional Mobility Training, Transfer Training, Gait Training, Endurance Training, Stair training, Safety Education & Training, Home Exercise Program, Patient/Caregiver Education & Training  Safety Devices  Type of devices: All fall risk precautions in place, Gait belt, Bed alarm in place, Patient at risk for falls, Call light within reach, Left in bed, Nurse notified  Restraints  Initially in place: No     Therapy Time   Individual Concurrent Group Co-treatment   Time In       1038   Time Out       1116   Minutes       45     Co-treatment with OT warranted secondary to decreased safety and independence requiring 2 skilled therapy professionals to address individual discipline's goals. PT addressing pre gait trunk strengthening, weight shifting prior to transfers, transfer training and postural control in sitting/standing.        Ricco Jeronimo, PTA

## 2021-10-27 NOTE — FLOWSHEET NOTE
Dr. Juan Layne called RN to discuss results of stress test. New order received for NPO at midnight for possible cardiac catheterization tomorrow. RN called and left a voicemail for cath lab to give them a heads-up on plan of care. Patient informed and verbalizes understanding.

## 2021-10-27 NOTE — PROGRESS NOTES
Section of Cardiology  Progress Note      Date:  10/27/2021  Patient: Christian Duvall  Admission:  10/24/2021  6:48 AM  Ad[facility]: [facility] [[facility]]  Non-ST elevation MI (NSTEMI) ([facility]) [I21.4]  Lymphedema of both lower extremities [I89.0]  Chronic bilateral low back pain without sciatica [M54.50, G89.29]  Acute on chronic congestive heart failure, unspecified heart failure type ([facility] [I50.9]  Age:  61 y.o., 1955     LOS: 3 days     Reason for evaluation:   previous M. I.      SUBJECTIVE:     The patient was seen and examined. Notes and labs reviewed. There were not complications over night. Patient seen in the stress lab. She was free of chest pain but she still has severe back pain. She was seen by Orthopedic surgery  kyphoplasty was recommended pending cardiac clearance and her cardiac testings. Patient's cardiac review of systems: negative for chest pain and dyspnea. The patient is generally feeling unchanged. OBJECTIVE:    Telemetry: Sinus  BP (!) 124/58   Pulse 74   Temp 97 °F (36.1 °C) (Temporal)   Resp 18   Ht 5' 5\" (1.651 m)   Wt 192 lb 9.6 oz (87.4 kg)   SpO2 91%   BMI 32.05 kg/m²     Intake/Output Summary (Last 24 hours) at 10/27/2021 1836  Last data filed at 10/27/2021 1833  Gross per 24 hour   Intake 720 ml   Output 2800 ml   Net -2080 ml       EXAM:   CONSTITUTIONAL:  awake, alert, cooperative, no apparent distress, and appears stated age. HEENT: Normal jugular venous pulsations, no carotid bruits. Head is atraumatic, normocephalic. Eyes and oral mucosa are normal.  LUNGS: Good respiratory effort. No for increased work of breathing. On auscultation: clear to auscultation bilaterally  CARDIOVASCULAR:  Normal apical impulse, regular rate and rhythm, normal S1 and S2, no S3 or S4,   ABDOMEN: Soft, nontender, nondistended. Bowel sounds present. SKIN: Warm and dry. EXTREMITIES: No lower extremity edema. Motor movement grossly intact.   No cyanosis or clubbing. Current Inpatient Medications:   dextromethorphan  30 mg Oral 2 times per day    lidocaine  1 patch TransDERmal Daily    gabapentin  300 mg Oral TID    FLUoxetine  40 mg Oral QAM    metoprolol succinate  25 mg Oral Daily    predniSONE  10 mg Oral Daily    insulin glargine  56 Units SubCUTAneous Daily    And    insulin lispro  10 Units SubCUTAneous TID     metFORMIN  1,000 mg Oral BID     rosuvastatin  20 mg Oral Nightly    sodium chloride flush  5-40 mL IntraVENous 2 times per day    aspirin  81 mg Oral Daily    furosemide  20 mg IntraVENous BID    influenza virus vaccine  0.5 mL IntraMUSCular Prior to discharge       IV Infusions (if any):   dextrose      sodium chloride      heparin (PORCINE) Infusion 13 Units/kg/hr (10/27/21 6761)       Diagnostics:   EKG: . ECHO: .   Ejection fraction: %  Stress Test: The EKG portion of the stress test was negative for ischemia however the nuclear portion reported to have intermediate risk with evidence of tommy-infarct ischemia in the inferior wall. .  Cardiac Angiography: .    Labs:   CBC:   Recent Labs     10/25/21  0712 10/26/21  0614   WBC 11.6* 10.8   HGB 8.8* 8.2*   HCT 30.7* 29.4*    322     BMP:   Recent Labs     10/26/21  0614 10/27/21  0235    140   K 4.1 4.0   CO2 26 27   BUN 21 19   CREATININE 0.77 0.78   LABGLOM >60 >60   GLUCOSE 307* 217*     No results found for: BNP  PT/INR: No results for input(s): PROTIME, INR in the last 72 hours. APTT:No results for input(s): APTT in the last 72 hours.   CARDIAC ENZYMES:  Recent Labs     10/25/21  1113 10/27/21  0927   TROPONINT NOT REPORTED NOT REPORTED     FASTING LIPID PANEL:  Lab Results   Component Value Date    HDL 40 10/25/2021    TRIG 107 10/25/2021     LIVER PROFILE:  Recent Labs     10/25/21  0712   AST 9   ALT <5*   LABALBU 2.9*       ASSESSMENT:  1.  Elevated troponin from unknown significance and etiology, patient has no angina and the EKG appears to be stable without ST-T changes. 2.  Insulin-dependent diabetes mellitus  3.  Hyperlipidemia  4.  Peripheral vascular disease    Patient Active Problem List   Diagnosis    Non-ST elevation MI ([facility]STEMI) ([facility]    Type 2 diabetes mellitus with hyperglycemia, without long-term current use of insulin ([facility])    Class 1 obesity due to excess calories with serious comorbidity and body mass index (BMI) of 33.0 to 33.9 in adult    Lymphedema of both lower extremities    Acute on chronic diastolic congestive heart failure (HCC)    Pathological fracture of thoracic vertebra due to secondary osteoporosis ([facility]    Pathological fracture of lumbar vertebra due to secondary osteoporosis (HCC)    Intractable back pain    Age-related osteoporosis with current pathological fracture    History of kyphoplasty    Facet arthritis, degenerative, lumbar spine    Degenerative spondylolisthesis    DDD (degenerative disc disease), thoracolumbar    Acute on chronic congestive heart failure (HCC)    Chronic bilateral low back pain without sciatica       PLAN:    1. Reviewed other notes and reviewed the stress test.  2. We'll keep nothing by mouth for possible cardiac catheterization  3. I will discuss the issue again with the patient and if she required PCI then kyphoplasty will not be feasible for few months at least.      Please see orders. Discussed with patient and nursing.     Drake Taylor MD, MD  He ANIL Cook Sender has been nurse and [address]

## 2021-10-27 NOTE — FLOWSHEET NOTE
Dr. Belkis Juares in department and was updated on the patient's current status via RN. Dr. Belkis Juares recommends a kyphoplasty either inpatient or outpatient once cleared by cardiology. Will continue to monitor closely.

## 2021-10-27 NOTE — CARE COORDINATION
Social Work-Met with patient to discuss dc options. Discussed SNF for rehab. She is agreeable. The Plan for Transition of Care is related to the following treatment goals: SNF with PT/OT and skilled nursing    The Patient and/or patient representative patient was provided with a choice of provider and agrees   with the discharge plan. [x] Yes [] No    Freedom of choice list was provided with basic dialogue that supports the patient's individualized plan of care/goals, treatment preferences and shares the quality data associated with the providers. [x] Yes [] No. Patient would like social "[employer]er to call her cousin,Carla. Phone call to Rj Yang. The first choice is [facility].  Sent referral. Anyi Burns

## 2021-10-28 ENCOUNTER — APPOINTMENT (OUTPATIENT)
Dept: CT IMAGING | Age: 66
DRG: 233 | End: 2021-10-28
Payer: MEDICARE

## 2021-10-28 ENCOUNTER — APPOINTMENT (OUTPATIENT)
Dept: GENERAL RADIOLOGY | Age: 66
DRG: 233 | End: 2021-10-28
Payer: MEDICARE

## 2021-10-28 LAB
-: ABNORMAL
ALLEN TEST: ABNORMAL
AMORPHOUS: ABNORMAL
ANION GAP SERPL CALCULATED.3IONS-SCNC: 9 MMOL/L (ref 9–17)
ANTI-XA UNFRAC HEPARIN: 0.44 IU/L (ref 0.3–0.7)
BACTERIA: ABNORMAL
BILIRUBIN URINE: NEGATIVE
BUN BLDV-MCNC: 15 MG/DL (ref 8–23)
BUN/CREAT BLD: 25 (ref 9–20)
CALCIUM SERPL-MCNC: 8.5 MG/DL (ref 8.6–10.4)
CASTS UA: ABNORMAL /LPF
CHLORIDE BLD-SCNC: 103 MMOL/L (ref 98–107)
CO2: 30 MMOL/L (ref 20–31)
COLOR: YELLOW
COMMENT UA: ABNORMAL
CREAT SERPL-MCNC: 0.61 MG/DL (ref 0.5–0.9)
CRYSTALS, UA: ABNORMAL /HPF
EPITHELIAL CELLS UA: ABNORMAL /HPF (ref 0–5)
ESTIMATED AVERAGE GLUCOSE: 212 MG/DL
FIO2: 2
GFR AFRICAN AMERICAN: >60 ML/MIN
GFR NON-AFRICAN AMERICAN: >60 ML/MIN
GFR SERPL CREATININE-BSD FRML MDRD: ABNORMAL ML/MIN/{1.73_M2}
GFR SERPL CREATININE-BSD FRML MDRD: ABNORMAL ML/MIN/{1.73_M2}
GLUCOSE BLD-MCNC: 107 MG/DL (ref 65–105)
GLUCOSE BLD-MCNC: 145 MG/DL (ref 65–105)
GLUCOSE BLD-MCNC: 162 MG/DL (ref 65–105)
GLUCOSE BLD-MCNC: 74 MG/DL (ref 65–105)
GLUCOSE BLD-MCNC: 90 MG/DL (ref 70–99)
GLUCOSE URINE: NEGATIVE
HBA1C MFR BLD: 9 % (ref 4–6)
HCT VFR BLD CALC: 32.4 % (ref 36.3–47.1)
HEMOGLOBIN: 9 G/DL (ref 11.9–15.1)
INR BLD: 1.2
KETONES, URINE: NEGATIVE
LEUKOCYTE ESTERASE, URINE: ABNORMAL
MAGNESIUM: 1.6 MG/DL (ref 1.6–2.6)
MCH RBC QN AUTO: 22.7 PG (ref 25.2–33.5)
MCHC RBC AUTO-ENTMCNC: 27.8 G/DL (ref 28.4–34.8)
MCV RBC AUTO: 81.6 FL (ref 82.6–102.9)
MODE: ABNORMAL
MUCUS: ABNORMAL
NEGATIVE BASE EXCESS, ART: ABNORMAL (ref 0–2)
NITRITE, URINE: NEGATIVE
NRBC AUTOMATED: 0 PER 100 WBC
O2 DEVICE/FLOW/%: ABNORMAL
OTHER OBSERVATIONS UA: ABNORMAL
PARTIAL THROMBOPLASTIN TIME: >150 SEC (ref 23.9–33.8)
PATIENT TEMP: 37
PDW BLD-RTO: 14.9 % (ref 11.8–14.4)
PH UA: 7.5 (ref 5–8)
PLATELET # BLD: 322 K/UL (ref 138–453)
PMV BLD AUTO: 9.5 FL (ref 8.1–13.5)
POC HCO3: 33.4 MMOL/L (ref 21–28)
POC O2 SATURATION: 91 % (ref 94–98)
POC PCO2 TEMP: ABNORMAL MM HG
POC PCO2: 49.2 MM HG (ref 35–48)
POC PH TEMP: ABNORMAL
POC PH: 7.44 (ref 7.35–7.45)
POC PO2 TEMP: ABNORMAL MM HG
POC PO2: 60.8 MM HG (ref 83–108)
POSITIVE BASE EXCESS, ART: 8 (ref 0–3)
POTASSIUM SERPL-SCNC: 3.6 MMOL/L (ref 3.7–5.3)
PREALBUMIN: 15.4 MG/DL (ref 20–40)
PROTEIN UA: NEGATIVE
PROTHROMBIN TIME: 15.1 SEC (ref 11.5–14.2)
RBC # BLD: 3.97 M/UL (ref 3.95–5.11)
RBC UA: ABNORMAL /HPF (ref 0–2)
RENAL EPITHELIAL, UA: ABNORMAL /HPF
SAMPLE SITE: ABNORMAL
SODIUM BLD-SCNC: 142 MMOL/L (ref 135–144)
SPECIFIC GRAVITY UA: 1.01 (ref 1–1.03)
TCO2 (CALC), ART: ABNORMAL MMOL/L (ref 22–29)
TRICHOMONAS: ABNORMAL
TURBIDITY: ABNORMAL
URINE HGB: NEGATIVE
UROBILINOGEN, URINE: NORMAL
WBC # BLD: 9 K/UL (ref 3.5–11.3)
WBC UA: ABNORMAL /HPF (ref 0–5)
YEAST: ABNORMAL

## 2021-10-28 PROCEDURE — 82947 ASSAY GLUCOSE BLOOD QUANT: CPT

## 2021-10-28 PROCEDURE — B2151ZZ FLUOROSCOPY OF LEFT HEART USING LOW OSMOLAR CONTRAST: ICD-10-PCS | Performed by: FAMILY MEDICINE

## 2021-10-28 PROCEDURE — B2111ZZ FLUOROSCOPY OF MULTIPLE CORONARY ARTERIES USING LOW OSMOLAR CONTRAST: ICD-10-PCS | Performed by: FAMILY MEDICINE

## 2021-10-28 PROCEDURE — 87086 URINE CULTURE/COLONY COUNT: CPT

## 2021-10-28 PROCEDURE — 6360000004 HC RX CONTRAST MEDICATION

## 2021-10-28 PROCEDURE — 2709999900 HC NON-CHARGEABLE SUPPLY

## 2021-10-28 PROCEDURE — C1894 INTRO/SHEATH, NON-LASER: HCPCS

## 2021-10-28 PROCEDURE — 93880 EXTRACRANIAL BILAT STUDY: CPT

## 2021-10-28 PROCEDURE — 81001 URINALYSIS AUTO W/SCOPE: CPT

## 2021-10-28 PROCEDURE — 6360000002 HC RX W HCPCS: Performed by: INTERNAL MEDICINE

## 2021-10-28 PROCEDURE — 99232 SBSQ HOSP IP/OBS MODERATE 35: CPT | Performed by: FAMILY MEDICINE

## 2021-10-28 PROCEDURE — 36600 WITHDRAWAL OF ARTERIAL BLOOD: CPT

## 2021-10-28 PROCEDURE — 85027 COMPLETE CBC AUTOMATED: CPT

## 2021-10-28 PROCEDURE — 84134 ASSAY OF PREALBUMIN: CPT

## 2021-10-28 PROCEDURE — 6370000000 HC RX 637 (ALT 250 FOR IP): Performed by: NURSE PRACTITIONER

## 2021-10-28 PROCEDURE — 80048 BASIC METABOLIC PNL TOTAL CA: CPT

## 2021-10-28 PROCEDURE — 82803 BLOOD GASES ANY COMBINATION: CPT

## 2021-10-28 PROCEDURE — 85610 PROTHROMBIN TIME: CPT

## 2021-10-28 PROCEDURE — 87641 MR-STAPH DNA AMP PROBE: CPT

## 2021-10-28 PROCEDURE — 85730 THROMBOPLASTIN TIME PARTIAL: CPT

## 2021-10-28 PROCEDURE — 71045 X-RAY EXAM CHEST 1 VIEW: CPT

## 2021-10-28 PROCEDURE — 83735 ASSAY OF MAGNESIUM: CPT

## 2021-10-28 PROCEDURE — 94761 N-INVAS EAR/PLS OXIMETRY MLT: CPT

## 2021-10-28 PROCEDURE — 71250 CT THORAX DX C-: CPT

## 2021-10-28 PROCEDURE — 2700000000 HC OXYGEN THERAPY PER DAY

## 2021-10-28 PROCEDURE — 2580000003 HC RX 258: Performed by: INTERNAL MEDICINE

## 2021-10-28 PROCEDURE — 6370000000 HC RX 637 (ALT 250 FOR IP): Performed by: INTERNAL MEDICINE

## 2021-10-28 PROCEDURE — APPSS45 APP SPLIT SHARED TIME 31-45 MINUTES: Performed by: NURSE PRACTITIONER

## 2021-10-28 PROCEDURE — 2500000003 HC RX 250 WO HCPCS

## 2021-10-28 PROCEDURE — C9113 INJ PANTOPRAZOLE SODIUM, VIA: HCPCS | Performed by: INTERNAL MEDICINE

## 2021-10-28 PROCEDURE — 36415 COLL VENOUS BLD VENIPUNCTURE: CPT

## 2021-10-28 PROCEDURE — 2060000000 HC ICU INTERMEDIATE R&B

## 2021-10-28 PROCEDURE — 93970 EXTREMITY STUDY: CPT

## 2021-10-28 PROCEDURE — 93458 L HRT ARTERY/VENTRICLE ANGIO: CPT | Performed by: INTERNAL MEDICINE

## 2021-10-28 PROCEDURE — 6360000002 HC RX W HCPCS

## 2021-10-28 PROCEDURE — 87077 CULTURE AEROBIC IDENTIFY: CPT

## 2021-10-28 PROCEDURE — 85520 HEPARIN ASSAY: CPT

## 2021-10-28 PROCEDURE — 87186 SC STD MICRODIL/AGAR DIL: CPT

## 2021-10-28 PROCEDURE — 83036 HEMOGLOBIN GLYCOSYLATED A1C: CPT

## 2021-10-28 PROCEDURE — 4A023N7 MEASUREMENT OF CARDIAC SAMPLING AND PRESSURE, LEFT HEART, PERCUTANEOUS APPROACH: ICD-10-PCS | Performed by: FAMILY MEDICINE

## 2021-10-28 RX ORDER — PANTOPRAZOLE SODIUM 40 MG/1
40 TABLET, DELAYED RELEASE ORAL
Status: DISCONTINUED | OUTPATIENT
Start: 2021-10-29 | End: 2021-11-02

## 2021-10-28 RX ORDER — POTASSIUM CHLORIDE 20 MEQ/1
40 TABLET, EXTENDED RELEASE ORAL ONCE
Status: COMPLETED | OUTPATIENT
Start: 2021-10-28 | End: 2021-10-28

## 2021-10-28 RX ORDER — ACETAMINOPHEN 325 MG/1
650 TABLET ORAL EVERY 4 HOURS PRN
Status: DISCONTINUED | OUTPATIENT
Start: 2021-10-28 | End: 2021-11-02

## 2021-10-28 RX ORDER — SODIUM CHLORIDE 9 MG/ML
INJECTION, SOLUTION INTRAVENOUS CONTINUOUS
Status: ACTIVE | OUTPATIENT
Start: 2021-10-28 | End: 2021-10-28

## 2021-10-28 RX ORDER — SODIUM CHLORIDE 9 MG/ML
25 INJECTION, SOLUTION INTRAVENOUS PRN
Status: DISCONTINUED | OUTPATIENT
Start: 2021-10-28 | End: 2021-11-02

## 2021-10-28 RX ORDER — FENTANYL CITRATE 50 UG/ML
25 INJECTION, SOLUTION INTRAMUSCULAR; INTRAVENOUS
Status: ACTIVE | OUTPATIENT
Start: 2021-10-28 | End: 2021-10-28

## 2021-10-28 RX ORDER — SODIUM CHLORIDE 0.9 % (FLUSH) 0.9 %
5-40 SYRINGE (ML) INJECTION EVERY 12 HOURS SCHEDULED
Status: DISCONTINUED | OUTPATIENT
Start: 2021-10-28 | End: 2021-11-02

## 2021-10-28 RX ORDER — PANTOPRAZOLE SODIUM 40 MG/10ML
40 INJECTION, POWDER, LYOPHILIZED, FOR SOLUTION INTRAVENOUS ONCE
Status: COMPLETED | OUTPATIENT
Start: 2021-10-28 | End: 2021-10-28

## 2021-10-28 RX ORDER — SODIUM CHLORIDE 0.9 % (FLUSH) 0.9 %
5-40 SYRINGE (ML) INJECTION PRN
Status: DISCONTINUED | OUTPATIENT
Start: 2021-10-28 | End: 2021-11-02

## 2021-10-28 RX ADMIN — PREDNISONE 10 MG: 5 TABLET ORAL at 07:38

## 2021-10-28 RX ADMIN — INSULIN GLARGINE 56 UNITS: 100 INJECTION, SOLUTION SUBCUTANEOUS at 12:56

## 2021-10-28 RX ADMIN — SODIUM CHLORIDE, PRESERVATIVE FREE 10 ML: 5 INJECTION INTRAVENOUS at 12:56

## 2021-10-28 RX ADMIN — SODIUM CHLORIDE, PRESERVATIVE FREE 10 ML: 5 INJECTION INTRAVENOUS at 20:01

## 2021-10-28 RX ADMIN — OXYCODONE AND ACETAMINOPHEN 1 TABLET: 5; 325 TABLET ORAL at 20:00

## 2021-10-28 RX ADMIN — METOPROLOL SUCCINATE 25 MG: 25 TABLET, EXTENDED RELEASE ORAL at 07:37

## 2021-10-28 RX ADMIN — FUROSEMIDE 20 MG: 10 INJECTION, SOLUTION INTRAMUSCULAR; INTRAVENOUS at 17:15

## 2021-10-28 RX ADMIN — ANTI-FUNGAL POWDER MICONAZOLE NITRATE TALC FREE: 1.42 POWDER TOPICAL at 07:37

## 2021-10-28 RX ADMIN — GABAPENTIN 300 MG: 300 CAPSULE ORAL at 20:52

## 2021-10-28 RX ADMIN — Medication 30 MG: at 07:39

## 2021-10-28 RX ADMIN — SODIUM CHLORIDE, PRESERVATIVE FREE 10 ML: 5 INJECTION INTRAVENOUS at 07:37

## 2021-10-28 RX ADMIN — ASPIRIN 81 MG: 81 TABLET, CHEWABLE ORAL at 07:38

## 2021-10-28 RX ADMIN — GABAPENTIN 300 MG: 300 CAPSULE ORAL at 07:37

## 2021-10-28 RX ADMIN — PANTOPRAZOLE SODIUM 40 MG: 40 INJECTION, POWDER, FOR SOLUTION INTRAVENOUS at 12:55

## 2021-10-28 RX ADMIN — INSULIN LISPRO 10 UNITS: 100 INJECTION, SOLUTION INTRAVENOUS; SUBCUTANEOUS at 17:15

## 2021-10-28 RX ADMIN — GABAPENTIN 300 MG: 300 CAPSULE ORAL at 12:56

## 2021-10-28 RX ADMIN — Medication 30 MG: at 20:05

## 2021-10-28 RX ADMIN — POTASSIUM CHLORIDE 40 MEQ: 20 TABLET, EXTENDED RELEASE ORAL at 12:55

## 2021-10-28 RX ADMIN — FLUOXETINE 40 MG: 20 CAPSULE ORAL at 07:37

## 2021-10-28 RX ADMIN — FUROSEMIDE 20 MG: 10 INJECTION, SOLUTION INTRAMUSCULAR; INTRAVENOUS at 07:37

## 2021-10-28 ASSESSMENT — PAIN SCALES - GENERAL
PAINLEVEL_OUTOF10: 8
PAINLEVEL_OUTOF10: 0
PAINLEVEL_OUTOF10: 5
PAINLEVEL_OUTOF10: 0

## 2021-10-28 ASSESSMENT — ENCOUNTER SYMPTOMS
CONSTIPATION: 0
DIARRHEA: 1
CHEST TIGHTNESS: 0
BACK PAIN: 1
COLOR CHANGE: 1
SHORTNESS OF BREATH: 0
NAUSEA: 0
VOMITING: 0
COUGH: 0

## 2021-10-28 NOTE — FLOWSHEET NOTE
RN notified Dr. Donnella Favre via perfect serve of patient verbalizing concerns for cardiac ctah through the groin due to laying flat and coughing & is requesting acess through radial. Awaiting response at this time.

## 2021-10-28 NOTE — FLOWSHEET NOTE
Patient requested for RN to contact her cousin Maura Ernst at [phone] to discuss plan of care. Maura Ernst verbalizes understanding.

## 2021-10-28 NOTE — FLOWSHEET NOTE
Damaris Hernandez NP in department and was notified of consult for [facility]. New orders received (see orders for details).

## 2021-10-28 NOTE — PROGRESS NOTES
Progress Note    10/28/2021 2:22 PM  Subjective:   Admit Date: 10/24/2021  PCP: Micki Suarez MD  Date of Discharge: Unknown    Medications:   Scheduled Meds:   [START ON 10/29/2021] pantoprazole  40 mg Oral QAM AC    sodium chloride flush  5-40 mL IntraVENous 2 times per day    dextromethorphan  30 mg Oral 2 times per day    lidocaine  1 patch TransDERmal Daily    gabapentin  300 mg Oral TID    FLUoxetine  40 mg Oral QAM    metoprolol succinate  25 mg Oral Daily    predniSONE  10 mg Oral Daily    insulin glargine  56 Units SubCUTAneous Daily    And    insulin lispro  10 Units SubCUTAneous TID WC    [Held by provider] metFORMIN  1,000 mg Oral BID WC    rosuvastatin  20 mg Oral Nightly    aspirin  81 mg Oral Daily    furosemide  20 mg IntraVENous BID    influenza virus vaccine  0.5 mL IntraMUSCular Prior to discharge     Continuous Infusions:   sodium chloride 100 mL/hr at 10/28/21 1100    sodium chloride      dextrose      sodium chloride       PRN Meds:sodium chloride flush, sodium chloride, acetaminophen, fentanNYL, miconazole, oxyCODONE-acetaminophen, glucose, dextrose, glucagon (rDNA), dextrose, sodium chloride, ondansetron **OR** ondansetron, [DISCONTINUED] acetaminophen **OR** acetaminophen, polyethylene glycol, nitroGLYCERIN, perflutren lipid microspheres    Diet:   Diet: ADULT DIET; Regular; 3 carb choices (45 gm/meal);  Low Fat/Low Chol/High Fiber/SEBASTIAN    Subjective:   Systemic or Specific Complaints: Spine pain    Objective:     Patient Vitals for the past 24 hrs:   BP Temp Temp src Pulse Resp SpO2 Weight   10/28/21 1400 -- -- -- -- 16 -- --   10/28/21 1230 -- -- -- -- 16 -- --   10/28/21 1200 (!) 118/57 97.7 °F (36.5 °C) Temporal 68 16 96 % --   10/28/21 1145 125/61 -- -- -- 16 95 % --   10/28/21 1132 -- 97.7 °F (36.5 °C) Temporal -- -- -- --   10/28/21 1130 -- -- -- -- 16 -- --   10/28/21 1115 -- -- -- -- 16 -- --   10/28/21 1100 -- 97.3 °F (36.3 °C) Oral -- 16 -- --   10/28/21 0800 -- 97.1 °F (36.2 °C) Temporal -- 17 -- --   10/28/21 0500 -- -- -- 74 -- -- --   10/28/21 0400 (!) 151/77 98.4 °F (36.9 °C) Oral 72 16 98 % 192 lb (87.1 kg)   10/28/21 0300 -- -- -- 69 -- -- --   10/28/21 0200 -- -- -- 70 -- -- --   10/28/21 0100 -- -- -- 75 -- -- --   10/28/21 0000 (!) 149/69 98.4 °F (36.9 °C) -- 81 16 91 % --   10/27/21 2300 -- -- -- 82 -- -- --   10/27/21 2200 -- -- -- 87 -- -- --   10/27/21 2100 -- -- -- 78 -- 90 % --   10/27/21 2000 119/61 97.4 °F (36.3 °C) Oral 81 16 90 % --   10/27/21 1900 -- -- -- 80 -- 91 % --   10/27/21 1600 (!) 124/58 97 °F (36.1 °C) Temporal 74 18 91 % --     I/O last 3 completed shifts: In: 992 [P.O.:720; I.V.:272]  Out: 2650 [Urine:2650]  I/O this shift:  In: 320 [P.O.:320]  Out: 1050 [Urine:1050]      General: alert, appears stated age, cooperative and moderate distress   Wound: Motion: Painful range of Motion in affected extremity   DVT Exam: No evidence of DVT seen on physical exam.  Negative Erwin's sign. No cords or calf tenderness. Additional exam:     Data Review  CBC:   Recent Labs     10/26/21  0614 10/28/21  0453   WBC 10.8 9.0   HGB 8.2* 9.0*    322     BMP:    Recent Labs     10/26/21  0614 10/27/21  0235 10/28/21  0453    140 142   K 4.1 4.0 3.6*    102 103   CO2 26 27 30   BUN 21 19 15   CREATININE 0.77 0.78 0.61   GLUCOSE 307* 217* 90     Hepatic:   No results for input(s): AST, ALT, ALB, BILITOT, ALKPHOS in the last 72 hours. Troponin: No results for input(s): TROPONINI in the last 72 hours. BNP: No results for input(s): BNP in the last 72 hours. Lipids:   No results for input(s): CHOL, HDL in the last 72 hours. Invalid input(s): LDLCALCU  INR:   Recent Labs     10/28/21  1124   INR 1.2         Assessment:   [place] is unchanged from yesterday. Pain is not well controlled. She has no nausea and no vomiting.     Current activity is up with assistance  Incision:       Plan:      1: MRI confirms acute pathological osteoporotic L3 fracture. Low signal T1, high signal STIR changes. L5 fracture is chronic, previous kyphoplasty vertebral augmentation T12, L1.  2:  Continue Deep venous thrombosis prophylaxis mechanically   3:  Continue Pain Control  4. We will schedule for kyphoplasty vertebral augmentation, biopsy if  cleared by cardiology. Following with you.     Electronically signed by Jasen Garnett MD on 10/28/2021 at 2:22 PM

## 2021-10-28 NOTE — PROGRESS NOTES
pain and said all her pain was due to fibromyalgia. troponins were elevated     Review of Systems:     Constitutional:  negative for chills, fevers, sweats  Respiratory:  negative for dyspnea on exertion, shortness of breath, wheezing. Positive for cough  Cardiovascular:  negative for chest pain, chest pressure/discomfort, lower extremity edema, palpitations  Gastrointestinal:  negative for abdominal pain, constipation, diarrhea, nausea, vomiting  Neurological:  negative for dizziness, headache  + for back pain   Medications: Allergies:     Allergies   Allergen Reactions    Amoxicillin Diarrhea and Other (See Comments)    Amoxicillin-Pot Clavulanate Diarrhea    Atorvastatin Other (See Comments)     Other reaction(s): Myalgia  Weakness      Cefuroxime Axetil Other (See Comments)    Clavulanic Acid Diarrhea and Other (See Comments)    Codeine Other (See Comments)     Unless suspended in syrup      Dextroamphetamine     Adhesive Tape Rash     Itching     Cephalosporins Rash       Current Meds:   Scheduled Meds:    dextromethorphan  30 mg Oral 2 times per day    lidocaine  1 patch TransDERmal Daily    gabapentin  300 mg Oral TID    FLUoxetine  40 mg Oral QAM    metoprolol succinate  25 mg Oral Daily    predniSONE  10 mg Oral Daily    insulin glargine  56 Units SubCUTAneous Daily    And    insulin lispro  10 Units SubCUTAneous TID     metFORMIN  1,000 mg Oral BID WC    rosuvastatin  20 mg Oral Nightly    sodium chloride flush  5-40 mL IntraVENous 2 times per day    aspirin  81 mg Oral Daily    furosemide  20 mg IntraVENous BID    influenza virus vaccine  0.5 mL IntraMUSCular Prior to discharge     Continuous Infusions:    dextrose      sodium chloride      heparin (PORCINE) Infusion 13 Units/kg/hr (10/27/21 8339)     PRN Meds: miconazole, oxyCODONE-acetaminophen, glucose, dextrose, glucagon (rDNA), dextrose, sodium chloride flush, sodium chloride, ondansetron **OR** ondansetron, acetaminophen **OR** acetaminophen, polyethylene glycol, heparin (porcine), heparin (porcine), nitroGLYCERIN, perflutren lipid microspheres    Data:     Past Medical History:   has a past medical history of Asthma, Diabetes mellitus ([facility] Fibromyalgia, Headache, Lymphedema of both lower extremities, and Myasthenia gravis ([facility] Social History:   reports that she has never smoked. She has never used smokeless tobacco. She reports previous alcohol use. She reports previous drug use. Family History:   Family History   Problem Relation Age of Onset    Coronary Art Dis Mother     Kidney Disease Mother        Vitals:  BP (!) 151/77   Pulse 74   Temp 97.1 °F (36.2 °C) (Temporal)   Resp 17   Ht 5' 5\" (1.651 m)   Wt 192 lb (87.1 kg)   SpO2 98%   BMI 31.95 kg/m²   Temp (24hrs), Av.6 °F (36.4 °C), Min:97 °F (36.1 °C), Max:98.4 °F (36.9 °C)    Recent Labs     10/27/21  1150 10/27/21  1630 10/27/21  1918 10/28/21  0741   POCGLU 142* 145* 206* 74       I/O (24Hr):     Intake/Output Summary (Last 24 hours) at 10/28/2021 0902  Last data filed at 10/28/2021 0600  Gross per 24 hour   Intake 992 ml   Output 2650 ml   Net -1658 ml       Labs:  Hematology:  Recent Labs     10/26/21  0614 10/28/21  0453   WBC 10.8 9.0   RBC 3.53* 3.97   HGB 8.2* 9.0*   HCT 29.4* 32.4*   MCV 83.3 81.6*   MCH 23.2* 22.7*   MCHC 27.9* 27.8*   RDW 15.4* 14.9*    322   MPV 10.1 9.5     Chemistry:  Recent Labs     10/25/21  1113 10/26/21  0614 10/27/21  0235 10/27/21  0927 10/28/21  0453   NA  --  139 140  --  142   K  --  4.1 4.0  --  3.6*   CL  --  105 102  --  103   CO2  --  26 27  --  30   GLUCOSE  --  307* 217*  --  90   BUN  --    --  15   CREATININE  --  0.77 0.78  --  0.61   ANIONGAP  --  8* 11  --  9   LABGLOM  --  >60 >60  --  >60   GFRAA  --  >60 >60  --  >60   CALCIUM  --  8.1* 8.2*  --  8.5*   TROPHS 82*  --   --  50*  --      Recent Labs     10/26/21  1920 10/27/21  0750 10/27/21  1150 10/27/21  1630 10/27/21  1918 10/28/21  0741   POCGLU 335* 143* 142* 145* 206* 74     ABG:No results found for: POCPH, PHART, PH, POCPCO2, QFF5AFT, PCO2, POCPO2, PO2ART, PO2, POCHCO3, EDX4PPD, HCO3, NBEA, PBEA, BEART, BE, THGBART, THB, PDI5BAH, YTMM9GHS, A6VAXTKD, O2SAT, FIO2  No results found for: SPECIAL  No results found for: CULTURE    Radiology:  XR LUMBAR SPINE (2-3 VIEWS)    Result Date: 10/24/2021  LUMBAR SPINE: 1. Age indeterminate, probably nonacute, compression fracture of L5 vertebral body with about 50% decrease height. Please correlate with point tenderness. MRI may also be considered for age characterization if deemed clinically necessary. 2. T12 and L1 kyphoplasty. CHEST X-RAY: 1. Poor inspiratory afford with significantly decreased lung volumes. 2. Patchy opacities left lower lung probably due to crowding of vessels with or without underlying developing infiltrate. Please correlate with auscultation. Short interval follow-up may also be considered. XR CHEST PORTABLE    Result Date: 10/24/2021  LUMBAR SPINE: 1. Age indeterminate, probably nonacute, compression fracture of L5 vertebral body with about 50% decrease height. Please correlate with point tenderness. MRI may also be considered for age characterization if deemed clinically necessary. 2. T12 and L1 kyphoplasty. CHEST X-RAY: 1. Poor inspiratory afford with significantly decreased lung volumes. 2. Patchy opacities left lower lung probably due to crowding of vessels with or without underlying developing infiltrate. Please correlate with auscultation. Short interval follow-up may also be considered. CT CHEST PULMONARY EMBOLISM W CONTRAST    Result Date: 10/24/2021  1. No evidence for acute pulmonary embolism. 2. Scattered bibasilar patchy subsegmental atelectasis and trace bilateral pleural effusions. 3. Borderline cardiomegaly.  4. Large areas of geographic ground-glass attenuation interspersed with more lucent areas probably combination of air trapping and pulmonary interstitial edema. 5. Cholelithiasis. Physical Examination:        General appearance:  alert, cooperative and no distress  Mental Status:  oriented to person, place and time and normal affect  Lungs:  Bilateral bases diminished, normal effort, non productive cough  Heart:  regular rate and rhythm, no murmur  Abdomen:  soft, nontender, nondistended, normal bowel sounds, no masses, hepatomegaly, splenomegaly  Extremities:  no redness, tenderness in the calves; chronic edema  Skin:  no gross lesions, rashes, induration    Assessment:        Hospital Problems         Last Modified POA    * (Principal) Non-ST elevation MI (NSTEMI) ([address] 10/27/2021 Yes    Type 2 diabetes mellitus with hyperglycemia, without long-term current use of insulin ([address] 10/24/2021 Yes    Class 1 obesity due to excess calories with serious comorbidity and body mass index (BMI) of 33.0 to 33.9 in adult 10/24/2021 Yes    Lymphedema of both lower extremities 10/24/2021 Yes    Acute on chronic diastolic congestive heart failure ([address] 10/26/2021 Yes    Pathological fracture of thoracic vertebra due to secondary osteoporosis ([address] 10/26/2021 Yes    Pathological fracture of lumbar vertebra due to secondary osteoporosis ([address] 10/26/2021 Yes    Intractable back pain 10/26/2021 Yes    Age-related osteoporosis with current pathological fracture 10/26/2021 Yes    History of kyphoplasty 10/26/2021 Yes    Facet arthritis, degenerative, lumbar spine 10/26/2021 Yes    Degenerative spondylolisthesis 10/26/2021 Yes    DDD (degenerative disc disease), thoracolumbar 10/26/2021 Yes    Acute on chronic congestive heart failure ([address] 10/27/2021 Yes    Chronic bilateral low back pain without sciatica 10/27/2021 Yes          Plan:        1. Ortho consult reviewed: MRI confirms acute pathological L3 fracture likely due to osteoporosis and chronic L5 fracture, plans for kyphoplasty if cleared by cardiology  2.  Stress test shows intermediate risk, Keep NPO, plans for cardiac cath today. If PCI required, patient will have to wait for kyphoplasty for at least a few months due to antiplatelet therapy   3. Monitor and control blood sugars: blood sugars controlled, hold metformin for cardiac cath  4. Continue PT/OT   5. Monitor labs, replace electrolytes as needed, potassium 3.6-will give supplementation    6. Continue telemetry monitoring   7. Acute on chronic diastolic CHF-improving, continue IV lasix 20mg BID for diuresis. , switch to oral when ok with cardiology. EF 70%, mild LVH and mild diastolic dysfunction on ECHO   8. Continue delsym BID for cough, will add PPI daily   9. Continue Heparin gtt per protocol    10.  Plan discussed with patient and staff      CYNDY Cruz NP  10/28/2021  9:02 AM

## 2021-10-28 NOTE — CARE COORDINATION
Social [facility] will not have bed. Discussed with patient's friend. She would like a referral to [address] [place] [address].  Catalino Christopher

## 2021-10-28 NOTE — DISCHARGE INSTR - COC
Infection Status       Infection Onset Added Last Indicated Last Indicated By Review Planned Expiration Resolved Resolved By    None active    Resolved    COVID-19 Rule Out 10/24/21 10/24/21 10/24/21 COVID-19, Rapid (Ordered)   10/24/21 Rule-Out Test Resulted            Nurse Assessment:  Last Vital Signs: BP (!) 118/57   Pulse 68   Temp 97.7 °F (36.5 °C) (Temporal)   Resp 16   Ht 5' 5\" (1.651 m)   Wt 192 lb (87.1 kg)   SpO2 96%   BMI 31.95 kg/m²     Last documented pain score (0-10 scale): Pain Level: 0  Last Weight:   Wt Readings from Last 1 Encounters:   10/28/21 192 lb (87.1 kg)     Mental Status:  oriented and alert- mild confusion at times r/t time of day    IV Access:  - None    Nursing Mobility/ADLs:  Walking   Dependent  Transfer  Dependent  Bathing  Dependent  Dressing  Dependent  Toileting  Dependent  Feeding  Assisted  Med Admin  Dependent  Med Delivery   whole    Wound Care Documentation and Therapy:  Mid Sternal incision -   SVG sites x3 - Right lower calf - staples scant drainage dry dressing in place, Right upper calf -staples open to air , Right groin one staple open to air  Right lower leg-  skin tear - Dry dressing         Elimination:  Continence: Bowel: Yes  Bladder: Yes  Urinary Catheter: {Urinary Catheter:[id number]}   Colostomy/Ileostomy/Ileal Conduit: {YES / TF:04686}       Date of Last BM11/10/21    Intake/Output Summary (Last 24 hours) at 10/28/2021 1259  Last data filed at 10/28/2021 1134  Gross per 24 hour   Intake 752 ml   Output 3000 ml   Net -2248 ml     I/O last 3 completed shifts: In: 992 [P.O.:720; I.V.:272]  Out: 2650 [Urine:2650]    Safety Concerns:      At Risk for Falls    Impairments/Disabilities:      None    Nutrition Therapy:  Current Nutrition Therapy:   - Oral Diet:  Cardiac    Routes of Feeding: Oral  Liquids: No Restrictions  Daily Fluid Restriction: yes - amount 1500 ml  Last Modified Barium Swallow with Video (Video Swallowing Test): not done    Treatments at the Time of Hospital Discharge:   Respiratory Treatments: See MAR   Oxygen Therapy:  is on oxygen at 2 L/min per nasal cannula. Ventilator:    - No ventilator support    Rehab Therapies: Physical Therapy and Occupational Therapy  Weight Bearing Status/Restrictions: No weight bearing restirctions  LSO as needed. No spine flexion, no spine lifting for 6 post fracture weeks     Other Medical Equipment (for information only, NOT a DME order):  braces backOther Treatments: ***  Patient's personal belongings (please select all that are sent with patient):  Glasses    RN SIGNATURE:  Electronically signed by Sang Sadler RN on 11/10/21 at 10:46 AM EST    CASE MANAGEMENT/SOCIAL WORK SECTION    Inpatient Status Date: ***    Readmission Risk Assessment Score:  Readmission Risk              Risk of Unplanned Readmission:  19           Discharging to Facility/ [address] [place] [address]   Address:  [place]:[phone]  Fax:[phone]    Dialysis Facility (if applicable)   Name:  Address:  Dialysis Schedule:  Phone:  Fax:    / signature: Electronically signed by KATHERIN Serrano on 10/28/21 at 1:01 PM EDT    PHYSICIAN SECTION    Prognosis: Good    Condition at Discharge: Stable    Rehab Potential (if transferring to Rehab): Fair    Recommended Labs or Other Treatments After Discharge:   Skilled nursing cardio/pulmonary assessment EVERY shift/visit with vital signs and SaO2 call abnormal results to Surgeon's office  Notify Surgeon's office for temp >101.5 F  Daily weight and record. Call for weight gain of 3 lbs in 3 days or 5-7lbs in 7 days    Monitor surgical incisions for signs of infection (redness, warmth, pain, purulent drainage, odor) and call with abnormal findings. Leave incisions open to air unless drainage is prohibitive. Call with any signs of separation or dehiscence. Shower daily with warm water and mild soap. Pat dry with clean towel, do not rub.    Heart Hugger on during daytime  Large breasted patients need to wear surgical or soft sports bra at all times to reduce tension on sternal incision  Yonathan hose or ace wraps on during day time, rinse and dry overnight to prevent infection of leg incisions    Strict Sternal Precautions: No lifting/pushing/pulling over 5lbs x 4 weeks, may increase 5-10 lbs per week after that as tolerated. Physical therapy BID, plus ambulation 3x per day in addition. Up in chair for all meals  Reinforce Cardiac and Diabetic Diet, medication compliance and activity instructions    Arrange for transportation to all appointments  Patient needs to see Surgeon and Cardiologist within 2 weeks of discharge. Obtain BMP 11/15/2021 and send results to nephrologist      Physician Certification: I certify the above information and transfer of Ines Cavazos  is necessary for the continuing treatment of the diagnosis listed and that she requires [facility] for less 30 days.      Update Admission H&P: No change in H&P    PHYSICIAN SIGNATURE:  Electronically signed by Beto Oliveira MD on 11/10/21 at 7:19 AM EST

## 2021-10-28 NOTE — PROGRESS NOTES
Section of Cardiology  Progress Note      Date:  10/28/2021  Patient: Tabatha Pettit  Admission:  10/24/2021  6:48 AM  Admit DX: NSTEMI (non-ST elevated myocardial infarction) ([facility] [I21.4]  Non-ST elevation MI (NSTEMI) ([facility]) [I21.4]  Lymphedema of both lower extremities [I89.0]  Chronic bilateral low back pain without sciatica [M54.50, G89.29]  Acute on chronic congestive heart failure, unspecified heart failure type ([facility] [I50.9]  Age:  61 y.o., 1955    LOS: 4 days     Reason for evaluation:   previous M. I.      SUBJECTIVE:     The patient was seen and examined. Notes and labs reviewed. There were not complications over night. Patient seen and examined in her room with her nurse at bedside. Her stress test was abnormal.  The patient still complains of back pain. Denies any chest pain. No shortness of breath at rest.  Patient's cardiac review of systems: . The patient is generally feeling unchanged. OBJECTIVE:    Telemetry: Sinus  BP (!) 151/77   Pulse 74   Temp 97.1 °F (36.2 °C) (Temporal)   Resp 17   Ht 5' 5\" (1.651 m)   Wt 192 lb (87.1 kg)   SpO2 98%   BMI 31.95 kg/m²     Intake/Output Summary (Last 24 hours) at 10/28/2021 1102  Last data filed at 10/28/2021 0900  Gross per 24 hour   Intake 752 ml   Output 3300 ml   Net -2548 ml       EXAM:   CONSTITUTIONAL:  awake, alert, cooperative, no apparent distress, and appears stated age. HEENT: Normal jugular venous pulsations, no carotid bruits. Head is atraumatic, normocephalic. Eyes and oral mucosa are normal.  LUNGS: Good respiratory effort. No for increased work of breathing. On auscultation: clear to auscultation bilaterally  CARDIOVASCULAR:  Normal apical impulse, regular rate and rhythm, normal S1 and S2, no S3 or S4,   ABDOMEN: Soft, nontender, nondistended. Bowel sounds present. SKIN: Warm and dry. EXTREMITIES: No lower extremity edema. Motor movement grossly intact. No cyanosis or clubbing.     Current Inpatient Medications:   potassium chloride  40 mEq Oral Once    pantoprazole  40 mg IntraVENous Once    [START ON 10/29/2021] pantoprazole  40 mg Oral QAM AC    dextromethorphan  30 mg Oral 2 times per day    lidocaine  1 patch TransDERmal Daily    gabapentin  300 mg Oral TID    FLUoxetine  40 mg Oral QAM    metoprolol succinate  25 mg Oral Daily    predniSONE  10 mg Oral Daily    insulin glargine  56 Units SubCUTAneous Daily    And    insulin lispro  10 Units SubCUTAneous TID WC    [Held by provider] metFORMIN  1,000 mg Oral BID WC    rosuvastatin  20 mg Oral Nightly    sodium chloride flush  5-40 mL IntraVENous 2 times per day    aspirin  81 mg Oral Daily    furosemide  20 mg IntraVENous BID    influenza virus vaccine  0.5 mL IntraMUSCular Prior to discharge       IV Infusions (if any):   dextrose      sodium chloride      heparin (PORCINE) Infusion 13 Units/kg/hr (10/27/21 1429)       Diagnostics:   EKG: . ECHO: reviewed. Ejection fraction: %  Stress Test: reviewed. Cardiac Angiography: not obtained. Labs:   CBC:   Recent Labs     10/26/21  0614 10/28/21  0453   WBC 10.8 9.0   HGB 8.2* 9.0*   HCT 29.4* 32.4*    322     BMP:   Recent Labs     10/27/21  0235 10/28/21  0453    142   K 4.0 3.6*   CO2 27 30   BUN 19 15   CREATININE 0.78 0.61   LABGLOM >60 >60   GLUCOSE 217* 90     No results found for: BNP  PT/INR: No results for input(s): PROTIME, INR in the last 72 hours. APTT:No results for input(s): APTT in the last 72 hours. CARDIAC ENZYMES:  Recent Labs     10/25/21  1113 10/27/21  0927   TROPONINT NOT REPORTED NOT REPORTED     FASTING LIPID PANEL:  Lab Results   Component Value Date    HDL 40 10/25/2021    TRIG 107 10/25/2021     LIVER PROFILE:No results for input(s): AST, ALT, LABALBU in the last 72 hours. ASSESSMENT:  1.  Elevated troponin from unknown significance and etiology, patient has no angina and the EKG appears to be stable without ST-T changes.   2.  Insulin-dependent diabetes

## 2021-10-28 NOTE — FLOWSHEET NOTE
Patient returned to room 2034 via bed post cardiac catheterization. Patient is a consult to cardiothoracic surgery. Patient is alert and oriented and denies pain. Bed locked and placed in lowest position. Side rails up x2. Call light placed at the patient's bedside. Telephone report completed prior to patient's arrival. Right radial assessed. No redness, drainage, swelling, or hematoma present. Armboard and TR band remain clean, dry and intact. 12 cc air total. Pulses palpable bilaterally. Vital signs obtained (see flowsheet). RN educated the patient on position orders and activity restrictions for cath recovery. Patient verbalizes understanding. Will continue to monitor closely.

## 2021-10-28 NOTE — PROGRESS NOTES
[address] [employer]  Occupational Therapy Not Seen    DATE: 10/28/2021    NAME: Robin Lopez  MRN: [id number]   : 1955    Patient not seen this date for Occupational Therapy due to:      [] Cancel by RN or physician due to:    [] Hemodialysis    [] Critical Lab Value Level     [] Blood transfusion in progress    [] Acute or unstable cardiovascular status   _MAP < 55 or more than >115  _HR < 40 or > 130    [] Acute or unstable pulmonary status   -FiO2 > 60%   _RR < 5 or >40    _O2 sats < 85%    [x] Strict Bedrest: Pt on strict bedrest for 2 hours following cardiac cath this AM.  Bedrest should be lifted ~1330 this date. OT will continue to follow and ck back as able or 10/29/21    [] Off Unit for surgery or procedure    [] Off Unit for testing       [] Pending imaging to R/O fracture    [] Refusal by Patient      [] Other      [] OT being discontinued at this time. Patient independent. No further needs. [] OT being discontinued at this time as the patient has been transferred to hospice care. No further needs.       CAMRYN Nguyễn

## 2021-10-28 NOTE — CONSULTS
[facility]  Consultation Note                             Patient's Name/Date of Birth: Sandra Espino / 1955 (5 y.o.)    Date: October 28, 2021     Chief Complaint:   Chief Complaint   Patient presents with    Back Pain       Surgeon: Dr. Brian Ayers  Cardiologist: Dr. Damir Clements  PCP: Dr. Alondra Dillon      Admitting Diagnosis: NSTEMI  Reason for consultation: Multivessel Coronary Artery Disease    HISTORY OF PRESENT ILLNESS:  Sandra Espino is a 61y.o. year old, female who presented 4 days ago to the [facility] with complaints of back pain. The patient states that she did not fall, but that her back hurt so bad that she was unable to get herself out of bed so she decided to call 911. The patient's pain did not radiate anywhere and she did not experience any shortness of breath or diaphoresis. The patient states that she is able to get out of bed, transfer herself to her motorized chair, and perform her daily tasks. The patient has been using her motorized chair for the past 2 years. She started using her chair after she broke her left leg. The patient has a significant past medical history of DM, CHF, fibromyalgia, lymphedema, asthma, myasthenia gravis, right toe amputation, carpal tunnel release and kyphoplasty. The patient denies any current chest pain or shortness of breath. She did not experience any fever or chills at home. We have been asked to evaluate the patient for surgical revascularization. Review of systems:  Review of Systems   Constitutional: Negative for activity change, fatigue and fever. Uses a motorized wheelchair   Respiratory: Negative for cough, chest tightness and shortness of breath. Cardiovascular: Positive for leg swelling (lymphedema). Negative for chest pain and palpitations. Gastrointestinal: Positive for diarrhea (occasional). Negative for constipation, nausea and vomiting. Endocrine: Negative for cold intolerance and polyuria.    Genitourinary: Negative for difficulty urinating. Musculoskeletal: Positive for back pain (fractured L3), gait problem (weakness) and myalgias (fibromyalgia). Skin: Positive for color change (bilateral lower legs). Neurological: Positive for numbness (bilateral legs). Negative for dizziness. Psychiatric/Behavioral: Negative for confusion.        Past Medical History:        Diagnosis Date    Asthma     Diabetes mellitus ([facility] [address]     Fibromyalgia     Headache     Lymphedema of both lower extremities     Myasthenia gravis ([facility])        Past Surgical History:        Procedure Laterality Date    CARPAL TUNNEL RELEASE      FRACTURE SURGERY      TOE AMPUTATION         Medications:    potassium chloride  40 mEq Oral Once    pantoprazole  40 mg IntraVENous Once    [START ON 10/29/2021] pantoprazole  40 mg Oral QAM AC    sodium chloride flush  5-40 mL IntraVENous 2 times per day    dextromethorphan  30 mg Oral 2 times per day    lidocaine  1 patch TransDERmal Daily    gabapentin  300 mg Oral TID    FLUoxetine  40 mg Oral QAM    metoprolol succinate  25 mg Oral Daily    predniSONE  10 mg Oral Daily    insulin glargine  56 Units SubCUTAneous Daily    And    insulin lispro  10 Units SubCUTAneous TID WC    [Held by provider] metFORMIN  1,000 mg Oral BID WC    rosuvastatin  20 mg Oral Nightly    aspirin  81 mg Oral Daily    furosemide  20 mg IntraVENous BID    influenza virus vaccine  0.5 mL IntraMUSCular Prior to discharge       Allergies:  Amoxicillin, Amoxicillin-pot clavulanate, Atorvastatin, Cefuroxime axetil, Clavulanic acid, Codeine, Dextroamphetamine, Adhesive tape, and Cephalosporins    Social History:       Social History     Tobacco Use   Smoking Status Never Smoker   Smokeless Tobacco Never Used        Social History     Substance and Sexual Activity   Alcohol Use Not Currently        Social History     Substance and Sexual Activity   Drug Use Not Currently      Occupation:  Retired    Family History: Problem Relation Age of Onset    Coronary Art Dis Mother     Kidney Disease Mother        PHYSICAL EXAM:  Physical Exam  Constitutional:       General: She is not in acute distress. Appearance: Normal appearance. HENT:      Head: Normocephalic. Eyes:      Pupils: Pupils are equal, round, and reactive to light. Cardiovascular:      Rate and Rhythm: Normal rate and regular rhythm. Pulses: Normal pulses. Heart sounds: Normal heart sounds. No murmur heard. No friction rub. No gallop. Pulmonary:      Effort: Pulmonary effort is normal. No respiratory distress. Breath sounds: Normal breath sounds. Abdominal:      General: Bowel sounds are normal. There is no distension. Palpations: Abdomen is soft. Tenderness: There is no abdominal tenderness. Musculoskeletal:         General: Swelling present. Cervical back: Normal range of motion. Right lower leg: Edema (1+) present. Left lower leg: Edema (1+) present. Comments: Bilateral lower leg lymphedema   Skin:     General: Skin is warm and dry. Capillary Refill: Capillary refill takes less than 2 seconds. Comments: Bilateral lower legs with dry, shahid legs   Neurological:      General: No focal deficit present. Mental Status: She is alert and oriented to person, place, and time. Psychiatric:         Mood and Affect: Mood normal.         Behavior: Behavior normal.         Thought Content:  Thought content normal.         Judgment: Judgment normal.       Data:  CBC with Differential:    Lab Results   Component Value Date    WBC 9.0 10/28/2021    RBC 3.97 10/28/2021    HGB 9.0 10/28/2021    HCT 32.4 10/28/2021     10/28/2021    MCV 81.6 10/28/2021    MCH 22.7 10/28/2021    MCHC 27.8 10/28/2021    RDW 14.9 10/28/2021    LYMPHOPCT 6 10/24/2021    MONOPCT 3 10/24/2021    BASOPCT 2 10/24/2021    MONOSABS 0.40 10/24/2021    LYMPHSABS 0.70 10/24/2021    EOSABS 0.20 10/24/2021    BASOSABS 0.20 10/24/2021    DIFFTYPE NOT REPORTED 10/24/2021     CMP:    Lab Results   Component Value Date     10/28/2021    K 3.6 10/28/2021     10/28/2021    CO2 30 10/28/2021    BUN 15 10/28/2021    CREATININE 0.61 10/28/2021    GFRAA >60 10/28/2021    LABGLOM >60 10/28/2021    GLUCOSE 90 10/28/2021    PROT 5.3 10/25/2021    LABALBU 2.9 10/25/2021    CALCIUM 8.5 10/28/2021    BILITOT 0.34 10/25/2021    ALKPHOS 83 10/25/2021    AST 9 10/25/2021    ALT <5 10/25/2021     PT/INR:    Lab Results   Component Value Date    PROTIME 15.1 10/28/2021    INR 1.2 10/28/2021       Imaging Studies:    Cardiac Cath: 10/28/2021  Procedure Summary      Multi-vessel coronary artery disease. Good ventricular function. Recommendations      Medical therapy as needed. Risk factor modification. Patient will benefit from revascularization, considering the anatomy,   surgical technique is the preferred way specially the patient is a   diabetic. Angiographic Findings      Cardiac Arteries and Lesion Findings     LMCA: Normal 0% stenosis.     LAD: Totally occluded at midportion exactly at the origin of the large  diagonal branch and septal . That area was heavily calcified. The  diagonal branch appeared to be normal as well as the septal . Collateral from the right and left system to distal LAD noted.     LCx: Large distribution vessel. It gives origin of 2 obtuse marginal  branches. The first was moderate-sized vessel with 95% stenosis the second  obtuse marginal branch was large and has 70 to 80% stenosis.     RCA: Moderate-sized vessel and disease. Proximally there is mild stenosis. The PDA appeared to be normal. The posterior lateral branch has 90% stenosis  this is a relatively small vessel.      Coronary Tree      Dominance: Right     LV Analysis  LV function assessed as:Normal.  Ejection Fraction  +----------------------------------------------------------------------+---+  ! Method !EF%!  +----------------------------------------------------------------------+---+  ! LV gram                                                               !60 !  +----------------------------------------------------------------------+---+      Echo: 10/25/2021  Summary  Left ventricle is normal in size. Mild left ventricular hypertrophy. Global left ventricular systolic function is normal with an estimated  ejection fraction of 70 % . No obvious wall motion abnormality seen. Grade I (mild) left ventricular diastolic dysfunction. Normal mitral valve structure. Mild mitral regurgitation. No significant pericardial effusion is seen. FINDINGS  Left Atrium  Left atrium is normal in size. Left Ventricle  Left ventricle is normal in size. Mild left ventricular hypertrophy. Global left ventricular systolic function is normal with an estimated  ejection fraction of 70 % . No obvious wall motion abnormality seen. Grade I (mild) left ventricular diastolic dysfunction. Right Atrium  Right atrium is normal in size. Right Ventricle  Normal right ventricular size and function. Mitral Valve  Normal mitral valve structure. Mild mitral regurgitation. Aortic Valve  Normal aortic valve structure and function without stenosis or  regurgitation. Tricuspid Valve  Normal tricuspid valve leaflets. Trivial tricuspid regurgitation. Pulmonic Valve  The pulmonic valve is normal in structure. Pericardial Effusion  No significant pericardial effusion is seen. Pleural Effusion  No pleural effusion seen. Miscellaneous  Normal aortic root dimension.     CT: 10/24/2021  FINDINGS:   Pulmonary Arteries: Pulmonary arteries show no evidence of intraluminal   filling defect to suggest pulmonary embolism.  Main pulmonary artery is   normal in caliber.       Mediastinum: Cardiomegaly.  Normal caliber aorta.  Mild atherosclerotic   disease.  No significant lymphadenopathy.  Thyroid gland and esophagus   grossly normal.       Lungs/pleura: Decreased lung volumes.  Patchy subsegmental atelectasis at   lung bases manifested by scattered thick and thin bandlike consolidative   densities along with some confluent subpleural densities.  This involve both   lower lobes as well as the inferior lingula.  There is trace bilateral   pleural fluid.  The rest of lungs demonstrates geographic areas of   ground-glass attenuation with interspersed uninvolved areas.  Probable   combination of interstitial edema and air trapping.  No pneumothorax.       Upper Abdomen: Cholelithiasis.  No adrenal mass.       Soft Tissues/Bones: No acute abnormality of the bones.  The superficial soft   tissues show no significant abnormalities. Assessment & Plan:  Problem List:  Patient Active Problem List   Diagnosis    Non-ST elevation [facility] ([facility]STEMI) ([facility] [address]    Type 2 diabetes mellitus with hyperglycemia, without long-term current use of insulin (HCC)    Class 1 obesity due to excess calories with serious comorbidity and body mass index (BMI) of 33.0 to 33.9 in adult    Lymphedema of both lower extremities    Acute on chronic diastolic congestive heart failure ([facility] [address]    Pathological fracture of thoracic vertebra due to secondary osteoporosis ([facility] [address]    Pathological fracture of lumbar vertebra due to secondary osteoporosis (HCC)    Intractable back pain    Age-related osteoporosis with current pathological fracture    History of kyphoplasty    Facet arthritis, degenerative, lumbar spine    Degenerative spondylolisthesis    DDD (degenerative disc disease), thoracolumbar    Acute on chronic congestive heart failure (HCC)    Chronic bilateral low back pain without sciatica       Risks Reviewed w/pt  - Risk for stroke: Yes  - Risk for bleeding:Yes  - Risk for cardiac arrythmias: Yes  - Small risk of death: Yes  - Risks of pneumonia from ventilator: Yes  - Risk for infection: Yes    Plan:   Will obtain pre-operative testing and review with Dr. Ashley Gordillo. Patient to have lungs and fluid volume status tuned up. Verify, with ortho- if patient is ok to lay flat during surgery. Will plan for CABG next week with Dr. Ashley Gordillo. The above recommendations including medications and orders were discussed and agreed upon with Dr. Ashley Gordillo the attending on service for the cardiothoracic surgery group today. On this date 10/28/2021 I have spent 50 minutes reviewing previous notes, test results and face to face with the patient discussing the diagnosis and importance of compliance with the treatment plan as well as documenting on the day of the visit. At least 50% of the time documented was spent with the patient to provide counseling and/or coordination of care.     CYNDY Hughes - CNP, CNP  Phone: [phone]

## 2021-10-28 NOTE — PROGRESS NOTES
RN reviewed results of stress test and discussed plan of care, as well as the cardiac catheterization  in detail with the patient. RN again informed patient of cardiology's recommendation for patient to undergo a cardiac cath today. Patient verbalizing concerns for both lying flat due to back pain, as well as frequent cough. Patient is requesting that the procedure be performed through the radial. RN to notify cardiology of patient's concerns. Will continue to monitor closely.

## 2021-10-28 NOTE — PROGRESS NOTES
Physical Therapy  DATE: 10/28/2021    NAME: Kristin Urban  MRN: [id number]   : 1955    Patient not seen this date for Physical Therapy due to:      [] Cancel by RN or physician due to:    [] Hemodialysis    [] Critical Lab Value Level     [] Blood transfusion in progress    [] Acute or unstable cardiovascular status   _MAP < 55 or more than >115  _HR < 40 or > 130    [] Acute or unstable pulmonary status   -FiO2 > 60%   _RR < 5 or >40    _O2 sats < 85%    [x] Strict Bedrest: Pt on strict bedrest for 2 hours following cardiac cath this AM.  Bedrest should be lifted ~1330 this date. PT will continue to follow and ck back as able or 10/29/21. [] Off Unit for surgery or procedure    [] Off Unit for testing       [] Pending imaging to R/O fracture    [] Refusal by Patient      [] Other      [] PT being discontinued at this time. Patient independent. No further needs. [] PT being discontinued at this time as the patient has been transferred to hospice care. No further needs.       Tayler Ba, PT

## 2021-10-28 NOTE — CARE COORDINATION
[facility] and began precert.  Stevie Marquez

## 2021-10-29 LAB
ANION GAP SERPL CALCULATED.3IONS-SCNC: 9 MMOL/L (ref 9–17)
BUN BLDV-MCNC: 13 MG/DL (ref 8–23)
BUN/CREAT BLD: 19 (ref 9–20)
CALCIUM SERPL-MCNC: 8.7 MG/DL (ref 8.6–10.4)
CHLORIDE BLD-SCNC: 103 MMOL/L (ref 98–107)
CO2: 32 MMOL/L (ref 20–31)
CREAT SERPL-MCNC: 0.7 MG/DL (ref 0.5–0.9)
GFR AFRICAN AMERICAN: >60 ML/MIN
GFR NON-AFRICAN AMERICAN: >60 ML/MIN
GFR SERPL CREATININE-BSD FRML MDRD: ABNORMAL ML/MIN/{1.73_M2}
GFR SERPL CREATININE-BSD FRML MDRD: ABNORMAL ML/MIN/{1.73_M2}
GLUCOSE BLD-MCNC: 138 MG/DL (ref 65–105)
GLUCOSE BLD-MCNC: 152 MG/DL (ref 65–105)
GLUCOSE BLD-MCNC: 174 MG/DL (ref 65–105)
GLUCOSE BLD-MCNC: 64 MG/DL (ref 70–99)
GLUCOSE BLD-MCNC: 69 MG/DL (ref 65–105)
MRSA, DNA, NASAL: NORMAL
POTASSIUM SERPL-SCNC: 3.8 MMOL/L (ref 3.7–5.3)
SODIUM BLD-SCNC: 144 MMOL/L (ref 135–144)
SPECIMEN DESCRIPTION: NORMAL

## 2021-10-29 PROCEDURE — 6360000002 HC RX W HCPCS: Performed by: NURSE PRACTITIONER

## 2021-10-29 PROCEDURE — 93005 ELECTROCARDIOGRAM TRACING: CPT | Performed by: NURSE PRACTITIONER

## 2021-10-29 PROCEDURE — 6370000000 HC RX 637 (ALT 250 FOR IP): Performed by: INTERNAL MEDICINE

## 2021-10-29 PROCEDURE — 80048 BASIC METABOLIC PNL TOTAL CA: CPT

## 2021-10-29 PROCEDURE — 99232 SBSQ HOSP IP/OBS MODERATE 35: CPT | Performed by: NURSE PRACTITIONER

## 2021-10-29 PROCEDURE — 97530 THERAPEUTIC ACTIVITIES: CPT

## 2021-10-29 PROCEDURE — 2580000003 HC RX 258: Performed by: INTERNAL MEDICINE

## 2021-10-29 PROCEDURE — 6360000002 HC RX W HCPCS: Performed by: INTERNAL MEDICINE

## 2021-10-29 PROCEDURE — 6370000000 HC RX 637 (ALT 250 FOR IP): Performed by: NURSE PRACTITIONER

## 2021-10-29 PROCEDURE — 36415 COLL VENOUS BLD VENIPUNCTURE: CPT

## 2021-10-29 PROCEDURE — 82947 ASSAY GLUCOSE BLOOD QUANT: CPT

## 2021-10-29 PROCEDURE — 2060000000 HC ICU INTERMEDIATE R&B

## 2021-10-29 PROCEDURE — 93930 UPPER EXTREMITY STUDY: CPT

## 2021-10-29 RX ORDER — POLYVINYL ALCOHOL 14 MG/ML
2 SOLUTION/ DROPS OPHTHALMIC
Status: DISCONTINUED | OUTPATIENT
Start: 2021-10-29 | End: 2021-11-02

## 2021-10-29 RX ORDER — FUROSEMIDE 40 MG/1
40 TABLET ORAL DAILY
Status: DISCONTINUED | OUTPATIENT
Start: 2021-10-30 | End: 2021-11-01

## 2021-10-29 RX ADMIN — FUROSEMIDE 20 MG: 10 INJECTION, SOLUTION INTRAMUSCULAR; INTRAVENOUS at 18:15

## 2021-10-29 RX ADMIN — INSULIN LISPRO 10 UNITS: 100 INJECTION, SOLUTION INTRAVENOUS; SUBCUTANEOUS at 18:15

## 2021-10-29 RX ADMIN — PREDNISONE 10 MG: 5 TABLET ORAL at 10:23

## 2021-10-29 RX ADMIN — INSULIN LISPRO 10 UNITS: 100 INJECTION, SOLUTION INTRAVENOUS; SUBCUTANEOUS at 13:00

## 2021-10-29 RX ADMIN — ANTI-FUNGAL POWDER MICONAZOLE NITRATE TALC FREE: 1.42 POWDER TOPICAL at 10:38

## 2021-10-29 RX ADMIN — GABAPENTIN 300 MG: 300 CAPSULE ORAL at 15:36

## 2021-10-29 RX ADMIN — Medication 30 MG: at 20:14

## 2021-10-29 RX ADMIN — SODIUM CHLORIDE, PRESERVATIVE FREE 10 ML: 5 INJECTION INTRAVENOUS at 10:31

## 2021-10-29 RX ADMIN — POLYVINYL ALCOHOL 2 DROP: 14 SOLUTION/ DROPS OPHTHALMIC at 22:24

## 2021-10-29 RX ADMIN — FLUOXETINE 40 MG: 20 CAPSULE ORAL at 10:23

## 2021-10-29 RX ADMIN — OXYCODONE AND ACETAMINOPHEN 1 TABLET: 5; 325 TABLET ORAL at 10:49

## 2021-10-29 RX ADMIN — POLYVINYL ALCOHOL 2 DROP: 14 SOLUTION/ DROPS OPHTHALMIC at 15:52

## 2021-10-29 RX ADMIN — PANTOPRAZOLE SODIUM 40 MG: 40 TABLET, DELAYED RELEASE ORAL at 06:05

## 2021-10-29 RX ADMIN — GABAPENTIN 300 MG: 300 CAPSULE ORAL at 20:14

## 2021-10-29 RX ADMIN — Medication 30 MG: at 10:31

## 2021-10-29 RX ADMIN — ASPIRIN 81 MG: 81 TABLET, CHEWABLE ORAL at 10:23

## 2021-10-29 RX ADMIN — FUROSEMIDE 20 MG: 10 INJECTION, SOLUTION INTRAMUSCULAR; INTRAVENOUS at 10:24

## 2021-10-29 RX ADMIN — GABAPENTIN 300 MG: 300 CAPSULE ORAL at 10:24

## 2021-10-29 RX ADMIN — METOPROLOL SUCCINATE 25 MG: 25 TABLET, EXTENDED RELEASE ORAL at 10:23

## 2021-10-29 RX ADMIN — SODIUM CHLORIDE, PRESERVATIVE FREE 10 ML: 5 INJECTION INTRAVENOUS at 20:14

## 2021-10-29 RX ADMIN — INSULIN GLARGINE 56 UNITS: 100 INJECTION, SOLUTION SUBCUTANEOUS at 10:31

## 2021-10-29 ASSESSMENT — PAIN SCALES - GENERAL
PAINLEVEL_OUTOF10: 0
PAINLEVEL_OUTOF10: 7

## 2021-10-29 NOTE — PROGRESS NOTES
Occupational Therapy  Facility/Department: [facility]  Daily Treatment Note  NAME: Jasmin Mane  : 1955  MRN: [id number]    Date of Service: 10/29/2021    Discharge Recommendations:  Patient would benefit from continued therapy after discharge   D/t recent hospitalization and medical conditions, pt would benefit from additional therapy at time of discharge to ensure safety. Please refer to AM-PAC score for current functional status. RN reports patient is medically stable for therapy treatment this date. Chart reviewed prior to treatment and patient is agreeable for therapy. All lines intact and patient positioned comfortably at end of treatment. All patient needs addressed prior to ending therapy session. OT Equipment Recommendations  ADL Assistive Devices: Toileting - 3-in-1 Commode;Sock-Aid Soft;Long-handled Shoe Horn;Long-handled Sponge    Assessment   Performance deficits / Impairments: Decreased functional mobility ; Decreased safe awareness;Decreased balance;Decreased coordination;Decreased ADL status; Decreased strength;Decreased sensation;Decreased fine motor control;Decreased endurance;Decreased high-level IADLs;Decreased ROM; Decreased cognition;Decreased vision/visual deficit; Decreased posture  Assessment: pt was unable to lift off bed with MAX A x2 in [facility] with bed raised d/t her severe back pain. Continue with OT POC for maximixing functional I and safety as able and complete teach and train of AE use as apropriate to assist with daily tasks.   Prognosis: Fair  OT Education: OT Role;Plan of Care;Energy Conservation;Transfer Training  Patient Education: proper bed mob tech, pursed lip breathing, safety in function, recommendations for continued therapy, benefits of being up OOB as able, call light use/fall prevention  REQUIRES OT FOLLOW UP: Yes  Activity Tolerance  Activity Tolerance: Patient limited by fatigue;Patient limited by pain  Activity Tolerance: poor  Safety Devices  Safety Devices in place: Yes  Type of devices: Left in bed;Patient at risk for falls;Gait belt;Nurse notified;Call light within reach; Bed alarm in place (Pt left with RN Magaly present to wrap BLE for edema mgmt)         Patient Diagnosis(es): The primary encounter diagnosis was Non-ST elevation MI (NSTEMI) ([facility] [address] Diagnoses of Chronic bilateral low back pain without sciatica, Lymphedema of both lower extremities, and Acute on chronic congestive heart failure, unspecified heart failure type [facility]) were also pertinent to this visit. has a past medical history of Asthma, Diabetes mellitus ([facility] [address] Fibromyalgia, Headache, Lymphedema of both lower extremities, and Myasthenia gravis ([facility] has a past surgical history that includes Toe amputation; Carpal tunnel release; and fracture surgery. Restrictions  Restrictions/Precautions  Restrictions/Precautions: Fall Risk  Required Braces or Orthoses?: No  Position Activity Restriction  Other position/activity restrictions: purwick catheter, 3L O2, RUE IV, telemetry, act as ksenia, maintain heels off bed AAT's, alarms, Chronic LBP  Subjective   General  Chart Reviewed: Yes  Patient assessed for rehabilitation services?: Yes  Response to previous treatment: Patient with no complaints from previous session  Family / Caregiver Present: No      Orientation  Orientation  Overall Orientation Status: Within Functional Limits  Objective    ADL  Additional Comments: Pt declined ADLs this date with encouragment, pt wanted to sit in chair this date. Pt unable to stand with marciano stedy. Balance  Sitting Balance: Stand by assistance  Standing Balance: Unable to assess(comment) (Pt attempted to stand 3x. pt unable to clear bed this date with MAX A x2 in marciano stedy)  Bed mobility  Supine to Sit: Maximum assistance;2 Person assistance  Sit to Supine: Maximum assistance;2 Person assistance  Scooting: Maximal assistance;2 Person assistance  Comment: MAX A x2.  Pt required Mod VCs for proper bed mob tech. Pt required A with trunk and BLE. Trendelenburg was used to scoot to [facility]. Transfers  Sit to stand: Maximum assistance;2 Person assistance (with marciano stedy and bed lifted and 3 attempts. Pt unable to lift off bed this date d/t c/o of severe back pain)  Stand to sit: 2 Person assistance;Maximum assistance  Transfer Comments: MAX verbal instruction/tactile assist for B hand placement on marciano stedy, safety features of lift, nose over toes tech,  pursed lip breathing, upright posture, weight shiting, controlled stand to sit to increase overall safety. Cognition  Overall Cognitive Status: Exceptions  Arousal/Alertness: Delayed responses to stimuli  Following Commands: Follows multistep commands with increased time; Follows multistep commands with repitition  Attention Span: Appears intact  Memory: Decreased short term memory  Safety Judgement: Decreased awareness of need for assistance;Decreased awareness of need for safety  Problem Solving: Assistance required to identify errors made;Assistance required to implement solutions;Assistance required to correct errors made;Assistance required to generate solutions;Decreased awareness of errors  Insights: Decreased awareness of deficits  Initiation: Requires cues for all  Sequencing: Requires cues for some                                         Plan   Plan  Times per week: 4-5x/week 1x/day as ksenia  Current Treatment Recommendations: Strengthening, ROM, Balance Training, Safety Education & Training, Positioning, Endurance Training, Neuromuscular Re-education, Patient/Caregiver Education & Training, Equipment Evaluation, Education, & procurement, Self-Care / ADL, Cognitive/Perceptual Training, Home Management Training, Pain Management    AM-PAC Score        AM-PAC Inpatient Daily Activity Raw Score: 9 (10/29/21 1307)  AM-PAC Inpatient ADL T-Scale Score : 25.33 (10/29/21 1307)  ADL Inpatient CMS 0-100% Score: 79.59 (10/29/21 1307)  ADL Inpatient CMS G-Code Modifier : CL (10/29/21 3398)    Goals  Short term goals  Time Frame for Short term goals: by discharge, pt to demo  Short term goal 1: bed mob tasks with use of rail as needed to max assist of 1. Short term goal 2: UB ADL to min assist/set up and LB ADL to max assist of 1 supine in bed as needed and with bed rail/AE. Short term goal 3: postural control EOB to SBA and ksenia > 15 mins to increase core strength/reduce falls in function. Short term goal 4: ADL transfers with lift/AD as needed to max-mod assist of 1. Short term goal 5: increase in BUE strength by a 1/2 grade to assist with self care tasks and be I with simple BUE HEP with use of handouts. Long term goals  Long term goal 1: Pt to stand with AD and min assist ksenia > 5 mins as able to reduce fall risk with ADL and functional tasks. Long term goal 2: Pt/caregivers to be I with edema mgt, proper positioning, pressure relief, EC/WS and fall prevention tech as well as DME/AE and AD recommendations with use of handouts. Patient Goals   Patient goals : Pt states she wants to be able to transfer on her own! Therapy Time   Individual Concurrent Group Co-treatment   Time In 200         Time Out 26         Minutes 32                 Co-treatment with PT warranted secondary to decreased safety and independence requiring 2 skilled therapy professionals to address individual discipline's goals. OT addressing preparation for ADL transfer, sitting balance for increased ADL performance, sitting/activity tolerance, functional reaching, environmental safety/scanning, fall prevention, functional mobility for ADL transfers, ability to sequence and follow directions and functional UE strength. Upon writer exit, call light within reach, pt retired to bed. All lines intact and patient positioned comfortably. All patient needs addressed prior to ending therapy session. Chart reviewed prior to treatment and patient is agreeable for therapy.   RN reports patient is medically stable for therapy treatment this date.     Maren Prieto OTR/L

## 2021-10-29 NOTE — PROGRESS NOTES
Section of Cardiology  Progress Note      Date:  10/29/2021  Patient: Bebeto Main  Admission:  10/24/2021  6:48 AM  Admit DX: NSTEMI (non-ST elevated myocardial infarction) ([facility] [I21.4]  Non-ST elevation MI (NSTEMI) ([facility]) [I21.4]  Lymphedema of both lower extremities [I89.0]  Chronic bilateral low back pain without sciatica [M54.50, G89.29]  Acute on chronic congestive heart failure, unspecified heart failure type ([facility] [I50.9]  Age:  61 y.o., 1955     LOS: 5 days     Reason for evaluation:   previous M. I. and coronary artery disease      SUBJECTIVE:     The patient was seen and examined. Notes and labs reviewed. There were not complications over night. Lissa Bermudez patient's nurse informing earlier today that the patient is not agreeing for surgery and wants to think about it more. I went and discussed that with her. She had multiple questions about surgery and I answered them. Patient's cardiac review of systems: negative for chest pain and dyspnea. The patient is generally feeling stable. OBJECTIVE:    Telemetry: Sinus  /60   Pulse 72   Temp 97.3 °F (36.3 °C) (Temporal)   Resp 16   Ht 5' 5\" (1.651 m)   Wt 192 lb (87.1 kg)   SpO2 99%   BMI 31.95 kg/m²     Intake/Output Summary (Last 24 hours) at 10/29/2021 1800  Last data filed at 10/29/2021 1259  Gross per 24 hour   Intake 240 ml   Output 2300 ml   Net -2060 ml       EXAM:   CONSTITUTIONAL:  awake, alert, cooperative, no apparent distress, and appears stated age. HEENT: Normal jugular venous pulsations, no carotid bruits. Head is atraumatic, normocephalic. Eyes and oral mucosa are normal.  LUNGS: Good respiratory effort. No for increased work of breathing. On auscultation: clear to auscultation bilaterally  CARDIOVASCULAR:  Normal apical impulse, regular rate and rhythm, normal S1 and S2, no S3 or S4,   ABDOMEN: Soft, nontender, nondistended. Bowel sounds present. SKIN: Warm and dry. EXTREMITIES: No lower extremity edema.  Motor disease  5.  multivessel cardiac disease  Patient Active Problem List   Diagnosis    NSTEMI (non-ST elevated myocardial infarction) ([facility]    Type 2 diabetes mellitus with hyperglycemia, without long-term current use of insulin (HCC)    Class 1 obesity due to excess calories with serious comorbidity and body mass index (BMI) of 33.0 to 33.9 in adult    Lymphedema of both lower extremities    Acute on chronic diastolic congestive heart failure ([facility]    Pathological fracture of thoracic vertebra due to secondary osteoporosis ([facility]    Pathological fracture of lumbar vertebra due to secondary osteoporosis (HCC)    Intractable back pain    Age-related osteoporosis with current pathological fracture    History of kyphoplasty    Facet arthritis, degenerative, lumbar spine    Degenerative spondylolisthesis    DDD (degenerative disc disease), thoracolumbar    Acute on chronic congestive heart failure (HCC)    Chronic bilateral low back pain without sciatica       PLAN:    1. I had long discussion with the patient and I was able to do minutes her to proceed with open heart surgery. Initially the patient was concerned about the surgery and recovery. 2. For now her kyphoplasty has to be on hold. 3. Discuss with Kristal from the CT surgery service  4. No need for heparin drip For now      Please see orders. Discussed with patient and nursing.     Florian Espinoza MD, MD

## 2021-10-29 NOTE — PROGRESS NOTES
[facility]  Office: [address], DO, Petra Savannah, DO, Saravanan Skye, DO, Valerie Jesus Blood, DO, Jo Mccollum MD, Angela Walker MD, Dion Oseguera MD, Madisyn Ga MD, Amy Mcdowell MD, Derrek Campoverde MD, Autumn Carlson MD, Lazarus Moats, MD, Kwabena Mcfarland, DO, Erica Rodriges, DO, Lida Mejia MD,  Suhail Castro, DO, Marah Barnes MD, Barb Randle MD, Raheem Harper MD, Miriam Estrada MD, Suleman Correia MD, Naomi Palacios MD, Catarina Murrell, [facility], [facility], CNP, Shey Reynaga, CNP, Shin Chance, CNS, Roel Mistry, [facility], Colt Downey, CNP, Jana Moore, CNP, Caro La, CNP, Cece Humphreys, CNP, Chinedu Mosley, CNP, SELMA BarnesC, Mimi Jeter, [facility], General Olp, CNP, Priscilla Pendleton, CNP, Willy Graves, CNP, Luis Jorge, CNP, Janina Angeles, CNP, Latoya Jackson, CNP, Jackelyn Porter, [facility]    Progress Note    10/29/2021    9:04 AM    Name:   Heidy Cid  MRN:     [id number]     [place]:      [de-identified]   Room:   2034/2034-01   Day:  5  Admit Date:  10/24/2021  6:48 AM    PCP:   Luis Jorge MD  Code Status:  Full Code    Subjective:     C/C:   Chief Complaint   Patient presents with    Back Pain     Interval History Status:     Patient complains of dry irritated eyes which is chronic for her, she uses systane drops at home. Her back pain is controlled with pain meds and lidocaine patch. VSS      S/p cath 10/28: MV CAD, CT surgery was consulted for CABG   Brief History:     Heidy Cid is a 61 y.o. Non- / non  female who presents with low Back Pain and is admitted to the hospital for the management of NSTEMI   She said she felt a pop while transferring from bed to wheelchair. It is unclear when that was but was unable to get out of her recliner. Starting 2 days pta she had cp and sob, along with nausea. Also c/o hip pain and said all her pain was due to fibromyalgia.  troponins were elevated     Review of Systems:     Constitutional:  negative for chills, fevers, sweats  Respiratory:  negative for dyspnea on exertion, shortness of breath, wheezing. Positive for cough  Cardiovascular:  negative for chest pain, chest pressure/discomfort, lower extremity edema, palpitations  Gastrointestinal:  negative for abdominal pain, constipation, diarrhea, nausea, vomiting  Neurological:  negative for dizziness, headache  + for back pain   Medications: Allergies:     Allergies   Allergen Reactions    Amoxicillin Diarrhea and Other (See Comments)    Amoxicillin-Pot Clavulanate Diarrhea    Atorvastatin Other (See Comments)     Other reaction(s): Myalgia  Weakness      Cefuroxime Axetil Other (See Comments)    Clavulanic Acid Diarrhea and Other (See Comments)    Codeine Other (See Comments)     Unless suspended in syrup      Dextroamphetamine     Adhesive Tape Rash     Itching     Cephalosporins Rash       Current Meds:   Scheduled Meds:    pantoprazole  40 mg Oral QAM AC    sodium chloride flush  5-40 mL IntraVENous 2 times per day    dextromethorphan  30 mg Oral 2 times per day    lidocaine  1 patch TransDERmal Daily    gabapentin  300 mg Oral TID    FLUoxetine  40 mg Oral QAM    metoprolol succinate  25 mg Oral Daily    predniSONE  10 mg Oral Daily    insulin glargine  56 Units SubCUTAneous Daily    And    insulin lispro  10 Units SubCUTAneous TID WC    [Held by provider] metFORMIN  1,000 mg Oral BID WC    rosuvastatin  20 mg Oral Nightly    aspirin  81 mg Oral Daily    furosemide  20 mg IntraVENous BID    influenza virus vaccine  0.5 mL IntraMUSCular Prior to discharge     Continuous Infusions:    sodium chloride      dextrose      sodium chloride       PRN Meds: sodium chloride flush, sodium chloride, acetaminophen, miconazole, oxyCODONE-acetaminophen, glucose, dextrose, glucagon (rDNA), dextrose, sodium chloride, ondansetron **OR** ondansetron, [DISCONTINUED] acetaminophen **OR** acetaminophen, polyethylene glycol, nitroGLYCERIN, perflutren lipid microspheres    Data:     Past Medical History:   has a past medical history of Asthma, Diabetes mellitus ([facility] Fibromyalgia, Headache, Lymphedema of both lower extremities, and Myasthenia gravis ([facility] Social History:   reports that she has never smoked. She has never used smokeless tobacco. She reports previous alcohol use. She reports previous drug use. Family History:   Family History   Problem Relation Age of Onset    Coronary Art Dis Mother     Kidney Disease Mother        Vitals:  BP (!) 143/71   Pulse 69   Temp 97.4 °F (36.3 °C) (Temporal)   Resp 16   Ht 5' 5\" (1.651 m)   Wt 192 lb (87.1 kg)   SpO2 96%   BMI 31.95 kg/m²   Temp (24hrs), Av.9 °F (36.6 °C), Min:96.9 °F (36.1 °C), Max:98.7 °F (37.1 °C)    Recent Labs     10/28/21  1122 10/28/21  1627 10/28/21  1958 10/29/21  0802   POCGLU 107* 145* 162* 69       I/O (24Hr):     Intake/Output Summary (Last 24 hours) at 10/29/2021 0913  Last data filed at 10/29/2021 0645  Gross per 24 hour   Intake 560 ml   Output 2200 ml   Net -1640 ml       Labs:  Hematology:  Recent Labs     10/28/21  0453 10/28/21  112   WBC 9.0  --    RBC 3.97  --    HGB 9.0*  --    HCT 32.4*  --    MCV 81.6*  --    MCH 22.7*  --    MCHC 27.8*  --    RDW 14.9*  --      --    MPV 9.5  --    INR  --  1.2     Chemistry:  Recent Labs     10/27/21  0235 10/27/21  0927 10/28/21  0453 10/28/21  1124 10/29/21  0525     --  142  --  144   K 4.0  --  3.6*  --  3.8     --  103  --  103   CO2 27  --  30  --  32*   GLUCOSE 217*  --  90  --  64*   BUN 19  --  15  --  13   CREATININE 0.78  --  0.61  --  0.70   MG  --   --   --  1.6  --    ANIONGAP 11  --  9  --  9   LABGLOM >60  --  >60  --  >60   GFRAA >60  --  >60  --  >60   CALCIUM 8.2*  --  8.5*  --  8.7   TROPHS  --  50*  --   --   --      Recent Labs     10/27/21  1918 10/28/21  0741 10/28/21  1122 10/28/21  1124 10/28/21  1624 10/28/21  1958 10/29/21  0802   LABA1C  --   --   --  9.0*  --   --   --    POCGLU 206* 74 107*  --  145* 162* 69     ABG:  Lab Results   Component Value Date    POCPH 7.440 10/28/2021    POCPCO2 49.2 10/28/2021    POCPO2 60.8 10/28/2021    POCHCO3 33.4 10/28/2021    NBEA NOT REPORTED 10/28/2021    PBEA 8 10/28/2021    RAR9NHB NOT REPORTED 10/28/2021    YFFD3EJY 91 10/28/2021    FIO2 2.0 10/28/2021     No results found for: SPECIAL  No results found for: CULTURE    Radiology:  XR LUMBAR SPINE (2-3 VIEWS)    Result Date: 10/24/2021  LUMBAR SPINE: 1. Age indeterminate, probably nonacute, compression fracture of L5 vertebral body with about 50% decrease height. Please correlate with point tenderness. MRI may also be considered for age characterization if deemed clinically necessary. 2. T12 and L1 kyphoplasty. CHEST X-RAY: 1. Poor inspiratory afford with significantly decreased lung volumes. 2. Patchy opacities left lower lung probably due to crowding of vessels with or without underlying developing infiltrate. Please correlate with auscultation. Short interval follow-up may also be considered. XR CHEST PORTABLE    Result Date: 10/24/2021  LUMBAR SPINE: 1. Age indeterminate, probably nonacute, compression fracture of L5 vertebral body with about 50% decrease height. Please correlate with point tenderness. MRI may also be considered for age characterization if deemed clinically necessary. 2. T12 and L1 kyphoplasty. CHEST X-RAY: 1. Poor inspiratory afford with significantly decreased lung volumes. 2. Patchy opacities left lower lung probably due to crowding of vessels with or without underlying developing infiltrate. Please correlate with auscultation. Short interval follow-up may also be considered. CT CHEST PULMONARY EMBOLISM W CONTRAST    Result Date: 10/24/2021  1. No evidence for acute pulmonary embolism. 2. Scattered bibasilar patchy subsegmental atelectasis and trace bilateral pleural effusions.  3. Borderline cardiomegaly. 4. Large areas of geographic ground-glass attenuation interspersed with more lucent areas probably combination of air trapping and pulmonary interstitial edema. 5. Cholelithiasis. Physical Examination:        General appearance:  alert, cooperative and no distress  Mental Status:  oriented to person, place and time and normal affect  Lungs:  Bilateral bases diminished, normal effort, non productive cough  Heart:  regular rate and rhythm, no murmur  Abdomen:  soft, nontender, nondistended, normal bowel sounds, no masses, hepatomegaly, splenomegaly  Extremities:  no redness, tenderness in the calves; chronic edema  Skin:  no gross lesions, rashes, induration    Assessment:        Hospital Problems         Last Modified POA    * (Principal) NSTEMI (non-ST elevated myocardial infarction) ([address] 10/28/2021 Yes    Type 2 diabetes mellitus with hyperglycemia, without long-term current use of insulin ([address] 10/24/2021 Yes    Class 1 obesity due to excess calories with serious comorbidity and body mass index (BMI) of 33.0 to 33.9 in adult 10/24/2021 Yes    Lymphedema of both lower extremities 10/24/2021 Yes    Acute on chronic diastolic congestive heart failure ([address] 10/26/2021 Yes    Pathological fracture of thoracic vertebra due to secondary osteoporosis ([address] 10/26/2021 Yes    Pathological fracture of lumbar vertebra due to secondary osteoporosis ([address] 10/26/2021 Yes    Intractable back pain 10/26/2021 Yes    Age-related osteoporosis with current pathological fracture 10/26/2021 Yes    History of kyphoplasty 10/26/2021 Yes    Facet arthritis, degenerative, lumbar spine 10/26/2021 Yes    Degenerative spondylolisthesis 10/26/2021 Yes    DDD (degenerative disc disease), thoracolumbar 10/26/2021 Yes    Acute on chronic congestive heart failure ([address] 10/27/2021 Yes    Chronic bilateral low back pain without sciatica 10/27/2021 Yes          Plan:        1.  Ortho consult reviewed: MRI confirms acute pathological L3 fracture likely due to osteoporosis and chronic L5 fracture, plans for kyphoplasty will need to be delayed due to need for CABG, will discuss need for ortho brace in the interim    2. MV CAD on cardiac cath-CT surgery consult reviewed, plans for surgery next week, pre-op testing in process     3. Monitor and control blood sugars: blood sugars controlled, hold metformin 48 hours post cardiac cath, may resume Sunday   4. Continue PT/OT   5. Monitor labs, replace electrolytes as needed  6. Continue telemetry monitoring   7. Acute on chronic diastolic CHF-improving, continue IV lasix 20mg BID for diuresis. , switch to oral when ok with cardiology. EF 70%, mild LVH and mild diastolic dysfunction on ECHO   8. Continue delsym BID for cough and PPI daily   9. Will add artificial tears as needed for her chronic dry eye   10.  Plan discussed with patient and staff      CYNDY Brown NP  10/29/2021  9:13 AM

## 2021-10-29 NOTE — CARE COORDINATION
Patient is having open heart procedure on Tuesday 11/02. Arcelia Lynch is following the patient and will be discharged to their facility, when medically stable.

## 2021-10-29 NOTE — PROGRESS NOTES
71 16 98 % --     I/O last 3 completed shifts: In: 240 [P.O.:240]  Out: 2900 [Urine:2900]  No intake/output data recorded. General: alert, appears stated age, cooperative and moderate distress   Wound: Motion: Painful range of Motion in affected extremity   DVT Exam: No evidence of DVT seen on physical exam.  Negative Erwin's sign. No cords or calf tenderness. Additional exam:     Data Review  CBC:   Recent Labs     10/28/21  0453   WBC 9.0   HGB 9.0*        BMP:    Recent Labs     10/27/21  0235 10/28/21  0453 10/29/21  0525    142 144   K 4.0 3.6* 3.8    103 103   CO2 27 30 32*   BUN 19 15 13   CREATININE 0.78 0.61 0.70   GLUCOSE 217* 90 64*     Hepatic:   No results for input(s): AST, ALT, ALB, BILITOT, ALKPHOS in the last 72 hours. Troponin: No results for input(s): TROPONINI in the last 72 hours. BNP: No results for input(s): BNP in the last 72 hours. Lipids:   No results for input(s): CHOL, HDL in the last 72 hours. Invalid input(s): LDLCALCU  INR:   Recent Labs     10/28/21  1124   INR 1.2         Assessment:   Anneliese is unchanged from yesterday. Pain is not well controlled. She has no nausea and no vomiting. Current activity is up with assistance  Incision:       Plan:      1: MRI confirms acute pathological osteoporotic L3 fracture. Low signal T1, high signal STIR changes. L5 fracture is chronic, previous kyphoplasty vertebral augmentation T12, L1.  2:  Continue Deep venous thrombosis prophylaxis mechanically   3:  Continue Pain Control  4. Patient getting open heart surgery on Tuesday. Consequently not a surgical candidate at this point.   Warm-and-form LSO ordered for as needed use    Electronically signed by Alma Jaramillo MD on 10/29/2021 at 3:08 PM

## 2021-10-29 NOTE — PLAN OF CARE
Problem: Falls - Risk of:  Goal: Will remain free from falls  Description: Will remain free from falls  Outcome: Ongoing     Problem: Falls - Risk of:  Goal: Absence of physical injury  Description: Absence of physical injury  Outcome: Ongoing     Problem: Discharge Planning:  Goal: Discharged to appropriate level of care  Description: Discharged to appropriate level of care  Outcome: Ongoing     Problem: Cardiac Output - Decreased:  Goal: Hemodynamic stability will improve  Description: Hemodynamic stability will improve  Outcome: Ongoing     Problem: Pain:  Goal: Pain level will decrease  Description: Pain level will decrease  Outcome: Ongoing     Problem: Pain:  Goal: Control of acute pain  Description: Control of acute pain  Outcome: Ongoing     Problem: Pain:  Goal: Control of chronic pain  Description: Control of chronic pain  Outcome: Ongoing     Problem: Tissue Perfusion - Cardiopulmonary, Altered:  Goal: Circulatory function within specified parameters  Description: Circulatory function within specified parameters  Outcome: Ongoing     Problem: Skin Integrity:  Goal: Will show no infection signs and symptoms  Description: Will show no infection signs and symptoms  Outcome: Ongoing     Problem: Skin Integrity:  Goal: Absence of new skin breakdown  Description: Absence of new skin breakdown  Outcome: Ongoing     Problem: Pain:  Goal: Pain level will decrease  Description: Pain level will decrease  Outcome: Ongoing     Problem: Pain:  Goal: Control of acute pain  Description: Control of acute pain  Outcome: Ongoing     Problem: Pain:  Goal: Control of chronic pain  Description: Control of chronic pain  Outcome: Ongoing

## 2021-10-29 NOTE — PROGRESS NOTES
[facility]  Daily Progress Note    Surgeon: Dr. Ashley Gordillo   Procedure : CABG Scheduled for 11/2/2021 at 0900  EF: 70%     Subjective:  Ms. Esperanza Enriquez feels well today with no acute complaints. Denies chest pain or shortness of breath. Plan of care reviewed and questions answered. Physical Exam  Vital Signs: BP (!) 115/57   Pulse 64   Temp 97.4 °F (36.3 °C) (Temporal)   Resp 16   Ht 5' 5\" (1.651 m)   Wt 192 lb (87.1 kg)   SpO2 99%   BMI 31.95 kg/m²  O2 Flow Rate (L/min): 2 L/min   Admit Weight: Weight: 199 lb (90.3 kg)   WEIGHTWeight: 192 lb (87.1 kg)     General: alert and oriented to person, place and time, well-developed and well-nourished, in no acute distress. Heart: Normal S1 and S2.  Regular rhythm. No murmurs, gallops, or rubs.   Pacing Wires: No   Lungs: clear to auscultation bilaterally and diminished breath sounds bibasilar Chest tubes: No  Abdomen: soft, non tender, non distended, BSx4  Extremities: 2+ edema    Scheduled Meds:    pantoprazole  40 mg Oral QAM AC    sodium chloride flush  5-40 mL IntraVENous 2 times per day    dextromethorphan  30 mg Oral 2 times per day    lidocaine  1 patch TransDERmal Daily    gabapentin  300 mg Oral TID    FLUoxetine  40 mg Oral QAM    metoprolol succinate  25 mg Oral Daily    predniSONE  10 mg Oral Daily    insulin glargine  56 Units SubCUTAneous Daily    And    insulin lispro  10 Units SubCUTAneous TID WC    [Held by provider] metFORMIN  1,000 mg Oral BID     rosuvastatin  20 mg Oral Nightly    aspirin  81 mg Oral Daily    furosemide  20 mg IntraVENous BID    influenza virus vaccine  0.5 mL IntraMUSCular Prior to discharge     Continuous Infusions:    sodium chloride      dextrose      sodium chloride         Data:  CBC:   Recent Labs     10/28/21  0453   WBC 9.0   HGB 9.0*   HCT 32.4*   MCV 81.6*        BMP:   Recent Labs     10/27/21  0235 10/28/21  0453 10/29/21  0525    142 144   K 4.0 3.6* 3.8  103 103   CO2 27 30 32*   BUN 19 15 13   CREATININE 0.78 0.61 0.70     PT/INR:   Recent Labs     10/28/21  1124   PROTIME 15.1*   INR 1.2     APTT:   Recent Labs     10/28/21  1124   APTT >150.0*       I/O:  I/O last 3 completed shifts: In: 12 [P.O.:560]  Out: 2850 [Urine:2850]    Assessment/Plan:      Diagnosis Date    Asthma     Diabetes mellitus ([facility]     Fibromyalgia     Headache     Lymphedema of both lower extremities     Myasthenia gravis ([facility]       Neuro: Intact   Lungs: clear, diminished in the bases   HD: VSS   Edema: 2+ peripheral   GI: Active bowel sounds X4   : Good urine output     Oxygen as needed via nasal cannula to maintain SpO2 > 92%  o Wean as tolerated   Complete pre-operative testing.  PT/OT   Encourage incentive spirometry, acapella and ambulation       The above recommendations including medications and orders were discussed and agreed upon with Dr. Wen Drummond, the attending on service for the cardiothoracic surgery group today. Electronically signed by CYNDY Dumas CNP on 10/29/2021 at 12:26 PM    On this date 10/29/2021 I have spent 28 minutes reviewing previous notes, test results and face to face with the patient discussing the diagnosis and importance of compliance with the treatment plan as well as documenting on the day of the visit. At least 50% of the time documented was spent with the patient to provide counseling and/or coordination of care. This note was created with the assistance of a speech-recognition program.  Although the intention is to generate a document that actually reflects the content of the visit, no guarantees can be provided that every mistake has been identified and corrected by editing. Note was updated later by me after  physical examination and  completion of the assessment.

## 2021-10-29 NOTE — CARE COORDINATION
Social [facility] [place] [facility] that patient will have CABG next week. They will resubmit for precert next week. Stevie Marquez

## 2021-10-30 LAB
ANION GAP SERPL CALCULATED.3IONS-SCNC: 8 MMOL/L (ref 9–17)
BUN BLDV-MCNC: 17 MG/DL (ref 8–23)
BUN/CREAT BLD: 22 (ref 9–20)
CALCIUM SERPL-MCNC: 9.3 MG/DL (ref 8.6–10.4)
CHLORIDE BLD-SCNC: 93 MMOL/L (ref 98–107)
CO2: 34 MMOL/L (ref 20–31)
CREAT SERPL-MCNC: 0.76 MG/DL (ref 0.5–0.9)
CULTURE: ABNORMAL
CULTURE: ABNORMAL
GFR AFRICAN AMERICAN: >60 ML/MIN
GFR NON-AFRICAN AMERICAN: >60 ML/MIN
GFR SERPL CREATININE-BSD FRML MDRD: ABNORMAL ML/MIN/{1.73_M2}
GFR SERPL CREATININE-BSD FRML MDRD: ABNORMAL ML/MIN/{1.73_M2}
GLUCOSE BLD-MCNC: 107 MG/DL (ref 70–99)
GLUCOSE BLD-MCNC: 120 MG/DL (ref 65–105)
GLUCOSE BLD-MCNC: 125 MG/DL (ref 65–105)
GLUCOSE BLD-MCNC: 129 MG/DL (ref 65–105)
GLUCOSE BLD-MCNC: 144 MG/DL (ref 65–105)
GLUCOSE BLD-MCNC: 152 MG/DL (ref 65–105)
HCT VFR BLD CALC: 33.1 % (ref 36.3–47.1)
HEMOGLOBIN: 9.4 G/DL (ref 11.9–15.1)
Lab: ABNORMAL
MCH RBC QN AUTO: 23.1 PG (ref 25.2–33.5)
MCHC RBC AUTO-ENTMCNC: 28.4 G/DL (ref 28.4–34.8)
MCV RBC AUTO: 81.3 FL (ref 82.6–102.9)
NRBC AUTOMATED: 0 PER 100 WBC
PDW BLD-RTO: 14.9 % (ref 11.8–14.4)
PLATELET # BLD: 359 K/UL (ref 138–453)
PMV BLD AUTO: 10.4 FL (ref 8.1–13.5)
POTASSIUM SERPL-SCNC: 4.6 MMOL/L (ref 3.7–5.3)
RBC # BLD: 4.07 M/UL (ref 3.95–5.11)
SODIUM BLD-SCNC: 135 MMOL/L (ref 135–144)
SPECIMEN DESCRIPTION: ABNORMAL
WBC # BLD: 12.7 K/UL (ref 3.5–11.3)

## 2021-10-30 PROCEDURE — 80048 BASIC METABOLIC PNL TOTAL CA: CPT

## 2021-10-30 PROCEDURE — 2060000000 HC ICU INTERMEDIATE R&B

## 2021-10-30 PROCEDURE — 6370000000 HC RX 637 (ALT 250 FOR IP): Performed by: FAMILY MEDICINE

## 2021-10-30 PROCEDURE — 2580000003 HC RX 258: Performed by: INTERNAL MEDICINE

## 2021-10-30 PROCEDURE — 6370000000 HC RX 637 (ALT 250 FOR IP): Performed by: INTERNAL MEDICINE

## 2021-10-30 PROCEDURE — 82947 ASSAY GLUCOSE BLOOD QUANT: CPT

## 2021-10-30 PROCEDURE — 99232 SBSQ HOSP IP/OBS MODERATE 35: CPT | Performed by: FAMILY MEDICINE

## 2021-10-30 PROCEDURE — 6370000000 HC RX 637 (ALT 250 FOR IP): Performed by: NURSE PRACTITIONER

## 2021-10-30 PROCEDURE — 85027 COMPLETE CBC AUTOMATED: CPT

## 2021-10-30 PROCEDURE — 36415 COLL VENOUS BLD VENIPUNCTURE: CPT

## 2021-10-30 RX ORDER — BENZONATATE 100 MG/1
100 CAPSULE ORAL 3 TIMES DAILY PRN
Status: DISCONTINUED | OUTPATIENT
Start: 2021-10-30 | End: 2021-11-10 | Stop reason: HOSPADM

## 2021-10-30 RX ORDER — LANOLIN ALCOHOL/MO/W.PET/CERES
CREAM (GRAM) TOPICAL 2 TIMES DAILY
Status: DISCONTINUED | OUTPATIENT
Start: 2021-10-30 | End: 2021-11-03

## 2021-10-30 RX ORDER — DIPHENHYDRAMINE HCL 25 MG
25 TABLET ORAL EVERY 6 HOURS PRN
Status: DISCONTINUED | OUTPATIENT
Start: 2021-10-30 | End: 2021-11-03

## 2021-10-30 RX ORDER — CIPROFLOXACIN 500 MG/1
500 TABLET, FILM COATED ORAL EVERY 12 HOURS SCHEDULED
Status: DISCONTINUED | OUTPATIENT
Start: 2021-10-30 | End: 2021-11-05 | Stop reason: ALTCHOICE

## 2021-10-30 RX ADMIN — Medication 30 MG: at 09:10

## 2021-10-30 RX ADMIN — ASPIRIN 81 MG: 81 TABLET, CHEWABLE ORAL at 09:05

## 2021-10-30 RX ADMIN — FLUOXETINE 40 MG: 20 CAPSULE ORAL at 09:05

## 2021-10-30 RX ADMIN — GABAPENTIN 300 MG: 300 CAPSULE ORAL at 09:05

## 2021-10-30 RX ADMIN — ACETAMINOPHEN 650 MG: 325 TABLET ORAL at 19:36

## 2021-10-30 RX ADMIN — INSULIN LISPRO 10 UNITS: 100 INJECTION, SOLUTION INTRAVENOUS; SUBCUTANEOUS at 16:56

## 2021-10-30 RX ADMIN — DIPHENHYDRAMINE HCL 25 MG: 25 TABLET ORAL at 20:46

## 2021-10-30 RX ADMIN — POLYVINYL ALCOHOL 2 DROP: 14 SOLUTION/ DROPS OPHTHALMIC at 14:23

## 2021-10-30 RX ADMIN — POLYVINYL ALCOHOL 2 DROP: 14 SOLUTION/ DROPS OPHTHALMIC at 23:22

## 2021-10-30 RX ADMIN — SODIUM CHLORIDE, PRESERVATIVE FREE 10 ML: 5 INJECTION INTRAVENOUS at 09:08

## 2021-10-30 RX ADMIN — PANTOPRAZOLE SODIUM 40 MG: 40 TABLET, DELAYED RELEASE ORAL at 05:36

## 2021-10-30 RX ADMIN — FUROSEMIDE 40 MG: 40 TABLET ORAL at 09:05

## 2021-10-30 RX ADMIN — POLYVINYL ALCOHOL 2 DROP: 14 SOLUTION/ DROPS OPHTHALMIC at 16:57

## 2021-10-30 RX ADMIN — SODIUM CHLORIDE, PRESERVATIVE FREE 10 ML: 5 INJECTION INTRAVENOUS at 20:46

## 2021-10-30 RX ADMIN — GABAPENTIN 300 MG: 300 CAPSULE ORAL at 20:46

## 2021-10-30 RX ADMIN — PREDNISONE 10 MG: 5 TABLET ORAL at 09:05

## 2021-10-30 RX ADMIN — SKIN PROTECTANT: 33 OINTMENT TOPICAL at 20:46

## 2021-10-30 RX ADMIN — Medication 30 MG: at 20:46

## 2021-10-30 RX ADMIN — INSULIN GLARGINE 56 UNITS: 100 INJECTION, SOLUTION SUBCUTANEOUS at 09:04

## 2021-10-30 RX ADMIN — ACETAMINOPHEN 650 MG: 325 TABLET ORAL at 02:29

## 2021-10-30 RX ADMIN — INSULIN LISPRO 10 UNITS: 100 INJECTION, SOLUTION INTRAVENOUS; SUBCUTANEOUS at 12:54

## 2021-10-30 RX ADMIN — METOPROLOL SUCCINATE 25 MG: 25 TABLET, EXTENDED RELEASE ORAL at 09:05

## 2021-10-30 RX ADMIN — GABAPENTIN 300 MG: 300 CAPSULE ORAL at 14:23

## 2021-10-30 ASSESSMENT — PAIN DESCRIPTION - ONSET: ONSET: ON-GOING

## 2021-10-30 ASSESSMENT — ENCOUNTER SYMPTOMS
ABDOMINAL PAIN: 0
NAUSEA: 0
VOMITING: 0
DIARRHEA: 0
COUGH: 1
CHEST TIGHTNESS: 0
BLOOD IN STOOL: 0
SHORTNESS OF BREATH: 1
CONSTIPATION: 0
WHEEZING: 0

## 2021-10-30 ASSESSMENT — PAIN SCALES - GENERAL
PAINLEVEL_OUTOF10: 0
PAINLEVEL_OUTOF10: 0
PAINLEVEL_OUTOF10: 5
PAINLEVEL_OUTOF10: 0
PAINLEVEL_OUTOF10: 0
PAINLEVEL_OUTOF10: 5
PAINLEVEL_OUTOF10: 5
PAINLEVEL_OUTOF10: 0

## 2021-10-30 ASSESSMENT — PAIN DESCRIPTION - PROGRESSION
CLINICAL_PROGRESSION: NOT CHANGED

## 2021-10-30 ASSESSMENT — PAIN DESCRIPTION - PAIN TYPE: TYPE: CHRONIC PAIN

## 2021-10-30 ASSESSMENT — PAIN DESCRIPTION - DESCRIPTORS: DESCRIPTORS: DISCOMFORT

## 2021-10-30 ASSESSMENT — PAIN DESCRIPTION - LOCATION: LOCATION: BACK

## 2021-10-30 ASSESSMENT — PAIN DESCRIPTION - FREQUENCY: FREQUENCY: CONTINUOUS

## 2021-10-30 ASSESSMENT — PAIN - FUNCTIONAL ASSESSMENT: PAIN_FUNCTIONAL_ASSESSMENT: PREVENTS OR INTERFERES SOME ACTIVE ACTIVITIES AND ADLS

## 2021-10-30 NOTE — PLAN OF CARE
Problem: Falls - Risk of:  Goal: Will remain free from falls  Description: Will remain free from falls  Outcome: Met This Shift  Goal: Absence of physical injury  Description: Absence of physical injury  Outcome: Met This Shift     Problem: Pain:  Goal: Pain level will decrease  Description: Pain level will decrease  Outcome: Met This Shift  Goal: Control of acute pain  Description: Control of acute pain  Outcome: Met This Shift  Goal: Control of chronic pain  Description: Control of chronic pain  Outcome: Met This Shift     Problem: Pain:  Goal: Pain level will decrease  Description: Pain level will decrease  Outcome: Met This Shift     Problem: Discharge Planning:  Goal: Discharged to appropriate level of care  Description: Discharged to appropriate level of care  Outcome: Ongoing     Problem: Cardiac Output - Decreased:  Goal: Hemodynamic stability will improve  Description: Hemodynamic stability will improve  Outcome: Ongoing     Problem: Tissue Perfusion - Cardiopulmonary, Altered:  Goal: Circulatory function within specified parameters  Description: Circulatory function within specified parameters  Outcome: Ongoing     Problem: Skin Integrity:  Goal: Will show no infection signs and symptoms  Description: Will show no infection signs and symptoms  Outcome: Ongoing  Goal: Absence of new skin breakdown  Description: Absence of new skin breakdown  Outcome: Ongoing

## 2021-10-30 NOTE — PROGRESS NOTES
[facility]  Office: [address], DO, Yomaira Clarkroe, DO, Rubenvinny Dry, DO, Gaurav Lopez Blood, DO, Arlin Carroll MD, Sheba Gibson MD, Marci Elkins MD, Codi Mays MD, Heather Lange MD, Vianney Cuevas MD, Michael Mcknight MD, Sg Felipe MD, Marisela Obregon, DO, Patricia Antony, DO, Dano Cardoso MD,  Rossi Pinon, DO, Cornelia Poe MD, Roberta Whitfield MD, Berta Josue MD, Magno Moyer MD, Adolph Marquis MD, Mala Heard MD, Jorge Mistry, [facility], [facility], CNP, Randolph [place], CNP, Suzanne Stack, CNS, Luana Garcia, CNP, Shawn Jean, CNP, Óscar Castellanos, CNP, Diogenes Chilel, CNP, Donna Lara, CNP, Gregory Garza, CNP, SELMA NathanC, Ronnie Aquino, [facility], Lyudmila Lipoma, CNP, Fernando Hu, CNP, Phylwest Field, CNP, Esme Iqbal, CNP, Emanuel Riddle, CNP, Benoit Colón, CNP, Deejay Gama    Progress Note    10/30/2021    11:26 AM    Name:   Irma Cardona  MRN:     [id number]     [place]:      [de-identified]   Room:   2034/2034-01   Day:  6  Admit Date:  10/24/2021  6:48 AM    PCP:   Esme Iqbal MD  Code Status:  Full Code    Subjective:     C/C:   Chief Complaint   Patient presents with    Back Pain     Interval History Status: not changed. Patient seen and examined at bedside, still with some reporting cough medications are helping. Breathing is okay, urine culture is growing Klebsiella and Enterococcus faecalis. Patient had Ace wrap over her lower extremities yesterday and feel it was done very tight and she is significantly uncomfortable and itchy asking for something for itchiness.   Tentative plan for CABG on Tuesday, for now orthopedic surgery being on hold to see the    Brief History:        Anneliese Everett is a 61 y.o. Non- / non  female who presents with low Back Pain and is admitted to the hospital for the management of NSTEMI   She said she felt a pop while transferring from bed to wheelchair. Toshia Ayers is unclear when that was but was unable to get out of her recliner. Starting 2 days pta she had cp and sob, along with nausea.  Also c/o hip pain and said all her pain was due to fibromyalgia. troponins were elevated     Review of Systems:     Review of Systems   Constitutional: Positive for activity change and appetite change. Negative for chills, diaphoresis and fever. HENT: Negative for congestion. Eyes: Negative for visual disturbance. Respiratory: Positive for cough and shortness of breath. Negative for chest tightness and wheezing. Cardiovascular: Positive for leg swelling. Negative for chest pain and palpitations. Gastrointestinal: Negative for abdominal pain, blood in stool, constipation, diarrhea, nausea and vomiting. Genitourinary: Negative for difficulty urinating. Neurological: Positive for weakness. Negative for dizziness, light-headedness, numbness and headaches. All other systems reviewed and are negative. Medications: Allergies:     Allergies   Allergen Reactions    Amoxicillin Diarrhea and Other (See Comments)    Amoxicillin-Pot Clavulanate Diarrhea    Atorvastatin Other (See Comments)     Other reaction(s): Myalgia  Weakness      Cefuroxime Axetil Other (See Comments)    Clavulanic Acid Diarrhea and Other (See Comments)    Codeine Other (See Comments)     Unless suspended in syrup      Dextroamphetamine     Adhesive Tape Rash     Itching     Cephalosporins Rash       Current Meds:   Scheduled Meds:    furosemide  40 mg Oral Daily    pantoprazole  40 mg Oral QAM AC    sodium chloride flush  5-40 mL IntraVENous 2 times per day    dextromethorphan  30 mg Oral 2 times per day    lidocaine  1 patch TransDERmal Daily    gabapentin  300 mg Oral TID    FLUoxetine  40 mg Oral QAM    metoprolol succinate  25 mg Oral Daily    predniSONE  10 mg Oral Daily    insulin glargine  56 Units SubCUTAneous Daily    And    insulin lispro  10 Units SubCUTAneous TID WC    [Held by provider] metFORMIN  1,000 mg Oral BID WC    rosuvastatin  20 mg Oral Nightly    aspirin  81 mg Oral Daily    influenza virus vaccine  0.5 mL IntraMUSCular Prior to discharge     Continuous Infusions:    sodium chloride      dextrose      sodium chloride       PRN Meds: polyvinyl alcohol, sodium chloride flush, sodium chloride, acetaminophen, miconazole, oxyCODONE-acetaminophen, glucose, dextrose, glucagon (rDNA), dextrose, sodium chloride, ondansetron **OR** ondansetron, [DISCONTINUED] acetaminophen **OR** acetaminophen, polyethylene glycol, nitroGLYCERIN, perflutren lipid microspheres    Data:     Past Medical History:   has a past medical history of Asthma, Diabetes mellitus ([facility] [address] Fibromyalgia, Headache, Lymphedema of both lower extremities, and Myasthenia gravis ([facility] [address] Social History:   reports that she has never smoked. She has never used smokeless tobacco. She reports previous alcohol use. She reports previous drug use. Family History:   Family History   Problem Relation Age of Onset    Coronary Art Dis Mother     Kidney Disease Mother        Vitals:  /60   Pulse 68   Temp 98.2 °F (36.8 °C) (Axillary)   Resp 16   Ht 5' 5\" (1.651 m)   Wt 183 lb 3.2 oz (83.1 kg)   SpO2 97%   BMI 30.49 kg/m²   Temp (24hrs), Av.5 °F (36.4 °C), Min:97.1 °F (36.2 °C), Max:98.2 °F (36.8 °C)    Recent Labs     10/29/21  1640 10/29/21  2048 10/30/21  0631 10/30/21  0733   POCGLU 138* 152* 129* 120*       I/O (24Hr):     Intake/Output Summary (Last 24 hours) at 10/30/2021 1126  Last data filed at 10/30/2021 0602  Gross per 24 hour   Intake --   Output 3500 ml   Net -3500 ml       Labs:  Hematology:  Recent Labs     10/28/21  0453 10/28/21  1124 10/30/21  0321   WBC 9.0  --  12.7*   RBC 3.97  --  4.07   HGB 9.0*  --  9.4*   HCT 32.4*  --  33.1*   MCV 81.6*  --  81.3*   MCH 22.7*  --  23.1*   MCHC 27.8*  --  28.4   RDW 14.9*  --  14.9*     --  359   MPV 9.5  --  10.4 INR  --  1.2  --      Chemistry:  Recent Labs     10/28/21  0453 10/28/21  1124 10/29/21  0525 10/30/21  0321     --  144 135   K 3.6*  --  3.8 4.6     --  103 93*   CO2 30  --  32* 34*   GLUCOSE 90  --  64* 107*   BUN 15  --  13 17   CREATININE 0.61  --  0.70 0.76   MG  --  1.6  --   --    ANIONGAP 9  --  9 8*   LABGLOM >60  --  >60 >60   GFRAA >60  --  >60 >60   CALCIUM 8.5*  --  8.7 9.3     Recent Labs     10/28/21  1124 10/28/21  1627 10/29/21  0802 10/29/21  1141 10/29/21  1640 10/29/21  2048 10/30/21  0631 10/30/21  0733   LABA1C 9.0*  --   --   --   --   --   --   --    POCGLU  --    < > 69 174* 138* 152* 129* 120*    < > = values in this interval not displayed. ABG:  Lab Results   Component Value Date    POCPH 7.440 10/28/2021    POCPCO2 49.2 10/28/2021    POCPO2 60.8 10/28/2021    POCHCO3 33.4 10/28/2021    NBEA NOT REPORTED 10/28/2021    PBEA 8 10/28/2021    SOJ3BJY NOT REPORTED 10/28/2021    EHTS1MJY 91 10/28/2021    FIO2 2.0 10/28/2021     Lab Results   Component Value Date/Time    SPECIAL NOT REPORTED 10/28/2021 11:30 AM     Lab Results   Component Value Date/Time    CULTURE (A) 10/28/2021 11:30 AM     LACTOSE FERMENTING GRAM NEGATIVE RODS >[id number] CFU/ML    CULTURE  10/28/2021 11:30 AM     PRESUMPTIVE ID: GROUP D ENTEROCOCCUS >[id number] CFU/ML       Radiology:  XR LUMBAR SPINE (2-3 VIEWS)    Result Date: 10/24/2021  LUMBAR SPINE: 1. Age indeterminate, probably nonacute, compression fracture of L5 vertebral body with about 50% decrease height. Please correlate with point tenderness. MRI may also be considered for age characterization if deemed clinically necessary. 2. T12 and L1 kyphoplasty. CHEST X-RAY: 1. Poor inspiratory afford with significantly decreased lung volumes. 2. Patchy opacities left lower lung probably due to crowding of vessels with or without underlying developing infiltrate. Please correlate with auscultation. Short interval follow-up may also be considered.      CT CHEST WO CONTRAST    Result Date: 10/28/2021  1. Suboptimal exam appears to been performed at expiratory phase respiration resulting in some areas of air trapping as well as underaeration. 2. Nonspecific small volume bilateral pleural effusion with bibasilar consolidative changes. Sequela pulmonary edema is a consideration. 3. Main pulmonary artery dilatation can be seen with pulmonary hypertension but is nonspecific. 4. Mild cardiomegaly. MRI THORACIC SPINE WO CONTRAST    Result Date: 10/26/2021  Acute inferior endplate fracture of L3. MRI LUMBAR SPINE WO CONTRAST    Result Date: 10/26/2021  Acute inferior endplate fracture of L3. XR CHEST PORTABLE    Result Date: 10/28/2021  Worsening bibasilar opacifications with worsening costophrenic blunting. XR CHEST PORTABLE    Result Date: 10/24/2021  LUMBAR SPINE: 1. Age indeterminate, probably nonacute, compression fracture of L5 vertebral body with about 50% decrease height. Please correlate with point tenderness. MRI may also be considered for age characterization if deemed clinically necessary. 2. T12 and L1 kyphoplasty. CHEST X-RAY: 1. Poor inspiratory afford with significantly decreased lung volumes. 2. Patchy opacities left lower lung probably due to crowding of vessels with or without underlying developing infiltrate. Please correlate with auscultation. Short interval follow-up may also be considered. CT CHEST PULMONARY EMBOLISM W CONTRAST    Result Date: 10/24/2021  1. No evidence for acute pulmonary embolism. 2. Scattered bibasilar patchy subsegmental atelectasis and trace bilateral pleural effusions. 3. Borderline cardiomegaly. 4. Large areas of geographic ground-glass attenuation interspersed with more lucent areas probably combination of air trapping and pulmonary interstitial edema. 5. Cholelithiasis.      NM Cardiac Stress Test Nuclear Imaging    Result Date: 10/27/2021  Perfusion:  Perfusion defects involve 6% of the left ventricular myocardium at stress, with some reversibility. Function:  Hypokinesis inferior wall. Left ventricular ejection fraction 72%. Risk stratification: Intermediate Notes concerning risk stratification: Risk stratification incorporates both clinical history and some testing results. Final risk determination is the responsibility of the ordering provider as other patient information and test results may increase or decrease the risk assessment reported for this examination. Risk stratification criteria are adapted from \"Noninvasive Risk Stratification\" criteria from [facility]. Al, ACC/AATS/AHA/ASE/ASNC/SCAI/SCCT/STS 2017 Appropriate Use Criteria For Coronary Revascularization in Patients With Stable Ischemic Heart Disease [facility] Volume 69, Issue 17, May 2017 High risk (>3% annual death or MI) 1. Severe resting LV dysfunction (LVEF <35%) not readily explained by non coronary causes 2. Resting perfusion abnormalities greater than 10% of the myocardium in patients without prior history or evidence of MI3. Stress-induced perfusion abnormalities encumbering greater than or equal to 10% myocardium or stress segmental scores indicating multiple vascular territories with abnormalities 4. Stress-induced LV dilatation (TID ratio greater than 1.19 for exercise and greater than 1.39 for regadenoson) Intermediate risk (1% to 3% annual death or MI) 1. Mild/moderate resting LV dysfunction (LVEF 35% to 49%) not readily explained by non coronary causes. 2. Resting perfusion abnormalities in 5%-9.9% of the myocardium in patients without a history or prior evidence of MI 3. Stress-induced perfusion abnormality encumbering 5%-9.9% of the myocardium or stress segmental scores indicating 1 vascular territory with abnormalities but without LV dilation 4. Small wall motion abnormality involving 1-2 segments and only 1 coronary bed. Low Risk (Less than 1% annual death or MI) 1.  Normal or small myocardial perfusion defect at rest or with stress encumbering less than 5% of the myocardium. Physical Examination:        Physical Exam  Vitals and nursing note reviewed. Constitutional:       General: She is not in acute distress. HENT:      Head: Normocephalic and atraumatic. Eyes:      Conjunctiva/sclera: Conjunctivae normal.      Pupils: Pupils are equal, round, and reactive to light. Cardiovascular:      Rate and Rhythm: Normal rate and regular rhythm. Heart sounds: No murmur heard. Comments: Bilateral lower extremity currently in Ace wrap  Pulmonary:      Effort: Pulmonary effort is normal. No accessory muscle usage or respiratory distress. Breath sounds: No stridor. Rales present. No decreased breath sounds, wheezing or rhonchi. Abdominal:      General: Bowel sounds are normal. There is no distension. Palpations: Abdomen is soft. Abdomen is not rigid. Tenderness: There is no abdominal tenderness. There is no guarding. Musculoskeletal:         General: No tenderness. Right lower le+ Edema present. Left lower le+ Edema present. Skin:     General: Skin is warm and dry. Findings: No erythema, lesion or rash. Neurological:      Mental Status: She is alert and oriented to person, place, and time. Cranial Nerves: No cranial nerve deficit. Motor: No seizure activity. Psychiatric:         Speech: Speech normal.         Behavior: Behavior normal. Behavior is cooperative.          Assessment:        Hospital Problems         Last Modified POA    * (Principal) NSTEMI (non-ST elevated myocardial infarction) ([facility] [address] 10/28/2021 Yes    Type 2 diabetes mellitus with hyperglycemia, without long-term current use of insulin ([facility] [address] 10/24/2021 Yes    Class 1 obesity due to excess calories with serious comorbidity and body mass index (BMI) of 33.0 to 33.9 in adult 10/24/2021 Yes    Lymphedema of both lower extremities 10/24/2021 Yes    Acute on chronic diastolic congestive heart failure ([address] 10/26/2021 Yes    Pathological fracture of thoracic vertebra due to secondary osteoporosis ([address] 10/26/2021 Yes    Pathological fracture of lumbar vertebra due to secondary osteoporosis ([address] 10/26/2021 Yes    Intractable back pain 10/26/2021 Yes    Age-related osteoporosis with current pathological fracture 10/26/2021 Yes    History of kyphoplasty 10/26/2021 Yes    Facet arthritis, degenerative, lumbar spine 10/26/2021 Yes    Degenerative spondylolisthesis 10/26/2021 Yes    DDD (degenerative disc disease), thoracolumbar 10/26/2021 Yes    Acute on chronic congestive heart failure ([address] 10/27/2021 Yes    Chronic bilateral low back pain without sciatica 10/27/2021 Yes          Plan:          Non-STEMI s/p cardiac cath with multivessel disease: Patient was seen by CTS and plan for open heart on Tuesday 11/2. Currently continue maximizing medical management. Appreciate cardiology recommendation. Acute decompensated diastolic heart failure: Diuresis per cardiology, initially was IV Lasix now switched to oral, strict I's & O's, daily weight, cardiac diet and fluid restriction    Osteoporosis was history of multiple vertebral fractures: Patient was new L3 fract and chronic L5: Patient was evaluated by orthopedic surgery and initial plan was for intervention but given patient will need open heart surgery recommendation is for pain control and LSO was ordered      Chronic lymphedema: Ace wrapping in place, it is tight and the patient is complaining she is unable to move lower extremity, advised to remove and apply skin protectant/moisturizer to prevent skin break.     Acute cystitis without hematuria: Urine growing Klebsiella and Enterococcus, antibiotics started    HTN: continue home medications     HPL: continue home medications    DM2: Continue diabetes home medications , insulin sliding scale, hypoglycemia protocol    Chronic back pain: Continue chronic medications for pain control    DVT prophylaxis    Continue PT/OT.     Discussed with the patient and the nurse      Elva Carr MD  10/30/2021  11:26 AM

## 2021-10-30 NOTE — PROGRESS NOTES
Section of Cardiology  Progress Note      Date:  10/30/2021  Patient: Keith King  Admission:  10/24/2021  6:48 AM  Admit DX: NSTEMI (non-ST elevated myocardial infarction) ([facility] [I21.4]  Non-ST elevation MI (NSTEMI) ([facility]) [I21.4]  Lymphedema of both lower extremities [I89.0]  Chronic bilateral low back pain without sciatica [M54.50, G89.29]  Acute on chronic congestive heart failure, unspecified heart failure type ([facility] [I50.9]  Age:  61 y.o., 1955     LOS: 6 days     Reason for evaluation:   coronary artery disease      SUBJECTIVE:     The patient was seen and examined. Notes and labs reviewed. There were not complications over night. Still with back pain. No shortness of breath. She feels nervous. No chest pain    The patient is generally feeling stable. OBJECTIVE:    Telemetry: Sinus  /60   Pulse 68   Temp 98.6 °F (37 °C) (Oral)   Resp 16   Ht 5' 5\" (1.651 m)   Wt 183 lb 3.2 oz (83.1 kg)   SpO2 97%   BMI 30.49 kg/m²     Intake/Output Summary (Last 24 hours) at 10/30/2021 1236  Last data filed at 10/30/2021 0602  Gross per 24 hour   Intake --   Output 3500 ml   Net -3500 ml       EXAM:   CONSTITUTIONAL:  awake, alert, cooperative, no apparent distress, and appears stated age. HEENT: Normal jugular venous pulsations, no carotid bruits. Head is atraumatic, normocephalic. Left eye appeared to have swelling and discharge  LUNGS: Good respiratory effort. No for increased work of breathing. On auscultation: clear to auscultation bilaterally  CARDIOVASCULAR:  Normal apical impulse, regular rate and rhythm, normal S1 and S2, no S3 or S4,   ABDOMEN: Soft, nontender, nondistended. Bowel sounds present. SKIN: Warm and dry. EXTREMITIES: No lower extremity edema. Motor movement grossly intact. No cyanosis or clubbing.     Current Inpatient Medications:   ciprofloxacin  500 mg Oral 2 times per day    Hydrocerin   Topical BID    furosemide  40 mg Oral Daily    pantoprazole  40 mg Oral QAM AC    sodium chloride flush  5-40 mL IntraVENous 2 times per day    dextromethorphan  30 mg Oral 2 times per day    lidocaine  1 patch TransDERmal Daily    gabapentin  300 mg Oral TID    FLUoxetine  40 mg Oral QAM    metoprolol succinate  25 mg Oral Daily    predniSONE  10 mg Oral Daily    insulin glargine  56 Units SubCUTAneous Daily    And    insulin lispro  10 Units SubCUTAneous TID WC    [Held by provider] metFORMIN  1,000 mg Oral BID WC    rosuvastatin  20 mg Oral Nightly    aspirin  81 mg Oral Daily    influenza virus vaccine  0.5 mL IntraMUSCular Prior to discharge       IV Infusions (if any):   sodium chloride      dextrose      sodium chloride         Diagnostics:   EKG: . ECHO: .   Ejection fraction: %  Stress Test: .  Cardiac Angiography: .    Labs:   CBC:   Recent Labs     10/28/21  0453 10/30/21  0321   WBC 9.0 12.7*   HGB 9.0* 9.4*   HCT 32.4* 33.1*    359     BMP:   Recent Labs     10/29/21  0525 10/30/21  0321    135   K 3.8 4.6   CO2 32* 34*   BUN 13 17   CREATININE 0.70 0.76   LABGLOM >60 >60   GLUCOSE 64* 107*     No results found for: BNP  PT/INR:   Recent Labs     10/28/21  1124   PROTIME 15.1*   INR 1.2     APTT:  Recent Labs     10/28/21  1124   APTT >150.0*     CARDIAC ENZYMES:No results for input(s): CKTOTAL, CKMB, CKMBINDEX, TROPONINT in the last 72 hours. FASTING LIPID PANEL:  Lab Results   Component Value Date    HDL 40 10/25/2021    TRIG 107 10/25/2021     LIVER PROFILE:No results for input(s): AST, ALT, LABALBU in the last 72 hours.     ASSESSMENT:  1.  Elevated troponin With what appeared to be non-ST elevation MI  2.  Insulin-dependent diabetes mellitus  3.  Hyperlipidemia  4.  Peripheral vascular disease  5.  multivessel cardiac disease    Patient Active Problem List   Diagnosis    NSTEMI (non-ST elevated myocardial infarction) ([facility] [address]    Type 2 diabetes mellitus with hyperglycemia, without long-term current use of insulin (HCC)    Class 1 obesity due to excess calories with serious comorbidity and body mass index (BMI) of 33.0 to 33.9 in adult    Lymphedema of both lower extremities    Acute on chronic diastolic congestive heart failure (HCC)    Pathological fracture of thoracic vertebra due to secondary osteoporosis ([address]    Pathological fracture of lumbar vertebra due to secondary osteoporosis (HCC)    Intractable back pain    Age-related osteoporosis with current pathological fracture    History of kyphoplasty    Facet arthritis, degenerative, lumbar spine    Degenerative spondylolisthesis    DDD (degenerative disc disease), thoracolumbar    Acute on chronic congestive heart failure (HCC)    Chronic bilateral low back pain without sciatica       PLAN:    1. Her left eye appeared to be infected in the eyelid and may be conjunctivitis  2. Patient now is convinced to proceed with open heart surgery      Please see orders. Discussed with patient and nursing.     Val Becerra MD, MD

## 2021-10-30 NOTE — PLAN OF CARE
Problem: Falls - Risk of:  Goal: Will remain free from falls  Description: Will remain free from falls  Outcome: Ongoing     Problem: Discharge Planning:  Goal: Discharged to appropriate level of care  Description: Discharged to appropriate level of care  Outcome: Ongoing     Problem: Cardiac Output - Decreased:  Goal: Hemodynamic stability will improve  Description: Hemodynamic stability will improve  Outcome: Ongoing     Problem: Pain:  Goal: Pain level will decrease  Description: Pain level will decrease  Outcome: Met This Shift     Problem: Skin Integrity:  Goal: Will show no infection signs and symptoms  Description: Will show no infection signs and symptoms  Outcome: Met This Shift     Problem: Pain:  Goal: Pain level will decrease  Description: Pain level will decrease  Outcome: Met This Shift

## 2021-10-31 LAB
ANION GAP SERPL CALCULATED.3IONS-SCNC: 7 MMOL/L (ref 9–17)
BUN BLDV-MCNC: 22 MG/DL (ref 8–23)
BUN/CREAT BLD: 21 (ref 9–20)
CALCIUM SERPL-MCNC: 8.9 MG/DL (ref 8.6–10.4)
CHLORIDE BLD-SCNC: 97 MMOL/L (ref 98–107)
CO2: 33 MMOL/L (ref 20–31)
CREAT SERPL-MCNC: 1.06 MG/DL (ref 0.5–0.9)
EKG ATRIAL RATE: 69 BPM
EKG P AXIS: 30 DEGREES
EKG P-R INTERVAL: 138 MS
EKG Q-T INTERVAL: 472 MS
EKG QRS DURATION: 122 MS
EKG QTC CALCULATION (BAZETT): 505 MS
EKG R AXIS: -38 DEGREES
EKG T AXIS: -42 DEGREES
EKG VENTRICULAR RATE: 69 BPM
GFR AFRICAN AMERICAN: >60 ML/MIN
GFR NON-AFRICAN AMERICAN: 52 ML/MIN
GFR SERPL CREATININE-BSD FRML MDRD: ABNORMAL ML/MIN/{1.73_M2}
GFR SERPL CREATININE-BSD FRML MDRD: ABNORMAL ML/MIN/{1.73_M2}
GLUCOSE BLD-MCNC: 125 MG/DL (ref 70–99)
GLUCOSE BLD-MCNC: 205 MG/DL (ref 65–105)
GLUCOSE BLD-MCNC: 232 MG/DL (ref 65–105)
GLUCOSE BLD-MCNC: 261 MG/DL (ref 65–105)
GLUCOSE BLD-MCNC: 84 MG/DL (ref 65–105)
POTASSIUM SERPL-SCNC: 4.4 MMOL/L (ref 3.7–5.3)
SODIUM BLD-SCNC: 137 MMOL/L (ref 135–144)

## 2021-10-31 PROCEDURE — 6370000000 HC RX 637 (ALT 250 FOR IP): Performed by: INTERNAL MEDICINE

## 2021-10-31 PROCEDURE — 6370000000 HC RX 637 (ALT 250 FOR IP): Performed by: NURSE PRACTITIONER

## 2021-10-31 PROCEDURE — 6370000000 HC RX 637 (ALT 250 FOR IP): Performed by: FAMILY MEDICINE

## 2021-10-31 PROCEDURE — 2580000003 HC RX 258: Performed by: INTERNAL MEDICINE

## 2021-10-31 PROCEDURE — 82947 ASSAY GLUCOSE BLOOD QUANT: CPT

## 2021-10-31 PROCEDURE — 80048 BASIC METABOLIC PNL TOTAL CA: CPT

## 2021-10-31 PROCEDURE — 94761 N-INVAS EAR/PLS OXIMETRY MLT: CPT

## 2021-10-31 PROCEDURE — 36415 COLL VENOUS BLD VENIPUNCTURE: CPT

## 2021-10-31 PROCEDURE — 93010 ELECTROCARDIOGRAM REPORT: CPT | Performed by: INTERNAL MEDICINE

## 2021-10-31 PROCEDURE — 99233 SBSQ HOSP IP/OBS HIGH 50: CPT | Performed by: NURSE PRACTITIONER

## 2021-10-31 PROCEDURE — 2700000000 HC OXYGEN THERAPY PER DAY

## 2021-10-31 PROCEDURE — 99232 SBSQ HOSP IP/OBS MODERATE 35: CPT | Performed by: FAMILY MEDICINE

## 2021-10-31 PROCEDURE — 2060000000 HC ICU INTERMEDIATE R&B

## 2021-10-31 RX ORDER — ERYTHROMYCIN 5 MG/G
OINTMENT OPHTHALMIC NIGHTLY
Status: DISCONTINUED | OUTPATIENT
Start: 2021-10-31 | End: 2021-11-10 | Stop reason: HOSPADM

## 2021-10-31 RX ORDER — KETOTIFEN FUMARATE 0.35 MG/ML
1 SOLUTION/ DROPS OPHTHALMIC 2 TIMES DAILY
Status: DISCONTINUED | OUTPATIENT
Start: 2021-10-31 | End: 2021-11-02

## 2021-10-31 RX ADMIN — ERYTHROMYCIN: 5 OINTMENT OPHTHALMIC at 22:29

## 2021-10-31 RX ADMIN — GABAPENTIN 300 MG: 300 CAPSULE ORAL at 13:59

## 2021-10-31 RX ADMIN — SODIUM CHLORIDE, PRESERVATIVE FREE 10 ML: 5 INJECTION INTRAVENOUS at 21:00

## 2021-10-31 RX ADMIN — METOPROLOL SUCCINATE 25 MG: 25 TABLET, EXTENDED RELEASE ORAL at 07:52

## 2021-10-31 RX ADMIN — Medication 30 MG: at 07:52

## 2021-10-31 RX ADMIN — DIPHENHYDRAMINE HCL 25 MG: 25 TABLET ORAL at 22:29

## 2021-10-31 RX ADMIN — PANTOPRAZOLE SODIUM 40 MG: 40 TABLET, DELAYED RELEASE ORAL at 05:52

## 2021-10-31 RX ADMIN — FLUOXETINE 40 MG: 20 CAPSULE ORAL at 07:53

## 2021-10-31 RX ADMIN — INSULIN LISPRO 10 UNITS: 100 INJECTION, SOLUTION INTRAVENOUS; SUBCUTANEOUS at 16:59

## 2021-10-31 RX ADMIN — SODIUM CHLORIDE, PRESERVATIVE FREE 10 ML: 5 INJECTION INTRAVENOUS at 07:53

## 2021-10-31 RX ADMIN — KETOTIFEN FUMARATE 1 DROP: 0.35 SOLUTION/ DROPS OPHTHALMIC at 10:17

## 2021-10-31 RX ADMIN — FUROSEMIDE 40 MG: 40 TABLET ORAL at 07:53

## 2021-10-31 RX ADMIN — ASPIRIN 81 MG: 81 TABLET, CHEWABLE ORAL at 07:52

## 2021-10-31 RX ADMIN — INSULIN LISPRO 10 UNITS: 100 INJECTION, SOLUTION INTRAVENOUS; SUBCUTANEOUS at 12:59

## 2021-10-31 RX ADMIN — GABAPENTIN 300 MG: 300 CAPSULE ORAL at 07:52

## 2021-10-31 RX ADMIN — PREDNISONE 10 MG: 5 TABLET ORAL at 07:52

## 2021-10-31 RX ADMIN — SKIN PROTECTANT: 33 OINTMENT TOPICAL at 07:57

## 2021-10-31 RX ADMIN — KETOTIFEN FUMARATE 1 DROP: 0.35 SOLUTION/ DROPS OPHTHALMIC at 21:00

## 2021-10-31 RX ADMIN — GABAPENTIN 300 MG: 300 CAPSULE ORAL at 21:00

## 2021-10-31 RX ADMIN — Medication 30 MG: at 21:00

## 2021-10-31 ASSESSMENT — ENCOUNTER SYMPTOMS
DIARRHEA: 0
VOMITING: 0
ABDOMINAL PAIN: 0
CHEST TIGHTNESS: 0
SHORTNESS OF BREATH: 1
NAUSEA: 0
COUGH: 1
WHEEZING: 0
BLOOD IN STOOL: 0
CONSTIPATION: 0

## 2021-10-31 ASSESSMENT — PAIN DESCRIPTION - PAIN TYPE
TYPE: ACUTE PAIN
TYPE: ACUTE PAIN

## 2021-10-31 ASSESSMENT — PAIN DESCRIPTION - PROGRESSION

## 2021-10-31 ASSESSMENT — PAIN SCALES - GENERAL
PAINLEVEL_OUTOF10: 0
PAINLEVEL_OUTOF10: 3
PAINLEVEL_OUTOF10: 0
PAINLEVEL_OUTOF10: 0
PAINLEVEL_OUTOF10: 3

## 2021-10-31 ASSESSMENT — PAIN DESCRIPTION - LOCATION
LOCATION: BACK
LOCATION: BACK

## 2021-10-31 NOTE — PROGRESS NOTES
[facility]  Daily Progress Note    Surgeon: Dr. Moody Limb   Procedure : CABG Scheduled for 11/2/2021 at 0900  EF: 70%     Subjective:  Ms. Libra Lu feels well today with no acute complaints. Denies chest pain or shortness of breath. Plan of care reviewed and questions answered. Physical Exam  Vital Signs: /63   Pulse 71   Temp 97.6 °F (36.4 °C) (Temporal)   Resp 16   Ht 5' 5\" (1.651 m)   Wt 184 lb 8 oz (83.7 kg)   SpO2 97%   BMI 30.70 kg/m²  O2 Flow Rate (L/min): 2 L/min   Admit Weight: Weight: 199 lb (90.3 kg)   WEIGHTWeight: 184 lb 8 oz (83.7 kg)     General: alert and oriented to person, place and time, well-developed and well-nourished, in no acute distress. Heart: Normal S1 and S2.  Regular rhythm. No murmurs, gallops, or rubs.   Pacing Wires: No   Lungs: clear to auscultation bilaterally and diminished breath sounds bibasilar Chest tubes: No  Abdomen: soft, non tender, non distended, BSx4  Extremities: 2+ edema    Scheduled Meds:    erythromycin   Both Eyes Nightly    ketotifen  1 drop Both Eyes BID    ciprofloxacin  500 mg Oral 2 times per day    Hydrocerin   Topical BID    furosemide  40 mg Oral Daily    pantoprazole  40 mg Oral QAM AC    sodium chloride flush  5-40 mL IntraVENous 2 times per day    dextromethorphan  30 mg Oral 2 times per day    lidocaine  1 patch TransDERmal Daily    gabapentin  300 mg Oral TID    FLUoxetine  40 mg Oral QAM    metoprolol succinate  25 mg Oral Daily    predniSONE  10 mg Oral Daily    insulin glargine  56 Units SubCUTAneous Daily    And    insulin lispro  10 Units SubCUTAneous TID WC    [Held by provider] metFORMIN  1,000 mg Oral BID WC    rosuvastatin  20 mg Oral Nightly    aspirin  81 mg Oral Daily    influenza virus vaccine  0.5 mL IntraMUSCular Prior to discharge     Continuous Infusions:    sodium chloride      dextrose      sodium chloride         Data:  CBC:   Recent Labs     10/30/21  0321   WBC 12.7*   HGB 9.4*   HCT 33.1*   MCV 81.3*        BMP:   Recent Labs     10/29/21  0525 10/30/21  0321 10/31/21  0437    135 137   K 3.8 4.6 4.4    93* 97*   CO2 32* 34* 33*   BUN 13 17 22   CREATININE 0.70 0.76 1.06*     PT/INR:   No results for input(s): PROTIME, INR in the last 72 hours. APTT:   No results for input(s): APTT in the last 72 hours. I/O:  I/O last 3 completed shifts: In: 12 [P.O.:450]  Out: 1350 [Urine:1350]    Assessment/Plan:      Diagnosis Date    Asthma     Diabetes mellitus ([facility]     Fibromyalgia     Headache     Lymphedema of both lower extremities     Myasthenia gravis ([facility] [address]       Neuro: Intact   Lungs: clear, diminished in the bases   HD: VSS   Edema: 2+ peripheral   GI: Active bowel sounds X4   : Good urine output     Oxygen as needed via nasal cannula to maintain SpO2 > 92%  o Wean as tolerated   Complete pre-operative testing.  PT/OT   Encourage incentive spirometry, acapella and ambulation    Reviewed preop testing  o Arterial mapping is smaller in size  o <50% bilateral carotid disease  o Vein mapping completed  o CT chest-Ascending aorta shows no Ca      The above recommendations including medications and orders were discussed and agreed upon with Dr. Gricelda Lowe, the attending on service for the cardiothoracic surgery group today. Electronically signed by CYNDY Galvez NP on 10/31/2021 at 2:00 PM    On this date 10/29/2021 I have spent 28 minutes reviewing previous notes, test results and face to face with the patient discussing the diagnosis and importance of compliance with the treatment plan as well as documenting on the day of the visit. At least 50% of the time documented was spent with the patient to provide counseling and/or coordination of care.     This note was created with the assistance of a speech-recognition program.  Although the intention is to generate a document that actually reflects the content of the visit, no guarantees can be provided that every mistake has been identified and corrected by editing. Note was updated later by me after  physical examination and  completion of the assessment.

## 2021-10-31 NOTE — PROGRESS NOTES
[facility]  Office: [address], DO, Vitor Dejah, DO, Timmyabraham Zapata, DO, Davidcory De La Pazemily Blood, DO, Cordella Holstein, MD, Jose Conway MD, Lucita Terry MD, Santosh Reynolds MD, Renella Boeck, MD, Margarita Green MD, Elvira Barron MD, Kerry Zelaya MD, Clemencia Hemphill, DO, Fidel Isaac DO, Karol Jorgensen MD,  Elian Retana DO, Julián Whitehead MD, Cait Gallardo MD, Jack Martinez MD, Maylin Bolden MD, Shilo Parker MD, Felipe Bernal MD, Jaky Blackwell, [facility], [facility], CNP, Carlotta Gonzáles, CNP, Ezekiel Bauer, CNS, Jose Cleveland, CNP, Fabi Langford, CNP, Tierra Chappell, CNP, Sonny Batista, CNP, Makayla Bellamy, [facility], Neil Duvall, CNP, Kristi Paiz, PA-C, Luis Gale, [facility], Samuel Bright, CNP, Alfredo Rm, CNP, Traci Gresham, CNP, Prince Stringer, CNP, Isrrael Mims, CNP, Saravanan Coronado, CNP, Deejay Thomas    Progress Note    10/31/2021    12:50 PM    Name:   Bertha Willis  MRN:     [id number]     [place]:      [de-identified]   Room:   2034/2034-01   Day:  7  Admit Date:  10/24/2021  6:48 AM    PCP:   Prince Stringer MD  Code Status:  Full Code    Subjective:     C/C:   Chief Complaint   Patient presents with    Back Pain     Interval History Status: better    Patient seen and examined at bedside, her cough is better  Her leg pain is better  Itchiness as well is better  Has some  Eye allergies  Breathing is okay  Tentative plan for CABG on Tuesday, for now orthopedic surgery being on hold . Brief History:     Lilly Said a 61 y.o. Non- / non  female who presents with low Back Pain and is admitted to the hospital for the management of NSTEMI   She said she felt a pop while transferring from bed to wheelchair. Marimar Riddle is unclear when that was but was unable to get out of her recliner. Starting 2 days pta she had cp and sob, along with nausea.  Also c/o hip pain and said all her pain was due to fibromyalgia. troponins were elevated     Review of Systems:     Review of Systems   Constitutional: Positive for activity change and appetite change. Negative for chills, diaphoresis and fever. HENT: Negative for congestion. Eyes: Negative for visual disturbance. Respiratory: Positive for cough (better) and shortness of breath (better). Negative for chest tightness and wheezing. Cardiovascular: Positive for leg swelling. Negative for chest pain and palpitations. Gastrointestinal: Negative for abdominal pain, blood in stool, constipation, diarrhea, nausea and vomiting. Genitourinary: Negative for difficulty urinating. Neurological: Negative for dizziness, light-headedness, numbness and headaches. All other systems reviewed and are negative. Medications: Allergies:     Allergies   Allergen Reactions    Amoxicillin Diarrhea and Other (See Comments)    Amoxicillin-Pot Clavulanate Diarrhea    Atorvastatin Other (See Comments)     Other reaction(s): Myalgia  Weakness      Cefuroxime Axetil Other (See Comments)    Clavulanic Acid Diarrhea and Other (See Comments)    Codeine Other (See Comments)     Unless suspended in syrup      Dextroamphetamine     Adhesive Tape Rash     Itching     Cephalosporins Rash       Current Meds:   Scheduled Meds:    erythromycin   Both Eyes Nightly    ketotifen  1 drop Both Eyes BID    ciprofloxacin  500 mg Oral 2 times per day    Hydrocerin   Topical BID    furosemide  40 mg Oral Daily    pantoprazole  40 mg Oral QAM AC    sodium chloride flush  5-40 mL IntraVENous 2 times per day    dextromethorphan  30 mg Oral 2 times per day    lidocaine  1 patch TransDERmal Daily    gabapentin  300 mg Oral TID    FLUoxetine  40 mg Oral QAM    metoprolol succinate  25 mg Oral Daily    predniSONE  10 mg Oral Daily    insulin glargine  56 Units SubCUTAneous Daily    And    insulin lispro  10 Units SubCUTAneous TID WC    [Held by provider] metFORMIN  1,000 mg Oral BID WC  rosuvastatin  20 mg Oral Nightly    aspirin  81 mg Oral Daily    influenza virus vaccine  0.5 mL IntraMUSCular Prior to discharge     Continuous Infusions:    sodium chloride      dextrose      sodium chloride       PRN Meds: benzonatate, diphenhydrAMINE, polyvinyl alcohol, sodium chloride flush, sodium chloride, acetaminophen, miconazole, oxyCODONE-acetaminophen, glucose, dextrose, glucagon (rDNA), dextrose, sodium chloride, ondansetron **OR** ondansetron, [DISCONTINUED] acetaminophen **OR** acetaminophen, polyethylene glycol, nitroGLYCERIN, perflutren lipid microspheres    Data:     Past Medical History:   has a past medical history of Asthma, Diabetes mellitus ([facility] [address] Fibromyalgia, Headache, Lymphedema of both lower extremities, and Myasthenia gravis ([facility] [address] Social History:   reports that she has never smoked. She has never used smokeless tobacco. She reports previous alcohol use. She reports previous drug use. Family History:   Family History   Problem Relation Age of Onset    Coronary Art Dis Mother     Kidney Disease Mother        Vitals:  /63   Pulse 71   Temp 97.2 °F (36.2 °C) (Temporal)   Resp 18   Ht 5' 5\" (1.651 m)   Wt 184 lb 8 oz (83.7 kg)   SpO2 97%   BMI 30.70 kg/m²   Temp (24hrs), Av.7 °F (36.5 °C), Min:97.2 °F (36.2 °C), Max:99.2 °F (37.3 °C)    Recent Labs     10/30/21  1622 10/30/21  1908 10/31/21  0801 10/31/21  1159   POCGLU 125* 152* 84 232*       I/O (24Hr):     Intake/Output Summary (Last 24 hours) at 10/31/2021 1250  Last data filed at 10/31/2021 0752  Gross per 24 hour   Intake 10 ml   Output 1350 ml   Net -1340 ml       Labs:  Hematology:  Recent Labs     10/30/21  0321   WBC 12.7*   RBC 4.07   HGB 9.4*   HCT 33.1*   MCV 81.3*   MCH 23.1*   MCHC 28.4   RDW 14.9*      MPV 10.4     Chemistry:  Recent Labs     10/29/21  0525 10/30/21  0321 10/31/21  0437    135 137   K 3.8 4.6 4.4    93* 97*   CO2 32* 34* 33*   GLUCOSE 64* 107* 125*   BUN 13 17 22   CREATININE 0.70 0.76 1.06*   ANIONGAP 9 8* 7*   LABGLOM >60 >60 52*   GFRAA >60 >60 >60   CALCIUM 8.7 9.3 8.9     Recent Labs     10/30/21  0733 10/30/21  1131 10/30/21  1622 10/30/21  1908 10/31/21  0801 10/31/21  1159   POCGLU 120* 144* 125* 152* 84 232*     ABG:  Lab Results   Component Value Date    POCPH 7.440 10/28/2021    POCPCO2 49.2 10/28/2021    POCPO2 60.8 10/28/2021    POCHCO3 33.4 10/28/2021    NBEA NOT REPORTED 10/28/2021    PBEA 8 10/28/2021    UKV3JGL NOT REPORTED 10/28/2021    DTUM0NSA 91 10/28/2021    FIO2 2.0 10/28/2021     Lab Results   Component Value Date/Time    SPECIAL NOT REPORTED 10/28/2021 11:30 AM     Lab Results   Component Value Date/Time    CULTURE KLEBSIELLA PNEUMONIAE >446310 CFU/ML (A) 10/28/2021 11:30 AM    CULTURE ENTEROCOCCUS FAECALIS >459334 CFU/ML (A) 10/28/2021 11:30 AM       Radiology:  XR LUMBAR SPINE (2-3 VIEWS)    Result Date: 10/24/2021  LUMBAR SPINE: 1. Age indeterminate, probably nonacute, compression fracture of L5 vertebral body with about 50% decrease height. Please correlate with point tenderness. MRI may also be considered for age characterization if deemed clinically necessary. 2. T12 and L1 kyphoplasty. CHEST X-RAY: 1. Poor inspiratory afford with significantly decreased lung volumes. 2. Patchy opacities left lower lung probably due to crowding of vessels with or without underlying developing infiltrate. Please correlate with auscultation. Short interval follow-up may also be considered. CT CHEST WO CONTRAST    Result Date: 10/28/2021  1. Suboptimal exam appears to been performed at expiratory phase respiration resulting in some areas of air trapping as well as underaeration. 2. Nonspecific small volume bilateral pleural effusion with bibasilar consolidative changes. Sequela pulmonary edema is a consideration. 3. Main pulmonary artery dilatation can be seen with pulmonary hypertension but is nonspecific. 4. Mild cardiomegaly.      MRI THORACIC SPINE WO CONTRAST    Result Date: 10/26/2021  Acute inferior endplate fracture of L3. MRI LUMBAR SPINE WO CONTRAST    Result Date: 10/26/2021  Acute inferior endplate fracture of L3. XR CHEST PORTABLE    Result Date: 10/28/2021  Worsening bibasilar opacifications with worsening costophrenic blunting. XR CHEST PORTABLE    Result Date: 10/24/2021  LUMBAR SPINE: 1. Age indeterminate, probably nonacute, compression fracture of L5 vertebral body with about 50% decrease height. Please correlate with point tenderness. MRI may also be considered for age characterization if deemed clinically necessary. 2. T12 and L1 kyphoplasty. CHEST X-RAY: 1. Poor inspiratory afford with significantly decreased lung volumes. 2. Patchy opacities left lower lung probably due to crowding of vessels with or without underlying developing infiltrate. Please correlate with auscultation. Short interval follow-up may also be considered. CT CHEST PULMONARY EMBOLISM W CONTRAST    Result Date: 10/24/2021  1. No evidence for acute pulmonary embolism. 2. Scattered bibasilar patchy subsegmental atelectasis and trace bilateral pleural effusions. 3. Borderline cardiomegaly. 4. Large areas of geographic ground-glass attenuation interspersed with more lucent areas probably combination of air trapping and pulmonary interstitial edema. 5. Cholelithiasis. NM Cardiac Stress Test Nuclear Imaging    Result Date: 10/27/2021  Perfusion:  Perfusion defects involve 6% of the left ventricular myocardium at stress, with some reversibility. Function:  Hypokinesis inferior wall. Left ventricular ejection fraction 72%. Risk stratification: Intermediate Notes concerning risk stratification: Risk stratification incorporates both clinical history and some testing results.   Final risk determination is the responsibility of the ordering provider as other patient information and test results may increase or decrease the risk assessment reported for this Normal rate and regular rhythm. Heart sounds: No murmur heard. Pulmonary:      Effort: Pulmonary effort is normal. No accessory muscle usage or respiratory distress. Breath sounds: No stridor. Rales present. No decreased breath sounds, wheezing or rhonchi. Abdominal:      General: Bowel sounds are normal. There is no distension. Palpations: Abdomen is soft. Abdomen is not rigid. Tenderness: There is no abdominal tenderness. There is no guarding. Musculoskeletal:         General: No tenderness. Right lower le+ Edema present. Left lower le+ Edema present. Skin:     General: Skin is warm and dry. Findings: No erythema, lesion or rash. Neurological:      Mental Status: She is alert and oriented to person, place, and time. Cranial Nerves: No cranial nerve deficit. Motor: No seizure activity. Psychiatric:         Speech: Speech normal.         Behavior: Behavior normal. Behavior is cooperative.          Assessment:        Hospital Problems         Last Modified POA    * (Principal) NSTEMI (non-ST elevated myocardial infarction) ([address] 10/28/2021 Yes    Type 2 diabetes mellitus with hyperglycemia, without long-term current use of insulin ([address] 10/24/2021 Yes    Class 1 obesity due to excess calories with serious comorbidity and body mass index (BMI) of 33.0 to 33.9 in adult 10/24/2021 Yes    Lymphedema of both lower extremities 10/24/2021 Yes    Acute on chronic diastolic congestive heart failure ([address] 10/26/2021 Yes    Pathological fracture of thoracic vertebra due to secondary osteoporosis ([address] 10/26/2021 Yes    Pathological fracture of lumbar vertebra due to secondary osteoporosis ([address] 10/26/2021 Yes    Intractable back pain 10/26/2021 Yes    Age-related osteoporosis with current pathological fracture 10/26/2021 Yes    History of kyphoplasty 10/26/2021 Yes    Facet arthritis, degenerative, lumbar spine 10/26/2021 Yes    Degenerative spondylolisthesis 10/26/2021 Yes    DDD (degenerative disc disease), thoracolumbar 10/26/2021 Yes    Acute on chronic congestive heart failure ([address] 10/27/2021 Yes    Chronic bilateral low back pain without sciatica 10/27/2021 Yes          Plan:          Non-STEMI s/p cardiac cath with multivessel disease: Patient was seen by CTS and plan for open heart on Tuesday 11/2. Currently continue maximizing medical management. Appreciate cardiology recommendation. Acute decompensated diastolic heart failure: Diuresis per cardiology, initially was IV Lasix now switched to oral, strict I's & O's, daily weight, cardiac diet and fluid restriction    Osteoporosis was history of multiple vertebral fractures: Patient was new L3 fract and chronic L5: Patient was evaluated by orthopedic surgery and initial plan was for intervention but given patient will need open heart surgery recommendation is for pain control and LSO was ordered      Chronic lymphedema: Ace wrapping in place, it is tight and the patient is complaining she is unable to move lower extremity, advised to remove and apply skin protectant/moisturizer to prevent skin break. Acute cystitis without hematuria: Urine growing Klebsiella and Enterococcus, antibiotics started. Allergic conjunctivitis: Continue artificial tears for dry eyes,-and also add erythromycin ointment at nighttime    HTN: continue home medications     HPL: continue home medications    DM2: Continue diabetes home medications , insulin sliding scale, hypoglycemia protocol    Chronic back pain: Continue chronic medications for pain control    DVT prophylaxis    Continue PT/OT.     Discussed with the patient and the nurse      Jas Abbott MD  10/31/2021  12:50 PM

## 2021-10-31 NOTE — PROGRESS NOTES
Section of Cardiology  Progress Note      Date:  10/31/2021  Patient: Carlos Molina  Admission:  10/24/2021  6:48 AM  Admit DX: NSTEMI (non-ST elevated myocardial infarction) ([facility] [I21.4]  Non-ST elevation MI (NSTEMI) ([facility]) [I21.4]  Lymphedema of both lower extremities [I89.0]  Chronic bilateral low back pain without sciatica [M54.50, G89.29]  Acute on chronic congestive heart failure, unspecified heart failure type ([facility] [address] [I50.9]  Age:  61 y.o., 1955     LOS: 7 days     Reason for evaluation:   coronary artery disease      SUBJECTIVE:     The patient was seen and examined. Notes and labs reviewed. There were not complications over night. Patient appears to be feeling better today comparing to last few days. Her eye has less discharge  Patient's cardiac review of systems: negative for chest pain and dyspnea. The patient is generally feeling stable. OBJECTIVE:    Telemetry: Sinus  /63   Pulse 71   Temp 97.6 °F (36.4 °C) (Temporal)   Resp 16   Ht 5' 5\" (1.651 m)   Wt 184 lb 8 oz (83.7 kg)   SpO2 97%   BMI 30.70 kg/m²     Intake/Output Summary (Last 24 hours) at 10/31/2021 1328  Last data filed at 10/31/2021 0752  Gross per 24 hour   Intake 10 ml   Output 1350 ml   Net -1340 ml       EXAM:   CONSTITUTIONAL:  awake, alert, cooperative, no apparent distress, and appears stated age. HEENT: Normal jugular venous pulsations, no carotid bruits. Head is atraumatic, normocephalic. Eyes and oral mucosa are normal.  LUNGS: Good respiratory effort. No for increased work of breathing. On auscultation: clear to auscultation bilaterally  CARDIOVASCULAR:  Normal apical impulse, regular rate and rhythm, normal S1 and S2, no S3 or S4,   ABDOMEN: Soft, nontender, nondistended. Bowel sounds present. SKIN: Warm and dry. EXTREMITIES: No lower extremity edema. Motor movement grossly intact. No cyanosis or clubbing.     Current Inpatient Medications:   erythromycin   Both Eyes Nightly    ketotifen  1 drop Both Eyes BID    ciprofloxacin  500 mg Oral 2 times per day    Hydrocerin   Topical BID    furosemide  40 mg Oral Daily    pantoprazole  40 mg Oral QAM AC    sodium chloride flush  5-40 mL IntraVENous 2 times per day    dextromethorphan  30 mg Oral 2 times per day    lidocaine  1 patch TransDERmal Daily    gabapentin  300 mg Oral TID    FLUoxetine  40 mg Oral QAM    metoprolol succinate  25 mg Oral Daily    predniSONE  10 mg Oral Daily    insulin glargine  56 Units SubCUTAneous Daily    And    insulin lispro  10 Units SubCUTAneous TID WC    [Held by provider] metFORMIN  1,000 mg Oral BID WC    rosuvastatin  20 mg Oral Nightly    aspirin  81 mg Oral Daily    influenza virus vaccine  0.5 mL IntraMUSCular Prior to discharge       IV Infusions (if any):   sodium chloride      dextrose      sodium chloride         Diagnostics:   EKG: . ECHO: reviewed. Ejection fraction: %  Stress Test: not obtained. Cardiac Angiography: reviewed. Labs:   CBC:   Recent Labs     10/30/21  0321   WBC 12.7*   HGB 9.4*   HCT 33.1*        BMP:   Recent Labs     10/30/21  0321 10/31/21  0437    137   K 4.6 4.4   CO2 34* 33*   BUN 17 22   CREATININE 0.76 1.06*   LABGLOM >60 52*   GLUCOSE 107* 125*     No results found for: BNP  PT/INR: No results for input(s): PROTIME, INR in the last 72 hours. APTT:No results for input(s): APTT in the last 72 hours. CARDIAC ENZYMES:No results for input(s): CKTOTAL, CKMB, CKMBINDEX, TROPONINT in the last 72 hours. FASTING LIPID PANEL:  Lab Results   Component Value Date    HDL 40 10/25/2021    TRIG 107 10/25/2021     LIVER PROFILE:No results for input(s): AST, ALT, LABALBU in the last 72 hours.     ASSESSMENT:  1.  Elevated troponin With what appeared to be non-ST elevation MI  2.  Insulin-dependent diabetes mellitus  3.  Hyperlipidemia  4.  Peripheral vascular disease  5.  multivessel cardiac disease     Patient Active Problem List   Diagnosis    [facility]STEMI (non-ST elevated myocardial infarction) [facility]    Type 2 diabetes mellitus with hyperglycemia, without long-term current use of insulin ([facility])    Class 1 obesity due to excess calories with serious comorbidity and body mass index (BMI) of 33.0 to 33.9 in adult    Lymphedema of both lower extremities    Acute on chronic diastolic congestive heart failure (HCC)    Pathological fracture of thoracic vertebra due to secondary osteoporosis ([facility] [facility]    Pathological fracture of lumbar vertebra due to secondary osteoporosis (HCC)    Intractable back pain    Age-related osteoporosis with current pathological fracture    History of kyphoplasty    Facet arthritis, degenerative, lumbar spine    Degenerative spondylolisthesis    DDD (degenerative disc disease), thoracolumbar    Acute on chronic congestive heart failure (HCC)    Chronic bilateral low back pain without sciatica    Allergic conjunctivitis of both eyes       PLAN:    Her eye appeared to be better with the current treatment with ointment and eyedrops  Continue aspirin, beta-blocker and statin    Please see orders. Discussed with patient and nursing.     Jailene Montero MD, MD

## 2021-10-31 NOTE — PLAN OF CARE
Problem: Falls - Risk of:  Goal: Will remain free from falls  Description: Will remain free from falls  10/31/2021 0102 by Daren Martínez RN  Outcome: Ongoing    Goal: Absence of physical injury  Description: Absence of physical injury  Outcome: Ongoing     Problem: Discharge Planning:  Goal: Discharged to appropriate level of care  Description: Discharged to appropriate level of care  10/31/2021 0102 by Daren Martínez RN  Outcome: Ongoing       Problem: Cardiac Output - Decreased:  Goal: Hemodynamic stability will improve  Description: Hemodynamic stability will improve  10/31/2021 0102 by Daren Martínez RN  Outcome: Ongoing       Problem: Pain:  Goal: Pain level will decrease  Description: Pain level will decrease  10/31/2021 0102 by Daren Martínez RN  Outcome: Ongoing    Goal: Control of acute pain  Description: Control of acute pain  Outcome: Ongoing  Goal: Control of chronic pain  Description: Control of chronic pain  Outcome: Ongoing     Problem: Tissue Perfusion - Cardiopulmonary, Altered:  Goal: Circulatory function within specified parameters  Description: Circulatory function within specified parameters  Outcome: Ongoing     Problem: Skin Integrity:  Goal: Will show no infection signs and symptoms  Description: Will show no infection signs and symptoms  10/31/2021 0102 by Daren Martínez RN  Outcome: Ongoing    Goal: Absence of new skin breakdown  Description: Absence of new skin breakdown  Outcome: Ongoing     Problem: Pain:  Goal: Pain level will decrease  Description: Pain level will decrease  10/31/2021 0102 by Daren Martínez RN  Outcome: Ongoing

## 2021-11-01 ENCOUNTER — ANESTHESIA EVENT (OUTPATIENT)
Dept: OPERATING ROOM | Age: 66
DRG: 233 | End: 2021-11-01
Payer: MEDICARE

## 2021-11-01 LAB
ANION GAP SERPL CALCULATED.3IONS-SCNC: 9 MMOL/L (ref 9–17)
BUN BLDV-MCNC: 19 MG/DL (ref 8–23)
BUN/CREAT BLD: 21 (ref 9–20)
CALCIUM SERPL-MCNC: 8.9 MG/DL (ref 8.6–10.4)
CHLORIDE BLD-SCNC: 98 MMOL/L (ref 98–107)
CO2: 32 MMOL/L (ref 20–31)
CREAT SERPL-MCNC: 0.92 MG/DL (ref 0.5–0.9)
GFR AFRICAN AMERICAN: >60 ML/MIN
GFR NON-AFRICAN AMERICAN: >60 ML/MIN
GFR SERPL CREATININE-BSD FRML MDRD: ABNORMAL ML/MIN/{1.73_M2}
GFR SERPL CREATININE-BSD FRML MDRD: ABNORMAL ML/MIN/{1.73_M2}
GLUCOSE BLD-MCNC: 100 MG/DL (ref 65–105)
GLUCOSE BLD-MCNC: 154 MG/DL (ref 70–99)
GLUCOSE BLD-MCNC: 284 MG/DL (ref 65–105)
HCT VFR BLD CALC: 33 % (ref 36.3–47.1)
HEMOGLOBIN: 9.1 G/DL (ref 11.9–15.1)
MCH RBC QN AUTO: 22.8 PG (ref 25.2–33.5)
MCHC RBC AUTO-ENTMCNC: 27.6 G/DL (ref 28.4–34.8)
MCV RBC AUTO: 82.5 FL (ref 82.6–102.9)
NRBC AUTOMATED: 0 PER 100 WBC
PDW BLD-RTO: 14.9 % (ref 11.8–14.4)
PLATELET # BLD: 331 K/UL (ref 138–453)
PMV BLD AUTO: 10 FL (ref 8.1–13.5)
POTASSIUM SERPL-SCNC: 3.9 MMOL/L (ref 3.7–5.3)
RBC # BLD: 4 M/UL (ref 3.95–5.11)
SODIUM BLD-SCNC: 139 MMOL/L (ref 135–144)
WBC # BLD: 11.1 K/UL (ref 3.5–11.3)

## 2021-11-01 PROCEDURE — 2060000000 HC ICU INTERMEDIATE R&B

## 2021-11-01 PROCEDURE — 99231 SBSQ HOSP IP/OBS SF/LOW 25: CPT | Performed by: PHYSICIAN ASSISTANT

## 2021-11-01 PROCEDURE — 99232 SBSQ HOSP IP/OBS MODERATE 35: CPT | Performed by: NURSE PRACTITIONER

## 2021-11-01 PROCEDURE — 80048 BASIC METABOLIC PNL TOTAL CA: CPT

## 2021-11-01 PROCEDURE — 97535 SELF CARE MNGMENT TRAINING: CPT

## 2021-11-01 PROCEDURE — 6370000000 HC RX 637 (ALT 250 FOR IP): Performed by: NURSE PRACTITIONER

## 2021-11-01 PROCEDURE — 6370000000 HC RX 637 (ALT 250 FOR IP): Performed by: FAMILY MEDICINE

## 2021-11-01 PROCEDURE — 6370000000 HC RX 637 (ALT 250 FOR IP): Performed by: INTERNAL MEDICINE

## 2021-11-01 PROCEDURE — 2700000000 HC OXYGEN THERAPY PER DAY

## 2021-11-01 PROCEDURE — 2580000003 HC RX 258: Performed by: INTERNAL MEDICINE

## 2021-11-01 PROCEDURE — 97112 NEUROMUSCULAR REEDUCATION: CPT

## 2021-11-01 PROCEDURE — 82947 ASSAY GLUCOSE BLOOD QUANT: CPT

## 2021-11-01 PROCEDURE — 85027 COMPLETE CBC AUTOMATED: CPT

## 2021-11-01 PROCEDURE — 97530 THERAPEUTIC ACTIVITIES: CPT

## 2021-11-01 PROCEDURE — 36415 COLL VENOUS BLD VENIPUNCTURE: CPT

## 2021-11-01 PROCEDURE — 94761 N-INVAS EAR/PLS OXIMETRY MLT: CPT

## 2021-11-01 RX ORDER — CHLORHEXIDINE GLUCONATE 0.12 MG/ML
15 RINSE ORAL ONCE
Status: DISCONTINUED | OUTPATIENT
Start: 2021-11-02 | End: 2021-11-02

## 2021-11-01 RX ORDER — CHLORHEXIDINE GLUCONATE 4 G/100ML
SOLUTION TOPICAL SEE ADMIN INSTRUCTIONS
Status: DISCONTINUED | OUTPATIENT
Start: 2021-11-01 | End: 2021-11-02

## 2021-11-01 RX ORDER — CLINDAMYCIN PHOSPHATE 900 MG/50ML
900 INJECTION INTRAVENOUS
Status: DISCONTINUED | OUTPATIENT
Start: 2021-11-02 | End: 2021-11-02

## 2021-11-01 RX ORDER — AMIODARONE HYDROCHLORIDE 200 MG/1
200 TABLET ORAL
Status: DISCONTINUED | OUTPATIENT
Start: 2021-11-02 | End: 2021-11-02

## 2021-11-01 RX ORDER — ASPIRIN 81 MG/1
81 TABLET ORAL ONCE
Status: COMPLETED | OUTPATIENT
Start: 2021-11-02 | End: 2021-11-02

## 2021-11-01 RX ORDER — FUROSEMIDE 40 MG/1
40 TABLET ORAL 2 TIMES DAILY
Status: DISCONTINUED | OUTPATIENT
Start: 2021-11-01 | End: 2021-11-02

## 2021-11-01 RX ADMIN — INSULIN LISPRO 10 UNITS: 100 INJECTION, SOLUTION INTRAVENOUS; SUBCUTANEOUS at 11:48

## 2021-11-01 RX ADMIN — ASPIRIN 81 MG: 81 TABLET, CHEWABLE ORAL at 08:00

## 2021-11-01 RX ADMIN — METOPROLOL SUCCINATE 25 MG: 25 TABLET, EXTENDED RELEASE ORAL at 08:00

## 2021-11-01 RX ADMIN — KETOTIFEN FUMARATE 1 DROP: 0.35 SOLUTION/ DROPS OPHTHALMIC at 07:59

## 2021-11-01 RX ADMIN — ACETAMINOPHEN 650 MG: 325 TABLET ORAL at 03:55

## 2021-11-01 RX ADMIN — SODIUM CHLORIDE, PRESERVATIVE FREE 10 ML: 5 INJECTION INTRAVENOUS at 07:59

## 2021-11-01 RX ADMIN — FUROSEMIDE 40 MG: 40 TABLET ORAL at 08:00

## 2021-11-01 RX ADMIN — FLUOXETINE 40 MG: 20 CAPSULE ORAL at 08:00

## 2021-11-01 RX ADMIN — GABAPENTIN 300 MG: 300 CAPSULE ORAL at 08:00

## 2021-11-01 RX ADMIN — KETOTIFEN FUMARATE 1 DROP: 0.35 SOLUTION/ DROPS OPHTHALMIC at 22:02

## 2021-11-01 RX ADMIN — SODIUM CHLORIDE, PRESERVATIVE FREE 10 ML: 5 INJECTION INTRAVENOUS at 21:00

## 2021-11-01 RX ADMIN — PANTOPRAZOLE SODIUM 40 MG: 40 TABLET, DELAYED RELEASE ORAL at 05:20

## 2021-11-01 RX ADMIN — ERYTHROMYCIN: 5 OINTMENT OPHTHALMIC at 22:02

## 2021-11-01 RX ADMIN — INSULIN LISPRO 10 UNITS: 100 INJECTION, SOLUTION INTRAVENOUS; SUBCUTANEOUS at 16:53

## 2021-11-01 RX ADMIN — GABAPENTIN 300 MG: 300 CAPSULE ORAL at 22:19

## 2021-11-01 RX ADMIN — FUROSEMIDE 40 MG: 40 TABLET ORAL at 16:53

## 2021-11-01 RX ADMIN — Medication 30 MG: at 22:01

## 2021-11-01 RX ADMIN — PREDNISONE 10 MG: 5 TABLET ORAL at 08:00

## 2021-11-01 RX ADMIN — SKIN PROTECTANT: 33 OINTMENT TOPICAL at 07:59

## 2021-11-01 RX ADMIN — GABAPENTIN 300 MG: 300 CAPSULE ORAL at 13:45

## 2021-11-01 RX ADMIN — ANTI-FUNGAL POWDER MICONAZOLE NITRATE TALC FREE: 1.42 POWDER TOPICAL at 07:59

## 2021-11-01 RX ADMIN — Medication 30 MG: at 08:00

## 2021-11-01 ASSESSMENT — PAIN DESCRIPTION - DESCRIPTORS: DESCRIPTORS: ACHING;DISCOMFORT

## 2021-11-01 ASSESSMENT — PAIN SCALES - GENERAL
PAINLEVEL_OUTOF10: 3
PAINLEVEL_OUTOF10: 0
PAINLEVEL_OUTOF10: 7
PAINLEVEL_OUTOF10: 3
PAINLEVEL_OUTOF10: 0

## 2021-11-01 ASSESSMENT — PAIN DESCRIPTION - ONSET: ONSET: ON-GOING

## 2021-11-01 ASSESSMENT — PAIN DESCRIPTION - FREQUENCY: FREQUENCY: INTERMITTENT

## 2021-11-01 ASSESSMENT — PAIN DESCRIPTION - PAIN TYPE: TYPE: ACUTE PAIN

## 2021-11-01 ASSESSMENT — PAIN DESCRIPTION - LOCATION: LOCATION: BACK

## 2021-11-01 ASSESSMENT — PAIN DESCRIPTION - ORIENTATION: ORIENTATION: MID

## 2021-11-01 NOTE — PROGRESS NOTES
Section of Cardiology  Progress Note      Date:  11/1/2021  Patient: Heidy Cid  Admission:  10/24/2021  6:48 AM  Admit DX: NSTEMI (non-ST elevated myocardial infarction) ([facility] [I21.4]  Non-ST elevation MI (NSTEMI) ([facility]) [I21.4]  Lymphedema of both lower extremities [I89.0]  Chronic bilateral low back pain without sciatica [M54.50, G89.29]  Acute on chronic congestive heart failure, unspecified heart failure type ([facility] [I50.9]  Age:  61 y.o., 1955     LOS: 8 days     Reason for evaluation:   NSTEMI and CAD      SUBJECTIVE:     The patient was seen and examined. Notes and labs reviewed. The patient denies having any further chest pain. She denies having any shortness of breath. She plans for bypass surgery tomorrow. OBJECTIVE:      EXAM:   Vitals:    VITALS:  /68   Pulse 65   Temp 98.4 °F (36.9 °C) (Oral)   Resp 16   Ht 5' 5\" (1.651 m)   Wt 184 lb (83.5 kg)   SpO2 98%   BMI 30.62 kg/m²   24HR INTAKE/OUTPUT:    Intake/Output Summary (Last 24 hours) at 11/1/2021 1017  Last data filed at 11/1/2021 0759  Gross per 24 hour   Intake 490 ml   Output 1650 ml   Net -1160 ml       CONSTITUTIONAL: Alert, awake, no signs of acute distress  HEENT: No JVD  LUNGS: Clear throughout, nonlabored  CARDIOVASCULAR:  regular rate and rhythm, normal S1 and S2, no S3 or S4, and no murmur or rub noted. SKIN: Warm and dry.   EXTREMITIES: Bilateral legs are wrapped    Current Inpatient Medications:   furosemide  40 mg Oral BID    erythromycin   Both Eyes Nightly    ketotifen  1 drop Both Eyes BID    ciprofloxacin  500 mg Oral 2 times per day    Hydrocerin   Topical BID    pantoprazole  40 mg Oral QAM AC    sodium chloride flush  5-40 mL IntraVENous 2 times per day    dextromethorphan  30 mg Oral 2 times per day    lidocaine  1 patch TransDERmal Daily    gabapentin  300 mg Oral TID    FLUoxetine  40 mg Oral QAM    metoprolol succinate  25 mg Oral Daily    predniSONE  10 mg Oral Daily    insulin glargine  56 Units SubCUTAneous Daily    And    insulin lispro  10 Units SubCUTAneous TID     [Held by provider] metFORMIN  1,000 mg Oral BID     rosuvastatin  20 mg Oral Nightly    aspirin  81 mg Oral Daily    influenza virus vaccine  0.5 mL IntraMUSCular Prior to discharge       IV Infusions (if any):   sodium chloride      dextrose      sodium chloride         Diagnostics:   Telemetry: Sinus rhythm    ECHO: CONCLUSIONS     Summary  Left ventricle is normal in size. Mild left ventricular hypertrophy. Global left ventricular systolic function is normal with an estimated  ejection fraction of 70 % . No obvious wall motion abnormality seen. Grade I (mild) left ventricular diastolic dysfunction. Normal mitral valve structure. Mild mitral regurgitation. No significant pericardial effusion is seen.     Signature  ----------------------------------------------------------------------------   Electronically signed by Aylin Hawkins(Sonographer) on 10/25/2021 10:56   AM  ----------------------------------------------------------------------------     ----------------------------------------------------------------------------   Electronically signed by Kar Diaz(Interpreting physician) on   10/25/2021 09:01 PM  ----------------------------------------------------------------------------    Labs:   CBC:  Recent Labs     10/30/21  0321 11/01/21  0446   WBC 12.7* 11.1   HGB 9.4* 9.1*   HCT 33.1* 33.0*    331     Magnesium:No results for input(s): MG in the last 72 hours. BMP:  Recent Labs     10/31/21  0437 11/01/21  0446    139   K 4.4 3.9   CALCIUM 8.9 8.9   CO2 33* 32*   BUN 22 19   CREATININE 1.06* 0.92*   LABGLOM 52* >60   GLUCOSE 125* 154*     BNP:No results for input(s): BNP, PROBNP in the last 72 hours. PT/INR:No results for input(s): PROTIME, INR in the last 72 hours. APTT:No results for input(s): APTT in the last 72 hours. CARDIAC ENZYMES:No results for input(s):  MYOGLOBIN, CKTOTAL, CKMB, CKMBINDEX, TROPHS, TROPONINT in the last 72 hours. FASTING LIPID PANEL:  Lab Results   Component Value Date    HDL 40 10/25/2021    TRIG 107 10/25/2021     LIVER PROFILE:No results for input(s): AST, ALT, LABALBU, ALKPHOS, BILITOT, BILIDIR, IBILI, PROT, GLOB, ALBUMIN in the last 72 hours. ASSESSMENT:    · CAD with multivessel disease and plans for bypass surgery tomorrow  · Borderline troponin elevation with possible non-STEMI-preserved LV systolic function on echocardiogram done 10/25/2021  · Diabetes, managed by others  · Lymphedema and nonhealing wounds, managed by others  · Peripheral vascular disease, managed by others  · Dyslipidemia, treated  · Lumbar compression fracture, managed by others    PLAN:  1. Continue current cardiac medications. 2. The patient remains free of chest pain and plans for bypass surgery tomorrow. We will continue to monitor. Discussed with patient and Dr. Carissa Montes.     Miguel Maria, APRN - CNP

## 2021-11-01 NOTE — PLAN OF CARE
Problem: Falls - Risk of:  Goal: Will remain free from falls  Description: Will remain free from falls  Outcome: Ongoing  Goal: Absence of physical injury  Description: Absence of physical injury  Outcome: Ongoing     Problem: Discharge Planning:  Goal: Discharged to appropriate level of care  Description: Discharged to appropriate level of care  Outcome: Ongoing     Problem: Cardiac Output - Decreased:  Goal: Hemodynamic stability will improve  Description: Hemodynamic stability will improve  Outcome: Ongoing     Problem: Pain:  Goal: Pain level will decrease  Description: Pain level will decrease  Outcome: Ongoing  Goal: Control of acute pain  Description: Control of acute pain  Outcome: Ongoing  Goal: Control of chronic pain  Description: Control of chronic pain  Outcome: Ongoing     Problem: Tissue Perfusion - Cardiopulmonary, Altered:  Goal: Circulatory function within specified parameters  Description: Circulatory function within specified parameters  Outcome: Ongoing     Problem: Skin Integrity:  Goal: Will show no infection signs and symptoms  Description: Will show no infection signs and symptoms  Outcome: Ongoing  Goal: Absence of new skin breakdown  Description: Absence of new skin breakdown  Outcome: Ongoing     Problem: Pain:  Goal: Pain level will decrease  Description: Pain level will decrease  Outcome: Ongoing  Goal: Control of acute pain  Description: Control of acute pain  Outcome: Ongoing  Goal: Control of chronic pain  Description: Control of chronic pain  Outcome: Ongoing     Problem: IP BALANCE  Goal: LTG - patient will maintain standing balance to allow for completion of daily activities  Outcome: Ongoing

## 2021-11-01 NOTE — PROGRESS NOTES
Occupational Therapy  Facility/Department: [facility]  Daily Treatment Note  NAME: Madhu Quispe  : 1955  MRN: [id number]    Date of Service: 2021    Discharge Recommendations:  Patient would benefit from continued therapy after discharge       Assessment   Performance deficits / Impairments: Decreased functional mobility ; Decreased safe awareness;Decreased balance;Decreased coordination;Decreased ADL status; Decreased strength;Decreased sensation;Decreased fine motor control;Decreased endurance;Decreased high-level IADLs;Decreased ROM; Decreased cognition;Decreased vision/visual deficit; Decreased posture  Assessment: Pt. completed ADLS at bedside requiring mod assist for UE  and total assist for LE ADLS. Pt. completed functional transfer from bed to bediside chair with use of marciano stedy with 2 assist for safety. Skilled OT warranted d/t decreased functional endurance and strength at this time. Cont. with OT to promote I/safety to return pt to prior living arrangements with assist as needed. Prognosis: Fair  OT Education: OT Role;Plan of Care;Transfer Training;Energy Conservation  Patient Education: Pt. educated on functional transfer safety and importance of mobility to regain strength and function for ADLS. REQUIRES OT FOLLOW UP: Yes  Activity Tolerance  Activity Tolerance: Patient Tolerated treatment well;Patient limited by fatigue;Patient limited by pain  Activity Tolerance: fair-  Safety Devices  Safety Devices in place: Yes  Type of devices: All fall risk precautions in place;Gait belt;Call light within reach; Chair alarm in place;Nurse notified; Left in chair         Patient Diagnosis(es): The primary encounter diagnosis was Non-ST elevation MI (NSTEMI) ([facility] Diagnoses of Chronic bilateral low back pain without sciatica, Lymphedema of both lower extremities, and Acute on chronic congestive heart failure, unspecified heart failure type [facility]) were also pertinent to this visit.       has a past medical history of Asthma, Diabetes mellitus ([facility] Fibromyalgia, Headache, Lymphedema of both lower extremities, and Myasthenia gravis ([facility] has a past surgical history that includes Toe amputation; Carpal tunnel release; and fracture surgery. Restrictions  Restrictions/Precautions  Restrictions/Precautions: Fall Risk, General Precautions  Required Braces or Orthoses?: No  Position Activity Restriction  Other position/activity restrictions: purwick catheter, 3L O2, RUE IV, telemetry, act as ksenia, maintain heels off bed AAT's, alarms, Chronic LBP (CABG SX 11/2)  Subjective   General  Chart Reviewed: Yes  Patient assessed for rehabilitation services?: Yes  Additional Pertinent Hx: Pt. agreeable for treatment  Response to previous treatment: Patient with no complaints from previous session  Family / Caregiver Present: No      Orientation  Orientation  Overall Orientation Status: Within Functional Limits  Orientation Level: Oriented to place;Oriented to time;Disoriented to situation;Oriented to person  Objective    ADL  Feeding: Setup  Grooming: Setup;Minimal assistance (seated in bedside chair)  UE Bathing: Setup; Moderate assistance;Maximum assistance  UE Dressing: Dependent/Total  LE Dressing: Dependent/Total  Toileting: Dependent/Total  Additional Comments: Bedside ADLS completed prior to sitting up in bedside chair. Balance  Sitting Balance: Stand by assistance (once in sitting position SBA for functional bal while on EOB)  Standing Balance  Time: <30 sec  Activity: marciano fairbanks transfer  Functional Mobility  Functional Mobility Comments: Obinna fairbanks transfer from bed to bedside chair. Bed mobility  Bridging: Maximum assistance  Rolling to Left: Maximum assistance  Rolling to Right: Maximum assistance  Supine to Sit: Maximum assistance  Scooting: Maximal assistance  Comment: Mod verbal cues to use siderails for assistance for rolling side to side.  Max assist x1 for proper positioning  Transfers  Sit to stand: 2 Person assistance;Maximum assistance  Stand to sit: 2 Person assistance;Maximum assistance  Transfer Comments: Pt. transfered to bedside chair with use of marciano stedy for proper positioning and safety. Cognition  Overall Cognitive Status: Exceptions  Arousal/Alertness: Delayed responses to stimuli  Following Commands: Follows one step commands consistently; Follows multistep commands with increased time  Attention Span: Appears intact  Memory: Decreased short term memory  Safety Judgement: Decreased awareness of need for assistance;Decreased awareness of need for safety  Problem Solving: Assistance required to identify errors made;Assistance required to implement solutions;Assistance required to correct errors made;Assistance required to generate solutions;Decreased awareness of errors  Insights: Decreased awareness of deficits  Initiation: Requires cues for some  Sequencing: Requires cues for some     Perception  Overall Perceptual Status: [facility]    Plan   Plan  Times per week: 4-5x/week 1x/day as ksenia  Times per day: Daily  Current Treatment Recommendations: Strengthening, ROM, Balance Training, Safety Education & Training, Positioning, Endurance Training, Neuromuscular Re-education, Patient/Caregiver Education & Training, Equipment Evaluation, Education, & procurement, Self-Care / ADL, Cognitive/Perceptual Training, Home Management Training, Pain Management  Plan Comment: Cont with stated POC       AM-PAC Score        AM-PAC Inpatient Daily Activity Raw Score: 9 (11/01/21 1231)  AM-PAC Inpatient ADL T-Scale Score : 25.33 (11/01/21 1231)  ADL Inpatient CMS 0-100% Score: 79.59 (11/01/21 1231)  ADL Inpatient CMS G-Code Modifier : CL (11/01/21 [facility])    Goals  Short term goals  Time Frame for Short term goals: by discharge, pt to demo  Short term goal 1: bed mob tasks with use of rail as needed to max assist of 1.   Short term goal 2: UB ADL to min assist/set up and LB ADL to max assist of 1 supine in bed as needed and with bed rail/AE. Short term goal 3: postural control EOB to SBA and ksenia > 15 mins to increase core strength/reduce falls in function. Short term goal 4: ADL transfers with lift/AD as needed to max-mod assist of 1. Short term goal 5: increase in BUE strength by a 1/2 grade to assist with self care tasks and be I with simple BUE HEP with use of handouts. Long term goals  Long term goal 1: Pt to stand with AD and min assist ksenia > 5 mins as able to reduce fall risk with ADL and functional tasks. Long term goal 2: Pt/caregivers to be I with edema mgt, proper positioning, pressure relief, EC/WS and fall prevention tech as well as DME/AE and AD recommendations with use of handouts. Patient Goals   Patient goals : Pt states she wants to be able to transfer on her own! Therapy Time   Co-treat  Group Co-treatment   Time In 0806  4137       Time Out 7778  7364       Minutes 41  25       Co-treatment with PT warranted secondary to decreased safety and independence requiring 2 skilled therapy professionals to address individual discipline's goals. OT addressing \"preparation for ADL transfer\",\"sitting balance for increased ADL performance\",\"sitting/activity tolerance\",\"functional reaching\",\"environmental safety/scanning\",\"fall prevention\",\"functional mobility for ADL transfers\",\"ability to sequence and follow directions\",\"functional UE strength\". Pt. Returned to bed in later afternoon with marciano fairbanks still max assist x2 for safety. Pt. Requested foam for built up utensils and issued two red foam handles to apply to utensils at dinner. Pt. Uses built up handles at home and is independent with use.     CAMRYN Clifton

## 2021-11-01 NOTE — CARE COORDINATION
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 Will need miriam, Rx and discharge order for snf at discharge.  Monika tarango

## 2021-11-01 NOTE — PROGRESS NOTES
Pt working with therapy, pt up to chair at this time. Pt is having some back pain now that she is sitting. Lidocaine patch refused this morning, will be applied now.

## 2021-11-01 NOTE — PROGRESS NOTES
[facility]  Daily Progress Note    Surgeon: Dr. Unique Angulo   Procedure : CABG Scheduled for 11/2/2021 at 0900  EF: 70%     Subjective:  Ms. Malachi Michel feels well today with no acute complaints. Denies chest pain or shortness of breath. Plan of care reviewed and questions answered. Physical Exam  Vital Signs: /68   Pulse 65   Temp 98.4 °F (36.9 °C) (Oral)   Resp 16   Ht 5' 5\" (1.651 m)   Wt 184 lb (83.5 kg)   SpO2 98%   BMI 30.62 kg/m²  O2 Flow Rate (L/min): 2 L/min   Admit Weight: Weight: 199 lb (90.3 kg)   WEIGHTWeight: 184 lb (83.5 kg)     General: alert and oriented to person, place and time, well-developed and well-nourished, in no acute distress. Heart: Normal S1 and S2.  Regular rhythm. No murmurs, gallops, or rubs.   Pacing Wires: No   Lungs: clear to auscultation bilaterally and diminished breath sounds bibasilar Chest tubes: No  Abdomen: soft, non tender, non distended, BSx4  Extremities: 2+ edema    Scheduled Meds:    furosemide  40 mg Oral BID    [START ON 11/2/2021] aspirin  81 mg Oral Once    [START ON 11/2/2021] amiodarone  200 mg Oral 90 Min Pre-Op    erythromycin   Both Eyes Nightly    ketotifen  1 drop Both Eyes BID    ciprofloxacin  500 mg Oral 2 times per day    Hydrocerin   Topical BID    pantoprazole  40 mg Oral QAM AC    sodium chloride flush  5-40 mL IntraVENous 2 times per day    dextromethorphan  30 mg Oral 2 times per day    lidocaine  1 patch TransDERmal Daily    gabapentin  300 mg Oral TID    FLUoxetine  40 mg Oral QAM    metoprolol succinate  25 mg Oral Daily    predniSONE  10 mg Oral Daily    insulin glargine  56 Units SubCUTAneous Daily    And    insulin lispro  10 Units SubCUTAneous TID WC    [Held by provider] metFORMIN  1,000 mg Oral BID WC    rosuvastatin  20 mg Oral Nightly    aspirin  81 mg Oral Daily    influenza virus vaccine  0.5 mL IntraMUSCular Prior to discharge     Continuous Infusions:    sodium chloride

## 2021-11-01 NOTE — PROGRESS NOTES
Physical Therapy  Facility/Department: [facility]  Daily Treatment Note  NAME: Ashley Argueta  : 1955  MRN: [id number]    Date of Service: 2021    Discharge Recommendations:  Patient would benefit from continued therapy after discharge     Pt currently functioning below baseline. Would suggest additional therapy at time of discharge to maximize long term outcomes and prevent re-admission. Please refer to AM-PAC score for current level of function. Assessment   Body structures, Functions, Activity limitations: Decreased functional mobility ; Decreased balance;Decreased cognition;Decreased ROM; Decreased strength;Decreased endurance; Increased pain;Decreased posture;Decreased sensation  Assessment: Patient with global deconditioning and limited by LBP this date. Patient unable to promote to standing with MAX x2 Assist and Amanda fairbanks this date. Patient would benefit from continued skilled PT treatment to maximize return to PLOF  Prognosis: Good  Decision Making: High Complexity  PT Education: Goals;PT Role;Plan of Care;General Safety;Home Exercise Program;Transfer Training;Pressure Relief;Energy Conservation; Functional Mobility Training  REQUIRES PT FOLLOW UP: Yes  Activity Tolerance  Activity Tolerance: Patient limited by fatigue;Patient limited by pain; Patient limited by endurance  Activity Tolerance: Per chart, pt. has compression fx L5. Pt. c/o back pain during sit to stand transfer. States if she does not have surgery she is to get a back brace for pain control. Patient Diagnosis(es): The primary encounter diagnosis was Non[facility]ST elevation [facility] Diagnoses of Chronic bilateral low back pain without sciatica, Lymphedema of both lower extremities, and Acute on chronic congestive heart failure, unspecified heart failure type [facility]) were also pertinent to this visit.      has a past medical history of Asthma, Diabetes mellitus ([facility] Fibromyalgia, Headache, Lymphedema of both lower extremities, and Myasthenia gravis ([facility] has a past surgical history that includes Toe amputation; Carpal tunnel release; and fracture surgery. Restrictions  Restrictions/Precautions  Restrictions/Precautions: Fall Risk  Required Braces or Orthoses?: No  Position Activity Restriction  Other position/activity restrictions: purwick catheter, 3L O2, RUE IV, telemetry, act as ksenia, maintain heels off bed AAT's, alarms, Chronic LBP (CABG SX 11/2)  Subjective   General  Chart Reviewed: Yes  Response To Previous Treatment: Patient reporting fatigue but able to participate. Family / Caregiver Present: No  Subjective  Subjective: Patient agreeable to PT treatment this date  General Comment  Comments: RNMontana  reported patient was medically stable for PT treatment. Orientation  Orientation  Overall Orientation Status: Within Functional Limits  Cognition   Cognition  Overall Cognitive Status: Exceptions  Arousal/Alertness: Delayed responses to stimuli  Following Commands: Follows multistep commands with increased time; Follows multistep commands with repitition  Attention Span: Appears intact  Memory: Decreased short term memory  Safety Judgement: Decreased awareness of need for assistance;Decreased awareness of need for safety  Problem Solving: Assistance required to identify errors made;Assistance required to implement solutions;Assistance required to correct errors made;Assistance required to generate solutions;Decreased awareness of errors  Insights: Decreased awareness of deficits  Initiation: Requires cues for all  Sequencing: Requires cues for some  Objective   Bed mobility  Bridging: Maximum assistance;2 Person assistance  Rolling to Left: Maximum assistance;2 Person assistance  Rolling to Right: Maximum assistance;2 Person assistance  Supine to Sit: Maximum assistance;2 Person assistance  Sit to Supine: Maximum assistance;2 Person assistance  Scooting: Maximal assistance;2 Person assistance  Comment: MOD VCs in place, Gait belt, Bed alarm in place, Patient at risk for falls, Call light within reach, Left in bed, Nurse notified  Restraints  Initially in place: No     Therapy Time    Concurrent Group Co-treatment   Time In [phone]   Time Out 1614      1100   Minutes 19      39       Returned from [phone]   RN requested writer and OT staff to assist patient back to bed from bedside chair. Transfer patient marciano fairbanks with max assist x2 for safety. Max x 2 A for bed mobility and boost to [facility] for positioning.        Co-treatment with OT warranted secondary to decreased safety and independence requiring 2 skilled therapy professionals to address individual discipline's goals. PT addressing pre gait trunk strengthening, weight shifting prior to transfers, transfer training and postural control in sitting/standing.     Janine Boudreaux, PTA

## 2021-11-01 NOTE — FLOWSHEET NOTE
[facility]  PROGRESS NOTE    Room # 2034/2034-01   Name: Jacquelin Best                Reason for visit: Routine    I visited the patient. Admit Date & Time: 10/24/2021  6:48 AM    Assessment:  Jacquelin Best is a 61 y.o. female in the hospital because \"open heart surgery tomorrow. \" Upon entering the room was sitting up, looking at iPad. Patient invited visit. Intervention:  I introduced myself and my title as  I offered space for patient  to express feelings, needs, and concerns and provided a ministry presence. Patient detailed a number of underlying health concerns going into tomorrow's open heart surgery. Patient asked for prayers for reconciliation with estranged sister Sharron Black. Patient shared that cousin is primary support system. Patient recently moved from [facility] to [facility] living in [place]. Actively listened to patient's concerns. Stayed, at patient's request, during Nurse Practitioner's visit. Offered prayer card and prayer. Outcome:  Patient seemed less anxious and said, \"now it's just time to let go and let God. \"  Expressed gratitude for prayers. Plan:  Chaplains will remain available to offer spiritual and emotional support as needed. Electronically signed by Coco Lora on 11/1/2021 at 10:33 AM.  Minoo         11/01/21 5901   Encounter Summary   Services provided to: Patient   Referral/Consult From: [address] Family members;Friends/neighbors   Continue Visiting   (11/1/21)   Complexity of Encounter Moderate   Length of Encounter 30 minutes   Spiritual Assessment Completed Yes   Routine   Type Initial   Assessment Approachable; Anxious; Coping   Intervention Active listening;Explored feelings, thoughts, concerns;Explored coping resources;Nurtured hope;Prayer;Sustaining presence/ Ministry of presence   Outcome Expressed gratitude; Less anxious, less agitated;Expressed feelings/needs/concerns;Engaged in conversation

## 2021-11-02 ENCOUNTER — APPOINTMENT (OUTPATIENT)
Dept: GENERAL RADIOLOGY | Age: 66
DRG: 233 | End: 2021-11-02
Payer: MEDICARE

## 2021-11-02 ENCOUNTER — ANESTHESIA (OUTPATIENT)
Dept: OPERATING ROOM | Age: 66
DRG: 233 | End: 2021-11-02
Payer: MEDICARE

## 2021-11-02 VITALS
RESPIRATION RATE: 12 BRPM | TEMPERATURE: 97.5 F | OXYGEN SATURATION: 100 % | DIASTOLIC BLOOD PRESSURE: 58 MMHG | SYSTOLIC BLOOD PRESSURE: 111 MMHG

## 2021-11-02 LAB
-: ABNORMAL
ABSOLUTE EOS #: 0 K/UL (ref 0–0.44)
ABSOLUTE EOS #: 0 K/UL (ref 0–0.44)
ABSOLUTE EOS #: 0.12 K/UL (ref 0–0.44)
ABSOLUTE IMMATURE GRANULOCYTE: 0.12 K/UL (ref 0–0.3)
ABSOLUTE IMMATURE GRANULOCYTE: 0.16 K/UL (ref 0–0.3)
ABSOLUTE IMMATURE GRANULOCYTE: 0.25 K/UL (ref 0–0.3)
ABSOLUTE LYMPH #: 0.48 K/UL (ref 1.1–3.7)
ABSOLUTE LYMPH #: 1.24 K/UL (ref 1.1–3.7)
ABSOLUTE LYMPH #: 1.26 K/UL (ref 1.1–3.7)
ABSOLUTE MONO #: 0.62 K/UL (ref 0.1–1.2)
ABSOLUTE MONO #: 0.97 K/UL (ref 0.1–1.2)
ABSOLUTE MONO #: 1.76 K/UL (ref 0.1–1.2)
ALLEN TEST: ABNORMAL
AMORPHOUS: ABNORMAL
ANION GAP SERPL CALCULATED.3IONS-SCNC: 11 MMOL/L (ref 9–17)
ANION GAP SERPL CALCULATED.3IONS-SCNC: 11 MMOL/L (ref 9–17)
ANION GAP SERPL CALCULATED.3IONS-SCNC: 13 MMOL/L (ref 9–17)
ANION GAP: 13 MMOL/L (ref 7–16)
ANION GAP: 17 MMOL/L (ref 7–16)
BACTERIA: ABNORMAL
BASOPHILS # BLD: 0 % (ref 0–2)
BASOPHILS # BLD: 0 % (ref 0–2)
BASOPHILS # BLD: 1 % (ref 0–2)
BASOPHILS ABSOLUTE: 0 K/UL (ref 0–0.2)
BASOPHILS ABSOLUTE: 0 K/UL (ref 0–0.2)
BASOPHILS ABSOLUTE: 0.12 K/UL (ref 0–0.2)
BILIRUBIN URINE: NEGATIVE
BNP INTERPRETATION: ABNORMAL
BUN BLDV-MCNC: 15 MG/DL (ref 8–23)
BUN BLDV-MCNC: 17 MG/DL (ref 8–23)
BUN BLDV-MCNC: 22 MG/DL (ref 8–23)
BUN/CREAT BLD: 20 (ref 9–20)
BUN/CREAT BLD: 23 (ref 9–20)
BUN/CREAT BLD: 24 (ref 9–20)
CALCIUM SERPL-MCNC: 7.2 MG/DL (ref 8.6–10.4)
CALCIUM SERPL-MCNC: 7.7 MG/DL (ref 8.6–10.4)
CALCIUM SERPL-MCNC: 9.6 MG/DL (ref 8.6–10.4)
CASTS UA: ABNORMAL /LPF
CHLORIDE BLD-SCNC: 103 MMOL/L (ref 98–107)
CHLORIDE BLD-SCNC: 110 MMOL/L (ref 98–107)
CHLORIDE BLD-SCNC: 94 MMOL/L (ref 98–107)
CO2: 21 MMOL/L (ref 20–31)
CO2: 25 MMOL/L (ref 20–31)
CO2: 32 MMOL/L (ref 20–31)
COLOR: YELLOW
COMMENT UA: ABNORMAL
CREAT SERPL-MCNC: 0.66 MG/DL (ref 0.5–0.9)
CREAT SERPL-MCNC: 0.7 MG/DL (ref 0.5–0.9)
CREAT SERPL-MCNC: 1.12 MG/DL (ref 0.5–0.9)
CRYSTALS, UA: ABNORMAL /HPF
DIFFERENTIAL TYPE: ABNORMAL
EKG ATRIAL RATE: 69 BPM
EKG ATRIAL RATE: 74 BPM
EKG P AXIS: 26 DEGREES
EKG P AXIS: 39 DEGREES
EKG P-R INTERVAL: 142 MS
EKG P-R INTERVAL: 174 MS
EKG Q-T INTERVAL: 428 MS
EKG Q-T INTERVAL: 452 MS
EKG QRS DURATION: 120 MS
EKG QRS DURATION: 126 MS
EKG QTC CALCULATION (BAZETT): 475 MS
EKG QTC CALCULATION (BAZETT): 484 MS
EKG R AXIS: -30 DEGREES
EKG R AXIS: -55 DEGREES
EKG T AXIS: -24 DEGREES
EKG T AXIS: -42 DEGREES
EKG VENTRICULAR RATE: 69 BPM
EKG VENTRICULAR RATE: 74 BPM
EOSINOPHILS RELATIVE PERCENT: 0 % (ref 1–4)
EOSINOPHILS RELATIVE PERCENT: 0 % (ref 1–4)
EOSINOPHILS RELATIVE PERCENT: 1 % (ref 1–4)
EPITHELIAL CELLS UA: ABNORMAL /HPF (ref 0–5)
FIO2: 35
FIO2: 45
FIO2: 50
FIO2: 70
FIO2: ABNORMAL
GFR AFRICAN AMERICAN: 59 ML/MIN
GFR AFRICAN AMERICAN: >60 ML/MIN
GFR AFRICAN AMERICAN: >60 ML/MIN
GFR NON-AFRICAN AMERICAN: 49 ML/MIN
GFR NON-AFRICAN AMERICAN: >60 ML/MIN
GFR NON-AFRICAN AMERICAN: >60 ML/MIN
GFR NON-AFRICAN AMERICAN: NORMAL ML/MIN
GFR NON-AFRICAN AMERICAN: NORMAL ML/MIN
GFR SERPL CREATININE-BSD FRML MDRD: ABNORMAL ML/MIN/{1.73_M2}
GFR SERPL CREATININE-BSD FRML MDRD: NORMAL ML/MIN
GFR SERPL CREATININE-BSD FRML MDRD: NORMAL ML/MIN
GFR SERPL CREATININE-BSD FRML MDRD: NORMAL ML/MIN/{1.73_M2}
GFR SERPL CREATININE-BSD FRML MDRD: NORMAL ML/MIN/{1.73_M2}
GLUCOSE BLD-MCNC: 118 MG/DL (ref 65–105)
GLUCOSE BLD-MCNC: 123 MG/DL (ref 65–105)
GLUCOSE BLD-MCNC: 126 MG/DL (ref 65–105)
GLUCOSE BLD-MCNC: 130 MG/DL (ref 74–100)
GLUCOSE BLD-MCNC: 136 MG/DL (ref 65–105)
GLUCOSE BLD-MCNC: 138 MG/DL (ref 65–105)
GLUCOSE BLD-MCNC: 140 MG/DL (ref 65–105)
GLUCOSE BLD-MCNC: 141 MG/DL (ref 65–105)
GLUCOSE BLD-MCNC: 142 MG/DL (ref 65–105)
GLUCOSE BLD-MCNC: 143 MG/DL (ref 74–100)
GLUCOSE BLD-MCNC: 149 MG/DL (ref 70–99)
GLUCOSE BLD-MCNC: 150 MG/DL (ref 70–99)
GLUCOSE BLD-MCNC: 154 MG/DL (ref 74–106)
GLUCOSE BLD-MCNC: 175 MG/DL (ref 74–106)
GLUCOSE BLD-MCNC: 175 MG/DL (ref 74–106)
GLUCOSE BLD-MCNC: 180 MG/DL (ref 74–106)
GLUCOSE BLD-MCNC: 186 MG/DL (ref 65–105)
GLUCOSE BLD-MCNC: 198 MG/DL (ref 74–106)
GLUCOSE BLD-MCNC: 205 MG/DL (ref 74–106)
GLUCOSE BLD-MCNC: 212 MG/DL (ref 74–106)
GLUCOSE BLD-MCNC: 296 MG/DL (ref 70–99)
GLUCOSE BLD-MCNC: 299 MG/DL (ref 65–105)
GLUCOSE URINE: NEGATIVE
HCT VFR BLD CALC: 21.9 % (ref 36.3–47.1)
HCT VFR BLD CALC: 27.9 % (ref 36.3–47.1)
HCT VFR BLD CALC: 28.6 % (ref 36.3–47.1)
HCT VFR BLD CALC: 40.9 % (ref 36.3–47.1)
HEMOGLOBIN: 11.2 G/DL (ref 11.9–15.1)
HEMOGLOBIN: 6.7 G/DL (ref 11.9–15.1)
HEMOGLOBIN: 8.6 G/DL (ref 11.9–15.1)
HEMOGLOBIN: 8.8 G/DL (ref 11.9–15.1)
IMMATURE GRANULOCYTES: 1 %
INR BLD: 1.1
INR BLD: 1.6
INR BLD: 1.8
KETONES, URINE: NEGATIVE
LEUKOCYTE ESTERASE, URINE: NEGATIVE
LYMPHOCYTES # BLD: 10 % (ref 24–43)
LYMPHOCYTES # BLD: 3 % (ref 24–43)
LYMPHOCYTES # BLD: 5 % (ref 24–43)
MAGNESIUM: 2.1 MG/DL (ref 1.6–2.6)
MAGNESIUM: 2.4 MG/DL (ref 1.6–2.6)
MAGNESIUM: 2.9 MG/DL (ref 1.6–2.6)
MCH RBC QN AUTO: 22.8 PG (ref 25.2–33.5)
MCH RBC QN AUTO: 25.4 PG (ref 25.2–33.5)
MCH RBC QN AUTO: 25.7 PG (ref 25.2–33.5)
MCH RBC QN AUTO: 25.9 PG (ref 25.2–33.5)
MCHC RBC AUTO-ENTMCNC: 27.4 G/DL (ref 28.4–34.8)
MCHC RBC AUTO-ENTMCNC: 30.6 G/DL (ref 28.4–34.8)
MCHC RBC AUTO-ENTMCNC: 30.8 G/DL (ref 28.4–34.8)
MCHC RBC AUTO-ENTMCNC: 30.8 G/DL (ref 28.4–34.8)
MCV RBC AUTO: 82.7 FL (ref 82.6–102.9)
MCV RBC AUTO: 83.3 FL (ref 82.6–102.9)
MCV RBC AUTO: 83.5 FL (ref 82.6–102.9)
MCV RBC AUTO: 84.6 FL (ref 82.6–102.9)
MODE: ABNORMAL
MONOCYTES # BLD: 5 % (ref 3–12)
MONOCYTES # BLD: 6 % (ref 3–12)
MONOCYTES # BLD: 7 % (ref 3–12)
MORPHOLOGY: ABNORMAL
MUCUS: ABNORMAL
NEGATIVE BASE EXCESS, ART: 2 (ref 0–2)
NEGATIVE BASE EXCESS, ART: 2 (ref 0–2)
NEGATIVE BASE EXCESS, ART: 3 (ref 0–2)
NEGATIVE BASE EXCESS, ART: 4 (ref 0–2)
NEGATIVE BASE EXCESS, ART: ABNORMAL (ref 0–2)
NITRITE, URINE: NEGATIVE
NRBC AUTOMATED: 0 PER 100 WBC
O2 DEVICE/FLOW/%: ABNORMAL
OTHER OBSERVATIONS UA: ABNORMAL
PARTIAL THROMBOPLASTIN TIME: 38.2 SEC (ref 23.9–33.8)
PATIENT TEMP: 35
PATIENT TEMP: 35.3
PATIENT TEMP: 37
PATIENT TEMP: ABNORMAL
PDW BLD-RTO: 15 % (ref 11.8–14.4)
PDW BLD-RTO: 15.1 % (ref 11.8–14.4)
PDW BLD-RTO: 15.1 % (ref 11.8–14.4)
PDW BLD-RTO: 15.4 % (ref 11.8–14.4)
PH UA: 6 (ref 5–8)
PLATELET # BLD: 123 K/UL (ref 138–453)
PLATELET # BLD: 186 K/UL (ref 138–453)
PLATELET # BLD: 187 K/UL (ref 138–453)
PLATELET # BLD: 410 K/UL (ref 138–453)
PLATELET ESTIMATE: ABNORMAL
PMV BLD AUTO: 10.1 FL (ref 8.1–13.5)
PMV BLD AUTO: 9.5 FL (ref 8.1–13.5)
PMV BLD AUTO: 9.6 FL (ref 8.1–13.5)
PMV BLD AUTO: 9.8 FL (ref 8.1–13.5)
POC BUN: 16 MG/DL (ref 8–26)
POC BUN: 16 MG/DL (ref 8–26)
POC CHLORIDE: 102 MMOL/L (ref 98–107)
POC CHLORIDE: 108 MMOL/L (ref 98–107)
POC CREATININE: 0.78 MG/DL (ref 0.51–1.19)
POC CREATININE: 0.93 MG/DL (ref 0.51–1.19)
POC HCO3: 20.8 MMOL/L (ref 21–28)
POC HCO3: 21.3 MMOL/L (ref 21–28)
POC HCO3: 23.5 MMOL/L (ref 21–28)
POC HCO3: 23.5 MMOL/L (ref 21–28)
POC HCO3: 24.5 MMOL/L (ref 22–27)
POC HCO3: 24.9 MMOL/L (ref 21–28)
POC HCO3: 27.4 MMOL/L (ref 22–27)
POC HCO3: 27.5 MMOL/L (ref 22–27)
POC HCO3: 30 MMOL/L (ref 22–27)
POC HCO3: 30.4 MMOL/L (ref 22–27)
POC HCO3: 32.6 MMOL/L (ref 22–27)
POC HCO3: 33.1 MMOL/L (ref 22–27)
POC HEMATOCRIT: 18 % (ref 36–46)
POC HEMATOCRIT: 19 % (ref 36–46)
POC HEMATOCRIT: 19 % (ref 36–46)
POC HEMATOCRIT: 21 % (ref 36–46)
POC HEMATOCRIT: 23 % (ref 36–46)
POC HEMATOCRIT: 26 % (ref 36–46)
POC HEMATOCRIT: 26 % (ref 36–46)
POC HEMATOCRIT: 27 % (ref 36–46)
POC HEMATOCRIT: 30 % (ref 36–46)
POC HEMOGLOBIN: 10.2 G/DL (ref 12–16)
POC HEMOGLOBIN: 6.1 G/DL (ref 12–16)
POC HEMOGLOBIN: 6.5 G/DL (ref 12–16)
POC HEMOGLOBIN: 6.5 G/DL (ref 12–16)
POC HEMOGLOBIN: 7 G/DL (ref 12–16)
POC HEMOGLOBIN: 7.8 G/DL (ref 12–16)
POC HEMOGLOBIN: 8.8 G/DL (ref 12–16)
POC HEMOGLOBIN: 8.8 G/DL (ref 12–16)
POC HEMOGLOBIN: 9.2 G/DL (ref 12–16)
POC IONIZED CALCIUM: 0.86 MMOL/L (ref 1.13–1.33)
POC IONIZED CALCIUM: 0.86 MMOL/L (ref 1.15–1.33)
POC IONIZED CALCIUM: 0.96 MMOL/L (ref 1.13–1.33)
POC IONIZED CALCIUM: 0.96 MMOL/L (ref 1.13–1.33)
POC IONIZED CALCIUM: 1.02 MMOL/L (ref 1.15–1.33)
POC IONIZED CALCIUM: 1.03 MMOL/L (ref 1.13–1.33)
POC IONIZED CALCIUM: 1.06 MMOL/L (ref 1.13–1.33)
POC IONIZED CALCIUM: 1.08 MMOL/L (ref 1.13–1.33)
POC IONIZED CALCIUM: 1.09 MMOL/L (ref 1.15–1.33)
POC IONIZED CALCIUM: 1.45 MMOL/L (ref 1.13–1.33)
POC O2 SATURATION: 100 %
POC O2 SATURATION: 100 % (ref 94–98)
POC O2 SATURATION: 96 %
POC O2 SATURATION: 98 % (ref 94–98)
POC O2 SATURATION: 99 % (ref 94–98)
POC PCO2 TEMP: 29 MM HG
POC PCO2 TEMP: 36 MM HG
POC PCO2 TEMP: ABNORMAL MM HG
POC PCO2: 31.4 MM HG (ref 35–48)
POC PCO2: 33.7 MM HG (ref 35–48)
POC PCO2: 34 MM HG (ref 32–45)
POC PCO2: 35 MM HG (ref 32–45)
POC PCO2: 37.9 MM HG (ref 35–48)
POC PCO2: 38 MM HG (ref 32–45)
POC PCO2: 39 MM HG (ref 32–45)
POC PCO2: 39.4 MM HG (ref 35–48)
POC PCO2: 40 MM HG (ref 32–45)
POC PCO2: 45 MM HG (ref 32–45)
POC PCO2: 45.9 MM HG (ref 35–48)
POC PCO2: 56 MM HG (ref 32–45)
POC PH TEMP: 7.41
POC PH TEMP: 7.46
POC PH TEMP: ABNORMAL
POC PH: 7.32 (ref 7.35–7.45)
POC PH: 7.37 (ref 7.35–7.45)
POC PH: 7.38 (ref 7.35–7.45)
POC PH: 7.4 (ref 7.35–7.45)
POC PH: 7.43 (ref 7.35–7.45)
POC PH: 7.44 (ref 7.35–7.45)
POC PH: 7.44 (ref 7.35–7.45)
POC PH: 7.45 (ref 7.35–7.45)
POC PH: 7.46 (ref 7.35–7.45)
POC PH: 7.5 (ref 7.35–7.45)
POC PH: 7.51 (ref 7.35–7.45)
POC PH: 7.53 (ref 7.35–7.45)
POC PO2 TEMP: 131 MM HG
POC PO2 TEMP: 99 MM HG
POC PO2 TEMP: ABNORMAL MM HG
POC PO2: 111 MM HG (ref 83–108)
POC PO2: 132.6 MM HG (ref 83–108)
POC PO2: 141 MM HG (ref 83–108)
POC PO2: 161.4 MM HG (ref 83–108)
POC PO2: 195 MM HG (ref 75–95)
POC PO2: 221.2 MM HG (ref 83–108)
POC PO2: 231 MM HG (ref 75–95)
POC PO2: 580 MM HG (ref 75–95)
POC PO2: 605 MM HG (ref 75–95)
POC PO2: 635 MM HG (ref 75–95)
POC PO2: 649 MM HG (ref 75–95)
POC PO2: 75 MM HG (ref 75–95)
POC POTASSIUM: 3.3 MMOL/L (ref 3.5–4.5)
POC POTASSIUM: 3.6 MMOL/L (ref 3.5–4.5)
POC POTASSIUM: 3.7 MMOL/L (ref 3.5–5.1)
POC POTASSIUM: 3.8 MMOL/L (ref 3.5–5.1)
POC POTASSIUM: 4.2 MMOL/L (ref 3.5–5.1)
POC POTASSIUM: 4.4 MMOL/L (ref 3.5–5.1)
POC POTASSIUM: 4.5 MMOL/L (ref 3.5–5.1)
POC SODIUM: 137 MMOL/L (ref 136–145)
POC SODIUM: 139 MMOL/L (ref 136–145)
POC SODIUM: 139 MMOL/L (ref 136–145)
POC SODIUM: 143 MMOL/L (ref 138–146)
POC SODIUM: 144 MMOL/L (ref 138–146)
POC TCO2: 22 MMOL/L (ref 22–30)
POC TCO2: 24 MMOL/L (ref 22–30)
POC TCO2: 25 MMOL/L (ref 22–30)
POSITIVE BASE EXCESS, ART: 1 (ref 0–2)
POSITIVE BASE EXCESS, ART: 1 (ref 0–3)
POSITIVE BASE EXCESS, ART: 10 (ref 0–2)
POSITIVE BASE EXCESS, ART: 4 (ref 0–2)
POSITIVE BASE EXCESS, ART: 4 (ref 0–2)
POSITIVE BASE EXCESS, ART: 6 (ref 0–2)
POSITIVE BASE EXCESS, ART: 7 (ref 0–2)
POSITIVE BASE EXCESS, ART: 7 (ref 0–2)
POSITIVE BASE EXCESS, ART: ABNORMAL (ref 0–3)
POTASSIUM SERPL-SCNC: 3.5 MMOL/L (ref 3.7–5.3)
POTASSIUM SERPL-SCNC: 4 MMOL/L (ref 3.7–5.3)
POTASSIUM SERPL-SCNC: 4 MMOL/L (ref 3.7–5.3)
PRO-BNP: 738 PG/ML
PROTEIN UA: NEGATIVE
PROTHROMBIN TIME: 13.8 SEC (ref 11.5–14.2)
PROTHROMBIN TIME: 18.9 SEC (ref 11.5–14.2)
PROTHROMBIN TIME: 20.3 SEC (ref 11.5–14.2)
RBC # BLD: 2.59 M/UL (ref 3.95–5.11)
RBC # BLD: 3.34 M/UL (ref 3.95–5.11)
RBC # BLD: 3.46 M/UL (ref 3.95–5.11)
RBC # BLD: 4.91 M/UL (ref 3.95–5.11)
RBC # BLD: ABNORMAL 10*6/UL
RBC UA: ABNORMAL /HPF (ref 0–2)
RENAL EPITHELIAL, UA: ABNORMAL /HPF
SAMPLE SITE: ABNORMAL
SEG NEUTROPHILS: 82 % (ref 36–65)
SEG NEUTROPHILS: 87 % (ref 36–65)
SEG NEUTROPHILS: 90 % (ref 36–65)
SEGMENTED NEUTROPHILS ABSOLUTE COUNT: 10.18 K/UL (ref 1.5–8.1)
SEGMENTED NEUTROPHILS ABSOLUTE COUNT: 14.49 K/UL (ref 1.5–8.1)
SEGMENTED NEUTROPHILS ABSOLUTE COUNT: 21.93 K/UL (ref 1.5–8.1)
SODIUM BLD-SCNC: 137 MMOL/L (ref 135–144)
SODIUM BLD-SCNC: 139 MMOL/L (ref 135–144)
SODIUM BLD-SCNC: 144 MMOL/L (ref 135–144)
SPECIFIC GRAVITY UA: 1.01 (ref 1–1.03)
TCO2 (CALC), ART: 26 MMOL/L (ref 23–28)
TCO2 (CALC), ART: 28 MMOL/L (ref 23–28)
TCO2 (CALC), ART: 29 MMOL/L (ref 23–28)
TCO2 (CALC), ART: 31 MMOL/L (ref 23–28)
TCO2 (CALC), ART: 32 MMOL/L (ref 23–28)
TCO2 (CALC), ART: 34 MMOL/L (ref 23–28)
TCO2 (CALC), ART: 34 MMOL/L (ref 23–28)
TCO2 (CALC), ART: ABNORMAL MMOL/L (ref 22–29)
TRICHOMONAS: ABNORMAL
TURBIDITY: CLEAR
URINE HGB: ABNORMAL
UROBILINOGEN, URINE: NORMAL
WBC # BLD: 12.4 K/UL (ref 3.5–11.3)
WBC # BLD: 16.1 K/UL (ref 3.5–11.3)
WBC # BLD: 23.5 K/UL (ref 3.5–11.3)
WBC # BLD: 25.2 K/UL (ref 3.5–11.3)
WBC # BLD: ABNORMAL 10*3/UL
WBC UA: ABNORMAL /HPF (ref 0–5)
YEAST: ABNORMAL

## 2021-11-02 PROCEDURE — 94002 VENT MGMT INPAT INIT DAY: CPT

## 2021-11-02 PROCEDURE — 0BH17EZ INSERTION OF ENDOTRACHEAL AIRWAY INTO TRACHEA, VIA NATURAL OR ARTIFICIAL OPENING: ICD-10-PCS | Performed by: STUDENT IN AN ORGANIZED HEALTH CARE EDUCATION/TRAINING PROGRAM

## 2021-11-02 PROCEDURE — 85730 THROMBOPLASTIN TIME PARTIAL: CPT

## 2021-11-02 PROCEDURE — 6360000002 HC RX W HCPCS

## 2021-11-02 PROCEDURE — 36415 COLL VENOUS BLD VENIPUNCTURE: CPT

## 2021-11-02 PROCEDURE — B24BZZ4 ULTRASONOGRAPHY OF HEART WITH AORTA, TRANSESOPHAGEAL: ICD-10-PCS | Performed by: ANESTHESIOLOGY

## 2021-11-02 PROCEDURE — 6370000000 HC RX 637 (ALT 250 FOR IP): Performed by: NURSE PRACTITIONER

## 2021-11-02 PROCEDURE — 80051 ELECTROLYTE PANEL: CPT

## 2021-11-02 PROCEDURE — 36620 INSERTION CATHETER ARTERY: CPT | Performed by: ANESTHESIOLOGY

## 2021-11-02 PROCEDURE — 3600000008 HC SURGERY OHS BASE: Performed by: THORACIC SURGERY (CARDIOTHORACIC VASCULAR SURGERY)

## 2021-11-02 PROCEDURE — 99232 SBSQ HOSP IP/OBS MODERATE 35: CPT | Performed by: NURSE PRACTITIONER

## 2021-11-02 PROCEDURE — 6360000002 HC RX W HCPCS: Performed by: THORACIC SURGERY (CARDIOTHORACIC VASCULAR SURGERY)

## 2021-11-02 PROCEDURE — 6360000002 HC RX W HCPCS: Performed by: ANESTHESIOLOGY

## 2021-11-02 PROCEDURE — 85027 COMPLETE CBC AUTOMATED: CPT

## 2021-11-02 PROCEDURE — 80048 BASIC METABOLIC PNL TOTAL CA: CPT

## 2021-11-02 PROCEDURE — 93005 ELECTROCARDIOGRAM TRACING: CPT | Performed by: NURSE PRACTITIONER

## 2021-11-02 PROCEDURE — 2580000003 HC RX 258: Performed by: NURSE ANESTHETIST, CERTIFIED REGISTERED

## 2021-11-02 PROCEDURE — 93312 ECHO TRANSESOPHAGEAL: CPT

## 2021-11-02 PROCEDURE — 6360000002 HC RX W HCPCS: Performed by: NURSE PRACTITIONER

## 2021-11-02 PROCEDURE — 2580000003 HC RX 258: Performed by: THORACIC SURGERY (CARDIOTHORACIC VASCULAR SURGERY)

## 2021-11-02 PROCEDURE — 2580000003 HC RX 258: Performed by: NURSE PRACTITIONER

## 2021-11-02 PROCEDURE — 3600000018 HC SURGERY OHS ADDTL 15MIN: Performed by: THORACIC SURGERY (CARDIOTHORACIC VASCULAR SURGERY)

## 2021-11-02 PROCEDURE — 36430 TRANSFUSION BLD/BLD COMPNT: CPT

## 2021-11-02 PROCEDURE — 2500000003 HC RX 250 WO HCPCS: Performed by: NURSE ANESTHETIST, CERTIFIED REGISTERED

## 2021-11-02 PROCEDURE — 86850 RBC ANTIBODY SCREEN: CPT

## 2021-11-02 PROCEDURE — 86901 BLOOD TYPING SEROLOGIC RH(D): CPT

## 2021-11-02 PROCEDURE — 84132 ASSAY OF SERUM POTASSIUM: CPT

## 2021-11-02 PROCEDURE — 6370000000 HC RX 637 (ALT 250 FOR IP): Performed by: INTERNAL MEDICINE

## 2021-11-02 PROCEDURE — 6360000002 HC RX W HCPCS: Performed by: NURSE ANESTHETIST, CERTIFIED REGISTERED

## 2021-11-02 PROCEDURE — 82803 BLOOD GASES ANY COMBINATION: CPT

## 2021-11-02 PROCEDURE — 86920 COMPATIBILITY TEST SPIN: CPT

## 2021-11-02 PROCEDURE — 2500000003 HC RX 250 WO HCPCS: Performed by: THORACIC SURGERY (CARDIOTHORACIC VASCULAR SURGERY)

## 2021-11-02 PROCEDURE — 86900 BLOOD TYPING SEROLOGIC ABO: CPT

## 2021-11-02 PROCEDURE — 81001 URINALYSIS AUTO W/SCOPE: CPT

## 2021-11-02 PROCEDURE — 83880 ASSAY OF NATRIURETIC PEPTIDE: CPT

## 2021-11-02 PROCEDURE — P9017 PLASMA 1 DONOR FRZ W/IN 8 HR: HCPCS

## 2021-11-02 PROCEDURE — P9041 ALBUMIN (HUMAN),5%, 50ML: HCPCS

## 2021-11-02 PROCEDURE — 94761 N-INVAS EAR/PLS OXIMETRY MLT: CPT

## 2021-11-02 PROCEDURE — 84520 ASSAY OF UREA NITROGEN: CPT

## 2021-11-02 PROCEDURE — 82565 ASSAY OF CREATININE: CPT

## 2021-11-02 PROCEDURE — 85610 PROTHROMBIN TIME: CPT

## 2021-11-02 PROCEDURE — 84295 ASSAY OF SERUM SODIUM: CPT

## 2021-11-02 PROCEDURE — 02HV33Z INSERTION OF INFUSION DEVICE INTO SUPERIOR VENA CAVA, PERCUTANEOUS APPROACH: ICD-10-PCS | Performed by: ANESTHESIOLOGY

## 2021-11-02 PROCEDURE — 33508 ENDOSCOPIC VEIN HARVEST: CPT | Performed by: THORACIC SURGERY (CARDIOTHORACIC VASCULAR SURGERY)

## 2021-11-02 PROCEDURE — 83735 ASSAY OF MAGNESIUM: CPT

## 2021-11-02 PROCEDURE — 97530 THERAPEUTIC ACTIVITIES: CPT

## 2021-11-02 PROCEDURE — 82374 ASSAY BLOOD CARBON DIOXIDE: CPT

## 2021-11-02 PROCEDURE — 2100000000 HC CCU R&B

## 2021-11-02 PROCEDURE — 2709999900 HC NON-CHARGEABLE SUPPLY: Performed by: THORACIC SURGERY (CARDIOTHORACIC VASCULAR SURGERY)

## 2021-11-02 PROCEDURE — 82330 ASSAY OF CALCIUM: CPT

## 2021-11-02 PROCEDURE — 82947 ASSAY GLUCOSE BLOOD QUANT: CPT

## 2021-11-02 PROCEDURE — 85025 COMPLETE CBC W/AUTO DIFF WBC: CPT

## 2021-11-02 PROCEDURE — 3700000000 HC ANESTHESIA ATTENDED CARE: Performed by: THORACIC SURGERY (CARDIOTHORACIC VASCULAR SURGERY)

## 2021-11-02 PROCEDURE — 3700000001 HC ADD 15 MINUTES (ANESTHESIA): Performed by: THORACIC SURGERY (CARDIOTHORACIC VASCULAR SURGERY)

## 2021-11-02 PROCEDURE — 5A1945Z RESPIRATORY VENTILATION, 24-96 CONSECUTIVE HOURS: ICD-10-PCS | Performed by: STUDENT IN AN ORGANIZED HEALTH CARE EDUCATION/TRAINING PROGRAM

## 2021-11-02 PROCEDURE — 33533 CABG ARTERIAL SINGLE: CPT | Performed by: THORACIC SURGERY (CARDIOTHORACIC VASCULAR SURGERY)

## 2021-11-02 PROCEDURE — 85014 HEMATOCRIT: CPT

## 2021-11-02 PROCEDURE — C1713 ANCHOR/SCREW BN/BN,TIS/BN: HCPCS | Performed by: THORACIC SURGERY (CARDIOTHORACIC VASCULAR SURGERY)

## 2021-11-02 PROCEDURE — 2700000000 HC OXYGEN THERAPY PER DAY

## 2021-11-02 PROCEDURE — 2580000003 HC RX 258: Performed by: INTERNAL MEDICINE

## 2021-11-02 PROCEDURE — 33517 CABG ARTERY-VEIN SINGLE: CPT | Performed by: THORACIC SURGERY (CARDIOTHORACIC VASCULAR SURGERY)

## 2021-11-02 PROCEDURE — P9016 RBC LEUKOCYTES REDUCED: HCPCS

## 2021-11-02 PROCEDURE — 37799 UNLISTED PX VASCULAR SURGERY: CPT

## 2021-11-02 PROCEDURE — 71045 X-RAY EXAM CHEST 1 VIEW: CPT

## 2021-11-02 PROCEDURE — 6370000000 HC RX 637 (ALT 250 FOR IP): Performed by: NURSE ANESTHETIST, CERTIFIED REGISTERED

## 2021-11-02 PROCEDURE — 86927 PLASMA FRESH FROZEN: CPT

## 2021-11-02 PROCEDURE — P9073 PLATELETS PHERESIS PATH REDU: HCPCS

## 2021-11-02 PROCEDURE — 2720000010 HC SURG SUPPLY STERILE: Performed by: THORACIC SURGERY (CARDIOTHORACIC VASCULAR SURGERY)

## 2021-11-02 PROCEDURE — P9041 ALBUMIN (HUMAN),5%, 50ML: HCPCS | Performed by: NURSE PRACTITIONER

## 2021-11-02 RX ORDER — PROPOFOL 10 MG/ML
INJECTION, EMULSION INTRAVENOUS
Status: DISCONTINUED
Start: 2021-11-02 | End: 2021-11-03

## 2021-11-02 RX ORDER — CLINDAMYCIN PHOSPHATE 900 MG/50ML
INJECTION INTRAVENOUS PRN
Status: DISCONTINUED | OUTPATIENT
Start: 2021-11-02 | End: 2021-11-02 | Stop reason: SDUPTHER

## 2021-11-02 RX ORDER — OXYCODONE HYDROCHLORIDE AND ACETAMINOPHEN 5; 325 MG/1; MG/1
1 TABLET ORAL EVERY 4 HOURS PRN
Status: DISCONTINUED | OUTPATIENT
Start: 2021-11-02 | End: 2021-11-03

## 2021-11-02 RX ORDER — POLYETHYLENE GLYCOL 3350 17 G/17G
17 POWDER, FOR SOLUTION ORAL DAILY
Status: DISCONTINUED | OUTPATIENT
Start: 2021-11-02 | End: 2021-11-03

## 2021-11-02 RX ORDER — DEXTROSE MONOHYDRATE 50 MG/ML
100 INJECTION, SOLUTION INTRAVENOUS PRN
Status: DISCONTINUED | OUTPATIENT
Start: 2021-11-02 | End: 2021-11-03

## 2021-11-02 RX ORDER — SODIUM CHLORIDE 9 MG/ML
INJECTION, SOLUTION INTRAVENOUS CONTINUOUS PRN
Status: DISCONTINUED | OUTPATIENT
Start: 2021-11-02 | End: 2021-11-02 | Stop reason: SDUPTHER

## 2021-11-02 RX ORDER — SENNA AND DOCUSATE SODIUM 50; 8.6 MG/1; MG/1
1 TABLET, FILM COATED ORAL 2 TIMES DAILY
Status: DISCONTINUED | OUTPATIENT
Start: 2021-11-02 | End: 2021-11-10 | Stop reason: HOSPADM

## 2021-11-02 RX ORDER — POTASSIUM CHLORIDE 29.8 MG/ML
20 INJECTION INTRAVENOUS PRN
Status: DISCONTINUED | OUTPATIENT
Start: 2021-11-02 | End: 2021-11-03 | Stop reason: DRUGHIGH

## 2021-11-02 RX ORDER — MULTIVITAMIN WITH IRON
1 TABLET ORAL
Status: DISCONTINUED | OUTPATIENT
Start: 2021-11-03 | End: 2021-11-03

## 2021-11-02 RX ORDER — PHENYLEPHRINE HCL IN 0.9% NACL 1 MG/10 ML
SYRINGE (ML) INTRAVENOUS PRN
Status: DISCONTINUED | OUTPATIENT
Start: 2021-11-02 | End: 2021-11-02 | Stop reason: SDUPTHER

## 2021-11-02 RX ORDER — PROPOFOL 10 MG/ML
INJECTION, EMULSION INTRAVENOUS PRN
Status: DISCONTINUED | OUTPATIENT
Start: 2021-11-02 | End: 2021-11-02 | Stop reason: SDUPTHER

## 2021-11-02 RX ORDER — HYDRALAZINE HYDROCHLORIDE 20 MG/ML
5 INJECTION INTRAMUSCULAR; INTRAVENOUS EVERY 5 MIN PRN
Status: DISCONTINUED | OUTPATIENT
Start: 2021-11-02 | End: 2021-11-03

## 2021-11-02 RX ORDER — PROPOFOL 10 MG/ML
5-50 INJECTION, EMULSION INTRAVENOUS
Status: DISCONTINUED | OUTPATIENT
Start: 2021-11-02 | End: 2021-11-04

## 2021-11-02 RX ORDER — AMIODARONE HYDROCHLORIDE 200 MG/1
200 TABLET ORAL 2 TIMES DAILY
Status: DISCONTINUED | OUTPATIENT
Start: 2021-11-02 | End: 2021-11-03

## 2021-11-02 RX ORDER — LIDOCAINE HYDROCHLORIDE 20 MG/ML
INJECTION, SOLUTION EPIDURAL; INFILTRATION; INTRACAUDAL; PERINEURAL PRN
Status: DISCONTINUED | OUTPATIENT
Start: 2021-11-02 | End: 2021-11-02 | Stop reason: SDUPTHER

## 2021-11-02 RX ORDER — SODIUM CHLORIDE 9 MG/ML
INJECTION, SOLUTION INTRAVENOUS CONTINUOUS
Status: DISCONTINUED | OUTPATIENT
Start: 2021-11-02 | End: 2021-11-03

## 2021-11-02 RX ORDER — SODIUM CHLORIDE 0.9 % (FLUSH) 0.9 %
10 SYRINGE (ML) INJECTION EVERY 12 HOURS SCHEDULED
Status: DISCONTINUED | OUTPATIENT
Start: 2021-11-02 | End: 2021-11-03

## 2021-11-02 RX ORDER — NALOXONE HYDROCHLORIDE 0.4 MG/ML
0.4 INJECTION, SOLUTION INTRAMUSCULAR; INTRAVENOUS; SUBCUTANEOUS PRN
Status: DISCONTINUED | OUTPATIENT
Start: 2021-11-02 | End: 2021-11-10 | Stop reason: HOSPADM

## 2021-11-02 RX ORDER — ALBUMIN, HUMAN INJ 5% 5 %
SOLUTION INTRAVENOUS
Status: COMPLETED
Start: 2021-11-02 | End: 2021-11-02

## 2021-11-02 RX ORDER — BISACODYL 10 MG
10 SUPPOSITORY, RECTAL RECTAL DAILY PRN
Status: DISCONTINUED | OUTPATIENT
Start: 2021-11-02 | End: 2021-11-03

## 2021-11-02 RX ORDER — NITROGLYCERIN 20 MG/100ML
5-200 INJECTION INTRAVENOUS CONTINUOUS
Status: DISCONTINUED | OUTPATIENT
Start: 2021-11-02 | End: 2021-11-04

## 2021-11-02 RX ORDER — ASPIRIN 81 MG/1
81 TABLET ORAL DAILY
Status: DISCONTINUED | OUTPATIENT
Start: 2021-11-02 | End: 2021-11-03

## 2021-11-02 RX ORDER — POTASSIUM CHLORIDE 20 MEQ/1
40 TABLET, EXTENDED RELEASE ORAL PRN
Status: DISCONTINUED | OUTPATIENT
Start: 2021-11-02 | End: 2021-11-03 | Stop reason: DRUGHIGH

## 2021-11-02 RX ORDER — DIPHENHYDRAMINE HCL 25 MG
25 TABLET ORAL NIGHTLY PRN
Status: DISCONTINUED | OUTPATIENT
Start: 2021-11-03 | End: 2021-11-03

## 2021-11-02 RX ORDER — FENTANYL CITRATE 50 UG/ML
50 INJECTION, SOLUTION INTRAMUSCULAR; INTRAVENOUS
Status: DISCONTINUED | OUTPATIENT
Start: 2021-11-02 | End: 2021-11-02 | Stop reason: ALTCHOICE

## 2021-11-02 RX ORDER — FENTANYL CITRATE 0.05 MG/ML
INJECTION, SOLUTION INTRAMUSCULAR; INTRAVENOUS PRN
Status: DISCONTINUED | OUTPATIENT
Start: 2021-11-02 | End: 2021-11-02 | Stop reason: SDUPTHER

## 2021-11-02 RX ORDER — ACETAMINOPHEN 325 MG/1
650 TABLET ORAL EVERY 4 HOURS PRN
Status: DISCONTINUED | OUTPATIENT
Start: 2021-11-02 | End: 2021-11-03

## 2021-11-02 RX ORDER — POTASSIUM CHLORIDE 7.45 MG/ML
10 INJECTION INTRAVENOUS PRN
Status: DISCONTINUED | OUTPATIENT
Start: 2021-11-02 | End: 2021-11-03 | Stop reason: DRUGHIGH

## 2021-11-02 RX ORDER — ROCURONIUM BROMIDE 10 MG/ML
INJECTION, SOLUTION INTRAVENOUS PRN
Status: DISCONTINUED | OUTPATIENT
Start: 2021-11-02 | End: 2021-11-02 | Stop reason: SDUPTHER

## 2021-11-02 RX ORDER — CHLORHEXIDINE GLUCONATE 0.12 MG/ML
15 RINSE ORAL 2 TIMES DAILY
Status: DISCONTINUED | OUTPATIENT
Start: 2021-11-02 | End: 2021-11-03

## 2021-11-02 RX ORDER — ACETAMINOPHEN 650 MG/1
650 SUPPOSITORY RECTAL EVERY 4 HOURS PRN
Status: DISCONTINUED | OUTPATIENT
Start: 2021-11-02 | End: 2021-11-03

## 2021-11-02 RX ORDER — SODIUM CHLORIDE 9 MG/ML
INJECTION, SOLUTION INTRAVENOUS PRN
Status: DISCONTINUED | OUTPATIENT
Start: 2021-11-02 | End: 2021-11-03

## 2021-11-02 RX ORDER — CLOPIDOGREL BISULFATE 75 MG/1
75 TABLET ORAL DAILY
Status: DISCONTINUED | OUTPATIENT
Start: 2021-11-03 | End: 2021-11-03

## 2021-11-02 RX ORDER — FENTANYL CITRATE 50 UG/ML
25 INJECTION, SOLUTION INTRAMUSCULAR; INTRAVENOUS
Status: DISCONTINUED | OUTPATIENT
Start: 2021-11-02 | End: 2021-11-02 | Stop reason: ALTCHOICE

## 2021-11-02 RX ORDER — MEPERIDINE HYDROCHLORIDE 50 MG/ML
25 INJECTION INTRAMUSCULAR; INTRAVENOUS; SUBCUTANEOUS
Status: ACTIVE | OUTPATIENT
Start: 2021-11-02 | End: 2021-11-02

## 2021-11-02 RX ORDER — CLINDAMYCIN PHOSPHATE 900 MG/50ML
900 INJECTION INTRAVENOUS EVERY 6 HOURS
Status: DISPENSED | OUTPATIENT
Start: 2021-11-02 | End: 2021-11-03

## 2021-11-02 RX ORDER — ALBUMIN, HUMAN INJ 5% 5 %
25 SOLUTION INTRAVENOUS PRN
Status: DISCONTINUED | OUTPATIENT
Start: 2021-11-02 | End: 2021-11-03

## 2021-11-02 RX ORDER — PROTAMINE SULFATE 10 MG/ML
50 INJECTION, SOLUTION INTRAVENOUS
Status: ACTIVE | OUTPATIENT
Start: 2021-11-02 | End: 2021-11-02

## 2021-11-02 RX ORDER — SODIUM CHLORIDE 0.9 % (FLUSH) 0.9 %
10 SYRINGE (ML) INJECTION PRN
Status: DISCONTINUED | OUTPATIENT
Start: 2021-11-02 | End: 2021-11-03

## 2021-11-02 RX ORDER — OXYCODONE HYDROCHLORIDE AND ACETAMINOPHEN 5; 325 MG/1; MG/1
2 TABLET ORAL EVERY 4 HOURS PRN
Status: DISCONTINUED | OUTPATIENT
Start: 2021-11-02 | End: 2021-11-03

## 2021-11-02 RX ORDER — HEPARIN SODIUM 1000 [USP'U]/ML
INJECTION, SOLUTION INTRAVENOUS; SUBCUTANEOUS PRN
Status: DISCONTINUED | OUTPATIENT
Start: 2021-11-02 | End: 2021-11-02 | Stop reason: SDUPTHER

## 2021-11-02 RX ORDER — PROTAMINE SULFATE 10 MG/ML
INJECTION, SOLUTION INTRAVENOUS PRN
Status: DISCONTINUED | OUTPATIENT
Start: 2021-11-02 | End: 2021-11-02 | Stop reason: SDUPTHER

## 2021-11-02 RX ORDER — SODIUM CHLORIDE 9 MG/ML
25 INJECTION, SOLUTION INTRAVENOUS PRN
Status: DISCONTINUED | OUTPATIENT
Start: 2021-11-02 | End: 2021-11-03

## 2021-11-02 RX ORDER — DEXTROSE MONOHYDRATE 25 G/50ML
12.5 INJECTION, SOLUTION INTRAVENOUS PRN
Status: DISCONTINUED | OUTPATIENT
Start: 2021-11-02 | End: 2021-11-03

## 2021-11-02 RX ORDER — MIDAZOLAM HYDROCHLORIDE 1 MG/ML
INJECTION INTRAMUSCULAR; INTRAVENOUS PRN
Status: DISCONTINUED | OUTPATIENT
Start: 2021-11-02 | End: 2021-11-02 | Stop reason: SDUPTHER

## 2021-11-02 RX ORDER — ONDANSETRON 2 MG/ML
4 INJECTION INTRAMUSCULAR; INTRAVENOUS EVERY 8 HOURS PRN
Status: DISCONTINUED | OUTPATIENT
Start: 2021-11-02 | End: 2021-11-03

## 2021-11-02 RX ORDER — NICOTINE POLACRILEX 4 MG
15 LOZENGE BUCCAL PRN
Status: DISCONTINUED | OUTPATIENT
Start: 2021-11-02 | End: 2021-11-03

## 2021-11-02 RX ADMIN — Medication 50 MCG: at 14:46

## 2021-11-02 RX ADMIN — Medication 100 MCG: at 11:16

## 2021-11-02 RX ADMIN — SODIUM CHLORIDE: 9 INJECTION, SOLUTION INTRAVENOUS at 10:52

## 2021-11-02 RX ADMIN — PROPOFOL 30 MCG/KG/MIN: 10 INJECTION, EMULSION INTRAVENOUS at 14:11

## 2021-11-02 RX ADMIN — PROPOFOL 50 MCG/KG/MIN: 10 INJECTION, EMULSION INTRAVENOUS at 15:39

## 2021-11-02 RX ADMIN — FENTANYL CITRATE 50 MCG: 50 INJECTION INTRAMUSCULAR; INTRAVENOUS at 16:09

## 2021-11-02 RX ADMIN — Medication 0.02 MCG/KG/MIN: at 15:39

## 2021-11-02 RX ADMIN — Medication 100 MCG: at 10:26

## 2021-11-02 RX ADMIN — FENTANYL CITRATE 100 MCG: 50 INJECTION, SOLUTION INTRAMUSCULAR; INTRAVENOUS at 14:31

## 2021-11-02 RX ADMIN — SODIUM CHLORIDE: 9 INJECTION, SOLUTION INTRAVENOUS at 16:15

## 2021-11-02 RX ADMIN — VASOPRESSIN 0.02 UNITS/MIN: 20 INJECTION INTRAVENOUS at 20:10

## 2021-11-02 RX ADMIN — PROPOFOL 70 MG: 10 INJECTION, EMULSION INTRAVENOUS at 10:11

## 2021-11-02 RX ADMIN — Medication 50 MCG: at 12:03

## 2021-11-02 RX ADMIN — FENTANYL CITRATE 100 MCG: 50 INJECTION, SOLUTION INTRAMUSCULAR; INTRAVENOUS at 14:37

## 2021-11-02 RX ADMIN — FENTANYL CITRATE 50 MCG: 50 INJECTION, SOLUTION INTRAMUSCULAR; INTRAVENOUS at 14:14

## 2021-11-02 RX ADMIN — FENTANYL CITRATE 50 MCG: 50 INJECTION, SOLUTION INTRAMUSCULAR; INTRAVENOUS at 11:29

## 2021-11-02 RX ADMIN — PROPOFOL 20 MG: 10 INJECTION, EMULSION INTRAVENOUS at 14:12

## 2021-11-02 RX ADMIN — LIDOCAINE HYDROCHLORIDE 100 MG: 20 INJECTION, SOLUTION EPIDURAL; INFILTRATION; INTRACAUDAL; PERINEURAL at 10:11

## 2021-11-02 RX ADMIN — ALBUMIN (HUMAN) 25 G: 12.5 INJECTION, SOLUTION INTRAVENOUS at 17:30

## 2021-11-02 RX ADMIN — PROPOFOL 40 MG: 10 INJECTION, EMULSION INTRAVENOUS at 12:16

## 2021-11-02 RX ADMIN — SODIUM CHLORIDE: 9 INJECTION, SOLUTION INTRAVENOUS at 10:53

## 2021-11-02 RX ADMIN — EPINEPHRINE 0.02 MCG/KG/MIN: 1 INJECTION PARENTERAL at 14:00

## 2021-11-02 RX ADMIN — Medication 100 MCG: at 10:14

## 2021-11-02 RX ADMIN — MIDAZOLAM 2 MG: 1 INJECTION INTRAMUSCULAR; INTRAVENOUS at 09:58

## 2021-11-02 RX ADMIN — FENTANYL CITRATE 250 MCG: 50 INJECTION, SOLUTION INTRAMUSCULAR; INTRAVENOUS at 10:11

## 2021-11-02 RX ADMIN — Medication 25 MCG/HR: at 17:24

## 2021-11-02 RX ADMIN — SODIUM CHLORIDE, PRESERVATIVE FREE 10 ML: 5 INJECTION INTRAVENOUS at 07:10

## 2021-11-02 RX ADMIN — FENTANYL CITRATE 25 MCG: 50 INJECTION INTRAMUSCULAR; INTRAVENOUS at 16:59

## 2021-11-02 RX ADMIN — PROTAMINE SULFATE 250 MG: 10 INJECTION, SOLUTION INTRAVENOUS at 14:07

## 2021-11-02 RX ADMIN — ALBUMIN (HUMAN) 25 G: 12.5 INJECTION, SOLUTION INTRAVENOUS at 17:00

## 2021-11-02 RX ADMIN — Medication 50 MCG: at 14:17

## 2021-11-02 RX ADMIN — ALBUMIN (HUMAN) 25 G: 12.5 INJECTION, SOLUTION INTRAVENOUS at 19:25

## 2021-11-02 RX ADMIN — EPINEPHRINE 0.04 MCG/KG/MIN: 1 INJECTION PARENTERAL at 16:30

## 2021-11-02 RX ADMIN — SODIUM CHLORIDE 0.52 MCG/KG/MIN: 9 INJECTION, SOLUTION INTRAVENOUS at 21:10

## 2021-11-02 RX ADMIN — Medication 100 MCG: at 10:57

## 2021-11-02 RX ADMIN — VANCOMYCIN HYDROCHLORIDE 1500 MG: 5 INJECTION, POWDER, LYOPHILIZED, FOR SOLUTION INTRAVENOUS at 17:00

## 2021-11-02 RX ADMIN — PROPOFOL 30 MG: 10 INJECTION, EMULSION INTRAVENOUS at 14:13

## 2021-11-02 RX ADMIN — FENTANYL CITRATE 150 MCG: 50 INJECTION, SOLUTION INTRAMUSCULAR; INTRAVENOUS at 14:33

## 2021-11-02 RX ADMIN — AMINOCAPROIC ACID 1 G/HR: 250 INJECTION, SOLUTION INTRAVENOUS at 11:00

## 2021-11-02 RX ADMIN — ROCURONIUM BROMIDE 30 MG: 10 INJECTION, SOLUTION INTRAVENOUS at 12:02

## 2021-11-02 RX ADMIN — Medication 100 MCG: at 12:10

## 2021-11-02 RX ADMIN — Medication 100 MCG: at 12:13

## 2021-11-02 RX ADMIN — MIDAZOLAM 1 MG: 1 INJECTION INTRAMUSCULAR; INTRAVENOUS at 13:33

## 2021-11-02 RX ADMIN — Medication 100 MCG: at 10:48

## 2021-11-02 RX ADMIN — Medication 0.02 MCG/KG/MIN: at 13:30

## 2021-11-02 RX ADMIN — Medication 2 UNITS/HR: at 10:41

## 2021-11-02 RX ADMIN — FENTANYL CITRATE 100 MCG: 50 INJECTION, SOLUTION INTRAMUSCULAR; INTRAVENOUS at 12:15

## 2021-11-02 RX ADMIN — ROCURONIUM BROMIDE 30 MG: 10 INJECTION, SOLUTION INTRAVENOUS at 13:58

## 2021-11-02 RX ADMIN — Medication 50 MCG: at 14:19

## 2021-11-02 RX ADMIN — ROCURONIUM BROMIDE 50 MG: 10 INJECTION, SOLUTION INTRAVENOUS at 10:11

## 2021-11-02 RX ADMIN — HYDROCORTISONE SODIUM SUCCINATE 100 MG: 100 INJECTION, POWDER, FOR SOLUTION INTRAMUSCULAR; INTRAVENOUS at 08:07

## 2021-11-02 RX ADMIN — PROPOFOL 50 MCG/KG/MIN: 10 INJECTION, EMULSION INTRAVENOUS at 20:07

## 2021-11-02 RX ADMIN — ROCURONIUM BROMIDE 20 MG: 10 INJECTION, SOLUTION INTRAVENOUS at 11:12

## 2021-11-02 RX ADMIN — POTASSIUM CHLORIDE 20 MEQ: 29.8 INJECTION, SOLUTION INTRAVENOUS at 16:54

## 2021-11-02 RX ADMIN — EPINEPHRINE 0.03 MCG/KG/MIN: 1 INJECTION PARENTERAL at 18:41

## 2021-11-02 RX ADMIN — INSULIN GLARGINE 56 UNITS: 100 INJECTION, SOLUTION SUBCUTANEOUS at 09:10

## 2021-11-02 RX ADMIN — ALBUMIN (HUMAN) 25 G: 12.5 INJECTION, SOLUTION INTRAVENOUS at 23:01

## 2021-11-02 RX ADMIN — Medication 100 MCG: at 11:52

## 2021-11-02 RX ADMIN — ALBUMIN (HUMAN) 25 G: 12.5 INJECTION, SOLUTION INTRAVENOUS at 16:00

## 2021-11-02 RX ADMIN — MIDAZOLAM 2 MG: 1 INJECTION INTRAMUSCULAR; INTRAVENOUS at 13:57

## 2021-11-02 RX ADMIN — PROPOFOL 20 MG: 10 INJECTION, EMULSION INTRAVENOUS at 12:14

## 2021-11-02 RX ADMIN — HEPARIN SODIUM 29000 UNITS: 1000 INJECTION, SOLUTION INTRAVENOUS; SUBCUTANEOUS at 11:56

## 2021-11-02 RX ADMIN — CLINDAMYCIN PHOSPHATE 900 MG: 18 INJECTION, SOLUTION INTRAMUSCULAR; INTRAVENOUS at 10:58

## 2021-11-02 RX ADMIN — MIDAZOLAM 1 MG: 1 INJECTION INTRAMUSCULAR; INTRAVENOUS at 12:44

## 2021-11-02 RX ADMIN — PROPOFOL 50 MG: 10 INJECTION, EMULSION INTRAVENOUS at 14:31

## 2021-11-02 RX ADMIN — ALBUMIN (HUMAN) 25 G: 12.5 INJECTION, SOLUTION INTRAVENOUS at 18:40

## 2021-11-02 RX ADMIN — MUPIROCIN: 20 OINTMENT TOPICAL at 07:11

## 2021-11-02 RX ADMIN — ROCURONIUM BROMIDE 20 MG: 10 INJECTION, SOLUTION INTRAVENOUS at 15:18

## 2021-11-02 RX ADMIN — FENTANYL CITRATE 50 MCG: 50 INJECTION, SOLUTION INTRAMUSCULAR; INTRAVENOUS at 14:15

## 2021-11-02 RX ADMIN — MIDAZOLAM 2 MG: 1 INJECTION INTRAMUSCULAR; INTRAVENOUS at 12:02

## 2021-11-02 RX ADMIN — ASPIRIN 81 MG: 81 TABLET, COATED ORAL at 07:10

## 2021-11-02 RX ADMIN — AMIODARONE HYDROCHLORIDE 200 MG: 200 TABLET ORAL at 07:10

## 2021-11-02 RX ADMIN — METOPROLOL TARTRATE 12.5 MG: 25 TABLET, FILM COATED ORAL at 07:11

## 2021-11-02 RX ADMIN — NITROGLYCERIN 75 MCG/MIN: 20 INJECTION INTRAVENOUS at 16:36

## 2021-11-02 RX ADMIN — SODIUM CHLORIDE: 9 INJECTION, SOLUTION INTRAVENOUS at 12:07

## 2021-11-02 ASSESSMENT — PULMONARY FUNCTION TESTS
PIF_VALUE: 1
PIF_VALUE: 25
PIF_VALUE: 1
PIF_VALUE: 25
PIF_VALUE: 25
PIF_VALUE: 1
PIF_VALUE: 24
PIF_VALUE: 1
PIF_VALUE: 28
PIF_VALUE: 22
PIF_VALUE: 24
PIF_VALUE: 25
PIF_VALUE: 25
PIF_VALUE: 1
PIF_VALUE: 26
PIF_VALUE: 25
PIF_VALUE: 1
PIF_VALUE: 24
PIF_VALUE: 28
PIF_VALUE: 25
PIF_VALUE: 24
PIF_VALUE: 24
PIF_VALUE: 23
PIF_VALUE: 24
PIF_VALUE: 25
PIF_VALUE: 24
PIF_VALUE: 24
PIF_VALUE: 1
PIF_VALUE: 27
PIF_VALUE: 6
PIF_VALUE: 1
PIF_VALUE: 24
PIF_VALUE: 22
PIF_VALUE: 28
PIF_VALUE: 25
PIF_VALUE: 26
PIF_VALUE: 23
PIF_VALUE: 25
PIF_VALUE: 1
PIF_VALUE: 2
PIF_VALUE: 1
PIF_VALUE: 25
PIF_VALUE: 3
PIF_VALUE: 21
PIF_VALUE: 24
PIF_VALUE: 22
PIF_VALUE: 1
PIF_VALUE: 25
PIF_VALUE: 26
PIF_VALUE: 24
PIF_VALUE: 25
PIF_VALUE: 1
PIF_VALUE: 28
PIF_VALUE: 1
PIF_VALUE: 25
PIF_VALUE: 23
PIF_VALUE: 25
PIF_VALUE: 3
PIF_VALUE: 24
PIF_VALUE: 4
PIF_VALUE: 25
PIF_VALUE: 24
PIF_VALUE: 25
PIF_VALUE: 25
PIF_VALUE: 1
PIF_VALUE: 12
PIF_VALUE: 1
PIF_VALUE: 2
PIF_VALUE: 23
PIF_VALUE: 1
PIF_VALUE: 1
PIF_VALUE: 25
PIF_VALUE: 25
PIF_VALUE: 24
PIF_VALUE: 24
PIF_VALUE: 1
PIF_VALUE: 24
PIF_VALUE: 1
PIF_VALUE: 25
PIF_VALUE: 1
PIF_VALUE: 1
PIF_VALUE: 26
PIF_VALUE: 1
PIF_VALUE: 1
PIF_VALUE: 24
PIF_VALUE: 1
PIF_VALUE: 25
PIF_VALUE: 27
PIF_VALUE: 22
PIF_VALUE: 26
PIF_VALUE: 1
PIF_VALUE: 1
PIF_VALUE: 24
PIF_VALUE: 25
PIF_VALUE: 24
PIF_VALUE: 25
PIF_VALUE: 25
PIF_VALUE: 26
PIF_VALUE: 1
PIF_VALUE: 24
PIF_VALUE: 1
PIF_VALUE: 24
PIF_VALUE: 23
PIF_VALUE: 25
PIF_VALUE: 25
PIF_VALUE: 26
PIF_VALUE: 2
PIF_VALUE: 25
PIF_VALUE: 1
PIF_VALUE: 24
PIF_VALUE: 23
PIF_VALUE: 25
PIF_VALUE: 24
PIF_VALUE: 24
PIF_VALUE: 25
PIF_VALUE: 25
PIF_VALUE: 1
PIF_VALUE: 24
PIF_VALUE: 23
PIF_VALUE: 26
PIF_VALUE: 0
PIF_VALUE: 22
PIF_VALUE: 25
PIF_VALUE: 24
PIF_VALUE: 25
PIF_VALUE: 24
PIF_VALUE: 30
PIF_VALUE: 25
PIF_VALUE: 25
PIF_VALUE: 1
PIF_VALUE: 1
PIF_VALUE: 25
PIF_VALUE: 1
PIF_VALUE: 25
PIF_VALUE: 25
PIF_VALUE: 26
PIF_VALUE: 1
PIF_VALUE: 26
PIF_VALUE: 25
PIF_VALUE: 26
PIF_VALUE: 1
PIF_VALUE: 15
PIF_VALUE: 1
PIF_VALUE: 1
PIF_VALUE: 25
PIF_VALUE: 24
PIF_VALUE: 1
PIF_VALUE: 23
PIF_VALUE: 25
PIF_VALUE: 25
PIF_VALUE: 27
PIF_VALUE: 23
PIF_VALUE: 25
PIF_VALUE: 1
PIF_VALUE: 24
PIF_VALUE: 1
PIF_VALUE: 25
PIF_VALUE: 25
PIF_VALUE: 26
PIF_VALUE: 1
PIF_VALUE: 25
PIF_VALUE: 4
PIF_VALUE: 1
PIF_VALUE: 24
PIF_VALUE: 24
PIF_VALUE: 26
PIF_VALUE: 25
PIF_VALUE: 24
PIF_VALUE: 24
PIF_VALUE: 1
PIF_VALUE: 1
PIF_VALUE: 24
PIF_VALUE: 1
PIF_VALUE: 26
PIF_VALUE: 3
PIF_VALUE: 25
PIF_VALUE: 22
PIF_VALUE: 25
PIF_VALUE: 1
PIF_VALUE: 26
PIF_VALUE: 25
PIF_VALUE: 25
PIF_VALUE: 1
PIF_VALUE: 24
PIF_VALUE: 1
PIF_VALUE: 24
PIF_VALUE: 36
PIF_VALUE: 0
PIF_VALUE: 25
PIF_VALUE: 3
PIF_VALUE: 26
PIF_VALUE: 23
PIF_VALUE: 1
PIF_VALUE: 24
PIF_VALUE: 1
PIF_VALUE: 26
PIF_VALUE: 25
PIF_VALUE: 1
PIF_VALUE: 25
PIF_VALUE: 24
PIF_VALUE: 1
PIF_VALUE: 1
PIF_VALUE: 26
PIF_VALUE: 24
PIF_VALUE: 24
PIF_VALUE: 22
PIF_VALUE: 25
PIF_VALUE: 25
PIF_VALUE: 23
PIF_VALUE: 21
PIF_VALUE: 24
PIF_VALUE: 2
PIF_VALUE: 27
PIF_VALUE: 1
PIF_VALUE: 25
PIF_VALUE: 23
PIF_VALUE: 23
PIF_VALUE: 27
PIF_VALUE: 1
PIF_VALUE: 1
PIF_VALUE: 4
PIF_VALUE: 24
PIF_VALUE: 23
PIF_VALUE: 25
PIF_VALUE: 23
PIF_VALUE: 25
PIF_VALUE: 1
PIF_VALUE: 25
PIF_VALUE: 26
PIF_VALUE: 25
PIF_VALUE: 25
PIF_VALUE: 2
PIF_VALUE: 1
PIF_VALUE: 24
PIF_VALUE: 26
PIF_VALUE: 28
PIF_VALUE: 2
PIF_VALUE: 22
PIF_VALUE: 25
PIF_VALUE: 25
PIF_VALUE: 24
PIF_VALUE: 24
PIF_VALUE: 25
PIF_VALUE: 22
PIF_VALUE: 23
PIF_VALUE: 1
PIF_VALUE: 25
PIF_VALUE: 2
PIF_VALUE: 28
PIF_VALUE: 25
PIF_VALUE: 21
PIF_VALUE: 24
PIF_VALUE: 1
PIF_VALUE: 28
PIF_VALUE: 1
PIF_VALUE: 25
PIF_VALUE: 6
PIF_VALUE: 24
PIF_VALUE: 1
PIF_VALUE: 25
PIF_VALUE: 1
PIF_VALUE: 25
PIF_VALUE: 3
PIF_VALUE: 29
PIF_VALUE: 25
PIF_VALUE: 25
PIF_VALUE: 26
PIF_VALUE: 1
PIF_VALUE: 2
PIF_VALUE: 2
PIF_VALUE: 24
PIF_VALUE: 24
PIF_VALUE: 2
PIF_VALUE: 2
PIF_VALUE: 22
PIF_VALUE: 5
PIF_VALUE: 2
PIF_VALUE: 2
PIF_VALUE: 27
PIF_VALUE: 22
PIF_VALUE: 25
PIF_VALUE: 1
PIF_VALUE: 25
PIF_VALUE: 26
PIF_VALUE: 25
PIF_VALUE: 24
PIF_VALUE: 1
PIF_VALUE: 27
PIF_VALUE: 25
PIF_VALUE: 24
PIF_VALUE: 4
PIF_VALUE: 1
PIF_VALUE: 23
PIF_VALUE: 1
PIF_VALUE: 26
PIF_VALUE: 24
PIF_VALUE: 24
PIF_VALUE: 26
PIF_VALUE: 24
PIF_VALUE: 25
PIF_VALUE: 23
PIF_VALUE: 23
PIF_VALUE: 1
PIF_VALUE: 24
PIF_VALUE: 27
PIF_VALUE: 25
PIF_VALUE: 23
PIF_VALUE: 1
PIF_VALUE: 24
PIF_VALUE: 24
PIF_VALUE: 23
PIF_VALUE: 24
PIF_VALUE: 0
PIF_VALUE: 24
PIF_VALUE: 1
PIF_VALUE: 1
PIF_VALUE: 2
PIF_VALUE: 7
PIF_VALUE: 23
PIF_VALUE: 25
PIF_VALUE: 25
PIF_VALUE: 1
PIF_VALUE: 2
PIF_VALUE: 25
PIF_VALUE: 1
PIF_VALUE: 6
PIF_VALUE: 25
PIF_VALUE: 24
PIF_VALUE: 22
PIF_VALUE: 24
PIF_VALUE: 25

## 2021-11-02 ASSESSMENT — PAIN SCALES - GENERAL
PAINLEVEL_OUTOF10: 0
PAINLEVEL_OUTOF10: 7
PAINLEVEL_OUTOF10: 0
PAINLEVEL_OUTOF10: 7

## 2021-11-02 NOTE — FLOWSHEET NOTE
[facility]  PROGRESS NOTE    Room # 2028/2028-01   Name: Carmel Covarrubias                Reason for visit: [address]    I visited the patient and family. Admit Date & Time: 10/24/2021  6:48 AM    Assessment:  Carmel Covarrubias is a 61 y.o. female in the hospital because \"open heart surgery. \" Upon entering the room patient was being prepped for surgery and was in good spirits, but anxious to get on with surgery. Intervention:  I introduced myself and my title as  I offered space for patient  to express feelings, needs, and concerns and provided a ministry presence. Listened to patient's concerns and tried to distract while Dr. Marion Jane set up central line. Met her cousin, Morena Sorto, who is vital to her support system. Prayed with family and told them a  would see them after surgery. Outcome:  Family expressed gratitude for visit. Plan:  Chaplains will remain available to offer spiritual and emotional support as needed. Electronically signed by Makayla Jacob on 11/2/2021 at 9:29 AM.  Minoo         11/02/21 0815   Encounter Summary   Services provided to: Patient   Referral/Consult From: [address] [facility] Family members  (Cousin:  Morena Sorto)   Complexity of Encounter Moderate   Length of Encounter 45 minutes   Routine   Type Follow up   Assessment Approachable; Anxious; Coping   Intervention Active listening;Explored feelings, thoughts, concerns;Explored coping resources;Nurtured hope;Prayer;Sustaining presence/ Ministry of presence   Outcome Expressed gratitude;Expressed feelings of patricia, peace, and/or awe;Engaged in conversation;Coping;Less anxious, less agitated;Encouraged

## 2021-11-02 NOTE — PROGRESS NOTES
Physical Therapy  Facility/Department: [facility] CV[facility]  Daily Treatment Note  NAME: Alvaro Vazquez  : 1955  MRN: [id number]    Date of Service: 2021    Discharge Recommendations:  Patient would benefit from continued therapy after discharge        Assessment   Body structures, Functions, Activity limitations: Decreased functional mobility ; Decreased balance;Decreased cognition;Decreased ROM; Decreased strength;Decreased endurance; Increased pain;Decreased posture;Decreased sensation  Assessment: Patient with global deconditioning and limited by LBP this date. Patient unable to promote to standing with MAX x2 Assist and Linda Charles robertdy this date. Patient would benefit from continued skilled PT treatment to maximize return to [facility]  Specific instructions for Next Treatment: Co-Tx  Prognosis: Good  Decision Making: High Complexity  PT Education: Goals;PT Role;Plan of Care;General Safety;Home Exercise Program;Transfer Training;Pressure Relief;Energy Conservation; Functional Mobility Training  Barriers to Learning: questionable cognition  Activity Tolerance  Activity Tolerance: Per chart, pt. has compression fx L5. Pt. c/o back pain during sit to stand transfer. States if she does not have surgery she is to get a back brace for pain control. Patient Diagnosis(es): The primary encounter diagnosis was Non-ST elevation MI (NSTEMI) ([facility] Diagnoses of Chronic bilateral low back pain without sciatica, Lymphedema of both lower extremities, and Acute on chronic congestive heart failure, unspecified heart failure type [facility]) were also pertinent to this visit. has a past medical history of Asthma, Diabetes mellitus ([facility] Fibromyalgia, Headache, Lymphedema of both lower extremities, and Myasthenia gravis ([facility] has a past surgical history that includes Toe amputation; Carpal tunnel release; and fracture surgery.     Restrictions  Restrictions/Precautions  Restrictions/Precautions: Fall Risk, General Precautions  Required Braces or Orthoses?: No  Position Activity Restriction  Other position/activity restrictions: purwick catheter, 3L O2, RUE IV, telemetry, act as ksenia, maintain heels off bed AAT's, alarms, Chronic LBP (CABG SX 11/2)  Subjective   General  Chart Reviewed: Yes  Response To Previous Treatment: Patient reporting fatigue but able to participate. Family / Caregiver Present: No  Subjective  Subjective: Patient agreeable to PT treatment this date  General Comment  Comments: RNJovanna  reported patient was medically stable for PT treatment. Orientation  Orientation  Overall Orientation Status: Within Functional Limits  Cognition      Objective   Bed mobility  Bridging: Maximum assistance;2 Person assistance  Rolling to Left: Maximum assistance;2 Person assistance  Rolling to Right: Maximum assistance;2 Person assistance  Supine to Sit: Maximum assistance;2 Person assistance  Sit to Supine: Maximum assistance;2 Person assistance  Scooting: Maximal assistance;2 Person assistance  Comment: Patient requires MAX verbal cues with hand over hand placement of UE on bed rail and to initiate proper log roll technique, Patient requires MAX support of LE OOB and Upper Body to scoot complety out to EOB with CINDI foot placement on the floor to establish safe sitting balance to reduce risk of falls. Transfers  Comment: STS x 3 reps from elevated bed within [facility]. VCs for hand palcement, assistance with feet placement, safety with Linda Charles [email or web address]dy. Fair carryover. Ambulation  Ambulation?: No  Neuromuscular Education  NDT Treatment: Sitting;Standing  Balance  Posture: Fair  Sitting - Static: Fair  Sitting - Dynamic: Fair;-  Standing - Static: Poor  Exercises  Comments: Patient sat EOB with good core stability and balance unassisted x15 minutes while recieving medication and to acclimate to positional change.      AM-PAC Score  AM-PAC Inpatient Mobility Raw Score : 8 (11/02/21 9368)  AM-PAC Inpatient T-Scale Score : 28.52 (11/02/21 1558)  Mobility Inpatient CMS 0-100% Score: 86.62 (11/02/21 1558)  Mobility Inpatient CMS G-Code Modifier : CM (11/02/21 1558)          Goals  Short term goals  Time Frame for Short term goals: 12 visits:  Short term goal 1: Pt. to require mod x2 for bed mob. (sleeps in lift chair at home)  Short term goal 2: Pt. to require min Ax2 from raised bed with LUCERO OCHOA  (pt. uses lift chair at home)  Short term goal 3: Pt. to have good initial static sitting balance EOB  Short term goal 4: Pt. to have fair dynamic sitting balance  Short term goal 5: Pt. to tolerate 25+ min. of PT for ther ex x 4 ext, core strengthening, mobility training  Patient Goals   Patient goals : regain independence    Plan    Plan  Times per week: 1-2x/day; 5-6days/wk  Specific instructions for Next Treatment: Co-Tx  Current Treatment Recommendations: Strengthening, ROM, Balance Training, Functional Mobility Training, Transfer Training, Gait Training, Endurance Training, Stair training, Safety Education & Training, Home Exercise Program, Patient/Caregiver Education & Training  Safety Devices  Type of devices: All fall risk precautions in place, Gait belt, Bed alarm in place, Patient at risk for falls, Call light within reach, Left in bed, Nurse notified  Restraints  Initially in place: No     Therapy Time   Individual Concurrent Group Co-treatment   Time In 1007         Time Out 1038         Minutes 31              Co-treatment with OT warranted secondary to decreased safety and independence requiring 2 skilled therapy professionals to address individual discipline's goals. PT addressing core control in seated posture EOB, bed mobility techniques, and pre transfer activities.   Bolivar Broderick, PTA

## 2021-11-02 NOTE — ANESTHESIA POSTPROCEDURE EVALUATION
Department of Anesthesiology  Postprocedure Note    Patient: Priya Diane  MRN: [id number]  YOB: 1955  Date of evaluation: 11/2/2021  Time:  6:19 PM     Procedure Summary     Date: 11/02/21 Room / Location: [address] [place] [address] / [facility] [place] - INPATIENT    Anesthesia Start: 1000 Anesthesia Stop: 8697    Procedure: CABG CORONARY ARTERY BYPASS  LESLIE (N/A Chest) Diagnosis: (DX CAD)    Surgeons: Debra Paez MD Responsible Provider: Jaya Ayala MD    Anesthesia Type: general ASA Status: 4          Anesthesia Type: general    Tracie Phase I:      Tracie Phase II:      Last vitals: Reviewed and per EMR flowsheets. Anesthesia Post Evaluation    Patient location during evaluation: ICU  Patient participation: complete - patient cannot participate  Level of consciousness: sedated and ventilated  Pain score: 0  Airway patency: patent  Nausea & Vomiting: no nausea and no vomiting  Complications: no  Cardiovascular status: vasoactive/inotropes  Respiratory status: acceptable, ventilator and intubated  Hydration status: stable  There was medical reason for not using a multimodal analgesia pain management approach.

## 2021-11-02 NOTE — PROGRESS NOTES
[facility]  Occupational Therapy Not Seen    DATE: 2021    NAME: Tyrell Cheadle  MRN: [id number]   : 1955    Patient not seen this date for Occupational Therapy due to:      [] Cancel by RN or physician due to:    [] Hemodialysis    [] Critical Lab Value Level     [] Blood transfusion in progress    [] Acute or unstable cardiovascular status   _MAP < 55 or more than >115  _HR < 40 or > 130    [] Acute or unstable pulmonary status   -FiO2 > 60%   _RR < 5 or >40    _O2 sats < 85%    [] Strict Bedrest    [] Off Unit for surgery or procedure    [] Off Unit for testing       [] Pending imaging to R/O fracture    [] Refusal by Patient      [x] Other: Pt having CABG this date, re eval 11/3/2021.      [] OT being discontinued at this time. Patient independent. No further needs. [] OT being discontinued at this time as the patient has been transferred to hospice care. No further needs.       Ken Mark, CAMRYN

## 2021-11-02 NOTE — PROGRESS NOTES
[facility]  Office: [address], DO, Kourtney Walker, DO, Melchor Falcon, DO, Jame Tayla Blood, DO, Gianna Rodriguez MD, Ainsley Burnette MD, Shantanu Velez MD, Aime Saenz MD, Hugo Schulte MD, Lisa Carrillo MD, Sima Wong MD, Farrah Berg MD, Fran Peraza, DO, Terrie Duran, DO, Lillian Naranjo MD,  Gab Argueta DO, Yomi Beltre MD, Rae Beltran MD, Litzy Rhodes MD, Abdulaziz Larios MD, Lisa Johnston MD, Sahara Salcedo MD, [facility] Members, [facility], [facility], [facility], Sumaya Mane, CNP, Emerita Gill, CNS, Shireen Ramos, CNP, Mayur Castorena, CNP, Octavio Farfan, CNP, Brendan Mena, CNP, Dewey Chang, CNP, Mely Carpenter, CNP, Gonzalez Campos PA-C, Prateek Rankin, [facility], Starr Joiner, CNP, Julia Garza, CNP, Arnulfo Spencer, CNP, Myriam Allen, CNP, Rox Rutherford, CNP, Saranya Duvall, CNP, [facility]    Progress Note    11/2/2021    9:16 AM    Name:   Bebeto Louis  MRN:     [id number]     [place]:      [de-identified]   Room:   2028/2028-01   Day:  9  Admit Date:  10/24/2021  6:48 AM    PCP:   Myriam Allen MD  Code Status:  Full Code    Subjective:     C/C:   Chief Complaint   Patient presents with    Back Pain     Interval History Status:    VSS, no new issues overnight. Plans for CABG today   Brief History:     Bebeto Louis is a 61 y.o. Non- / non  female who presents with low Back Pain and is admitted to the hospital for the management of NSTEMIShe said she felt a pop while transferring from bed to wheelchair. It is unclear when that was but was unable to get out of her recliner. Starting 2 days pta she had cp and sob, along with nausea. Also c/o hip pain and said all her pain was due to fibromyalgia. troponins were elevated   Review of Systems:     Constitutional:  negative for chills, fevers, sweats  Respiratory:  negative for dyspnea on exertion, shortness of breath, wheezing.  Positive for cough  Cardiovascular:  negative for chest pain, chest pressure/discomfort, l, palpitations. +1 BLE lower extremity edema  Gastrointestinal:  negative for abdominal pain, constipation, diarrhea, nausea, vomiting  Neurological:  negative for dizziness, headache  + for back pain   Medications: Allergies:     Allergies   Allergen Reactions    Amoxicillin Diarrhea and Other (See Comments)    Amoxicillin-Pot Clavulanate Diarrhea    Atorvastatin Other (See Comments)     Other reaction(s): Myalgia  Weakness      Cefuroxime Axetil Other (See Comments)    Clavulanic Acid Diarrhea and Other (See Comments)    Codeine Other (See Comments)     Unless suspended in syrup      Dextroamphetamine     Adhesive Tape Rash     Itching     Cephalosporins Rash       Current Meds:   Scheduled Meds:    furosemide  40 mg Oral BID    mupirocin   Nasal BID    chlorhexidine  15 mL Mouth/Throat Once    chlorhexidine   Topical See Admin Instructions    clindamycin (CLEOCIN) IV  900 mg IntraVENous On Call to OR    amiodarone  200 mg Oral 90 Min Pre-Op    erythromycin   Both Eyes Nightly    ketotifen  1 drop Both Eyes BID    ciprofloxacin  500 mg Oral 2 times per day    Hydrocerin   Topical BID    pantoprazole  40 mg Oral QAM AC    sodium chloride flush  5-40 mL IntraVENous 2 times per day    dextromethorphan  30 mg Oral 2 times per day    lidocaine  1 patch TransDERmal Daily    gabapentin  300 mg Oral TID    FLUoxetine  40 mg Oral QAM    metoprolol succinate  25 mg Oral Daily    predniSONE  10 mg Oral Daily    insulin glargine  56 Units SubCUTAneous Daily    And    insulin lispro  10 Units SubCUTAneous TID WC    [Held by provider] metFORMIN  1,000 mg Oral BID WC    rosuvastatin  20 mg Oral Nightly    aspirin  81 mg Oral Daily    influenza virus vaccine  0.5 mL IntraMUSCular Prior to discharge     Continuous Infusions:    sodium chloride      dextrose      sodium chloride       PRN Meds: benzonatate, diphenhydrAMINE, polyvinyl alcohol, sodium chloride flush, sodium chloride, acetaminophen, miconazole, oxyCODONE-acetaminophen, glucose, dextrose, glucagon (rDNA), dextrose, sodium chloride, ondansetron **OR** ondansetron, [DISCONTINUED] acetaminophen **OR** acetaminophen, polyethylene glycol, nitroGLYCERIN, perflutren lipid microspheres    Data:     Past Medical History:   has a past medical history of Asthma, Diabetes mellitus ([facility] Fibromyalgia, Headache, Lymphedema of both lower extremities, and Myasthenia gravis ([facility] Social History:   reports that she has never smoked. She has never used smokeless tobacco. She reports previous alcohol use. She reports previous drug use. Family History:   Family History   Problem Relation Age of Onset    Coronary Art Dis Mother     Kidney Disease Mother        Vitals:  /67   Pulse 69   Temp 97.2 °F (36.2 °C) (Temporal)   Resp 22   Ht 5' 5\" (1.651 m)   Wt 184 lb (83.5 kg)   SpO2 97%   BMI 30.62 kg/m²   Temp (24hrs), Av.1 °F (36.7 °C), Min:97.2 °F (36.2 °C), Max:98.7 °F (37.1 °C)    Recent Labs     21  0750 21  1144 21  1623 21  0736   POCGLU 100 284* 299* 186*       I/O (24Hr):     Intake/Output Summary (Last 24 hours) at 2021 0924  Last data filed at 2021 0800  Gross per 24 hour   Intake 1080 ml   Output 2350 ml   Net -1270 ml       Labs:  Hematology:  Recent Labs     21  0446 21  0402   WBC 11.1 12.4*   RBC 4.00 4.91   HGB 9.1* 11.2*   HCT 33.0* 40.9   MCV 82.5* 83.3   MCH 22.8* 22.8*   MCHC 27.6* 27.4*   RDW 14.9* 15.1*    410   MPV 10.0 9.5   INR  --  1.1     Chemistry:  Recent Labs     10/31/21  0437 21  0446 21  0402    139 137   K 4.4 3.9 4.0   CL 97* 98 94*   CO2 33* 32* 32*   GLUCOSE 125* 154* 296*   BUN 22 19 22   CREATININE 1.06* 0.92* 1.12*   MG  --   --  2.1   ANIONGAP 7* 9 11   LABGLOM 52* >60 49*   GFRAA >60 >60 59*   CALCIUM 8.9 8.9 9.6     Recent Labs 10/31/21  1652 10/31/21  1916 11/01/21  0750 11/01/21  1144 11/01/21  1623 11/02/21  0736   POCGLU 261* 205* 100 284* 299* 186*     ABG:  Lab Results   Component Value Date    POCPH 7.440 10/28/2021    POCPCO2 49.2 10/28/2021    POCPO2 60.8 10/28/2021    POCHCO3 33.4 10/28/2021    NBEA NOT REPORTED 10/28/2021    PBEA 8 10/28/2021    GSS2QVL NOT REPORTED 10/28/2021    CQNO7NWS 91 10/28/2021    FIO2 2.0 10/28/2021     Lab Results   Component Value Date/Time    SPECIAL NOT REPORTED 10/28/2021 11:30 AM     Lab Results   Component Value Date/Time    CULTURE KLEBSIELLA PNEUMONIAE >187673 CFU/ML (A) 10/28/2021 11:30 AM    CULTURE ENTEROCOCCUS FAECALIS >319935 CFU/ML (A) 10/28/2021 11:30 AM       Radiology:  XR LUMBAR SPINE (2-3 VIEWS)    Result Date: 10/24/2021  LUMBAR SPINE: 1. Age indeterminate, probably nonacute, compression fracture of L5 vertebral body with about 50% decrease height. Please correlate with point tenderness. MRI may also be considered for age characterization if deemed clinically necessary. 2. T12 and L1 kyphoplasty. CHEST X-RAY: 1. Poor inspiratory afford with significantly decreased lung volumes. 2. Patchy opacities left lower lung probably due to crowding of vessels with or without underlying developing infiltrate. Please correlate with auscultation. Short interval follow-up may also be considered. XR CHEST PORTABLE    Result Date: 10/24/2021  LUMBAR SPINE: 1. Age indeterminate, probably nonacute, compression fracture of L5 vertebral body with about 50% decrease height. Please correlate with point tenderness. MRI may also be considered for age characterization if deemed clinically necessary. 2. T12 and L1 kyphoplasty. CHEST X-RAY: 1. Poor inspiratory afford with significantly decreased lung volumes. 2. Patchy opacities left lower lung probably due to crowding of vessels with or without underlying developing infiltrate. Please correlate with auscultation.   Short interval follow-up may also be considered. CT CHEST PULMONARY EMBOLISM W CONTRAST    Result Date: 10/24/2021  1. No evidence for acute pulmonary embolism. 2. Scattered bibasilar patchy subsegmental atelectasis and trace bilateral pleural effusions. 3. Borderline cardiomegaly. 4. Large areas of geographic ground-glass attenuation interspersed with more lucent areas probably combination of air trapping and pulmonary interstitial edema. 5. Cholelithiasis.        Physical Examination:        General appearance:  alert, cooperative and no distress  Mental Status:  oriented to person, place and time and normal affect  Lungs:  Bilateral bases diminished, normal effort, non productive cough  Heart:  regular rate and rhythm, no murmur  Abdomen:  soft, nontender, nondistended, normal bowel sounds, no masses, hepatomegaly, splenomegaly  Extremities:  no redness, tenderness in the calves; chronic edema  Skin:  no gross lesions, rashes, induration    Assessment:        Hospital Problems         Last Modified POA    * (Principal) NSTEMI (non-ST elevated myocardial infarction) ([address] 10/28/2021 Yes    Type 2 diabetes mellitus with hyperglycemia, without long-term current use of insulin ([address] 10/24/2021 Yes    Class 1 obesity due to excess calories with serious comorbidity and body mass index (BMI) of 33.0 to 33.9 in adult 10/24/2021 Yes    Lymphedema of both lower extremities 10/24/2021 Yes    Acute on chronic diastolic congestive heart failure ([address] 10/26/2021 Yes    Pathological fracture of thoracic vertebra due to secondary osteoporosis ([address] 10/26/2021 Yes    Pathological fracture of lumbar vertebra due to secondary osteoporosis ([address] 10/26/2021 Yes    Intractable back pain 10/26/2021 Yes    Age-related osteoporosis with current pathological fracture 10/26/2021 Yes    History of kyphoplasty 10/26/2021 Yes    Facet arthritis, degenerative, lumbar spine 10/26/2021 Yes    Degenerative spondylolisthesis 10/26/2021 Yes    DDD (degenerative disc disease), thoracolumbar 10/26/2021 Yes    Acute on chronic congestive heart failure ([address] 10/27/2021 Yes    Chronic bilateral low back pain without sciatica 10/27/2021 Yes    Allergic conjunctivitis of both eyes 10/31/2021 Yes          Plan:        1. Acute pathological L3 fracture likely due to osteoporosis and chronic L5 fracture, plans for kyphoplasty at a later time   2. NSTEMI due to [facility] for CABG today, post-op management as ordered per CT surgery    3. Monitor and control blood sugars: blood sugars fairly well controlled  4. Monitor labs, replace electrolytes as needed  5. Continue telemetry monitoring   6. Acute on chronic diastolic CHF-resolved. Continue oral [id number] BID, also has chronic lymphedema  7.  Plan discussed with patient and staff      CYNDY ESTRADA NP  11/2/2021  9:24 AM

## 2021-11-02 NOTE — PLAN OF CARE
Nutrition Problem #1: Altered nutrition-related lab values  Intervention: Food and/or Nutrient Delivery: Continue NPO  Nutritional Goals: Initiate oral diet or nutrition support within the next 48 hours

## 2021-11-02 NOTE — ANESTHESIA PRE PROCEDURE
Department of Anesthesiology  Preprocedure Note       Name:  Gary Araujo   Age:  61 y.o.  :  1955                                          MRN:  [id number]         Date:  2021      Surgeon: Jaja Chavez):  Clint Escalante MD    Procedure: Procedure(s):  CABG CORONARY ARTERY BYPASS X 3 LESLIE SWAN    Medications prior to admission:   Prior to Admission medications    Medication Sig Start Date End Date Taking? Authorizing Provider   gabapentin (NEURONTIN) 300 MG capsule Take 300 mg by mouth 3 times daily. Yes Historical Provider, MD   insulin aspart protamine-insulin aspart (NOVOLOG MIX 70/30 FLEXPEN) (70-30) 100 UNIT/ML injection Inject into the skin 2 times daily (with meals) Inject 55 units at breakfast and 45 units at dinner subcutaneously   Yes Historical Provider, MD   predniSONE (DELTASONE) 10 MG tablet Take 10 mg by mouth daily    Yes Historical Provider, MD   albuterol sulfate HFA (VENTOLIN HFA) 108 (90 Base) MCG/ACT inhaler Inhale 2 puffs into the lungs every 4 hours as needed for Wheezing or Shortness of Breath   Yes Historical Provider, MD   FLUoxetine (PROZAC) 40 MG capsule Take 40 mg by mouth every morning   Yes Historical Provider, MD   fluticasone (FLONASE) 50 MCG/ACT nasal spray 1 spray by Each Nostril route daily   Yes Historical Provider, MD   metoprolol succinate (TOPROL XL) 25 MG extended release tablet Take 25 mg by mouth daily   Yes Historical Provider, MD   nystatin (MYCOSTATIN) 534564 UNIT/GM powder Apply topically 2 times daily Apply to affected area twice daily   Yes Historical Provider, MD   furosemide (LASIX) 40 MG tablet Take 40-80 mg by mouth See Admin Instructions Take 1 tablet by mouth every morning, 2 tablets mid day and 1 tablet at bedtime.    Yes Historical Provider, MD   acetaminophen (TYLENOL) 325 MG tablet Take 650 mg by mouth every 6 hours as needed for Pain   Yes Historical Provider, MD   metFORMIN (GLUCOPHAGE) 1000 MG tablet Take 1,000 mg by mouth 2 times daily (with diphenhydrAMINE (BENADRYL) tablet 25 mg  25 mg Oral Q6H PRN Codi Mays MD   25 mg at 10/31/21 2229    Hydrocerin cream CREA   Topical BID Codi Mays MD   Given at 11/01/21 0759    polyvinyl alcohol (LIQUIFILM TEARS) 1.4 % ophthalmic solution 2 drop  2 drop Both Eyes Q2H PRN Nathaly Norman, APRN - NP   2 drop at 10/30/21 2322    pantoprazole (PROTONIX) tablet 40 mg  40 mg Oral QAM AC Corazon Garnett MD   40 mg at 11/01/21 0520    sodium chloride flush 0.9 % injection 5-40 mL  5-40 mL IntraVENous 2 times per day Corazon Garnett MD   10 mL at 11/01/21 2100    sodium chloride flush 0.9 % injection 5-40 mL  5-40 mL IntraVENous PRN Corazon Garnett MD        0.9 % sodium chloride infusion  25 mL IntraVENous PRN Corazon Garnett MD        acetaminophen (TYLENOL) tablet 650 mg  650 mg Oral Q4H PRN Corazon Garnett MD   650 mg at 11/01/21 0355    dextromethorphan (DELSYM) 30 MG/5ML extended release liquid 30 mg  30 mg Oral 2 times per day Corazon Garnett MD   30 mg at 11/01/21 2201    lidocaine 4 % external patch 1 patch  1 patch TransDERmal Daily Corazon Garnett MD   1 patch at 11/01/21 1143    gabapentin (NEURONTIN) capsule 300 mg  300 mg Oral TID Corazon Garnett MD   300 mg at 11/01/21 2219    FLUoxetine (PROZAC) capsule 40 mg  40 mg Oral QAM Corazon Garnett MD   40 mg at 11/01/21 0800    metoprolol succinate (TOPROL XL) extended release tablet 25 mg  25 mg Oral Daily Corazon Garnett MD   25 mg at 11/01/21 0800    predniSONE (DELTASONE) tablet 10 mg  10 mg Oral Daily Corazon Garnett MD   10 mg at 11/01/21 0800    miconazole (MICOTIN) 2 % powder   Topical BID PRN Corazon Garnett MD   Given at 11/01/21 0759    oxyCODONE-acetaminophen (PERCOCET) 5-325 MG per tablet 1 tablet  1 tablet Oral Q12H PRN Corazon Garnett MD   1 tablet at 10/29/21 1049    insulin glargine (LANTUS) injection vial 56 Units  56 Units SubCUTAneous Daily Corazon Garnett MD   56 Units at 10/30/21 0904    And    insulin lispro (HUMALOG) injection vial 10 Units  10 Units SubCUTAneous TID  Beto Oliva MD   10 Units at 11/01/21 1653    [Held by provider] metFORMIN (GLUCOPHAGE) tablet 1,000 mg  1,000 mg Oral BID  CYNDY Meza - NP   1,000 mg at 10/26/21 1735    rosuvastatin (CRESTOR) tablet 20 mg  20 mg Oral Nightly Beto Oliva MD   20 mg at 11/01/21 2201    glucose (GLUTOSE) 40 % oral gel 15 g  15 g Oral PRN Beto Oliva MD        dextrose 50 % IV solution  12.5 g IntraVENous PRN Beto Oliva MD        glucagon (rDNA) injection 1 mg  1 mg IntraMUSCular PRN Beto Oliva MD        dextrose 5 % solution  100 mL/hr IntraVENous PRN Beto Oliva MD        0.9 % sodium chloride infusion  25 mL IntraVENous PRN Beto Oliva MD        ondansetron (ZOFRAN-ODT) disintegrating tablet 4 mg  4 mg Oral Q8H PRN Beto Oliva MD        Or    [facility]) injection 4 mg  4 mg IntraVENous Q6H PRN Beto Oliva MD        acetaminophen (TYLENOL) suppository 650 mg  650 mg Rectal Q6H PRN Beto Oliva MD        polyethylene glycol (GLYCOLAX) packet 17 g  17 g Oral Daily PRN Beto Oliva MD        aspirin chewable tablet 81 mg  81 mg Oral Daily Beto Oliva MD   81 mg at 11/01/21 0800    nitroGLYCERIN (NITROSTAT) SL tablet 0.4 mg  0.4 mg SubLINGual Q5 Min PRN Beto Oliva MD        perflutren lipid microspheres (DEFINITY) injection 1.65 mg  1.5 mL IntraVENous ONCE PRN Beto Oliva MD        influenza quadrivalent split vaccine (FLUZONE;FLUARIX;FLULAVAL;AFLURIA) injection 0.5 mL  0.5 mL IntraMUSCular Prior to discharge Beto Oliva MD           Allergies:     Allergies   Allergen Reactions    Amoxicillin Diarrhea and Other (See Comments)    Amoxicillin-Pot Clavulanate Diarrhea    Atorvastatin Other (See Comments)     Other reaction(s): Myalgia  Weakness      Cefuroxime Axetil Other (See Comments)    Clavulanic Acid Diarrhea and Other (See Comments)    Codeine Other (See Comments) Unless suspended in syrup      Dextroamphetamine     Adhesive Tape Rash     Itching     Cephalosporins Rash       Problem List:    Patient Active Problem List   Diagnosis Code    NSTEMI (non-ST elevated myocardial infarction) ([facility] I21.4    Type 2 diabetes mellitus with hyperglycemia, without long-term current use of insulin ([facility]) E11.65    Class 1 obesity due to excess calories with serious comorbidity and body mass index (BMI) of 33.0 to 33.9 in adult E66.09, Z68.33    Lymphedema of both lower extremities I89.0    Acute on chronic diastolic congestive heart failure (HCC) I50.33    Pathological fracture of thoracic vertebra due to secondary osteoporosis ([facility]) M80.88XA    Pathological fracture of lumbar vertebra due to secondary osteoporosis ([facility]) M80.88XA    Intractable back pain M54.9    Age-related osteoporosis with current pathological fracture M80.00XA    History of kyphoplasty Z98.890    Facet arthritis, degenerative, lumbar spine M47.816    Degenerative spondylolisthesis M43.10    DDD (degenerative disc disease), thoracolumbar M51.35    Acute on chronic congestive heart failure ([facility]) I50.9    Chronic bilateral low back pain without sciatica M54.50, G89.29    Allergic conjunctivitis of both eyes H10.13       Past Medical History:        Diagnosis Date    Asthma     Diabetes mellitus ([facility]     Fibromyalgia     Headache     Lymphedema of both lower extremities     Myasthenia gravis ([facility]        Past Surgical History:        Procedure Laterality Date    CARPAL TUNNEL RELEASE      FRACTURE SURGERY      TOE AMPUTATION         Social History:    Social History     Tobacco Use    Smoking status: Never Smoker    Smokeless tobacco: Never Used   Substance Use Topics    Alcohol use: Not Currently                                Counseling given: Not Answered      Vital Signs (Current):   Vitals:    11/01/21 2000 11/02/21 0000 11/02/21 0400 11/02/21 0624   BP: (!) 124/55 (!) 134/57

## 2021-11-02 NOTE — PROGRESS NOTES
Physical Therapy  DATE: 2021    NAME: Abdoul Ellsworth  MRN: [id number]   : 1955    Patient not seen this date for Physical Therapy due to:      [] Cancel by RN or physician due to:    [] Hemodialysis    [] Critical Lab Value Level     [] Blood transfusion in progress    [] Acute or unstable cardiovascular status   _MAP < 55 or more than >115  _HR < 40 or > 130    [] Acute or unstable pulmonary status   -FiO2 > 60%   _RR < 5 or >40    _O2 sats < 85%    [] Strict Bedrest    [x] CABG this AM    [] Off Unit for testing       [] Pending imaging to R/O fracture    [] Refusal by Patient      [] Other      [] PT being discontinued at this time. Patient independent. No further needs. [] PT being discontinued at this time as the patient has been transferred to hospice care. No further needs.       [facility], PT

## 2021-11-02 NOTE — PROGRESS NOTES
Section of Cardiology  Progress Note      Date:  11/2/2021  Patient: Jasmin Mane  Admission:  10/24/2021  6:48 AM  Admit DX: [facility]non[facility] [I21.4]  Non-ST elevation MI (NSTEMI) ([facility]) [I21.4]  Lymphedema of both lower extremities [I89.0]  Chronic bilateral low back pain without sciatica [M54.50, G89.29]  Acute on chronic congestive heart failure, unspecified heart failure type ([facility] [I50.9]  Age:  61 y.o., 1955     LOS: 9 days     Reason for evaluation:   NSTEMI and CAD      SUBJECTIVE:     The patient was seen and examined. Notes and labs reviewed. Patient seems to be in good spirits awaiting for surgery today denies chest pain denies shortness of breath. OBJECTIVE:      EXAM:   Vitals:    VITALS:  /67   Pulse 69   Temp 97.2 °F (36.2 °C) (Temporal)   Resp 22   Ht 5' 5\" (1.651 m)   Wt 184 lb (83.5 kg)   SpO2 97%   BMI 30.62 kg/m²   24HR INTAKE/OUTPUT:      Intake/Output Summary (Last 24 hours) at 11/2/2021 9921  Last data filed at 11/2/2021 0800  Gross per 24 hour   Intake 1080 ml   Output 2350 ml   Net -1270 ml       CONSTITUTIONAL: Alert, awake, no signs of acute distress  HEENT: No JVD  LUNGS: Decreased lung sounds in lower third bilaterally with some dry rales heard in the lower aspect of lung field bilaterally I do not hear any wheezing  CARDIOVASCULAR:  regular rate and rhythm, normal S1 and S2, no S3 or S4, and no murmur or rub noted. SKIN: Warm and dry.   EXTREMITIES: Mild pitting leg edema bilaterally  Current Inpatient Medications:   furosemide  40 mg Oral BID    mupirocin   Nasal BID    chlorhexidine  15 mL Mouth/Throat Once    chlorhexidine   Topical See Admin Instructions    clindamycin (CLEOCIN) IV  900 mg IntraVENous On Call to OR    amiodarone  200 mg Oral 90 Min Pre-Op    erythromycin   Both Eyes Nightly    ketotifen  1 drop Both Eyes BID    ciprofloxacin  500 mg Oral 2 times per day    Hydrocerin   Topical BID    pantoprazole  40 mg Oral QAM AC    sodium chloride flush  5-40 mL IntraVENous 2 times per day    dextromethorphan  30 mg Oral 2 times per day    lidocaine  1 patch TransDERmal Daily    gabapentin  300 mg Oral TID    FLUoxetine  40 mg Oral QAM    metoprolol succinate  25 mg Oral Daily    predniSONE  10 mg Oral Daily    insulin glargine  56 Units SubCUTAneous Daily    And    insulin lispro  10 Units SubCUTAneous TID WC    [Held by provider] metFORMIN  1,000 mg Oral BID WC    rosuvastatin  20 mg Oral Nightly    aspirin  81 mg Oral Daily    influenza virus vaccine  0.5 mL IntraMUSCular Prior to discharge       IV Infusions (if any):   sodium chloride      dextrose      sodium chloride         Diagnostics:   Telemetry: Sinus rhythm    ECHO: CONCLUSIONS     Summary  Left ventricle is normal in size. Mild left ventricular hypertrophy. Global left ventricular systolic function is normal with an estimated  ejection fraction of 70 % . No obvious wall motion abnormality seen. Grade I (mild) left ventricular diastolic dysfunction. Normal mitral valve structure. Mild mitral regurgitation.   No significant pericardial effusion is seen.     Signature  ----------------------------------------------------------------------------   Electronically signed by Aylin Hawkins(Sonographer) on 10/25/2021 10:56   AM  ----------------------------------------------------------------------------     ----------------------------------------------------------------------------   Electronically signed by Nilesh DiazInterpreting physician) on   10/25/2021 09:01 PM  ----------------------------------------------------------------------------    Labs:   CBC:  Recent Labs     11/01/21 0446 11/02/21 0402   WBC 11.1 12.4*   HGB 9.1* 11.2*   HCT 33.0* 40.9    410     Magnesium:  Recent Labs     11/02/21 0402   MG 2.1     BMP:  Recent Labs     11/01/21 0446 11/02/21 0402    137   K 3.9 4.0   CALCIUM 8.9 9.6   CO2 32* 32*   BUN 19 22   CREATININE 0.92* 1.12*   LABGLOM >60 49*   GLUCOSE 154* 296*     BNP:No results for input(s): BNP, PROBNP in the last 72 hours. PT/INR:  Recent Labs     11/02/21  0402   PROTIME 13.8   INR 1.1     APTT:No results for input(s): APTT in the last 72 hours. CARDIAC ENZYMES:No results for input(s): MYOGLOBIN, CKTOTAL, CKMB, CKMBINDEX, TROPHS, TROPONINT in the last 72 hours. FASTING LIPID PANEL:  Lab Results   Component Value Date    HDL 40 10/25/2021    TRIG 107 10/25/2021     LIVER PROFILE:No results for input(s): AST, ALT, LABALBU, ALKPHOS, BILITOT, BILIDIR, IBILI, PROT, GLOB, ALBUMIN in the last 72 hours. ASSESSMENT:    · CAD with multivessel disease, awaiting coronary artery bypass graft surgery today, no angina at rest   · borderline troponin elevation with possible non-STEMI-preserved LV systolic function on echocardiogram done 10/25/2021  · Diabetes, managed by others  · Lymphedema and nonhealing wounds, managed by others  · Peripheral vascular disease, managed by others  · Dyslipidemia, treated  · Lumbar compression fracture, managed by others    PLAN:  Continue current cardiac medications. We will take care of patient postoperatively. Discussed with patient and her nurse.    Marley Valentin MD

## 2021-11-02 NOTE — PROGRESS NOTES
1705:  Hgb 7.0 - 1 unit PRBC's initiated at this time. 1725: I attended the patient during the first 15 minutes of the blood transfusion and did not recognize a potential blood reaction.

## 2021-11-02 NOTE — ANESTHESIA PROCEDURE NOTES
Arterial Line:    An arterial line was placed using surface landmarks, in the patient floor for the following indication(s): continuous blood pressure monitoring, blood sampling needed and unable to use non-invasive cuff. A 20 gauge (size), (length), Arrow (type) catheter was placed, Seldinger technique used, into the left radial artery and 1% lidocaine, 2cc, secured by tape and Tegaderm. Anesthesia type: Local  Local infiltration: Injection    Events:  patient tolerated procedure well with no complications and EBL 0mL.     Additional notes:  No hx raynauds  Neg aly  11/2/2021 8:28 AM11/2/2021 8:30 AM  Anesthesiologist: Benedicto Vázquez MD  Performed: Anesthesiologist   Preanesthetic Checklist  Completed: patient identified, IV checked, site marked, risks and benefits discussed, surgical consent, monitors and equipment checked, pre-op evaluation, timeout performed, anesthesia consent given, oxygen available and patient being monitored

## 2021-11-02 NOTE — BRIEF OP NOTE
Brief Postoperative Note      Patient: Heidy Cid  YOB: 1955  MRN: [id number]    Date of Procedure: 11/2/2021    Pre-Op Diagnosis: DX CAD    Post-Op Diagnosis: Same       Procedure(s):  CABG CORONARY ARTERY BYPASS  LESLIE    Surgeon(s):  Panda Tabor MD    Assistant:  Surgical Assistant: Jerelene Schlatter First Assistant: Madhu Carrillo RN    Anesthesia: General    Estimated Blood Loss (mL): N/A    Complications: None    Specimens:   * No specimens in log *    Implants:  Implant Name Type Inv. Item Serial No.  Lot No. LRB No. Used Action   DEVICE STRNL CLSR MULTIIMPLANT STRNLOCK 360  DEVICE STRNL CLSR MULTIIMPLANT STRNLOCK 360  CHINMAYSensorflare PCET MICROFIXATION-WD [id number] N/A 1 Implanted         Drains:   Chest Tube 1 Left 24 [place] (Active)       Chest Tube 2 Left 24 [place] (Active)       Urethral Catheter 16 fr (Active)       [REMOVED] External Urinary Catheter (Removed)   Catheter changed  Yes 11/01/21 1400   Urine Color Yellow 11/01/21 2204   Urine Appearance Clear 11/01/21 2204   Urine Odor Malodorous 11/01/21 1400   Output (mL) 300 mL 11/02/21 0600   Suction 40 mmgHg continuous 11/02/21 0200   Placement Replaced 11/02/21 0200   Skin Assessment No Injury 10/31/21 0400       Findings: CABG X 2 w/ SLIMA to LAD and RSVG1 to large high Diag, OM exploration (not graftable) - VERY FRIABLE TISSUES, MALNURISHED, DEBILITATED AND ON CHRONIC STEROIDS. There was coagulopathy at the connclusion of the case and I thus did not want to take chances so performed temporary sternal closure and will perform Delayed STrnalock 360 closure tomorrow at 11 AM.  Her sternum is thin, osteoporotic and very very weak.     Electronically signed by Panda Tabor MD on 11/2/2021 at 3:23 PM

## 2021-11-02 NOTE — CONSULTS
Pharmacy Note  PCA Pharmacy To Dose - Initial Note    Olivia Cook is a 61 y.o. female ordered patient controlled analgesia (PCA) with fentanyl consult received from Dr. Nabil Rabago. PCA Indication: post op pain    Patient is also receiving the following pain medications:  percocet    Dose is based on BSA= [unfilled]    PCA Dose: 39 mg  Lockout: 10 minutes  Basal Rate:  No basal rate ordered by physician. 1 hour dose limit: 234 mg    Loading dose: 50Mg x1. Thank you for the consult. Nursing to contact pharmacy with PCA pain management concerns.     RACHEL Abbott [facility]  11/2/2021  4:50 PM

## 2021-11-02 NOTE — ANESTHESIA PROCEDURE NOTES
Procedure Performed: LESLIE       Start Time:  11/2/2021 11:00 AM       End Time:   11/2/2021 2:00 PM    Preanesthesia Checklist:  Patient identified, IV assessed, risks and benefits discussed, monitors and equipment assessed, procedure being performed at surgeon's request and anesthesia consent obtained. General Procedure Information  Diagnostic Indications for Echo:  hemodynamic monitoring  Physician Requesting Echo: Geovanni Tobar MD  Location performed:  OR  Intubated  Heart visualized  Probe Insertion:  Easy      Echocardiographic and Doppler Measurements    Ventricles    Right Ventricle:  Global function normal.    Left Ventricle:  Global Function normal.          Valves    Other Valve Findings:       Trace to Mild mitral regurgitation under general anesthesia    Aorta    Other Aortic Findings:        Aorta visualized      Atria      Left Atrium:  Left atrial appendage normal.      Septa    Atrial Septum:      Other atrial septal defect findings:       No pfo noted                Anesthesia Information  Performed Personally  Anesthesiologist:  Emily Jefferson MD      Echocardiogram Comments:       Post exam mirrors prebypass examination

## 2021-11-03 ENCOUNTER — ANESTHESIA EVENT (OUTPATIENT)
Dept: OPERATING ROOM | Age: 66
DRG: 233 | End: 2021-11-03
Payer: MEDICARE

## 2021-11-03 ENCOUNTER — APPOINTMENT (OUTPATIENT)
Dept: GENERAL RADIOLOGY | Age: 66
DRG: 233 | End: 2021-11-03
Payer: MEDICARE

## 2021-11-03 ENCOUNTER — ANESTHESIA (OUTPATIENT)
Dept: OPERATING ROOM | Age: 66
DRG: 233 | End: 2021-11-03
Payer: MEDICARE

## 2021-11-03 VITALS — SYSTOLIC BLOOD PRESSURE: 91 MMHG | DIASTOLIC BLOOD PRESSURE: 55 MMHG | OXYGEN SATURATION: 100 %

## 2021-11-03 PROBLEM — I25.10 CAD IN NATIVE ARTERY: Status: ACTIVE | Noted: 2021-11-03

## 2021-11-03 LAB
ABSOLUTE EOS #: <0.03 K/UL (ref 0–0.44)
ABSOLUTE IMMATURE GRANULOCYTE: 0.12 K/UL (ref 0–0.3)
ABSOLUTE LYMPH #: 1.07 K/UL (ref 1.1–3.7)
ABSOLUTE MONO #: 0.64 K/UL (ref 0.1–1.2)
ALBUMIN SERPL-MCNC: 4.4 G/DL (ref 3.5–5.2)
ALBUMIN/GLOBULIN RATIO: ABNORMAL (ref 1–2.5)
ALLEN TEST: ABNORMAL
ALP BLD-CCNC: 33 U/L (ref 35–104)
ALT SERPL-CCNC: <5 U/L (ref 5–33)
ANION GAP SERPL CALCULATED.3IONS-SCNC: 11 MMOL/L (ref 9–17)
ANION GAP SERPL CALCULATED.3IONS-SCNC: 13 MMOL/L (ref 9–17)
ANION GAP SERPL CALCULATED.3IONS-SCNC: 8 MMOL/L (ref 9–17)
ANION GAP: 12 MMOL/L (ref 7–16)
AST SERPL-CCNC: 21 U/L
BASOPHILS # BLD: 0 % (ref 0–2)
BASOPHILS ABSOLUTE: 0.06 K/UL (ref 0–0.2)
BILIRUB SERPL-MCNC: 1.22 MG/DL (ref 0.3–1.2)
BILIRUBIN DIRECT: 0.74 MG/DL
BILIRUBIN, INDIRECT: 0.48 MG/DL (ref 0–1)
BLD PROD TYP BPU: NORMAL
BUN BLDV-MCNC: 13 MG/DL (ref 8–23)
BUN/CREAT BLD: 17 (ref 9–20)
BUN/CREAT BLD: 20 (ref 9–20)
BUN/CREAT BLD: 20 (ref 9–20)
CALCIUM SERPL-MCNC: 7.1 MG/DL (ref 8.6–10.4)
CALCIUM SERPL-MCNC: 7.2 MG/DL (ref 8.6–10.4)
CALCIUM SERPL-MCNC: 7.7 MG/DL (ref 8.6–10.4)
CHLORIDE BLD-SCNC: 112 MMOL/L (ref 98–107)
CHLORIDE BLD-SCNC: 114 MMOL/L (ref 98–107)
CHLORIDE BLD-SCNC: 116 MMOL/L (ref 98–107)
CO2: 17 MMOL/L (ref 20–31)
CO2: 19 MMOL/L (ref 20–31)
CO2: 21 MMOL/L (ref 20–31)
CREAT SERPL-MCNC: 0.65 MG/DL (ref 0.5–0.9)
CREAT SERPL-MCNC: 0.66 MG/DL (ref 0.5–0.9)
CREAT SERPL-MCNC: 0.77 MG/DL (ref 0.5–0.9)
DIFFERENTIAL TYPE: ABNORMAL
DISPENSE STATUS BLOOD BANK: NORMAL
EOSINOPHILS RELATIVE PERCENT: 0 % (ref 1–4)
FIO2: 30
FIO2: 30
FIO2: ABNORMAL
FIO2: ABNORMAL
GFR AFRICAN AMERICAN: >60 ML/MIN
GFR NON-AFRICAN AMERICAN: >60 ML/MIN
GFR NON-AFRICAN AMERICAN: NORMAL ML/MIN
GFR SERPL CREATININE-BSD FRML MDRD: ABNORMAL ML/MIN/{1.73_M2}
GFR SERPL CREATININE-BSD FRML MDRD: NORMAL ML/MIN
GFR SERPL CREATININE-BSD FRML MDRD: NORMAL ML/MIN/{1.73_M2}
GLOBULIN: ABNORMAL G/DL (ref 1.5–3.8)
GLUCOSE BLD-MCNC: 109 MG/DL (ref 65–105)
GLUCOSE BLD-MCNC: 110 MG/DL (ref 65–105)
GLUCOSE BLD-MCNC: 111 MG/DL (ref 65–105)
GLUCOSE BLD-MCNC: 124 MG/DL (ref 65–105)
GLUCOSE BLD-MCNC: 129 MG/DL (ref 65–105)
GLUCOSE BLD-MCNC: 130 MG/DL (ref 65–105)
GLUCOSE BLD-MCNC: 135 MG/DL (ref 74–100)
GLUCOSE BLD-MCNC: 60 MG/DL (ref 65–105)
GLUCOSE BLD-MCNC: 64 MG/DL (ref 65–105)
GLUCOSE BLD-MCNC: 71 MG/DL (ref 65–105)
GLUCOSE BLD-MCNC: 71 MG/DL (ref 65–105)
GLUCOSE BLD-MCNC: 75 MG/DL (ref 65–105)
GLUCOSE BLD-MCNC: 76 MG/DL (ref 65–105)
GLUCOSE BLD-MCNC: 77 MG/DL (ref 65–105)
GLUCOSE BLD-MCNC: 79 MG/DL (ref 65–105)
GLUCOSE BLD-MCNC: 81 MG/DL (ref 65–105)
GLUCOSE BLD-MCNC: 91 MG/DL (ref 65–105)
GLUCOSE BLD-MCNC: 92 MG/DL (ref 65–105)
GLUCOSE BLD-MCNC: 93 MG/DL (ref 70–99)
GLUCOSE BLD-MCNC: 94 MG/DL (ref 70–99)
GLUCOSE BLD-MCNC: 95 MG/DL (ref 65–105)
GLUCOSE BLD-MCNC: 97 MG/DL (ref 74–106)
GLUCOSE BLD-MCNC: 98 MG/DL (ref 70–99)
GLUCOSE BLD-MCNC: 99 MG/DL (ref 65–105)
HCO3 VENOUS: 18.7 MMOL/L (ref 22–29)
HCT VFR BLD CALC: 25.8 % (ref 36.3–47.1)
HCT VFR BLD CALC: 25.8 % (ref 36.3–47.1)
HCT VFR BLD CALC: 26.7 % (ref 36.3–47.1)
HEMOGLOBIN: 7.9 G/DL (ref 11.9–15.1)
HEMOGLOBIN: 8 G/DL (ref 11.9–15.1)
HEMOGLOBIN: 8.2 G/DL (ref 11.9–15.1)
IMMATURE GRANULOCYTES: 1 %
INR BLD: 1.7
INR BLD: 1.8
LYMPHOCYTES # BLD: 8 % (ref 24–43)
MAGNESIUM: 1.9 MG/DL (ref 1.6–2.6)
MAGNESIUM: 2.1 MG/DL (ref 1.6–2.6)
MAGNESIUM: 2.2 MG/DL (ref 1.6–2.6)
MCH RBC QN AUTO: 25.9 PG (ref 25.2–33.5)
MCH RBC QN AUTO: 26 PG (ref 25.2–33.5)
MCH RBC QN AUTO: 26.2 PG (ref 25.2–33.5)
MCHC RBC AUTO-ENTMCNC: 30.6 G/DL (ref 28.4–34.8)
MCHC RBC AUTO-ENTMCNC: 30.7 G/DL (ref 28.4–34.8)
MCHC RBC AUTO-ENTMCNC: 31 G/DL (ref 28.4–34.8)
MCV RBC AUTO: 83.8 FL (ref 82.6–102.9)
MCV RBC AUTO: 84.5 FL (ref 82.6–102.9)
MCV RBC AUTO: 85.4 FL (ref 82.6–102.9)
MODE: ABNORMAL
MONOCYTES # BLD: 5 % (ref 3–12)
NEGATIVE BASE EXCESS, ART: 5 (ref 0–2)
NEGATIVE BASE EXCESS, ART: 5 (ref 0–2)
NEGATIVE BASE EXCESS, ART: 8 (ref 0–2)
NEGATIVE BASE EXCESS, VEN: 6 (ref 0–2)
NRBC AUTOMATED: 0 PER 100 WBC
O2 DEVICE/FLOW/%: ABNORMAL
O2 SAT, VEN: 94 % (ref 60–85)
PARTIAL THROMBOPLASTIN TIME: 41.1 SEC (ref 23.9–33.8)
PATIENT TEMP: 38.3
PATIENT TEMP: ABNORMAL
PCO2, VEN: 31.8 MM HG (ref 41–51)
PDW BLD-RTO: 15.2 % (ref 11.8–14.4)
PDW BLD-RTO: 15.3 % (ref 11.8–14.4)
PDW BLD-RTO: 15.9 % (ref 11.8–14.4)
PH VENOUS: 7.38 (ref 7.32–7.43)
PLATELET # BLD: 148 K/UL (ref 138–453)
PLATELET # BLD: 148 K/UL (ref 138–453)
PLATELET # BLD: 158 K/UL (ref 138–453)
PLATELET ESTIMATE: ABNORMAL
PMV BLD AUTO: 10.5 FL (ref 8.1–13.5)
PMV BLD AUTO: 10.6 FL (ref 8.1–13.5)
PMV BLD AUTO: 9.8 FL (ref 8.1–13.5)
PO2, VEN: 70.4 MM HG (ref 30–50)
POC BUN: 14 MG/DL (ref 8–26)
POC CHLORIDE: 114 MMOL/L (ref 98–107)
POC CREATININE: 0.96 MG/DL (ref 0.51–1.19)
POC HCO3: 18.9 MMOL/L (ref 21–28)
POC HCO3: 19.2 MMOL/L (ref 21–28)
POC HCO3: 20.2 MMOL/L (ref 22–27)
POC HEMATOCRIT: 25 % (ref 36–46)
POC HEMATOCRIT: 26 % (ref 36–46)
POC HEMOGLOBIN: 8.5 G/DL (ref 12–16)
POC HEMOGLOBIN: 8.8 G/DL (ref 12–16)
POC IONIZED CALCIUM: 1.01 MMOL/L (ref 1.13–1.33)
POC IONIZED CALCIUM: 1.16 MMOL/L (ref 1.15–1.33)
POC O2 SATURATION: 100 %
POC O2 SATURATION: 95 % (ref 94–98)
POC O2 SATURATION: 96 % (ref 94–98)
POC PCO2 TEMP: 34 MM HG
POC PCO2 TEMP: ABNORMAL MM HG
POC PCO2: 30.4 MM HG (ref 35–48)
POC PCO2: 32 MM HG (ref 32–45)
POC PCO2: 41.5 MM HG (ref 35–48)
POC PH TEMP: 7.36
POC PH TEMP: ABNORMAL
POC PH: 7.27 (ref 7.35–7.45)
POC PH: 7.41 (ref 7.35–7.45)
POC PH: 7.41 (ref 7.35–7.45)
POC PO2 TEMP: 77 MM HG
POC PO2 TEMP: ABNORMAL MM HG
POC PO2: 260 MM HG (ref 75–95)
POC PO2: 79.9 MM HG (ref 83–108)
POC PO2: 86.2 MM HG (ref 83–108)
POC POTASSIUM: 3.6 MMOL/L (ref 3.5–4.5)
POC POTASSIUM: 3.6 MMOL/L (ref 3.5–5.1)
POC POTASSIUM: 4.8 MMOL/L (ref 3.5–4.5)
POC SODIUM: 145 MMOL/L (ref 138–146)
POC SODIUM: 147 MMOL/L (ref 136–145)
POC TCO2: 20 MMOL/L (ref 22–30)
POSITIVE BASE EXCESS, ART: ABNORMAL (ref 0–2)
POSITIVE BASE EXCESS, ART: ABNORMAL (ref 0–3)
POSITIVE BASE EXCESS, ART: ABNORMAL (ref 0–3)
POSITIVE BASE EXCESS, VEN: ABNORMAL (ref 0–3)
POTASSIUM SERPL-SCNC: 3.5 MMOL/L (ref 3.7–5.3)
POTASSIUM SERPL-SCNC: 3.9 MMOL/L (ref 3.7–5.3)
POTASSIUM SERPL-SCNC: 4.5 MMOL/L (ref 3.7–5.3)
PROTHROMBIN TIME: 19.7 SEC (ref 11.5–14.2)
PROTHROMBIN TIME: 20.7 SEC (ref 11.5–14.2)
RBC # BLD: 3.02 M/UL (ref 3.95–5.11)
RBC # BLD: 3.08 M/UL (ref 3.95–5.11)
RBC # BLD: 3.16 M/UL (ref 3.95–5.11)
RBC # BLD: ABNORMAL 10*6/UL
SAMPLE SITE: ABNORMAL
SEG NEUTROPHILS: 86 % (ref 36–65)
SEGMENTED NEUTROPHILS ABSOLUTE COUNT: 11.86 K/UL (ref 1.5–8.1)
SODIUM BLD-SCNC: 141 MMOL/L (ref 135–144)
SODIUM BLD-SCNC: 144 MMOL/L (ref 135–144)
SODIUM BLD-SCNC: 146 MMOL/L (ref 135–144)
TCO2 (CALC), ART: 21 MMOL/L (ref 23–28)
TCO2 (CALC), ART: ABNORMAL MMOL/L (ref 22–29)
TCO2 (CALC), ART: ABNORMAL MMOL/L (ref 22–29)
TOTAL CO2, VENOUS: ABNORMAL MMOL/L (ref 23–30)
TOTAL PROTEIN: 5.3 G/DL (ref 6.4–8.3)
TRANSFUSION STATUS: NORMAL
UNIT DIVISION: 0
UNIT NUMBER: NORMAL
WBC # BLD: 13.8 K/UL (ref 3.5–11.3)
WBC # BLD: 14.6 K/UL (ref 3.5–11.3)
WBC # BLD: 14.9 K/UL (ref 3.5–11.3)
WBC # BLD: ABNORMAL 10*3/UL

## 2021-11-03 PROCEDURE — 94640 AIRWAY INHALATION TREATMENT: CPT

## 2021-11-03 PROCEDURE — 94003 VENT MGMT INPAT SUBQ DAY: CPT

## 2021-11-03 PROCEDURE — 2580000003 HC RX 258: Performed by: THORACIC SURGERY (CARDIOTHORACIC VASCULAR SURGERY)

## 2021-11-03 PROCEDURE — 6360000002 HC RX W HCPCS: Performed by: NURSE PRACTITIONER

## 2021-11-03 PROCEDURE — 83735 ASSAY OF MAGNESIUM: CPT

## 2021-11-03 PROCEDURE — 2500000003 HC RX 250 WO HCPCS: Performed by: NURSE PRACTITIONER

## 2021-11-03 PROCEDURE — 2580000003 HC RX 258: Performed by: NURSE PRACTITIONER

## 2021-11-03 PROCEDURE — C9113 INJ PANTOPRAZOLE SODIUM, VIA: HCPCS | Performed by: NURSE PRACTITIONER

## 2021-11-03 PROCEDURE — 2000000000 HC ICU R&B

## 2021-11-03 PROCEDURE — 85610 PROTHROMBIN TIME: CPT

## 2021-11-03 PROCEDURE — 85027 COMPLETE CBC AUTOMATED: CPT

## 2021-11-03 PROCEDURE — 99232 SBSQ HOSP IP/OBS MODERATE 35: CPT | Performed by: STUDENT IN AN ORGANIZED HEALTH CARE EDUCATION/TRAINING PROGRAM

## 2021-11-03 PROCEDURE — APPSS45 APP SPLIT SHARED TIME 31-45 MINUTES: Performed by: NURSE PRACTITIONER

## 2021-11-03 PROCEDURE — 82803 BLOOD GASES ANY COMBINATION: CPT

## 2021-11-03 PROCEDURE — 2580000003 HC RX 258: Performed by: NURSE ANESTHETIST, CERTIFIED REGISTERED

## 2021-11-03 PROCEDURE — 3600000012 HC SURGERY LEVEL 2 ADDTL 15MIN: Performed by: THORACIC SURGERY (CARDIOTHORACIC VASCULAR SURGERY)

## 2021-11-03 PROCEDURE — 6370000000 HC RX 637 (ALT 250 FOR IP): Performed by: NURSE PRACTITIONER

## 2021-11-03 PROCEDURE — 80048 BASIC METABOLIC PNL TOTAL CA: CPT

## 2021-11-03 PROCEDURE — 3700000001 HC ADD 15 MINUTES (ANESTHESIA): Performed by: THORACIC SURGERY (CARDIOTHORACIC VASCULAR SURGERY)

## 2021-11-03 PROCEDURE — 82565 ASSAY OF CREATININE: CPT

## 2021-11-03 PROCEDURE — 85014 HEMATOCRIT: CPT

## 2021-11-03 PROCEDURE — 94761 N-INVAS EAR/PLS OXIMETRY MLT: CPT

## 2021-11-03 PROCEDURE — 2500000003 HC RX 250 WO HCPCS: Performed by: NURSE ANESTHETIST, CERTIFIED REGISTERED

## 2021-11-03 PROCEDURE — 37799 UNLISTED PX VASCULAR SURGERY: CPT

## 2021-11-03 PROCEDURE — P9041 ALBUMIN (HUMAN),5%, 50ML: HCPCS | Performed by: NURSE PRACTITIONER

## 2021-11-03 PROCEDURE — 3700000000 HC ANESTHESIA ATTENDED CARE: Performed by: THORACIC SURGERY (CARDIOTHORACIC VASCULAR SURGERY)

## 2021-11-03 PROCEDURE — 84520 ASSAY OF UREA NITROGEN: CPT

## 2021-11-03 PROCEDURE — 84295 ASSAY OF SERUM SODIUM: CPT

## 2021-11-03 PROCEDURE — 2709999900 HC NON-CHARGEABLE SUPPLY: Performed by: THORACIC SURGERY (CARDIOTHORACIC VASCULAR SURGERY)

## 2021-11-03 PROCEDURE — 82330 ASSAY OF CALCIUM: CPT

## 2021-11-03 PROCEDURE — 2700000000 HC OXYGEN THERAPY PER DAY

## 2021-11-03 PROCEDURE — 21750 REPAIR OF STERNUM SEPARATION: CPT | Performed by: THORACIC SURGERY (CARDIOTHORACIC VASCULAR SURGERY)

## 2021-11-03 PROCEDURE — 6360000002 HC RX W HCPCS: Performed by: THORACIC SURGERY (CARDIOTHORACIC VASCULAR SURGERY)

## 2021-11-03 PROCEDURE — 71045 X-RAY EXAM CHEST 1 VIEW: CPT

## 2021-11-03 PROCEDURE — 80051 ELECTROLYTE PANEL: CPT

## 2021-11-03 PROCEDURE — 2100000000 HC CCU R&B

## 2021-11-03 PROCEDURE — 36591 DRAW BLOOD OFF VENOUS DEVICE: CPT

## 2021-11-03 PROCEDURE — C1713 ANCHOR/SCREW BN/BN,TIS/BN: HCPCS | Performed by: THORACIC SURGERY (CARDIOTHORACIC VASCULAR SURGERY)

## 2021-11-03 PROCEDURE — 80076 HEPATIC FUNCTION PANEL: CPT

## 2021-11-03 PROCEDURE — 2720000010 HC SURG SUPPLY STERILE: Performed by: THORACIC SURGERY (CARDIOTHORACIC VASCULAR SURGERY)

## 2021-11-03 PROCEDURE — 3600000002 HC SURGERY LEVEL 2 BASE: Performed by: THORACIC SURGERY (CARDIOTHORACIC VASCULAR SURGERY)

## 2021-11-03 PROCEDURE — 6360000002 HC RX W HCPCS

## 2021-11-03 PROCEDURE — 85025 COMPLETE CBC W/AUTO DIFF WBC: CPT

## 2021-11-03 PROCEDURE — 85730 THROMBOPLASTIN TIME PARTIAL: CPT

## 2021-11-03 PROCEDURE — 84132 ASSAY OF SERUM POTASSIUM: CPT

## 2021-11-03 DEVICE — IMPLANTABLE DEVICE: Type: IMPLANTABLE DEVICE | Site: STERNUM | Status: FUNCTIONAL

## 2021-11-03 DEVICE — DEVICE STRNL CLSR MULTIIMPLANT STRNLOCK 360: Type: IMPLANTABLE DEVICE | Site: STERNUM | Status: FUNCTIONAL

## 2021-11-03 RX ORDER — NICOTINE POLACRILEX 4 MG
15 LOZENGE BUCCAL PRN
Status: DISCONTINUED | OUTPATIENT
Start: 2021-11-03 | End: 2021-11-10 | Stop reason: HOSPADM

## 2021-11-03 RX ORDER — PANTOPRAZOLE SODIUM 40 MG/10ML
40 INJECTION, POWDER, LYOPHILIZED, FOR SOLUTION INTRAVENOUS DAILY
Status: DISCONTINUED | OUTPATIENT
Start: 2021-11-03 | End: 2021-11-04

## 2021-11-03 RX ORDER — IPRATROPIUM BROMIDE AND ALBUTEROL SULFATE 2.5; .5 MG/3ML; MG/3ML
1 SOLUTION RESPIRATORY (INHALATION)
Status: DISCONTINUED | OUTPATIENT
Start: 2021-11-03 | End: 2021-11-10 | Stop reason: HOSPADM

## 2021-11-03 RX ORDER — POTASSIUM CHLORIDE 29.8 MG/ML
20 INJECTION INTRAVENOUS PRN
Status: DISCONTINUED | OUTPATIENT
Start: 2021-11-03 | End: 2021-11-10 | Stop reason: HOSPADM

## 2021-11-03 RX ORDER — SODIUM CHLORIDE 0.9 % (FLUSH) 0.9 %
10 SYRINGE (ML) INJECTION PRN
Status: DISCONTINUED | OUTPATIENT
Start: 2021-11-03 | End: 2021-11-10 | Stop reason: HOSPADM

## 2021-11-03 RX ORDER — POTASSIUM CHLORIDE 20 MEQ/1
40 TABLET, EXTENDED RELEASE ORAL PRN
Status: DISCONTINUED | OUTPATIENT
Start: 2021-11-03 | End: 2021-11-03

## 2021-11-03 RX ORDER — SODIUM PHOSPHATE, DIBASIC AND SODIUM PHOSPHATE, MONOBASIC 7; 19 G/133ML; G/133ML
1 ENEMA RECTAL DAILY PRN
Status: DISCONTINUED | OUTPATIENT
Start: 2021-11-03 | End: 2021-11-10 | Stop reason: HOSPADM

## 2021-11-03 RX ORDER — OXYCODONE HYDROCHLORIDE AND ACETAMINOPHEN 5; 325 MG/1; MG/1
1 TABLET ORAL EVERY 4 HOURS PRN
Status: DISCONTINUED | OUTPATIENT
Start: 2021-11-03 | End: 2021-11-10 | Stop reason: HOSPADM

## 2021-11-03 RX ORDER — ASPIRIN 81 MG/1
81 TABLET ORAL DAILY
Status: DISCONTINUED | OUTPATIENT
Start: 2021-11-03 | End: 2021-11-10 | Stop reason: HOSPADM

## 2021-11-03 RX ORDER — SODIUM CHLORIDE 9 MG/ML
INJECTION, SOLUTION INTRAVENOUS CONTINUOUS PRN
Status: DISCONTINUED | OUTPATIENT
Start: 2021-11-03 | End: 2021-11-03 | Stop reason: SDUPTHER

## 2021-11-03 RX ORDER — ACETAMINOPHEN 325 MG/1
650 TABLET ORAL EVERY 4 HOURS PRN
Status: DISCONTINUED | OUTPATIENT
Start: 2021-11-03 | End: 2021-11-10 | Stop reason: HOSPADM

## 2021-11-03 RX ORDER — PANTOPRAZOLE SODIUM 40 MG/1
40 TABLET, DELAYED RELEASE ORAL DAILY
Status: DISCONTINUED | OUTPATIENT
Start: 2021-11-03 | End: 2021-11-10 | Stop reason: HOSPADM

## 2021-11-03 RX ORDER — FENTANYL CITRATE 50 UG/ML
25 INJECTION, SOLUTION INTRAMUSCULAR; INTRAVENOUS
Status: DISCONTINUED | OUTPATIENT
Start: 2021-11-03 | End: 2021-11-10

## 2021-11-03 RX ORDER — HYDRALAZINE HYDROCHLORIDE 20 MG/ML
5 INJECTION INTRAMUSCULAR; INTRAVENOUS EVERY 5 MIN PRN
Status: DISCONTINUED | OUTPATIENT
Start: 2021-11-03 | End: 2021-11-10 | Stop reason: HOSPADM

## 2021-11-03 RX ORDER — SODIUM CHLORIDE 9 MG/ML
25 INJECTION, SOLUTION INTRAVENOUS PRN
Status: DISCONTINUED | OUTPATIENT
Start: 2021-11-03 | End: 2021-11-10 | Stop reason: HOSPADM

## 2021-11-03 RX ORDER — METOPROLOL TARTRATE 5 MG/5ML
2.5 INJECTION INTRAVENOUS EVERY 10 MIN PRN
Status: DISCONTINUED | OUTPATIENT
Start: 2021-11-03 | End: 2021-11-10 | Stop reason: HOSPADM

## 2021-11-03 RX ORDER — ONDANSETRON 2 MG/ML
4 INJECTION INTRAMUSCULAR; INTRAVENOUS EVERY 8 HOURS PRN
Status: DISCONTINUED | OUTPATIENT
Start: 2021-11-03 | End: 2021-11-10 | Stop reason: HOSPADM

## 2021-11-03 RX ORDER — PROTAMINE SULFATE 10 MG/ML
50 INJECTION, SOLUTION INTRAVENOUS
Status: ACTIVE | OUTPATIENT
Start: 2021-11-03 | End: 2021-11-03

## 2021-11-03 RX ORDER — CALCIUM CHLORIDE 100 MG/ML
INJECTION INTRAVENOUS; INTRAVENTRICULAR PRN
Status: DISCONTINUED | OUTPATIENT
Start: 2021-11-03 | End: 2021-11-03 | Stop reason: SDUPTHER

## 2021-11-03 RX ORDER — CLINDAMYCIN PHOSPHATE 900 MG/50ML
INJECTION INTRAVENOUS PRN
Status: DISCONTINUED | OUTPATIENT
Start: 2021-11-03 | End: 2021-11-03 | Stop reason: SDUPTHER

## 2021-11-03 RX ORDER — POTASSIUM CHLORIDE 7.45 MG/ML
10 INJECTION INTRAVENOUS PRN
Status: DISCONTINUED | OUTPATIENT
Start: 2021-11-03 | End: 2021-11-03

## 2021-11-03 RX ORDER — SODIUM CHLORIDE 0.9 % (FLUSH) 0.9 %
10 SYRINGE (ML) INJECTION EVERY 12 HOURS SCHEDULED
Status: DISCONTINUED | OUTPATIENT
Start: 2021-11-03 | End: 2021-11-10 | Stop reason: HOSPADM

## 2021-11-03 RX ORDER — ALBUTEROL SULFATE 2.5 MG/3ML
2.5 SOLUTION RESPIRATORY (INHALATION) EVERY 4 HOURS PRN
Status: DISCONTINUED | OUTPATIENT
Start: 2021-11-03 | End: 2021-11-10 | Stop reason: HOSPADM

## 2021-11-03 RX ORDER — FENTANYL CITRATE 50 UG/ML
50 INJECTION, SOLUTION INTRAMUSCULAR; INTRAVENOUS
Status: DISCONTINUED | OUTPATIENT
Start: 2021-11-03 | End: 2021-11-05

## 2021-11-03 RX ORDER — POTASSIUM CHLORIDE 29.8 MG/ML
INJECTION INTRAVENOUS
Status: DISCONTINUED
Start: 2021-11-03 | End: 2021-11-03

## 2021-11-03 RX ORDER — DEXTROSE MONOHYDRATE 25 G/50ML
12.5 INJECTION, SOLUTION INTRAVENOUS PRN
Status: DISCONTINUED | OUTPATIENT
Start: 2021-11-03 | End: 2021-11-10 | Stop reason: HOSPADM

## 2021-11-03 RX ORDER — OXYCODONE HYDROCHLORIDE AND ACETAMINOPHEN 5; 325 MG/1; MG/1
2 TABLET ORAL EVERY 4 HOURS PRN
Status: DISCONTINUED | OUTPATIENT
Start: 2021-11-03 | End: 2021-11-10 | Stop reason: HOSPADM

## 2021-11-03 RX ORDER — MEPERIDINE HYDROCHLORIDE 50 MG/ML
25 INJECTION INTRAMUSCULAR; INTRAVENOUS; SUBCUTANEOUS
Status: ACTIVE | OUTPATIENT
Start: 2021-11-03 | End: 2021-11-03

## 2021-11-03 RX ORDER — AMIODARONE HYDROCHLORIDE 200 MG/1
200 TABLET ORAL 3 TIMES DAILY
Status: DISCONTINUED | OUTPATIENT
Start: 2021-11-03 | End: 2021-11-10

## 2021-11-03 RX ORDER — SODIUM CHLORIDE 9 MG/ML
10 INJECTION INTRAVENOUS DAILY
Status: DISCONTINUED | OUTPATIENT
Start: 2021-11-03 | End: 2021-11-04

## 2021-11-03 RX ORDER — POLYETHYLENE GLYCOL 3350 17 G/17G
17 POWDER, FOR SOLUTION ORAL DAILY
Status: DISCONTINUED | OUTPATIENT
Start: 2021-11-03 | End: 2021-11-10 | Stop reason: HOSPADM

## 2021-11-03 RX ORDER — DEXTROSE MONOHYDRATE 50 MG/ML
100 INJECTION, SOLUTION INTRAVENOUS PRN
Status: DISCONTINUED | OUTPATIENT
Start: 2021-11-03 | End: 2021-11-10 | Stop reason: HOSPADM

## 2021-11-03 RX ORDER — DIPHENHYDRAMINE HCL 25 MG
25 TABLET ORAL NIGHTLY PRN
Status: DISCONTINUED | OUTPATIENT
Start: 2021-11-04 | End: 2021-11-10 | Stop reason: HOSPADM

## 2021-11-03 RX ORDER — ALBUMIN, HUMAN INJ 5% 5 %
25 SOLUTION INTRAVENOUS PRN
Status: DISCONTINUED | OUTPATIENT
Start: 2021-11-03 | End: 2021-11-10 | Stop reason: HOSPADM

## 2021-11-03 RX ADMIN — FENTANYL CITRATE 50 MCG: 50 INJECTION INTRAMUSCULAR; INTRAVENOUS at 22:23

## 2021-11-03 RX ADMIN — CALCIUM CHLORIDE INJECTION 1 G: 100 INJECTION, SOLUTION INTRAVENOUS at 12:22

## 2021-11-03 RX ADMIN — HYDROCORTISONE SODIUM SUCCINATE 50 MG: 100 INJECTION, POWDER, FOR SOLUTION INTRAMUSCULAR; INTRAVENOUS at 21:43

## 2021-11-03 RX ADMIN — POTASSIUM CHLORIDE 20 MEQ: 29.8 INJECTION, SOLUTION INTRAVENOUS at 01:34

## 2021-11-03 RX ADMIN — PROPOFOL 50 MCG/KG/MIN: 10 INJECTION, EMULSION INTRAVENOUS at 10:21

## 2021-11-03 RX ADMIN — HYDROCORTISONE SODIUM SUCCINATE 50 MG: 100 INJECTION, POWDER, FOR SOLUTION INTRAMUSCULAR; INTRAVENOUS at 14:06

## 2021-11-03 RX ADMIN — POLYETHYLENE GLYCOL 3350 17 G: 17 POWDER, FOR SOLUTION ORAL at 21:42

## 2021-11-03 RX ADMIN — FENTANYL CITRATE 50 MCG: 50 INJECTION INTRAMUSCULAR; INTRAVENOUS at 17:51

## 2021-11-03 RX ADMIN — PROPOFOL 15 MCG/KG/MIN: 10 INJECTION, EMULSION INTRAVENOUS at 16:28

## 2021-11-03 RX ADMIN — DOCUSATE SODIUM 50MG AND SENNOSIDES 8.6MG 1 TABLET: 8.6; 5 TABLET, FILM COATED ORAL at 21:43

## 2021-11-03 RX ADMIN — ALBUMIN (HUMAN) 25 G: 12.5 INJECTION, SOLUTION INTRAVENOUS at 01:28

## 2021-11-03 RX ADMIN — POTASSIUM CHLORIDE 20 MEQ: 29.8 INJECTION, SOLUTION INTRAVENOUS at 11:20

## 2021-11-03 RX ADMIN — CLINDAMYCIN PHOSPHATE 900 MG: 900 INJECTION, SOLUTION INTRAVENOUS at 05:42

## 2021-11-03 RX ADMIN — PROPOFOL 50 MCG/KG/MIN: 10 INJECTION, EMULSION INTRAVENOUS at 05:17

## 2021-11-03 RX ADMIN — CLINDAMYCIN PHOSPHATE 900 MG: 900 INJECTION, SOLUTION INTRAVENOUS at 16:30

## 2021-11-03 RX ADMIN — IPRATROPIUM BROMIDE AND ALBUTEROL SULFATE 1 AMPULE: .5; 2.5 SOLUTION RESPIRATORY (INHALATION) at 15:08

## 2021-11-03 RX ADMIN — SODIUM BICARBONATE 50 MEQ: 84 INJECTION, SOLUTION INTRAVENOUS at 06:19

## 2021-11-03 RX ADMIN — IPRATROPIUM BROMIDE AND ALBUTEROL SULFATE 1 AMPULE: .5; 2.5 SOLUTION RESPIRATORY (INHALATION) at 19:31

## 2021-11-03 RX ADMIN — SODIUM CHLORIDE, PRESERVATIVE FREE 10 ML: 5 INJECTION INTRAVENOUS at 16:35

## 2021-11-03 RX ADMIN — SODIUM CHLORIDE: 9 INJECTION, SOLUTION INTRAVENOUS at 11:31

## 2021-11-03 RX ADMIN — MUPIROCIN: 20 OINTMENT TOPICAL at 21:48

## 2021-11-03 RX ADMIN — AMIODARONE HYDROCHLORIDE 200 MG: 200 TABLET ORAL at 21:43

## 2021-11-03 RX ADMIN — FENTANYL CITRATE 50 MCG: 50 INJECTION INTRAMUSCULAR; INTRAVENOUS at 19:38

## 2021-11-03 RX ADMIN — AMIODARONE HYDROCHLORIDE 200 MG: 200 TABLET ORAL at 14:06

## 2021-11-03 RX ADMIN — SODIUM BICARBONATE 50 MEQ: 84 INJECTION, SOLUTION INTRAVENOUS at 23:07

## 2021-11-03 RX ADMIN — VANCOMYCIN HYDROCHLORIDE 1500 MG: 5 INJECTION, POWDER, LYOPHILIZED, FOR SOLUTION INTRAVENOUS at 17:46

## 2021-11-03 RX ADMIN — PROPOFOL 50 MCG/KG/MIN: 10 INJECTION, EMULSION INTRAVENOUS at 01:05

## 2021-11-03 RX ADMIN — POTASSIUM CHLORIDE 20 MEQ: 29.8 INJECTION, SOLUTION INTRAVENOUS at 14:07

## 2021-11-03 RX ADMIN — SODIUM CHLORIDE, PRESERVATIVE FREE 10 ML: 5 INJECTION INTRAVENOUS at 22:08

## 2021-11-03 RX ADMIN — ALBUMIN (HUMAN) 25 G: 12.5 INJECTION, SOLUTION INTRAVENOUS at 03:07

## 2021-11-03 RX ADMIN — POTASSIUM CHLORIDE 10 MEQ: 7.46 INJECTION, SOLUTION INTRAVENOUS at 02:39

## 2021-11-03 RX ADMIN — VANCOMYCIN HYDROCHLORIDE 1500 MG: 5 INJECTION, POWDER, LYOPHILIZED, FOR SOLUTION INTRAVENOUS at 06:38

## 2021-11-03 RX ADMIN — CLINDAMYCIN PHOSPHATE 900 MG: 18 INJECTION, SOLUTION INTRAMUSCULAR; INTRAVENOUS at 11:46

## 2021-11-03 RX ADMIN — ERYTHROMYCIN: 5 OINTMENT OPHTHALMIC at 21:48

## 2021-11-03 RX ADMIN — ALBUMIN (HUMAN) 25 G: 12.5 INJECTION, SOLUTION INTRAVENOUS at 10:33

## 2021-11-03 RX ADMIN — PANTOPRAZOLE SODIUM 40 MG: 40 INJECTION, POWDER, FOR SOLUTION INTRAVENOUS at 16:30

## 2021-11-03 RX ADMIN — GABAPENTIN 300 MG: 300 CAPSULE ORAL at 21:43

## 2021-11-03 ASSESSMENT — PULMONARY FUNCTION TESTS
PIF_VALUE: 29
PIF_VALUE: 0
PIF_VALUE: 30
PIF_VALUE: 31
PIF_VALUE: 28
PIF_VALUE: 29
PIF_VALUE: 31
PIF_VALUE: 32
PIF_VALUE: 32
PIF_VALUE: 31
PIF_VALUE: 31
PIF_VALUE: 32
PIF_VALUE: 29
PIF_VALUE: 31
PIF_VALUE: 30
PIF_VALUE: 35
PIF_VALUE: 32
PIF_VALUE: 0
PIF_VALUE: 32
PIF_VALUE: 30
PIF_VALUE: 31
PIF_VALUE: 33
PIF_VALUE: 32
PIF_VALUE: 1
PIF_VALUE: 29
PIF_VALUE: 25
PIF_VALUE: 1
PIF_VALUE: 36
PIF_VALUE: 35
PIF_VALUE: 31
PIF_VALUE: 33
PIF_VALUE: 32
PIF_VALUE: 32
PIF_VALUE: 0
PIF_VALUE: 33
PIF_VALUE: 34
PIF_VALUE: 0
PIF_VALUE: 33
PIF_VALUE: 33
PIF_VALUE: 31
PIF_VALUE: 35
PIF_VALUE: 29
PIF_VALUE: 16
PIF_VALUE: 1
PIF_VALUE: 32
PIF_VALUE: 0
PIF_VALUE: 31
PIF_VALUE: 32
PIF_VALUE: 2
PIF_VALUE: 31
PIF_VALUE: 32
PIF_VALUE: 30
PIF_VALUE: 31
PIF_VALUE: 31
PIF_VALUE: 0
PIF_VALUE: 32
PIF_VALUE: 28
PIF_VALUE: 33
PIF_VALUE: 31
PIF_VALUE: 31
PIF_VALUE: 32
PIF_VALUE: 0
PIF_VALUE: 0
PIF_VALUE: 32
PIF_VALUE: 31
PIF_VALUE: 0
PIF_VALUE: 31
PIF_VALUE: 32
PIF_VALUE: 33
PIF_VALUE: 29
PIF_VALUE: 31
PIF_VALUE: 32
PIF_VALUE: 0
PIF_VALUE: 16
PIF_VALUE: 32
PIF_VALUE: 32
PIF_VALUE: 29
PIF_VALUE: 31
PIF_VALUE: 29
PIF_VALUE: 16
PIF_VALUE: 32
PIF_VALUE: 32
PIF_VALUE: 31
PIF_VALUE: 32
PIF_VALUE: 32

## 2021-11-03 NOTE — PLAN OF CARE
Patient scheduled for a delayed sternal closure today at 1100. Patient currently sedated and remains on vent.

## 2021-11-03 NOTE — ANESTHESIA PRE PROCEDURE
Department of Anesthesiology  Preprocedure Note       Name:  Olivia Cook   Age:  61 y.o.  :  1955                                          MRN:  [id number]         Date:  11/3/2021      Surgeon: Elena Diaz):  Qasim Pantoja MD    Procedure: Procedure(s):  POST OP CHEST CLOSURE    Medications prior to admission:   Prior to Admission medications    Medication Sig Start Date End Date Taking? Authorizing Provider   gabapentin (NEURONTIN) 300 MG capsule Take 300 mg by mouth 3 times daily. Yes Historical Provider, MD   insulin aspart protamine-insulin aspart (NOVOLOG MIX 70/30 FLEXPEN) (70-30) 100 UNIT/ML injection Inject into the skin 2 times daily (with meals) Inject 55 units at breakfast and 45 units at dinner subcutaneously   Yes Historical Provider, MD   predniSONE (DELTASONE) 10 MG tablet Take 10 mg by mouth daily    Yes Historical Provider, MD   albuterol sulfate HFA (VENTOLIN HFA) 108 (90 Base) MCG/ACT inhaler Inhale 2 puffs into the lungs every 4 hours as needed for Wheezing or Shortness of Breath   Yes Historical Provider, MD   FLUoxetine (PROZAC) 40 MG capsule Take 40 mg by mouth every morning   Yes Historical Provider, MD   fluticasone (FLONASE) 50 MCG/ACT nasal spray 1 spray by Each Nostril route daily   Yes Historical Provider, MD   metoprolol succinate (TOPROL XL) 25 MG extended release tablet Take 25 mg by mouth daily   Yes Historical Provider, MD   nystatin (MYCOSTATIN) 230064 UNIT/GM powder Apply topically 2 times daily Apply to affected area twice daily   Yes Historical Provider, MD   furosemide (LASIX) 40 MG tablet Take 40-80 mg by mouth See Admin Instructions Take 1 tablet by mouth every morning, 2 tablets mid day and 1 tablet at bedtime.    Yes Historical Provider, MD   acetaminophen (TYLENOL) 325 MG tablet Take 650 mg by mouth every 6 hours as needed for Pain   Yes Historical Provider, MD   metFORMIN (GLUCOPHAGE) 1000 MG tablet Take 1,000 mg by mouth 2 times daily (with meals)   Yes Historical Provider, MD   famotidine (PEPCID) 40 MG tablet Take 40 mg by mouth nightly   Yes Historical Provider, MD   vitamin D (CHOLECALCIFEROL) 50 MCG (2000 UT) TABS tablet Take 2,000 Units by mouth daily   Yes Historical Provider, MD   ascorbic acid (VITAMIN C) 500 MG tablet Take 500 mg by mouth daily   Yes Historical Provider, MD   oxyCODONE-acetaminophen (PERCOCET) 5-325 MG per tablet Take 1 tablet by mouth every 12 hours as needed for Pain.    Yes Historical Provider, MD   apixaban (ELIQUIS) 5 MG TABS tablet Take 5 mg by mouth 2 times daily   Yes Historical Provider, MD   rosuvastatin (CRESTOR) 10 MG tablet Take 10 mg by mouth daily   Yes Historical Provider, MD       Current medications:    Current Facility-Administered Medications   Medication Dose Route Frequency Provider Last Rate Last Admin    sodium bicarbonate 8.4 % injection 50 mEq  50 mEq IntraVENous Q30 Min PRN Lindsayjeffery Giles APRN - CNP   50 mEq at 11/03/21 6346    potassium chloride 20 mEq/50 mL IVPB (Central Line)  20 mEq IntraVENous PRN Lindsay Giles APRN - CNP        protamine injection 50 mg  50 mg IntraVENous Once PRN Lindsay Chan, APRN - CNP        sodium chloride flush 0.9 % injection 10 mL  10 mL IntraVENous 2 times per day Lindsay Kos, APRN - CNP        sodium chloride flush 0.9 % injection 10 mL  10 mL IntraVENous PRN Lindsay Chan, APRN - CNP        0.9 % sodium chloride infusion  25 mL IntraVENous PRN Lindsay Chan, APRN - CNP        ondansetron [facility]) injection 4 mg  4 mg IntraVENous Q8H PRN Lindsay Giles APRN - CNP        aspirin EC tablet 81 mg  81 mg Oral Daily CYNDY Marin CNP        acetaminophen (TYLENOL) tablet 650 mg  650 mg Oral Q4H PRN Lindsay Chan APRN - CNP        oxyCODONE-acetaminophen (PERCOCET) 5-325 MG per tablet 1 tablet  1 tablet Oral Q4H PRN CYNDY Regalado CNP        Or    oxyCODONE-acetaminophen (PERCOCET) 5-325 MG per tablet 2 tablet  2 tablet Oral Q4H PRN Lotus Peace, APRN - CNP        fentaNYL (SUBLIMAZE) injection 25 mcg  25 mcg IntraVENous Q1H PRN Lotus Peace, APRN - CNP        Or    fentaNYL (SUBLIMAZE) injection 50 mcg  50 mcg IntraVENous Q1H PRN Lotus Peace, APRN - CNP        meperidine (DEMEROL) injection 25 mg  25 mg IntraVENous Once PRN Lotus Peace, APRN - CNP        amiodarone (CORDARONE) tablet 200 mg  200 mg Oral TID Lotus Argenis, APRN - CNP        hydrALAZINE (APRESOLINE) injection 5 mg  5 mg IntraVENous Q5 Min PRN Lotus Peace, APRN - CNP        metoprolol (LOPRESSOR) injection 2.5 mg  2.5 mg IntraVENous Q10 Min PRN Lotus Peace, APRN - CNP        mupirocin (BACTROBAN) 2 % ointment   Nasal BID Lotus Argenis, APRN - CNP        sodium bicarbonate 8.4 % injection 50 mEq  50 mEq IntraVENous Q30 Min PRN Lotus Argenis, APRN - CNP        Elder Chao ON 11/4/2021] diphenhydrAMINE (BENADRYL) tablet 25 mg  25 mg Oral Nightly PRN Lotus Peadeanne, APRN - CNP        polyethylene glycol (GLYCOLAX) packet 17 g  17 g Oral Daily [facility], APRN - CNP        bisacodyl (DULCOLAX) EC tablet 5 mg  5 mg Oral Daily PRN Lotus Argenis, APRN - CNP        fleet rectal enema 1 enema  1 enema Rectal Daily PRN [facility] Argenis, APRN - CNP        metoprolol tartrate (LOPRESSOR) tablet 25 mg  25 mg Oral BID [facility], APRN - CNP        pantoprazole (PROTONIX) tablet 40 mg  40 mg Oral Daily [facility], APRN - CNP        pantoprazole (PROTONIX) injection 40 mg  40 mg IntraVENous Daily [facility], CYNDY - CNP        And    sodium chloride (PF) 0.9 % injection 10 mL  10 mL IntraVENous Daily [facility], APRN - CNP        ipratropium-albuterol (DUONEB) nebulizer solution 1 ampule  1 ampule Inhalation Q4H [place] Jeanette  Aftab, APRN - CNP        albumin human 5 % IV solution 25 g  25 g IntraVENous PRN Lotus Argenis, APRN - CNP        glucose (GLUTOSE) 40 % oral gel 15 g  15 g Oral PRN Harriet Sanchez Aftab, APRN - CNP        dextrose 50 % IV solution  12.5 g IntraVENous PRN Linzie Hopping, APRN - CNP        glucagon (rDNA) injection 1 mg  1 mg IntraMUSCular PRN Linzie Hopping, APRN - CNP        dextrose 5 % solution  100 mL/hr IntraVENous PRN Linzie Hopping, APRN - CNP        sennosides-docusate sodium (SENOKOT-S) 8.6-50 MG tablet 1 tablet  1 tablet Oral BID Linzie Hopping, APRN - CNP        vancomycin (VANCOCIN) 1,500 mg in dextrose 5 % 250 mL IVPB  1,500 mg IntraVENous Q12H Linzie Hopping, APRN - CNP   Stopped at 11/03/21 0808    insulin regular (HUMULIN R;NOVOLIN R) 100 Units in sodium chloride 0.9 % 100 mL infusion  1 Units/hr IntraVENous Continuous Linzie Hopping, APRN - CNP 0.3 mL/hr at 11/03/21 0600 0.34 Units/hr at 11/03/21 0600    clindamycin (CLEOCIN) 900 mg in dextrose 5 % 50 mL IVPB  900 mg IntraVENous Q6H Linzie Hopping, APRN - CNP   Stopped at 11/03/21 1697    nitroGLYCERIN 50 mg in dextrose 5% 250 mL infusion  5-200 mcg/min IntraVENous Continuous Linzie Hopping, APRN - CNP   Paused at 11/02/21 2021    naloxone (NARCAN) injection 0.4 mg  0.4 mg IntraVENous PRN Linzie Hopping, APRN - CNP        calcium gluconate 1,000 mg in dextrose 5 % 100 mL IVPB  1,000 mg IntraVENous PRN Linzie Hopping, APRN - CNP        Or    calcium gluconate 2,000 mg in dextrose 5 % 100 mL IVPB  2,000 mg IntraVENous PRN Linzie Hopping, APRN - CNP        Or    calcium gluconate 3,000 mg in dextrose 5 % 100 mL IVPB  3,000 mg IntraVENous PRN Linzie Hopping, APRN - CNP        Or    calcium gluconate 4,000 mg in dextrose 5 % 100 mL IVPB  4,000 mg IntraVENous PRN Linzie Hopping, APRN - CNP        hydrocortisone sodium succinate PF (SOLU-CORTEF) injection 50 mg  50 mg IntraVENous Q8H Jeanette Ugalde, APRN - CNP        norepinephrine (LEVOPHED) 16 mg in dextrose 5% 250 mL infusion  0.01-3.3 mcg/kg/min IntraVENous Continuous Lisa Kemp, APRN - CNP 1.6 mL/hr at 11/03/21 1255 0.02 mcg/kg/min at 11/03/21 1255    EPINEPHrine (EPINEPHrine HCL) 5 mg in dextrose 5 % 250 mL infusion  1-30 mcg/min IntraVENous Continuous Matthew Carranza APRN - CNP   Stopped at 11/02/21 2003    propofol injection  5-50 mcg/kg/min IntraVENous Titrated Matthew Tere, APRN - CNP 25.1 mL/hr at 11/03/21 1021 50 mcg/kg/min at 11/03/21 1021    phenylephrine (RODNEY-SYNEPHRINE) 50 mg in dextrose 5 % 250 mL infusion   mcg/min IntraVENous Continuous Matthew Carranza, APRN - CNP   Stopped at 11/02/21 2100    vasopressin 20 Units in dextrose 5 % 100 mL infusion  0.02 Units/min IntraVENous Continuous Matthew Carranza, APRN - CNP 6 mL/hr at 11/03/21 1032 0.02 Units/min at 11/03/21 1032    erythromycin [facility]) ophthalmic ointment   Both Eyes Nightly Matthew Carranza APRN - CNP   Given at 11/01/21 2202    ciprofloxacin (CIPRO) tablet 500 mg  500 mg Oral 2 times per day [facility] Tere, APRN - CNP   500 mg at 11/01/21 2201    benzonatate (TESSALON) capsule 100 mg  100 mg Oral TID PRN Matthew Carranza APRN - CNP        gabapentin (NEURONTIN) capsule 300 mg  300 mg Oral TID [facility] Tere, APRN - CNP   300 mg at 11/01/21 2219    FLUoxetine (PROZAC) capsule 40 mg  40 mg Oral QAM Matthew Tere APRN - CNP   40 mg at 11/01/21 0800    insulin glargine (LANTUS) injection vial 56 Units  56 Units SubCUTAneous Daily [facility] Tere APRN - CNP   56 Units at 11/02/21 0910    And    insulin lispro (HUMALOG) injection vial 10 Units  10 Units SubCUTAneous TID  CYNDY Maldonado - CNP   10 Units at 11/01/21 1653    [Held by provider] metFORMIN (GLUCOPHAGE) tablet 1,000 mg  1,000 mg Oral BID  Matthew Carranza APRN - CNP   1,000 mg at 10/26/21 1735    rosuvastatin (CRESTOR) tablet 20 mg  20 mg Oral Nightly Matthew Carranza, APRN - CNP   20 mg at 11/01/21 2201    influenza quadrivalent split vaccine (FLUZONE;FLUARIX;FLULAVAL;AFLURIA) injection 0.5 mL  0.5 mL IntraMUSCular Prior to discharge Matthew Carranza, APRN - CNP           Allergies:     Allergies   Allergen Reactions    Amoxicillin Diarrhea and Other (See Comments)    Amoxicillin-Pot Clavulanate Diarrhea    Atorvastatin Other (See Comments)     Other reaction(s): Myalgia  Weakness      Cefuroxime Axetil Other (See Comments)    Clavulanic Acid Diarrhea and Other (See Comments)    Codeine Other (See Comments)     Unless suspended in syrup      Dextroamphetamine     Adhesive Tape Rash     Itching     Cephalosporins Rash       Problem List:    Patient Active Problem List   Diagnosis Code    NSTEMI (non-ST elevated myocardial infarction) ([facility] I21.4    Type 2 diabetes mellitus with hyperglycemia, without long-term current use of insulin ([facility]) E11.65    Class 1 obesity due to excess calories with serious comorbidity and body mass index (BMI) of 33.0 to 33.9 in adult E66.09, Z68.33    Lymphedema of both lower extremities I89.0    Acute on chronic diastolic congestive heart failure (HCC) I50.33    Pathological fracture of thoracic vertebra due to secondary osteoporosis ([facility]) M80.88XA    Pathological fracture of lumbar vertebra due to secondary osteoporosis ([facility]) M80.88XA    Intractable back pain M54.9    Age-related osteoporosis with current pathological fracture M80.00XA    History of kyphoplasty Z98.890    Facet arthritis, degenerative, lumbar spine M47.816    Degenerative spondylolisthesis M43.10    DDD (degenerative disc disease), thoracolumbar M51.35    Acute on chronic congestive heart failure (HCC) I50.9    Chronic bilateral low back pain without sciatica M54.50, G89.29    Allergic conjunctivitis of both eyes H10.13    CAD in native artery I25.10       Past Medical History:        Diagnosis Date    Asthma     Diabetes mellitus ([facility]     Fibromyalgia     Headache     Lymphedema of both lower extremities     [facility]        Past Surgical History:        Procedure Laterality Date    CARPAL TUNNEL RELEASE      CORONARY ARTERY BYPASS GRAFT N/A 11/2/2021    CABG CORONARY ARTERY BYPASS  LESLIE performed by Debra Paez MD at [address] TOE AMPUTATION         Social History:    Social History     Tobacco Use    Smoking status: Never Smoker    Smokeless tobacco: Never Used   Substance Use Topics    Alcohol use: Not Currently                                Counseling given: Not Answered      Vital Signs (Current):   Vitals:    11/03/21 1000 11/03/21 1042 11/03/21 1100 11/03/21 1258   BP: (!) 88/57  (!) 92/59    Pulse: 72  71 69   Resp: 18 18 18 18   Temp:       TempSrc:       SpO2: 97% 98% 96% 94%   Weight:       Height:                                                  BP Readings from Last 3 Encounters:   11/03/21 (!) 92/59   11/03/21 (!) 91/55   11/02/21 (!) 111/58       NPO Status:                                                                                 BMI:   Wt Readings from Last 3 Encounters:   11/01/21 184 lb (83.5 kg)     Body mass index is 30.62 kg/m².     CBC:   Lab Results   Component Value Date    WBC 14.6 11/03/2021    RBC 3.16 11/03/2021    HGB 8.2 11/03/2021    HCT 26.7 11/03/2021    MCV 84.5 11/03/2021    RDW 15.3 11/03/2021     11/03/2021       CMP:   Lab Results   Component Value Date     11/03/2021    K 3.9 11/03/2021     11/03/2021    CO2 17 11/03/2021    BUN 13 11/03/2021    CREATININE 0.65 11/03/2021    GFRAA >60 11/03/2021    LABGLOM >60 11/03/2021    GLUCOSE 94 11/03/2021    PROT 5.3 11/03/2021    CALCIUM 7.1 11/03/2021    BILITOT 1.22 11/03/2021    ALKPHOS 33 11/03/2021    AST 21 11/03/2021    ALT <5 11/03/2021       POC Tests:   Recent Labs     11/02/21  1638 11/02/21  1701 11/03/21  1200 11/03/21  1200 11/03/21  1317   POCGLU 130*   < > 97   < > 91   POCNA 144   < > 147*  --   --    POCK 3.3*   < > 3.6  --   --    POCCL 108*  --   --   --   --    POCBUN 16  --   --   --   --    POCHEMO 7.0*   < > 8.5*  --   --    POCHCT 21*   < > 25*  --   --     < > = values in this interval not displayed. Coags:   Lab Results   Component Value Date    PROTIME 19.7 11/03/2021    INR 1.7 11/03/2021    APTT 38.2 11/02/2021       HCG (If Applicable): No results found for: PREGTESTUR, PREGSERUM, HCG, HCGQUANT     ABGs: No results found for: PHART, PO2ART, XRP5WIL, CTT7YGL, BEART, J2LLPANR     Type & Screen (If Applicable):  No results found for: LABABO, LABRH    Drug/Infectious Status (If Applicable):  No results found for: HIV, HEPCAB    COVID-19 Screening (If Applicable):   Lab Results   Component Value Date    COVID19 Not Detected 10/24/2021           Anesthesia Evaluation  Patient summary reviewed and Nursing notes reviewed no history of anesthetic complications:   Airway: Mallampati: Unable to assess / NA        Dental:          Pulmonary:normal exam    (+) asthma:                            Cardiovascular:  Exercise tolerance: poor (<4 METS),   (+) past MI:, CAD:, CHF:,       ECG reviewed    Rate: normal  Echocardiogram reviewed    Cleared by cardiology              Neuro/Psych:   (+) neuromuscular disease:, headaches:,             GI/Hepatic/Renal:             Endo/Other:    (+) Diabetes, . Abdominal:             Vascular: Other Findings:             Anesthesia Plan      general     ASA 4       Induction: inhalational and intravenous. MIPS: Prophylactic antiemetics administered. Anesthetic plan and risks discussed with Unable to obtain due to emergent nature. Plan discussed with CRNA.     Attending anesthesiologist reviewed and agrees with Preprocedure content      called family member listed on chart for consent no answer went to VM    Procedure medically neccessary         Britney Denise DO   11/3/2021

## 2021-11-03 NOTE — FLOWSHEET NOTE
Patient remains intubated. RN states patient has improved over night, still scheduled for open heart surgery. No family was present.  shared in silent prayer, presence. Follow up as needed.      11/03/21 0848   Encounter Summary   Services provided to: Patient   Referral/Consult From: Jose Weber Visiting   (11-3-21 PT: intubated)   Complexity of Encounter Low   Length of Encounter 15 minutes   Spiritual Assessment Completed Yes   Routine   Type Follow up   Assessment Unable to respond   Intervention Prayer

## 2021-11-03 NOTE — FLOWSHEET NOTE
Follow up visit and consult. Patient back from recovery from open heart. LEANN Hill [place] states patient  heart situation and is scheduled for another  open heart again tomorrow 11 am .  No family was present.    shared in silent prayer, leaves prayer card for possible follow up.     11/02/21 2053   Encounter Summary   Services provided to: Patient   Referral/Consult From: Nurse   Continue Visiting   (11-2-21 PT: intubated, )   Complexity of Encounter Moderate   Length of Encounter 15 minutes   Spiritual Assessment Completed Yes   Routine   Type Post-procedure   Assessment Unable to respond   Intervention Prayer

## 2021-11-03 NOTE — PROGRESS NOTES
Patient's cousin Rj Yang phoned unit. RN updated her at this time. Plan to return to OR shortly. States she missed a call from the hospital. RN notified her that Dr. Stan Nichols had called her regarding plan to return to OR.

## 2021-11-03 NOTE — PROGRESS NOTES
Patient returned to 2028 from OR s/p chest/sternum closure. Patient stable, vitals as charted. Plan to wean to extubate. RT present as well and aware of plan.

## 2021-11-03 NOTE — PROGRESS NOTES
Section of Cardiology  Progress Note      Date:  11/3/2021  Patient: Kristin Urban  Admission:  10/24/2021  6:48 AM  Admit DX: NSTEMI (non-ST elevated myocardial infarction) ([facility] [I21.4]  Non-ST elevation MI (NSTEMI) ([facility]) [I21.4]  Lymphedema of both lower extremities [I89.0]  Chronic bilateral low back pain without sciatica [M54.50, G89.29]  Acute on chronic congestive heart failure, unspecified heart failure type ([facility] [I50.9]  Age:  61 y.o., 1955     LOS: 10 days     Reason for evaluation:   NSTEMI and CAD      SUBJECTIVE:     The patient was seen and examined. Notes and labs reviewed. Patient had her bypass surgery yesterday and is scheduled for delayed sternal closure later today. She remains sedated on the ventilator. OBJECTIVE:      EXAM:   Vitals:    VITALS:  BP (!) 103/59   Pulse 74   Temp 101.1 °F (38.4 °C) (Bladder)   Resp 18   Ht 5' 5\" (1.651 m)   Wt 184 lb (83.5 kg)   SpO2 97%   BMI 30.62 kg/m²   24HR INTAKE/OUTPUT:      Intake/Output Summary (Last 24 hours) at 11/3/2021 0843  Last data filed at 11/3/2021 0600  Gross per 24 hour   Intake 9556.95 ml   Output 6251 ml   Net 3305.95 ml       CONSTITUTIONAL: Sedated on the ventilator, no signs of acute distress  HEENT: No JVD  LUNGS: Clear throughout anteriorly, nonlabored   CARDIOVASCULAR:  regular rate and rhythm, normal S1 and S2, no S3 or S4, and no rub noted. SKIN: Warm and dry.   EXTREMITIES: Mild bilateral lower extremity edema    Current Inpatient Medications:   sodium chloride flush  10 mL IntraVENous 2 times per day    aspirin  81 mg Oral Daily    clopidogrel  75 mg Oral Daily    amiodarone  200 mg Oral BID    chlorhexidine  15 mL Mouth/Throat BID    mupirocin   Nasal BID    multivitamin  1 tablet Oral Daily with breakfast    polyethylene glycol  17 g Oral Daily    sennosides-docusate sodium  1 tablet Oral BID    metoprolol tartrate  25 mg Oral BID    vancomycin (VANCOCIN) IV  1,500 mg IntraVENous Q12H    clindamycin (CLEOCIN) IV  900 mg IntraVENous Q6H    hydrocortisone sodium succinate PF  50 mg IntraVENous Q8H    erythromycin   Both Eyes Nightly    ciprofloxacin  500 mg Oral 2 times per day    Hydrocerin   Topical BID    gabapentin  300 mg Oral TID    FLUoxetine  40 mg Oral QAM    [Held by provider] predniSONE  10 mg Oral Daily    insulin glargine  56 Units SubCUTAneous Daily    And    insulin lispro  10 Units SubCUTAneous TID WC    [Held by provider] metFORMIN  1,000 mg Oral BID WC    rosuvastatin  20 mg Oral Nightly    [MAR Hold] influenza virus vaccine  0.5 mL IntraMUSCular Prior to discharge       IV Infusions (if any):   sodium chloride 75 mL/hr at 11/03/21 0600    sodium chloride      insulin 0.34 Units/hr (11/03/21 0600)    dextrose      nitroGLYCERIN Stopped (11/02/21 2021)    sodium chloride      norepinephrine 0.05 mcg/kg/min (11/03/21 0642)    fentaNYL 50 mcg/hr (11/03/21 0600)    EPINEPHrine infusion Stopped (11/02/21 2003)    cisatracurium (NIMBEX) infusion 0.519 mcg/kg/min (11/03/21 0600)    propofol 50 mcg/kg/min (11/03/21 0600)    phenylephrine (RODNEY-SYNEPHRINE) 50mg/250mL infusion Stopped (11/02/21 2100)    vasopressin (Septic Shock) infusion 0.02 Units/min (11/03/21 0600)       Diagnostics:   Telemetry: Sinus rhythm    Labs:   CBC:  Recent Labs     11/03/21 0045 11/03/21 0530   WBC 13.8* 14.6*   HGB 8.0* 8.2*   HCT 25.8* 26.7*    158     Magnesium:  Recent Labs     11/03/21 0045 11/03/21  0530   MG 2.2 2.1     BMP:  Recent Labs     11/03/21 0045 11/03/21  0530    146*   K 3.5* 3.9   CALCIUM 7.2* 7.1*   CO2 21 17*   BUN 13 13   CREATININE 0.66 0.65   LABGLOM >60 >60   GLUCOSE 93 94     BNP:  Recent Labs     11/02/21 2100   PROBNP 738*     PT/INR:  Recent Labs     11/02/21 2100 11/03/21  0530   PROTIME 20.3* 19.7*   INR 1.8 1.7     APTT:  Recent Labs     11/02/21  1540   APTT 38.2*     CARDIAC ENZYMES:No results for input(s):  MYOGLOBIN, CKTOTAL, CKMB, CKMBINDEX, TROPHS, TROPONINT in the last 72 hours. FASTING LIPID PANEL:  Lab Results   Component Value Date    HDL 40 10/25/2021    TRIG 107 10/25/2021     LIVER PROFILE:No results for input(s): AST, ALT, LABALBU, ALKPHOS, BILITOT, BILIDIR, IBILI, PROT, GLOB, ALBUMIN in the last 72 hours. ASSESSMENT:    · CAD with multivessel disease status post CABG x2 with LIMA to LAD and SVG to diagonal, OM exploration was not graftable 11/2/2021 and plans for delayed sternal closure 11/3/2021   · Borderline troponin elevation with possible non-STEMI-preserved LV systolic function on echocardiogram done 10/25/2021  · Diabetes, managed by others  · Lymphedema and nonhealing wounds, managed by others  · Peripheral vascular disease, managed by others  · Dyslipidemia, treated  · Lumbar compression fracture, managed by others    PLAN:  Continue current cardiac medications. Patient is maintaining sinus rhythm and plans to return to surgery later today. She is on vasopressors with currently stable blood pressure. Discussed with RN and Dr. Edmund Benjamin.     CYNDY Tobias - CNP

## 2021-11-03 NOTE — PROCEDURES
[address] [facility] [address]                 [address] [facility], [facility]                              CARDIAC STRESS TEST    PATIENT NAME: Louisa Ramesh                        :        1955  MED REC NO:   [id number]                             ROOM:         ACCOUNT NO:   [de-identified]                           ADMIT DATE: 10/24/2021  PROVIDER:     Meera Bermudez MD    DATE OF STUDY:  10/27/2021    LEXISCAN MYOVIEW STRESS TEST    ATTENDING PROVIDER:  Shakira Duffy DO    PRIMARY CARE PROVIDER:  Kerri Palacios. Thony Simon MD    PERFORMING PHYSICIAN: Willian Fox MD    INDICATION:  Elevated enzyme    HEART RATE  100% max predicted heart rate:  156  85% max predicted heart rate:  133  Duration:  1:00    Resting heart rate:  80  Maximum heart rate achieved:  93  % of predicted maximum:  59    BLOOD PRESSURE  Resting BP:  149/73  Peak BP:  149/73  METS:  1.0    MEDICATIONS GIVEN:  0.4 mg Lexiscan    REASON FOR TERMINATION:   Medication infusion complete. BASELINE EKG DEMONSTRATED:  Sinus rhythm, right bundle branch block    During the stress test, the patient reported:  No symptoms    STRESS EKG DEMONSTRATED:  No abnormal changes    HEART RATE RESPONSE:   Normal response. BLOOD PRESSURE RESPONSE:   Normal response. ECG IMPRESSION:  Negative. FINAL IMPRESSION:  Negative. Nuclear medicine report to follow.         Chandler Koyanagi, MD    D: 2021 10:57:04       T: 2021 11:00:08     GY/ANNA_EDIT  Job#: [id number]     Doc#: Unknown

## 2021-11-03 NOTE — ANESTHESIA POSTPROCEDURE EVALUATION
Department of Anesthesiology  Postprocedure Note    Patient: Tyrell Cheadle  MRN: [id number]  YOB: 1955  Date of evaluation: 11/3/2021  Time:  1:50 PM     Procedure Summary     Date: 11/03/21 Room / Location: [address] [place] [address] / [facility] - INPATIENT    Anesthesia Start: 0638 Anesthesia Stop: 3819    Procedure: POST OP CHEST CLOSURE (N/A Chest) Diagnosis: (DX INPATIENT)    Surgeons: Arnold Malone MD Responsible Provider: Rivera Mendoza DO    Anesthesia Type: general ASA Status: 4          Anesthesia Type: No value filed. Tracie Phase I:      Tracie Phase II:      Last vitals: Reviewed and per EMR flowsheets.        Anesthesia Post Evaluation    Patient location during evaluation: ICU  Patient participation: complete - patient cannot participate  Level of consciousness: sedated and ventilated  Airway patency: patent  Nausea & Vomiting: no vomiting  Complications: no  Cardiovascular status: vasoactive/inotropes  Respiratory status: ventilator

## 2021-11-03 NOTE — PROGRESS NOTES
Pt receiving 1u RBC at time. Vitals and blood administration completed. I attended the patient during the first 15 minutes of the blood transfusion and did not recognize a potential blood reaction. Will continue to monitor.

## 2021-11-03 NOTE — PROGRESS NOTES
Saige Piña NP on unit. K in OR resulted at 3.6. NP states to give another 20 meq when patient returns from OR and recheck K after infusion complete. See orders.

## 2021-11-03 NOTE — PLAN OF CARE
Problem: Falls - Risk of:  Goal: Will remain free from falls  Description: Will remain free from falls  Outcome: Ongoing     Problem: Cardiac Output - Decreased:  Goal: Hemodynamic stability will improve  Description: Hemodynamic stability will improve  Outcome: Ongoing     Problem: Pain:  Goal: Control of acute pain  Description: Control of acute pain  Outcome: Ongoing     Problem: Skin Integrity:  Goal: Will show no infection signs and symptoms  Description: Will show no infection signs and symptoms  Outcome: Ongoing  Goal: Absence of new skin breakdown  Description: Absence of new skin breakdown  Outcome: Ongoing     Problem: Nutrition  Goal: Optimal nutrition therapy  Outcome: Ongoing

## 2021-11-03 NOTE — PROGRESS NOTES
Physical Therapy  DATE: 11/3/2021    NAME: Abdoul Ellsworth  MRN: [id number]   : 1955    Patient not seen this date for Physical Therapy due to:      [] Cancel by RN or physician due to:    [] Hemodialysis    [] Critical Lab Value Level     [] Blood transfusion in progress    [] Acute or unstable cardiovascular status   _MAP < 55 or more than >115  _HR < 40 or > 130    [] Acute or unstable pulmonary status   -FiO2 > 60%   _RR < 5 or >40    _O2 sats < 85%    [] Strict Bedrest    [x]  Pt remains intubated on vent s/p CABG one day ago, & is currently in surgery for delayed sternal closure    [] Off Unit for testing       [] Pending imaging to R/O fracture    [] Refusal by Patient      [] Other      [] PT being discontinued at this time. Patient independent. No further needs. [] PT being discontinued at this time as the patient has been transferred to hospice care. No further needs.       [address], PT

## 2021-11-03 NOTE — PROGRESS NOTES
[facility]  Office: [address], DO, Reid China, DO, Tyler Leger, DO, Anuj Mares Blood, DO, Alejandro Armas MD, Tea Bonilla MD, Jana Srivastava MD, Kimberly Amin MD, Bud Brian MD, Tobias Nj MD, Lachelle Carter MD, Michelle Elizabeth MD, Mary Kay Simon, DO, Rigoberto Payton, DO, Emmett Rajput MD,  Marifer Yi, DO, Caden Parikh MD, Harper Hicks MD, Roxana Arroyo MD, Vincenzo Hayden MD, Monika Chester MD, Benji Etienne MD, Tiki Griffin, [facility], [facility], CNP, Cecilio Arroyo, CNP, Alison Solis, CNS, Giulia Mcmillan, CNP, Naheed Pineda, CNP, Naldo Montano, CNP, Jessica Martell, CNP, Shan La, CNP, Lyla Gonzalez, CNP, Monique De PA-C, Charmel Osgood, [facility], Michelle Magana, CNP, Macy Hernandez, CNP, Nuvia Hodges, CNP, Unique Yi, CNP, Tiney Schilder, CNP, Marlyne Osgood, CNP, [facility]illo[facility]    Progress Note    11/3/2021    9:51 AM    Name:   Tolu Camarillo  MRN:     [id number]     [place]:      [de-identified]   Room:   2028/202801   Day:  10  Admit Date:  10/24/2021  6:48 AM    PCP:   Unique Yi MD  Code Status:  Full Code    Subjective:     C/C:   Chief Complaint   Patient presents with    Back Pain     Interval History Status:    S/P CABG x 2 yesterday, closure delayed due to coagulapathy. She received 2 units of PRBC's yesterday for postoperative acute blood loss anemia. Patient is still intubated and sedated on insulin, propofol, fentanyl, nimbex, levophed and vasopressin drips. Plans to return to OR at 1100 for closure   Brief History:     Tolu Camarillo is a 61 y.o. Non- / non  female who presents with low Back Pain and is admitted to the hospital for the management of NSTEMI. She said she felt a pop while transferring from bed to wheelchair. It is unclear when that was but was unable to get out of her recliner. Starting 2 days pta she had cp and sob, along with nausea.   Also c/o hip pain and said all her pain was due to fibromyalgia. troponins were elevated   Review of Systems:   TROY: patient intubated, and chemically paralyzed and sedated     Medications: Allergies:     Allergies   Allergen Reactions    Amoxicillin Diarrhea and Other (See Comments)    Amoxicillin-Pot Clavulanate Diarrhea    Atorvastatin Other (See Comments)     Other reaction(s): Myalgia  Weakness      Cefuroxime Axetil Other (See Comments)    Clavulanic Acid Diarrhea and Other (See Comments)    Codeine Other (See Comments)     Unless suspended in syrup      Dextroamphetamine     Adhesive Tape Rash     Itching     Cephalosporins Rash       Current Meds:   Scheduled Meds:    sodium chloride flush  10 mL IntraVENous 2 times per day    aspirin  81 mg Oral Daily    clopidogrel  75 mg Oral Daily    amiodarone  200 mg Oral BID    chlorhexidine  15 mL Mouth/Throat BID    mupirocin   Nasal BID    multivitamin  1 tablet Oral Daily with breakfast    polyethylene glycol  17 g Oral Daily    sennosides-docusate sodium  1 tablet Oral BID    metoprolol tartrate  25 mg Oral BID    vancomycin (VANCOCIN) IV  1,500 mg IntraVENous Q12H    clindamycin (CLEOCIN) IV  900 mg IntraVENous Q6H    hydrocortisone sodium succinate PF  50 mg IntraVENous Q8H    erythromycin   Both Eyes Nightly    ciprofloxacin  500 mg Oral 2 times per day    Hydrocerin   Topical BID    gabapentin  300 mg Oral TID    FLUoxetine  40 mg Oral QAM    [Held by provider] predniSONE  10 mg Oral Daily    insulin glargine  56 Units SubCUTAneous Daily    And    insulin lispro  10 Units SubCUTAneous TID WC    [Held by provider] metFORMIN  1,000 mg Oral BID WC    rosuvastatin  20 mg Oral Nightly    [MAR Hold] influenza virus vaccine  0.5 mL IntraMUSCular Prior to discharge     Continuous Infusions:    sodium chloride 75 mL/hr at 11/03/21 0600    sodium chloride      insulin 0.34 Units/hr (11/03/21 0600)    dextrose      nitroGLYCERIN Stopped (21)    sodium chloride      norepinephrine 0.05 mcg/kg/min (21 7930)    fentaNYL 50 mcg/hr (21)    EPINEPHrine infusion Stopped (21)    cisatracurium (NIMBEX) infusion 0.519 mcg/kg/min (21)    propofol 50 mcg/kg/min (21)    phenylephrine (RODNEY-SYNEPHRINE) 50mg/250mL infusion Stopped (21)    vasopressin (Septic Shock) infusion 0.02 Units/min (21)     PRN Meds: potassium chloride **OR** potassium alternative oral replacement **OR** potassium chloride, sodium bicarbonate, sodium chloride flush, sodium chloride, ondansetron, acetaminophen, acetaminophen, oxyCODONE-acetaminophen **OR** oxyCODONE-acetaminophen, hydrALAZINE, diphenhydrAMINE, magnesium hydroxide, bisacodyl, albumin human, glucose, dextrose, glucagon (rDNA), dextrose, naloxone, calcium gluconate **OR** calcium gluconate **OR** calcium gluconate **OR** calcium gluconate, sodium chloride, benzonatate, diphenhydrAMINE    Data:     Past Medical History:   has a past medical history of Asthma, Diabetes mellitus ([facility] Fibromyalgia, Headache, Lymphedema of both lower extremities, and Myasthenia gravis ([facility] [address] [facility] Social History:   reports that she has never smoked. She has never used smokeless tobacco. She reports previous alcohol use. She reports previous drug use. Family History:   Family History   Problem Relation Age of Onset    Coronary Art Dis Mother     Kidney Disease Mother        Vitals:  BP 91/60   Pulse 73   Temp 100.6 °F (38.1 °C) (Bladder)   Resp 18   Ht 5' 5\" (1.651 m)   Wt 184 lb (83.5 kg)   SpO2 97%   BMI 30.62 kg/m²   Temp (24hrs), Av.4 °F (35.8 °C), Min:92.5 °F (33.6 °C), Max:101.1 °F (38.4 °C)    Recent Labs     21  0434 21  0504 21/03/21  0907   POCGLU 64* 81 77 99       I/O (24Hr):     Intake/Output Summary (Last 24 hours) at 11/3/2021 0957  Last data filed at 11/3/2021 0600  Gross per 24 hour   Intake 9556.95 ml Output 6251 ml   Net 3305.95 ml       Labs:  Hematology:  Recent Labs     11/02/21  1540 11/02/21  1815 11/02/21  2100 11/03/21  0045 11/03/21  0530   WBC 23.5*   < > 16.1* 13.8* 14.6*   RBC 3.46*   < > 2.59* 3.08* 3.16*   HGB 8.8*   < > 6.7* 8.0* 8.2*   HCT 28.6*   < > 21.9* 25.8* 26.7*   MCV 82.7   < > 84.6 83.8 84.5   MCH 25.4   < > 25.9 26.0 25.9   MCHC 30.8   < > 30.6 31.0 30.7   RDW 15.4*   < > 15.0* 15.2* 15.3*      < > 123* 148 158   MPV 9.8   < > 9.6 9.8 10.5   INR 1.6  --  1.8  --  1.7    < > = values in this interval not displayed.      Chemistry:  Recent Labs     11/02/21  1815 11/02/21  2100 11/03/21  0045 11/03/21  0530     --  144 146*   K 4.0  --  3.5* 3.9   *  --  112* 116*   CO2 21  --  21 17*   GLUCOSE 149*  --  93 94   BUN 15  --  13 13   CREATININE 0.66  --  0.66 0.65   MG 2.4  --  2.2 2.1   ANIONGAP 13  --  11 13   LABGLOM >60  --  >60 >60   GFRAA >60  --  >60 >60   CALCIUM 7.2*  --  7.2* 7.1*   PROBNP  --  738*  --   --      Recent Labs     11/03/21  0301 11/03/21  0400 11/03/21  0434 11/03/21  0504 11/03/21  0530 11/03/21  0605 11/03/21  0907   PROT  --   --   --   --  5.3*  --   --    LABALBU  --   --   --   --  4.4  --   --    AST  --   --   --   --  21  --   --    ALT  --   --   --   --  <5*  --   --    ALKPHOS  --   --   --   --  33*  --   --    BILITOT  --   --   --   --  1.22*  --   --    BILIDIR  --   --   --   --  0.74*  --   --    POCGLU 71 60* 64* 81  --  77 99     ABG:  Lab Results   Component Value Date    POCPH 7.400 11/02/2021    POCPCO2 33.7 11/02/2021    POCPO2 132.6 11/02/2021    POCHCO3 20.8 11/02/2021    NBEA 4 11/02/2021    PBEA NOT REPORTED 11/02/2021    GNJ7HKS NOT REPORTED 11/02/2021    LPWH3PNR 99 11/02/2021    FIO2 30.0 11/03/2021     Lab Results   Component Value Date/Time    SPECIAL NOT REPORTED 10/28/2021 11:30 AM     Lab Results   Component Value Date/Time    CULTURE KLEBSIELLA PNEUMONIAE >536659 CFU/ML (A) 10/28/2021 11:30 AM    CULTURE ENTEROCOCCUS FAECALIS >284660 CFU/ML (A) 10/28/2021 11:30 AM       Radiology:  XR LUMBAR SPINE (2-3 VIEWS)    Result Date: 10/24/2021  LUMBAR SPINE: 1. Age indeterminate, probably nonacute, compression fracture of L5 vertebral body with about 50% decrease height. Please correlate with point tenderness. MRI may also be considered for age characterization if deemed clinically necessary. 2. T12 and L1 kyphoplasty. CHEST X-RAY: 1. Poor inspiratory afford with significantly decreased lung volumes. 2. Patchy opacities left lower lung probably due to crowding of vessels with or without underlying developing infiltrate. Please correlate with auscultation. Short interval follow-up may also be considered. XR CHEST PORTABLE    Result Date: 10/24/2021  LUMBAR SPINE: 1. Age indeterminate, probably nonacute, compression fracture of L5 vertebral body with about 50% decrease height. Please correlate with point tenderness. MRI may also be considered for age characterization if deemed clinically necessary. 2. T12 and L1 kyphoplasty. CHEST X-RAY: 1. Poor inspiratory afford with significantly decreased lung volumes. 2. Patchy opacities left lower lung probably due to crowding of vessels with or without underlying developing infiltrate. Please correlate with auscultation. Short interval follow-up may also be considered. CT CHEST PULMONARY EMBOLISM W CONTRAST    Result Date: 10/24/2021  1. No evidence for acute pulmonary embolism. 2. Scattered bibasilar patchy subsegmental atelectasis and trace bilateral pleural effusions. 3. Borderline cardiomegaly. 4. Large areas of geographic ground-glass attenuation interspersed with more lucent areas probably combination of air trapping and pulmonary interstitial edema. 5. Cholelithiasis.        Physical Examination:        General appearance:  Intubated and chemically paralyzed and sedated on   Mental Status:  TROY  Lungs:  Bilateral bases diminished, normal effort on vent, chest tubes x 2  Heart:  regular rate and rhythm, no murmur  Abdomen:  soft, nontender, nondistended, hypoactive bowel sounds, no masses, hepatomegaly, splenomegaly   Extremities:  no redness,chronic trace BLE edema  Skin:  Surgical dressing to right lower leg intact with scant old dry drainage noted. Surgical incision to midsternal chest open with wound vac in place   Bowens and art line in place   Assessment:        Hospital Problems         Last Modified POA    * (Principal) Non-ST elevation MI (NSTEMI) ([address] 11/3/2021 Yes    Type 2 diabetes mellitus with hyperglycemia, without long-term current use of insulin ([address] 10/24/2021 Yes    Class 1 obesity due to excess calories with serious comorbidity and body mass index (BMI) of 33.0 to 33.9 in adult 10/24/2021 Yes    Lymphedema of both lower extremities 10/24/2021 Yes    Acute on chronic diastolic congestive heart failure ([address] 10/26/2021 Yes    Pathological fracture of thoracic vertebra due to secondary osteoporosis ([address] 10/26/2021 Yes    Pathological fracture of lumbar vertebra due to secondary osteoporosis ([address] 10/26/2021 Yes    Intractable back pain 10/26/2021 Yes    Age-related osteoporosis with current pathological fracture 10/26/2021 Yes    History of kyphoplasty 10/26/2021 Yes    Facet arthritis, degenerative, lumbar spine 10/26/2021 Yes    Degenerative spondylolisthesis 10/26/2021 Yes    DDD (degenerative disc disease), thoracolumbar 10/26/2021 Yes    Acute on chronic congestive heart failure ([address] 10/27/2021 Yes    Chronic bilateral low back pain without sciatica 10/27/2021 Yes    Allergic conjunctivitis of both eyes 10/31/2021 Yes          Plan:        1. Acute pathological L3 fracture likely due to osteoporosis and chronic L5             fracture, plans for kyphoplasty at a later time   2. NSTEMI due to MVCAD,s/p CABG x2 with delayed closure, plans to return to     OR for closure today. Post-op management per CT surgery    3.  Monitor and control blood sugars: continue insulin gtt   4. Monitor labs, replace electrolytes as needed  5. Continue telemetry monitoring   6. Compensated diastolic CHF  7. Monitor and control blood pressure: continue pressor support as managed         per CT surgery, wean as able   8. Strict I's and O's   9.  Vent and chest tube management per CT surgery   10 Plan discussed with staff     CYNDY Lou NP  11/3/2021  9:57 AM

## 2021-11-03 NOTE — BRIEF OP NOTE
Brief Postoperative Note      Patient: Tolu Camarillo  YOB: 1955  MRN: [id number]    Date of Procedure: 11/3/2021    Pre-Op Diagnosis: DX INPATIENT OPEN CHEST s/P CABG    Post-Op Diagnosis: Same       Procedure(s):  POST OP CHEST CLOSURE    Surgeon(s):  Danielle Chavez MD    Assistant:  Physician Assistant: DANIELLA Gonzalez    Anesthesia: General    Estimated Blood Loss (mL): N/A    Complications: None    Specimens:   * No specimens in log *    Implants:  Implant Name Type Inv. Item Serial No.  Lot No. LRB No. Used Action   SCREW BNE L16MM DIA2. 4MM G CANC TI SELF DRL ARACELI FULL THRD  SCREW BNE L16MM DIA2. 4MM G CANC TI SELF DRL ARACELI FULL THRD  [employer]WD  N/A 4 Implanted   SCREW BNE L14MM DIA2. 4MM G CANC TI SELF DRL ARACELI FULL THRD  SCREW BNE L14MM DIA2. 4MM G CANC TI SELF DRL ARACELI FULL THRD  [employer] MI[employer]-WD  N/A 16 Implanted         Drains:   Chest Tube 1 Left 24 [place] (Active)   Suction -20 cm H2O 11/03/21 0800   Chest Tube Airleak Yes 11/03/21 0800   Drainage Description Serosanguinous 11/03/21 0800   Dressing Status Clean;Dry; Intact 11/03/21 0800   Dressing Type Dry dressing 11/03/21 0800   Site Assessment Clean;Dry; Intact 11/03/21 0800   Surrounding Skin Unable to view 11/03/21 0800   Patency Intervention Tip/Tilt 11/03/21 0800   Output (ml) 0 ml 11/03/21 1100       Chest Tube 2 Left 24 [place] (Active)   Suction -20 cm H2O 11/03/21 0800   Chest Tube Airleak Yes 11/03/21 0800   Drainage Description Serosanguinous 11/03/21 0800   Dressing Status Clean;Dry; Intact 11/03/21 0800   Dressing Type Dry dressing 11/03/21 0800   Site Assessment Clean;Dry; Intact 11/03/21 0800   Surrounding Skin Unable to view 11/03/21 0800   Patency Intervention Tip/Tilt 11/03/21 0800   Output (ml) 0 ml 11/03/21 1100   Measurement at Chest Wall                     11/02/21 1539       Negative Pressure Wound Therapy Sternum Anterior (Active)   Wound Type Surgical 11/03/21 0800   Unit Type cardiac 11/02/21 2000   Dressing Type Black foam 11/03/21 0800   Number of pieces used 1 11/03/21 0800   Cycle Continuous 11/03/21 0800   Dressing Status Clean;Dry; Intact 11/03/21 0800   Drainage Amount Moderate 11/03/21 0800   Output (ml) 25 ml 11/03/21 1100   Wound Assessment Other (Comment) 11/03/21 0800   Eliza-wound Assessment Other (Comment) 11/02/21 2000       NG/OG/NJ/NE Tube Orogastric Right mouth (Active)   Surrounding Skin Dry; Intact 11/02/21 2100   Securement device Yes 11/02/21 2000   Status Suction-low intermittent 11/02/21 2100   Placement Verified by X-Ray (Initial);by External Catheter Length 11/02/21 2000   NG/OG/NJ/NE External Measurement (cm) 55 cm 11/02/21 2100   Drainage Appearance Bile;Green 11/02/21 2000       Urethral Catheter 16 fr (Active)   Catheter Indications Need for fluid volume management of the critically ill patient in a critical care setting 11/03/21 0800   Securement Device Date Changed 11/02/21 11/03/21 0400   Site Assessment No urethral drainage 11/03/21 0800   Urine Color Yellow 11/03/21 0800   Urine Appearance Clear 11/03/21 0800   Output (mL) 30 mL 11/03/21 1100   Provider Notified to Remove No 11/03/21 0400       [REMOVED] External Urinary Catheter (Removed)   Catheter changed  Yes 11/01/21 1400   Urine Color Yellow 11/01/21 2204   Urine Appearance Clear 11/01/21 2204   Urine Odor Malodorous 11/01/21 1400   Output (mL) 300 mL 11/02/21 0600   Suction 40 mmgHg continuous 11/02/21 0200   Placement Replaced 11/02/21 0200   Skin Assessment No Injury 10/31/21 0400       Findings: Delayed sternal closure with Sternalock 360 system, pulsaVac irrigation and mediastinal exploration and pack removal    Electronically signed by Romy Christy MD on 11/3/2021 at 12:28 PM

## 2021-11-04 ENCOUNTER — APPOINTMENT (OUTPATIENT)
Dept: GENERAL RADIOLOGY | Age: 66
DRG: 233 | End: 2021-11-04
Payer: MEDICARE

## 2021-11-04 PROBLEM — Z95.1 S/P CABG X 2: Chronic | Status: ACTIVE | Noted: 2021-11-02

## 2021-11-04 LAB
ALLEN TEST: ABNORMAL
ANION GAP SERPL CALCULATED.3IONS-SCNC: 12 MMOL/L (ref 9–17)
BUN BLDV-MCNC: 16 MG/DL (ref 8–23)
BUN/CREAT BLD: 19 (ref 9–20)
CALCIUM SERPL-MCNC: 7.8 MG/DL (ref 8.6–10.4)
CHLORIDE BLD-SCNC: 113 MMOL/L (ref 98–107)
CO2: 19 MMOL/L (ref 20–31)
CREAT SERPL-MCNC: 0.85 MG/DL (ref 0.5–0.9)
FIO2: 40
FIO2: ABNORMAL
FIO2: ABNORMAL
GFR AFRICAN AMERICAN: >60 ML/MIN
GFR NON-AFRICAN AMERICAN: >60 ML/MIN
GFR SERPL CREATININE-BSD FRML MDRD: ABNORMAL ML/MIN/{1.73_M2}
GFR SERPL CREATININE-BSD FRML MDRD: ABNORMAL ML/MIN/{1.73_M2}
GLUCOSE BLD-MCNC: 109 MG/DL (ref 65–105)
GLUCOSE BLD-MCNC: 115 MG/DL (ref 65–105)
GLUCOSE BLD-MCNC: 118 MG/DL (ref 65–105)
GLUCOSE BLD-MCNC: 119 MG/DL (ref 65–105)
GLUCOSE BLD-MCNC: 120 MG/DL (ref 65–105)
GLUCOSE BLD-MCNC: 123 MG/DL (ref 70–99)
GLUCOSE BLD-MCNC: 127 MG/DL (ref 65–105)
GLUCOSE BLD-MCNC: 139 MG/DL (ref 65–105)
GLUCOSE BLD-MCNC: 149 MG/DL (ref 65–105)
GLUCOSE BLD-MCNC: 156 MG/DL (ref 65–105)
GLUCOSE BLD-MCNC: 174 MG/DL (ref 65–105)
GLUCOSE BLD-MCNC: 96 MG/DL (ref 65–105)
HCT VFR BLD CALC: 26.6 % (ref 36.3–47.1)
HEMOGLOBIN: 7.8 G/DL (ref 11.9–15.1)
INR BLD: 1.8
MAGNESIUM: 2.1 MG/DL (ref 1.6–2.6)
MCH RBC QN AUTO: 25.7 PG (ref 25.2–33.5)
MCHC RBC AUTO-ENTMCNC: 29.3 G/DL (ref 28.4–34.8)
MCV RBC AUTO: 87.8 FL (ref 82.6–102.9)
MODE: ABNORMAL
NEGATIVE BASE EXCESS, ART: 4 (ref 0–2)
NEGATIVE BASE EXCESS, ART: 6 (ref 0–2)
NEGATIVE BASE EXCESS, ART: 6 (ref 0–2)
NRBC AUTOMATED: 0 PER 100 WBC
O2 DEVICE/FLOW/%: ABNORMAL
PATIENT TEMP: 37
PATIENT TEMP: ABNORMAL
PATIENT TEMP: ABNORMAL
PDW BLD-RTO: 16 % (ref 11.8–14.4)
PLATELET # BLD: 137 K/UL (ref 138–453)
PMV BLD AUTO: 11 FL (ref 8.1–13.5)
POC HCO3: 19.6 MMOL/L (ref 21–28)
POC HCO3: 20.2 MMOL/L (ref 21–28)
POC HCO3: 21.9 MMOL/L (ref 21–28)
POC O2 SATURATION: 95 % (ref 94–98)
POC PCO2 TEMP: ABNORMAL MM HG
POC PCO2: 38.1 MM HG (ref 35–48)
POC PCO2: 41.2 MM HG (ref 35–48)
POC PCO2: 42.6 MM HG (ref 35–48)
POC PH TEMP: ABNORMAL
POC PH: 7.28 (ref 7.35–7.45)
POC PH: 7.32 (ref 7.35–7.45)
POC PH: 7.33 (ref 7.35–7.45)
POC PO2 TEMP: ABNORMAL MM HG
POC PO2: 83.3 MM HG (ref 83–108)
POC PO2: 83.4 MM HG (ref 83–108)
POC PO2: 87.1 MM HG (ref 83–108)
POC TCO2: 21 MMOL/L (ref 22–30)
POSITIVE BASE EXCESS, ART: ABNORMAL (ref 0–3)
POTASSIUM SERPL-SCNC: 4.6 MMOL/L (ref 3.7–5.3)
POTASSIUM SERPL-SCNC: 4.8 MMOL/L (ref 3.7–5.3)
PROTHROMBIN TIME: 20.6 SEC (ref 11.5–14.2)
RBC # BLD: 3.03 M/UL (ref 3.95–5.11)
SAMPLE SITE: ABNORMAL
SODIUM BLD-SCNC: 144 MMOL/L (ref 135–144)
TCO2 (CALC), ART: ABNORMAL MMOL/L (ref 22–29)
WBC # BLD: 16.4 K/UL (ref 3.5–11.3)

## 2021-11-04 PROCEDURE — 6370000000 HC RX 637 (ALT 250 FOR IP): Performed by: NURSE PRACTITIONER

## 2021-11-04 PROCEDURE — 6360000002 HC RX W HCPCS: Performed by: INTERNAL MEDICINE

## 2021-11-04 PROCEDURE — 99233 SBSQ HOSP IP/OBS HIGH 50: CPT | Performed by: NURSE PRACTITIONER

## 2021-11-04 PROCEDURE — 2500000003 HC RX 250 WO HCPCS: Performed by: NURSE PRACTITIONER

## 2021-11-04 PROCEDURE — C9113 INJ PANTOPRAZOLE SODIUM, VIA: HCPCS | Performed by: NURSE PRACTITIONER

## 2021-11-04 PROCEDURE — 2700000000 HC OXYGEN THERAPY PER DAY

## 2021-11-04 PROCEDURE — 2580000003 HC RX 258: Performed by: NURSE PRACTITIONER

## 2021-11-04 PROCEDURE — 82947 ASSAY GLUCOSE BLOOD QUANT: CPT

## 2021-11-04 PROCEDURE — 84132 ASSAY OF SERUM POTASSIUM: CPT

## 2021-11-04 PROCEDURE — 85027 COMPLETE CBC AUTOMATED: CPT

## 2021-11-04 PROCEDURE — 80048 BASIC METABOLIC PNL TOTAL CA: CPT

## 2021-11-04 PROCEDURE — 2000000000 HC ICU R&B

## 2021-11-04 PROCEDURE — 6360000002 HC RX W HCPCS: Performed by: NURSE PRACTITIONER

## 2021-11-04 PROCEDURE — 94003 VENT MGMT INPAT SUBQ DAY: CPT

## 2021-11-04 PROCEDURE — 94761 N-INVAS EAR/PLS OXIMETRY MLT: CPT

## 2021-11-04 PROCEDURE — 83735 ASSAY OF MAGNESIUM: CPT

## 2021-11-04 PROCEDURE — 82803 BLOOD GASES ANY COMBINATION: CPT

## 2021-11-04 PROCEDURE — 82374 ASSAY BLOOD CARBON DIOXIDE: CPT

## 2021-11-04 PROCEDURE — 2100000000 HC CCU R&B

## 2021-11-04 PROCEDURE — P9041 ALBUMIN (HUMAN),5%, 50ML: HCPCS | Performed by: NURSE PRACTITIONER

## 2021-11-04 PROCEDURE — 94640 AIRWAY INHALATION TREATMENT: CPT

## 2021-11-04 PROCEDURE — 37799 UNLISTED PX VASCULAR SURGERY: CPT

## 2021-11-04 PROCEDURE — 71045 X-RAY EXAM CHEST 1 VIEW: CPT

## 2021-11-04 PROCEDURE — 85610 PROTHROMBIN TIME: CPT

## 2021-11-04 RX ORDER — FUROSEMIDE 10 MG/ML
20 INJECTION INTRAMUSCULAR; INTRAVENOUS 2 TIMES DAILY
Status: DISCONTINUED | OUTPATIENT
Start: 2021-11-04 | End: 2021-11-06

## 2021-11-04 RX ORDER — PREDNISONE 1 MG/1
10 TABLET ORAL DAILY
Status: DISCONTINUED | OUTPATIENT
Start: 2021-11-04 | End: 2021-11-10 | Stop reason: HOSPADM

## 2021-11-04 RX ORDER — FUROSEMIDE 10 MG/ML
40 INJECTION INTRAMUSCULAR; INTRAVENOUS ONCE
Status: COMPLETED | OUTPATIENT
Start: 2021-11-04 | End: 2021-11-04

## 2021-11-04 RX ADMIN — FENTANYL CITRATE 50 MCG: 50 INJECTION INTRAMUSCULAR; INTRAVENOUS at 07:06

## 2021-11-04 RX ADMIN — FENTANYL CITRATE 50 MCG: 50 INJECTION INTRAMUSCULAR; INTRAVENOUS at 00:41

## 2021-11-04 RX ADMIN — ALBUMIN (HUMAN) 25 G: 12.5 INJECTION, SOLUTION INTRAVENOUS at 01:14

## 2021-11-04 RX ADMIN — HYDROCORTISONE SODIUM SUCCINATE 50 MG: 100 INJECTION, POWDER, FOR SOLUTION INTRAMUSCULAR; INTRAVENOUS at 15:17

## 2021-11-04 RX ADMIN — SODIUM CHLORIDE, PRESERVATIVE FREE 10 ML: 5 INJECTION INTRAVENOUS at 08:53

## 2021-11-04 RX ADMIN — OXYCODONE AND ACETAMINOPHEN 1 TABLET: 5; 325 TABLET ORAL at 16:43

## 2021-11-04 RX ADMIN — AMIODARONE HYDROCHLORIDE 200 MG: 200 TABLET ORAL at 20:36

## 2021-11-04 RX ADMIN — FLUOXETINE 40 MG: 20 CAPSULE ORAL at 08:53

## 2021-11-04 RX ADMIN — ERYTHROMYCIN: 5 OINTMENT OPHTHALMIC at 22:55

## 2021-11-04 RX ADMIN — IPRATROPIUM BROMIDE AND ALBUTEROL SULFATE 1 AMPULE: .5; 2.5 SOLUTION RESPIRATORY (INHALATION) at 07:42

## 2021-11-04 RX ADMIN — PREDNISONE 10 MG: 5 TABLET ORAL at 16:30

## 2021-11-04 RX ADMIN — FUROSEMIDE 20 MG: 10 INJECTION, SOLUTION INTRAMUSCULAR; INTRAVENOUS at 17:38

## 2021-11-04 RX ADMIN — GABAPENTIN 300 MG: 300 CAPSULE ORAL at 15:17

## 2021-11-04 RX ADMIN — AMIODARONE HYDROCHLORIDE 200 MG: 200 TABLET ORAL at 15:17

## 2021-11-04 RX ADMIN — SODIUM CHLORIDE, PRESERVATIVE FREE 10 ML: 5 INJECTION INTRAVENOUS at 09:00

## 2021-11-04 RX ADMIN — DOCUSATE SODIUM 50MG AND SENNOSIDES 8.6MG 1 TABLET: 8.6; 5 TABLET, FILM COATED ORAL at 21:48

## 2021-11-04 RX ADMIN — IPRATROPIUM BROMIDE AND ALBUTEROL SULFATE 1 AMPULE: .5; 2.5 SOLUTION RESPIRATORY (INHALATION) at 19:50

## 2021-11-04 RX ADMIN — ALBUMIN (HUMAN) 25 G: 12.5 INJECTION, SOLUTION INTRAVENOUS at 05:07

## 2021-11-04 RX ADMIN — HYDROCORTISONE SODIUM SUCCINATE 50 MG: 100 INJECTION, POWDER, FOR SOLUTION INTRAMUSCULAR; INTRAVENOUS at 03:04

## 2021-11-04 RX ADMIN — POLYETHYLENE GLYCOL 3350 17 G: 17 POWDER, FOR SOLUTION ORAL at 08:54

## 2021-11-04 RX ADMIN — PROPOFOL 15 MCG/KG/MIN: 10 INJECTION, EMULSION INTRAVENOUS at 05:39

## 2021-11-04 RX ADMIN — AMIODARONE HYDROCHLORIDE 200 MG: 200 TABLET ORAL at 08:53

## 2021-11-04 RX ADMIN — ASPIRIN 81 MG: 81 TABLET, COATED ORAL at 08:52

## 2021-11-04 RX ADMIN — GABAPENTIN 300 MG: 300 CAPSULE ORAL at 08:53

## 2021-11-04 RX ADMIN — DOCUSATE SODIUM 50MG AND SENNOSIDES 8.6MG 1 TABLET: 8.6; 5 TABLET, FILM COATED ORAL at 08:53

## 2021-11-04 RX ADMIN — METOPROLOL TARTRATE 25 MG: 25 TABLET, FILM COATED ORAL at 20:36

## 2021-11-04 RX ADMIN — MUPIROCIN: 20 OINTMENT TOPICAL at 20:41

## 2021-11-04 RX ADMIN — GABAPENTIN 300 MG: 300 CAPSULE ORAL at 20:36

## 2021-11-04 RX ADMIN — FENTANYL CITRATE 50 MCG: 50 INJECTION INTRAMUSCULAR; INTRAVENOUS at 21:47

## 2021-11-04 RX ADMIN — SODIUM BICARBONATE 50 MEQ: 84 INJECTION, SOLUTION INTRAVENOUS at 06:36

## 2021-11-04 RX ADMIN — FUROSEMIDE 40 MG: 10 INJECTION, SOLUTION INTRAMUSCULAR; INTRAVENOUS at 13:14

## 2021-11-04 RX ADMIN — ALBUMIN (HUMAN) 25 G: 12.5 INJECTION, SOLUTION INTRAVENOUS at 09:56

## 2021-11-04 RX ADMIN — IPRATROPIUM BROMIDE AND ALBUTEROL SULFATE 1 AMPULE: .5; 2.5 SOLUTION RESPIRATORY (INHALATION) at 11:08

## 2021-11-04 RX ADMIN — PANTOPRAZOLE SODIUM 40 MG: 40 INJECTION, POWDER, FOR SOLUTION INTRAVENOUS at 08:53

## 2021-11-04 RX ADMIN — FENTANYL CITRATE 50 MCG: 50 INJECTION INTRAMUSCULAR; INTRAVENOUS at 06:03

## 2021-11-04 RX ADMIN — IPRATROPIUM BROMIDE AND ALBUTEROL SULFATE 1 AMPULE: .5; 2.5 SOLUTION RESPIRATORY (INHALATION) at 15:36

## 2021-11-04 RX ADMIN — OXYCODONE AND ACETAMINOPHEN 2 TABLET: 5; 325 TABLET ORAL at 20:36

## 2021-11-04 RX ADMIN — FENTANYL CITRATE 25 MCG: 50 INJECTION INTRAMUSCULAR; INTRAVENOUS at 11:08

## 2021-11-04 RX ADMIN — FUROSEMIDE 20 MG: 10 INJECTION, SOLUTION INTRAMUSCULAR; INTRAVENOUS at 08:53

## 2021-11-04 RX ADMIN — METOPROLOL TARTRATE 25 MG: 25 TABLET, FILM COATED ORAL at 13:13

## 2021-11-04 ASSESSMENT — PULMONARY FUNCTION TESTS
PIF_VALUE: 21
PIF_VALUE: 29
PIF_VALUE: 20

## 2021-11-04 ASSESSMENT — PAIN DESCRIPTION - PROGRESSION
CLINICAL_PROGRESSION: NOT CHANGED
CLINICAL_PROGRESSION: NOT CHANGED

## 2021-11-04 ASSESSMENT — PAIN SCALES - GENERAL
PAINLEVEL_OUTOF10: 6
PAINLEVEL_OUTOF10: 7
PAINLEVEL_OUTOF10: 7
PAINLEVEL_OUTOF10: 5

## 2021-11-04 ASSESSMENT — PAIN DESCRIPTION - PAIN TYPE: TYPE: SURGICAL PAIN

## 2021-11-04 ASSESSMENT — PAIN DESCRIPTION - FREQUENCY
FREQUENCY: CONTINUOUS
FREQUENCY: CONTINUOUS

## 2021-11-04 ASSESSMENT — PAIN DESCRIPTION - ONSET
ONSET: PROGRESSIVE
ONSET: PROGRESSIVE

## 2021-11-04 ASSESSMENT — PAIN DESCRIPTION - DESCRIPTORS
DESCRIPTORS: ACHING;DISCOMFORT;SORE
DESCRIPTORS: ACHING

## 2021-11-04 ASSESSMENT — PAIN DESCRIPTION - LOCATION
LOCATION: CHEST
LOCATION: CHEST;STERNUM

## 2021-11-04 ASSESSMENT — PAIN DESCRIPTION - ORIENTATION
ORIENTATION: MID
ORIENTATION: MID

## 2021-11-04 ASSESSMENT — PAIN - FUNCTIONAL ASSESSMENT
PAIN_FUNCTIONAL_ASSESSMENT: PREVENTS OR INTERFERES WITH MANY ACTIVE NOT PASSIVE ACTIVITIES
PAIN_FUNCTIONAL_ASSESSMENT: PREVENTS OR INTERFERES WITH MANY ACTIVE NOT PASSIVE ACTIVITIES

## 2021-11-04 NOTE — FLOWSHEET NOTE
11/03/21 2022   Provider Notification   Reason for Communication Review case   Provider Name Janice Alexandre   Provider Notification Advance Practice Clinician (CNS/NP/CNM/CRNA/PA)   Method of Communication Call   Response See orders     Notified of pt with increased agitation while preparing for extubation, not following commands.

## 2021-11-04 NOTE — RT PROTOCOL NOTE
RT Inhaler-Nebulizer Bronchodilator Protocol Note    There is a bronchodilator order in the chart from a provider indicating to follow the RT Bronchodilator Protocol and there is an Initiate RT Inhaler-Nebulizer Bronchodilator Protocol order as well (see protocol at bottom of note). CXR Findings:  XR CHEST PORTABLE    Result Date: 11/3/2021  1. New changes of median sternotomy. 2. Persistence of at least small right and trace left pleural effusions with underlying bibasilar passive atelectasis. Superimposed pneumonia and aspiration are not excluded on the left. 3. Pulmonary vascular congestion with questionable perihilar edema, potentially congestive heart failure given recent heart surgery, the above pleural effusions, and suspected mild cardiomegaly. XR CHEST PORTABLE    Result Date: 11/3/2021  Stable postop findings with low lung volumes, bibasilar atelectasis and small effusions, greater on the left. XR CHEST PORTABLE    Result Date: 11/2/2021  Small bilateral pleural effusions and bibasilar atelectasis. The findings from the last RT Protocol Assessment were as follows:   History Pulmonary Disease: Chronic pulmonary disease (Asthma)  Respiratory Pattern: Mild dyspnea at rest, irregular pattern, or RR 21-25 bpm (on ventilator due to surgery)  Breath Sounds: Slightly diminished and/or crackles  Cough: Unable to generate effective cough (due to ventilator)  Indication for Bronchodilator Therapy:    Bronchodilator Assessment Score: 12    Aerosolized bronchodilator medication orders have been revised according to the RT Inhaler-Nebulizer Bronchodilator Protocol below. Respiratory Therapist to perform RT Therapy Protocol Assessment initially then follow the protocol. Repeat RT Therapy Protocol Assessment PRN for score 0-3 or on second treatment, BID, and PRN for scores above 3.     No Indications - adjust the frequency to every 6 hours PRN wheezing or bronchospasm, if no treatments needed after 48 hours then discontinue using Per Protocol order mode. If indication present, adjust the RT bronchodilator orders based on the Bronchodilator Assessment Score as indicated below. Use Inhaler orders unless patient has one or more of the following: on home nebulizer, not able to hold breath for 10 seconds, is not alert and oriented, cannot activate and use MDI correctly, or respiratory rate 25 breaths per minute or more, then use the equivalent nebulizer order(s) with same Frequency and PRN reasons based on the score. If a patient is on this medication at home then do not decrease Frequency below that used at home. 0-3 - enter or revise RT bronchodilator order(s) to equivalent RT Bronchodilator order with Frequency of every 4 hours PRN for wheezing or increased work of breathing using Per Protocol order mode. 4-6 - enter or revise RT Bronchodilator order(s) to two equivalent RT bronchodilator orders with one order with BID Frequency and one order with Frequency of every 4 hours PRN wheezing or increased work of breathing using Per Protocol order mode. 7-10 - enter or revise RT Bronchodilator order(s) to two equivalent RT bronchodilator orders with one order with TID Frequency and one order with Frequency of every 4 hours PRN wheezing or increased work of breathing using Per Protocol order mode. 11-13 - enter or revise RT Bronchodilator order(s) to one equivalent RT bronchodilator order with QID Frequency and an Albuterol order with Frequency of every 4 hours PRN wheezing or increased work of breathing using Per Protocol order mode. Greater than 13 - enter or revise RT Bronchodilator order(s) to one equivalent RT bronchodilator order with every 4 hours Frequency and an Albuterol order with Frequency of every 2 hours PRN wheezing or increased work of breathing using Per Protocol order mode.      RT to enter RT Home Evaluation for COPD & MDI Assessment order using Per Protocol order mode.    Electronically signed by Loreto Welch RCP on 11/3/2021 at 9:47 PM

## 2021-11-04 NOTE — PROGRESS NOTES
Nutrition Assessment     Type and Reason for Visit: Reassess    Nutrition Recommendations/Plan:   1. Continue ADULT DIET; Regular; 4 carb choices (60 gm/meal)  2. Continue Glucerna 2x/day  3. Monitor p.o intakes and labs  4. Offer CAD diet education on a later date    Nutrition Assessment:  Patient is status post CABG x2 chest closure (11/3). Patient extubated today and diet advanced to carb control. Patient drowsy at time of visit after extubation. Patient not appropriate for diet education at this time. Will continue current diet and Glucerna 2x/day. Will monitor diet advancement, labs and diet tolerance. Will offer diet education on a later date. Malnutrition Assessment:  Malnutrition Status: At risk for malnutrition (Comment)    Estimated Daily Nutrient Needs:  Energy (kcal): 8008-9296 kcal (16/18 kcal/kg); Weight Used for Energy Requirements:  Current     Protein (g): 68-80 gm of protein (1.2-1.4 gm/kg); Weight Used for Protein Requirements:  Ideal            Nutrition Related Findings: Edema: +1 BLE, +2 facial edema. Present bowel sounds. Wound vac      Current Nutrition Therapies:    ADULT DIET;  Regular; 4 carb choices (60 gm/meal)  ADULT ORAL NUTRITION SUPPLEMENT; Breakfast, Dinner; Diabetic Oral Supplement    Anthropometric Measures:  · Height: 5' 5\" (165.1 cm)  · Current Body Wt: 184 lb (83.5 kg)   · BMI: 30.6    Nutrition Diagnosis:   · Altered nutrition-related lab values related to endocrine dysfuntion as evidenced by lab values      Nutrition Interventions:   Food and/or Nutrient Delivery:  Continue Current Diet, Continue Oral Nutrition Supplement  Nutrition Education/Counseling:  Education not indicated   Coordination of Nutrition Care:  Continue to monitor while inpatient    Goals:  PO intakes are greater than 75% at meals       Nutrition Monitoring and Evaluation:    Food/Nutrient Intake Outcomes:  Food and Nutrient Intake, Supplement Intake  Physical Signs/Symptoms Outcomes:  Biochemical Data, Fluid Status or Edema, Skin, Weight     Discharge Planning:    Continue current diet, Continue Oral Nutrition Supplement           Darin ABRAHAMN, RDN, LDN  Lead Clinical Dietitian  RD Office Phone [phone]

## 2021-11-04 NOTE — PLAN OF CARE
Problem: Falls - Risk of:  Goal: Will remain free from falls  Description: Will remain free from falls  Outcome: Ongoing  Goal: Absence of physical injury  Description: Absence of physical injury  Outcome: Ongoing     Problem: Discharge Planning:  Goal: Discharged to appropriate level of care  Description: Discharged to appropriate level of care  Outcome: Ongoing     Problem: Cardiac Output - Decreased:  Goal: Hemodynamic stability will improve  Description: Hemodynamic stability will improve  Outcome: Ongoing  Goal: Cardiac output within specified parameters  Description: Cardiac output within specified parameters  Outcome: Ongoing     Problem: Pain:  Goal: Pain level will decrease  Description: Pain level will decrease  Outcome: Ongoing  Goal: Control of acute pain  Description: Control of acute pain  Outcome: Ongoing  Goal: Control of chronic pain  Description: Control of chronic pain  Outcome: Ongoing     Problem: Tissue Perfusion - Cardiopulmonary, Altered:  Goal: Hemodynamic stability will improve  Description: Hemodynamic stability will improve  Outcome: Ongoing  Goal: Circulatory function within specified parameters  Description: Circulatory function within specified parameters  Outcome: Ongoing  Goal: Absence of angina  Description: Absence of angina  Outcome: Ongoing  Goal: Will show no evidence of cardiac arrhythmias  Description: Will show no evidence of cardiac arrhythmias  Outcome: Ongoing     Problem: Skin Integrity:  Goal: Will show no infection signs and symptoms  Description: Will show no infection signs and symptoms  Outcome: Ongoing  Goal: Absence of new skin breakdown  Description: Absence of new skin breakdown  Outcome: Ongoing     Problem: Pain:  Goal: Pain level will decrease  Description: Pain level will decrease  Outcome: Ongoing  Goal: Control of acute pain  Description: Control of acute pain  Outcome: Ongoing  Goal: Control of chronic pain  Description: Control of chronic pain  Outcome: Ongoing     Problem: IP BALANCE  Goal: LTG - patient will maintain standing balance to allow for completion of daily activities  Outcome: Ongoing     Problem: Nutrition  Goal: Optimal nutrition therapy  Outcome: Ongoing     Problem:  Activity Intolerance:  Goal: Able to perform prescribed physical activity  Description: Able to perform prescribed physical activity  Outcome: Ongoing  Goal: Ability to tolerate increased activity will improve  Description: Ability to tolerate increased activity will improve  Outcome: Ongoing     Problem: Anxiety:  Goal: Level of anxiety will decrease  Description: Level of anxiety will decrease  Outcome: Ongoing     Problem: Fluid Volume - Imbalance:  Goal: Ability to achieve a balanced intake and output will improve  Description: Ability to achieve a balanced intake and output will improve  Outcome: Ongoing  Goal: Chest tube drainage is within specified parameters  Description: Chest tube drainage is within specified parameters  Outcome: Ongoing     Problem: Gas Exchange - Impaired:  Goal: Levels of oxygenation will improve  Description: Levels of oxygenation will improve  Outcome: Ongoing  Goal: Ability to maintain adequate ventilation will improve  Description: Ability to maintain adequate ventilation will improve  Outcome: Ongoing     Problem: Tobacco Use:  Goal: Will participate in inpatient tobacco-use cessation counseling  Description: Will participate in inpatient tobacco-use cessation counseling  Outcome: Ongoing

## 2021-11-04 NOTE — PROGRESS NOTES
Order obtained for extubation. SpO2 of 97 on 40% FiO2. Patient extubated and placed on 4 liters/min via nasal cannula. Post extubation SpO2 is 97% with HR  88 bpm and RR 12 breaths/min. Patient had mild cough that was non-productive. Extubation Well tolerated by patient. .   Breath Sounds: clear diminished    lebron wilson RCP   9:50 AM

## 2021-11-04 NOTE — PROGRESS NOTES
Pt.'s soft bilateral wrist restraints discontinued at 0945. Pt. no longer fits criteria needed for wrist restraints.

## 2021-11-04 NOTE — CARE COORDINATION
Social work: Roxane following for placement, precert has been obtained but has since . Maximiliano Ugarte will resubmit for authorization for dc to snf.

## 2021-11-04 NOTE — PROGRESS NOTES
[facility]  Office: [address], DO, Starr Shaffer, DO, Mark Restrepo, DO, Víctor Mccrary Blood, DO, Arlen Ling MD, Dionte Hernandez MD, Temi Plascencia MD, Radha Sheffield MD, Lisa Paul MD, Anirudh Call MD, Hardeep Montgomery MD, Bk Navarro MD, Lois Jones, DO, Emily Baez DO, Akosua Christianson MD,  Alexsander Gong DO, Jane Best MD, Jose Angel Sandoval MD, Amina Larry MD, Issac Smith MD, Yesenia Chaves MD, Lindsay Humphreys MD, Brandy Lu CNP, [facility], CNP, Nikky Mendoza, CNP, [facility] , CNS, Marsha Bolden, CNP, Arturo Rojas, CNP, Sivan Gordon, CNP, Sandeep Charlton, CNP, Siddharth Mcmullen, CNP, Jules Leyva, CNP, Alma Bacon PA-C, Lida Rawls, [facility], Braxton Ewing, CNP, Marilyn Yancey, CNP, Armando Morales, CNP, Vivek Fallon, CNP, Maddie Reddy, CNP, Moe Rosales, CNP, Deejay [facility]    Progress Note    11/4/2021    8:54 AM    Name:   Gary Araujo  MRN:     [id number]     [place]:      [de-identified]   Room:   2028/2028-01  IP Day:  11  Admit Date:  10/24/2021  6:48 AM    PCP:   Vivek Fallon MD  Code Status:  Full Code    Subjective:     C/C:   Chief Complaint   Patient presents with    Back Pain     Interval History Status:    S/P CABG x 2 11/2/21 with delayed closure on 11/3/21,   Patient is currently intubated on CPAP trial, doing well. She is off pressors and insulin gtt. On precedex for agitation, plans for extubation soon   Brief History:     Gary Araujo is a 61 y.o. Non- / non  female who presents with low Back Pain and is admitted to the hospital for the management of NSTEMI. She said she felt a pop while transferring from bed to wheelchair. It is unclear when that was but was unable to get out of her recliner. Starting 2 days pta she had cp and sob, along with nausea. Also c/o hip pain and said all her pain was due to fibromyalgia.  troponins were elevated   Review of Systems: TROY: patient intubated on vent     Medications: Allergies:     Allergies   Allergen Reactions    Amoxicillin Diarrhea and Other (See Comments)    Amoxicillin-Pot Clavulanate Diarrhea    Atorvastatin Other (See Comments)     Other reaction(s): Myalgia  Weakness      Cefuroxime Axetil Other (See Comments)    Clavulanic Acid Diarrhea and Other (See Comments)    Codeine Other (See Comments)     Unless suspended in syrup      Dextroamphetamine     Adhesive Tape Rash     Itching     Cephalosporins Rash       Current Meds:   Scheduled Meds:    furosemide  20 mg IntraVENous BID    sodium chloride flush  10 mL IntraVENous 2 times per day    aspirin  81 mg Oral Daily    amiodarone  200 mg Oral TID    mupirocin   Nasal BID    polyethylene glycol  17 g Oral Daily    metoprolol tartrate  25 mg Oral BID    pantoprazole  40 mg Oral Daily    pantoprazole  40 mg IntraVENous Daily    And    sodium chloride (PF)  10 mL IntraVENous Daily    ipratropium-albuterol  1 ampule Inhalation Q4H WA    sennosides-docusate sodium  1 tablet Oral BID    hydrocortisone sodium succinate PF  50 mg IntraVENous Q8H    erythromycin   Both Eyes Nightly    ciprofloxacin  500 mg Oral 2 times per day    gabapentin  300 mg Oral TID    FLUoxetine  40 mg Oral QAM    insulin glargine  56 Units SubCUTAneous Daily    And    insulin lispro  10 Units SubCUTAneous TID WC    [Held by provider] metFORMIN  1,000 mg Oral BID WC    rosuvastatin  20 mg Oral Nightly    influenza virus vaccine  0.5 mL IntraMUSCular Prior to discharge     Continuous Infusions:    sodium chloride      dextrose      dexmedetomidine (PRECEDEX) IV infusion 0.4 mcg/kg/hr (11/04/21 0800)    norepinephrine 0.02 mcg/kg/min (11/04/21 0558)    EPINEPHrine infusion Stopped (11/02/21 2003)    propofol 15 mcg/kg/min (11/04/21 0539)    phenylephrine (RODNEY-SYNEPHRINE) 50mg/250mL infusion Stopped (11/02/21 2100)    vasopressin (Septic Shock) infusion Stopped (21 1309)     PRN Meds: sodium bicarbonate, potassium chloride, sodium chloride flush, sodium chloride, ondansetron, acetaminophen, oxyCODONE-acetaminophen **OR** oxyCODONE-acetaminophen, fentanNYL **OR** fentanNYL, hydrALAZINE, metoprolol, sodium bicarbonate, diphenhydrAMINE, bisacodyl, fleet, albumin human, glucose, dextrose, glucagon (rDNA), dextrose, albuterol, naloxone, calcium gluconate **OR** calcium gluconate **OR** calcium gluconate **OR** calcium gluconate, benzonatate    Data:     Past Medical History:   has a past medical history of Asthma, Diabetes mellitus ([facility] [address] Fibromyalgia, Headache, Lymphedema of both lower extremities, and Myasthenia gravis ([facility] [address] Social History:   reports that she has never smoked. She has never used smokeless tobacco. She reports previous alcohol use. She reports previous drug use. Family History:   Family History   Problem Relation Age of Onset    Coronary Art Dis Mother     Kidney Disease Mother        Vitals:  /62   Pulse 59   Temp 97.7 °F (36.5 °C) (Temporal)   Resp 22   Ht 5' 5\" (1.651 m)   Wt 184 lb (83.5 kg)   SpO2 100%   BMI 30.62 kg/m²   Temp (24hrs), Av.6 °F (37 °C), Min:97.4 °F (36.3 °C), Max:99.3 °F (37.4 °C)    Recent Labs     21  0316 21  0410 21  0510 21  0736   POCGLU 120* 109* 119* 118*       I/O (24Hr):     Intake/Output Summary (Last 24 hours) at 2021 0859  Last data filed at 2021 0800  Gross per 24 hour   Intake 3290.48 ml   Output 1185 ml   Net 2105.48 ml       Labs:  Hematology:  Recent Labs     21  0530 21  1345 21  0500   WBC 14.6* 14.9* 16.4*   RBC 3.16* 3.02* 3.03*   HGB 8.2* 7.9* 7.8*   HCT 26.7* 25.8* 26.6*   MCV 84.5 85.4 87.8   MCH 25.9 26.2 25.7   MCHC 30.7 30.6 29.3   RDW 15.3* 15.9* 16.0*    148 137*   MPV 10.5 10.6 11.0   INR 1.7 1.8 1.8     Chemistry:  Recent Labs     21  2100 21  0045 21  0530 21  0530 21  1345 21  2221 11/04/21  0020 11/04/21  0500   NA  --    < > 146*  --  141  --   --  144   K  --    < > 3.9   < > 4.5  --  4.8 4.6   CL  --    < > 116*  --  114*  --   --  113*   CO2  --    < > 17*  --  19*  --   --  19*   GLUCOSE  --    < > 94  --  98  --   --  123*   BUN  --    < > 13  --  13  --   --  16   CREATININE  --    < > 0.65   < > 0.77 0.96  --  0.85   MG  --    < > 2.1  --  1.9  --   --  2.1   ANIONGAP  --    < > 13  --  8*  --   --  12   LABGLOM  --    < > >60   < > >60 NOT REPORTED  --  >60   GFRAA  --    < > >60  --  >60  --   --  >60   CALCIUM  --    < > 7.1*  --  7.7*  --   --  7.8*   PROBNP 738*  --   --   --   --   --   --   --     < > = values in this interval not displayed. Recent Labs     11/03/21  0530 11/03/21  0605 11/04/21  0108 11/04/21  0250 11/04/21  0316 11/04/21  0410 11/04/21  0510 11/04/21  0736   PROT 5.3*  --   --   --   --   --   --   --    LABALBU 4.4  --   --   --   --   --   --   --    AST 21  --   --   --   --   --   --   --    ALT <5*  --   --   --   --   --   --   --    ALKPHOS 33*  --   --   --   --   --   --   --    BILITOT 1.22*  --   --   --   --   --   --   --    BILIDIR 0.74*  --   --   --   --   --   --   --    POCGLU  --    < > 149* 115* 120* 109* 119* 118*    < > = values in this interval not displayed. ABG:  Lab Results   Component Value Date    POCPH 7.284 11/04/2021    POCPCO2 42.6 11/04/2021    POCPO2 87.1 11/04/2021    POCHCO3 20.2 11/04/2021    NBEA 6 11/04/2021    PBEA NOT REPORTED 11/04/2021    HVD5ZSS NOT REPORTED 11/04/2021    RVUB4HJB 95 11/04/2021    FIO2 40.0 11/04/2021     Lab Results   Component Value Date/Time    SPECIAL NOT REPORTED 10/28/2021 11:30 AM     Lab Results   Component Value Date/Time    CULTURE KLEBSIELLA PNEUMONIAE >306159 CFU/ML (A) 10/28/2021 11:30 AM    CULTURE ENTEROCOCCUS FAECALIS >200430 CFU/ML (A) 10/28/2021 11:30 AM       Radiology:  XR LUMBAR SPINE (2-3 VIEWS)    Result Date: 10/24/2021  LUMBAR SPINE: 1.  Age indeterminate, probably nonacute, compression fracture of L5 vertebral body with about 50% decrease height. Please correlate with point tenderness. MRI may also be considered for age characterization if deemed clinically necessary. 2. T12 and L1 kyphoplasty. CHEST X-RAY: 1. Poor inspiratory afford with significantly decreased lung volumes. 2. Patchy opacities left lower lung probably due to crowding of vessels with or without underlying developing infiltrate. Please correlate with auscultation. Short interval follow-up may also be considered. XR CHEST PORTABLE    Result Date: 10/24/2021  LUMBAR SPINE: 1. Age indeterminate, probably nonacute, compression fracture of L5 vertebral body with about 50% decrease height. Please correlate with point tenderness. MRI may also be considered for age characterization if deemed clinically necessary. 2. T12 and L1 kyphoplasty. CHEST X-RAY: 1. Poor inspiratory afford with significantly decreased lung volumes. 2. Patchy opacities left lower lung probably due to crowding of vessels with or without underlying developing infiltrate. Please correlate with auscultation. Short interval follow-up may also be considered. CT CHEST PULMONARY EMBOLISM W CONTRAST    Result Date: 10/24/2021  1. No evidence for acute pulmonary embolism. 2. Scattered bibasilar patchy subsegmental atelectasis and trace bilateral pleural effusions. 3. Borderline cardiomegaly. 4. Large areas of geographic ground-glass attenuation interspersed with more lucent areas probably combination of air trapping and pulmonary interstitial edema. 5. Cholelithiasis.        Physical Examination:        General appearance:  Alert,  Intubated on vent   Mental Status:  TROY  Lungs:  Bilateral bases diminished, normal effort on vent, chest tubes x 2  Heart:  regular rate and rhythm, no murmur  Abdomen:  soft, nontender, nondistended, hypoactive bowel sounds, no masses, hepatomegaly, splenomegaly   Extremities:  no redness, +1BLE edema  Skin: Surgical dressing to right lower leg intact with scant old dry drainage noted. Surgical incision to midsternal chest with Reyne Mc in place   Bowens and art line in place   Assessment:        Hospital Problems         Last Modified POA    * (Principal) NSTEMI (non-ST elevated myocardial infarction) ([address] 11/3/2021 Yes    Type 2 diabetes mellitus with hyperglycemia, without long-term current use of insulin ([address] 10/24/2021 Yes    Class 1 obesity due to excess calories with serious comorbidity and body mass index (BMI) of 33.0 to 33.9 in adult 10/24/2021 Yes    Lymphedema of both lower extremities 10/24/2021 Yes    Acute on chronic diastolic congestive heart failure ([address] 10/26/2021 Yes    Pathological fracture of thoracic vertebra due to secondary osteoporosis ([address] 10/26/2021 Yes    Pathological fracture of lumbar vertebra due to secondary osteoporosis ([address] 10/26/2021 Yes    Intractable back pain 10/26/2021 Yes    Age-related osteoporosis with current pathological fracture 10/26/2021 Yes    History of kyphoplasty 10/26/2021 Yes    Facet arthritis, degenerative, lumbar spine 10/26/2021 Yes    Degenerative spondylolisthesis 10/26/2021 Yes    DDD (degenerative disc disease), thoracolumbar 10/26/2021 Yes    Acute on chronic congestive heart failure ([address] 10/27/2021 Yes    Chronic bilateral low back pain without sciatica 10/27/2021 Yes    Allergic conjunctivitis of both eyes 10/31/2021 Yes    CAD in native artery 11/3/2021 Yes    S/P CABG x 2 (Chronic) 11/4/2021 Yes    Overview Signed 11/4/2021  6:16 AM by CYNDY Hall - CNP     SLIMA to LAD and RSVG1 to large high Diag, OM exploration (not graftable)                Plan:        1. Acute pathological L3 fracture likely due to osteoporosis and chronic L5             fracture, plans for kyphoplasty at a later time   2. NSTEMI due to MVCAD,s/p CABG x2 with delayed closure, Post-op   management per CT surgery    3.  Monitor and control blood sugars: ok to hold am Lantus today as she is still NPO, insulin gtt discontinued per CTS. 4. Monitor labs, replace electrolytes as needed  5. Continue telemetry monitoring   6. Compensated diastolic CHF  7. Monitor and control blood pressure: currently stable   8. Strict I's and O's   9. Plans to extubate this morning   10. Continue chest tubes to wall suction, managed per CT surgery  11.   Plan discussed with staff     CYNDY ESTRADA NP  11/4/2021  8:59 AM

## 2021-11-04 NOTE — PROGRESS NOTES
Writer/RN received call from CT Surgery NP ELINA Ugalde to inform writer of Pulmonology Consult placed by same. Writer/RN updated VARINDER Ugalde that Pulmonology Consult had been previously requested by Internal Medicine; consequently they have been contacted. During this conversation writer/RN advised VARINDER Ugalde that patient's blood pressure had been low with diastolic pressures in the mid 14'V (17-49), systolic pressures < 267 (95-99), and MAPs < 65. VARINDER Ugalde acknowledged the concern and informed writer/RN that the current goal was to maintain MAPs > 55. Writer/RN read back this advisement, of keeping MAPs > 55 and that parameter was confirmed.

## 2021-11-04 NOTE — PROGRESS NOTES
Section of Cardiology  Progress Note      Date:  11/4/2021  Patient: Keith King  Admission:  10/24/2021  6:48 AM  Admit DX: NSTEMI (non-ST elevated myocardial infarction) ([facility] [I21.4]  Non-ST elevation MI (NSTEMI) ([facility]) [I21.4]  Lymphedema of both lower extremities [I89.0]  Chronic bilateral low back pain without sciatica [M54.50, G89.29]  Acute on chronic congestive heart failure, unspecified heart failure type ([facility] [I50.9]  CAD in native artery [I25.10]  Age:  61 y.o., 1955     LOS: 11 days     Reason for evaluation:   NSTEMI and CAD      SUBJECTIVE:     The patient was seen and examined. Notes and labs reviewed. The patient reports having incisional pain but recently took pain medication. She is sitting up in the chair and her daughter was at the bedside when I saw her. Denies shortness of breath and palpitations. OBJECTIVE:      EXAM:   Vitals:    VITALS:  /62   Pulse 69   Temp 96.8 °F (36 °C) (Temporal)   Resp 23   Ht 5' 5\" (1.651 m)   Wt 184 lb (83.5 kg)   SpO2 95%   BMI 30.62 kg/m²   24HR INTAKE/OUTPUT:      Intake/Output Summary (Last 24 hours) at 11/4/2021 1754  Last data filed at 11/4/2021 1700  Gross per 24 hour   Intake 2030.59 ml   Output 1666 ml   Net 364.59 ml       CONSTITUTIONAL: Alert, appears to be in mild distress, moaning in pain  HEENT: No JVD  LUNGS: Clear anteriorly, nonlabored  CARDIOVASCULAR:  regular rate and rhythm, normal S1 and S2, no S3 or S4, and no rub noted. SKIN: Warm and dry.   EXTREMITIES: Mild to moderate bilateral lower extremity edema    Current Inpatient Medications:   furosemide  20 mg IntraVENous BID    predniSONE  10 mg Oral Daily    sodium chloride flush  10 mL IntraVENous 2 times per day    aspirin  81 mg Oral Daily    amiodarone  200 mg Oral TID    mupirocin   Nasal BID    polyethylene glycol  17 g Oral Daily    metoprolol tartrate  25 mg Oral BID    pantoprazole  40 mg Oral Daily    ipratropium-albuterol  1 ampule Inhalation Q4H WA    sennosides-docusate sodium  1 tablet Oral BID    hydrocortisone sodium succinate PF  50 mg IntraVENous Q8H    erythromycin   Both Eyes Nightly    ciprofloxacin  500 mg Oral 2 times per day    gabapentin  300 mg Oral TID    FLUoxetine  40 mg Oral QAM    insulin glargine  56 Units SubCUTAneous Daily    And    insulin lispro  10 Units SubCUTAneous TID     [Held by provider] metFORMIN  1,000 mg Oral BID     rosuvastatin  20 mg Oral Nightly    influenza virus vaccine  0.5 mL IntraMUSCular Prior to discharge       IV Infusions (if any):   sodium chloride      dextrose      dexmedetomidine (PRECEDEX) IV infusion 0.5 mcg/kg/hr (11/04/21 1030)    norepinephrine 0.02 mcg/kg/min (11/04/21 0558)       Diagnostics:   Telemetry: Sinus rhythm    Labs:   CBC:  Recent Labs     11/03/21  1345 11/04/21  0500   WBC 14.9* 16.4*   HGB 7.9* 7.8*   HCT 25.8* 26.6*    137*     Magnesium:  Recent Labs     11/03/21  1345 11/04/21  0500   MG 1.9 2.1     BMP:  Recent Labs     11/03/21  1345 11/03/21  1345 11/03/21  2229 11/04/21  0020 11/04/21  0500     --   --   --  144   K 4.5   < >  --  4.8 4.6   CALCIUM 7.7*  --   --   --  7.8*   CO2 19*  --   --   --  19*   BUN 13  --   --   --  16   CREATININE 0.77  --  0.96  --  0.85   LABGLOM >60   < > NOT REPORTED  --  >60   GLUCOSE 98  --   --   --  123*    < > = values in this interval not displayed. BNP:  Recent Labs     11/02/21  2100   PROBNP 738*     PT/INR:  Recent Labs     11/03/21  1345 11/04/21  0500   PROTIME 20.7* 20.6*   INR 1.8 1.8     APTT:  Recent Labs     11/02/21  1540 11/03/21  1345   APTT 38.2* 41.1*     CARDIAC ENZYMES:No results for input(s): MYOGLOBIN, CKTOTAL, CKMB, CKMBINDEX, TROPHS, TROPONINT in the last 72 hours.   FASTING LIPID PANEL:  Lab Results   Component Value Date    HDL 40 10/25/2021    TRIG 107 10/25/2021     LIVER PROFILE:  Recent Labs     11/03/21  0530   AST 21   ALT <5*   LABALBU 4.4   ALKPHOS 33*   BILITOT 1.22* BILIDIR 0.74*   IBILI 0.48   PROT 5.3*   GLOB NOT REPORTED   ALBUMIN NOT REPORTED        ASSESSMENT:    · CAD with multivessel disease status post CABG x2 with LIMA to LAD and SVG to diagonal, OM exploration was not graftable 11/2/2021 and status post delayed sternal closure 11/3/2021   · Borderline troponin elevation with possible non-STEMI-preserved LV systolic function on echocardiogram done 10/25/2021  · Diabetes, managed by others  · Lymphedema and nonhealing wounds, managed by others  · Peripheral vascular disease, managed by others  · Dyslipidemia, treated  · Lumbar compression fracture, managed by others    PLAN:  Continue current cardiac medications. Patient has maintained sinus rhythm. She recently received the medication for her surgical pain. Increase activity as tolerated. Discussed with RN, patient, and patient's daughter at the bedside, and Dr. Guilherme Lynch.     Rodolfo Hamilton, APRN - CNP

## 2021-11-04 NOTE — PLAN OF CARE
Nutrition Problem #1: Altered nutrition-related lab values  Intervention: Food and/or Nutrient Delivery: Continue Current Diet, Continue Oral Nutrition Supplement  Nutritional Goals: PO intakes are greater than 75% at meals

## 2021-11-04 NOTE — CONSULTS
Pulmonary Medicine and [address] Mikki Nunes MD      Patient - Olivia Cook   MRN -  [id number]   Margaret # - [de-identified]   - 1955      Date of Admission -  10/24/2021  6:48 AM  Date of evaluation -  2021  Room - -   Brandon Almazan MD Primary Care Physician - Edda Henry MD     Reason for Consult    S/p CABG, vent management    Assessment   · Acute hypoxic respiratory failure  · CAD s/p CABG 1/3/2021, successfully extubated 2021  · Asthma  · Myasthenia gravis  · Suspected obstructive sleep apnea/Obesity  · Pulmonary edema/small bilateral pleural effusions/atelectasis    Recommendations   · Oxygen via nasal cannula, keep SPO2 90% or greater  · BiPAP support while asleep if necessary  · Incentive spirometry every hour while awake  · Home oxygen evaluation prior to discharge  · Continue diuresis with IV Lasix 20 mg twice daily  · Chest tube management per CT surgery  · Albuterol and Ipratropium Q 4 hours and prn  · Cipro  · Lasix 40 mg x1  · X-ray chest in am  · Labs: CBC and BMP in am  · DVT prophylaxis  · PUD prophylaxis, Protonix  · PT/OT  · PFTs as an outpatient  · Sleep apnea evaluation as an outpatient  · Discussed with RN  · Will follow with you    Problem List      Patient Active Problem List   Diagnosis    NSTEMI (non-ST elevated myocardial infarction) ([address]    Type 2 diabetes mellitus with hyperglycemia, without long-term current use of insulin ([address]    Class 1 obesity due to excess calories with serious comorbidity and body mass index (BMI) of 33.0 to 33.9 in adult    Lymphedema of both lower extremities    Acute on chronic diastolic congestive heart failure ([address]    Pathological fracture of thoracic vertebra due to secondary osteoporosis ([address]    Pathological fracture of lumbar vertebra due to secondary osteoporosis (HCC)    Intractable back pain    Age-related osteoporosis with current pathological fracture    History of kyphoplasty    Facet arthritis, degenerative, lumbar spine    Degenerative spondylolisthesis    DDD (degenerative disc disease), thoracolumbar    Acute on chronic congestive heart failure (HCC)    Chronic bilateral low back pain without sciatica    Allergic conjunctivitis of both eyes    CAD in native artery    S/P CABG x 2       HPI     Heidy Cid is 61 y.o., female who initially presented to the emergency room several days ago with complaints of chronic low back pain and inability to ambulate. Patient was found to have ST elevation MI, underwent heart cath, and subsequently had CABG on 11/3/2021. Patient was extubated this morning, currently is on nasal cannula at 4 L. She is not providing much history. She just is saying help and moaning in the bed. According to the chart she has a history of asthma and myasthenia gravis.     PMHx   Past Medical History      Diagnosis Date    Asthma     [facility]     Fibromyalgia     Headache     Lymphedema of both lower extremities     Myasthenia gravis ([facility]       Past Surgical History        Procedure Laterality Date    CARPAL TUNNEL RELEASE      CORONARY ARTERY BYPASS GRAFT N/A 11/2/2021    CABG CORONARY ARTERY BYPASS  LESLIE performed by Romy Christy MD at [facility] N/A 11/3/2021    POST OP CHEST CLOSURE performed by Romy Christy MD at [address] [place] [address]    Current Medications    furosemide  20 mg IntraVENous BID    sodium chloride flush  10 mL IntraVENous 2 times per day    aspirin  81 mg Oral Daily    amiodarone  200 mg Oral TID    mupirocin   Nasal BID    polyethylene glycol  17 g Oral Daily    metoprolol tartrate  25 mg Oral BID    pantoprazole  40 mg Oral Daily    pantoprazole  40 mg IntraVENous Daily    And    sodium chloride (PF)  10 mL IntraVENous Daily    ipratropium-albuterol  1 ampule Inhalation Q4H WA    sennosides-docusate sodium  1 tablet Oral BID  hydrocortisone sodium succinate PF  50 mg IntraVENous Q8H    erythromycin   Both Eyes Nightly    ciprofloxacin  500 mg Oral 2 times per day    gabapentin  300 mg Oral TID    FLUoxetine  40 mg Oral QAM    insulin glargine  56 Units SubCUTAneous Daily    And    insulin lispro  10 Units SubCUTAneous TID     [Held by provider] metFORMIN  1,000 mg Oral BID     rosuvastatin  20 mg Oral Nightly    influenza virus vaccine  0.5 mL IntraMUSCular Prior to discharge     sodium bicarbonate, potassium chloride, sodium chloride flush, sodium chloride, ondansetron, acetaminophen, oxyCODONE-acetaminophen **OR** oxyCODONE-acetaminophen, fentanNYL **OR** fentanNYL, hydrALAZINE, metoprolol, sodium bicarbonate, diphenhydrAMINE, bisacodyl, fleet, albumin human, glucose, dextrose, glucagon (rDNA), dextrose, albuterol, naloxone, calcium gluconate **OR** calcium gluconate **OR** calcium gluconate **OR** calcium gluconate, benzonatate  IV Drips/Infusions   sodium chloride      dextrose      dexmedetomidine (PRECEDEX) IV infusion 0.4 mcg/kg/hr (11/04/21 1100)    norepinephrine 0.02 mcg/kg/min (11/04/21 0558)    EPINEPHrine infusion Stopped (11/02/21 2003)    propofol 15 mcg/kg/min (11/04/21 0539)    phenylephrine (RODNEY-SYNEPHRINE) 50mg/250mL infusion Stopped (11/02/21 2100)    vasopressin (Septic Shock) infusion Stopped (11/03/21 1309)     Home Medications  Medications Prior to Admission: gabapentin (NEURONTIN) 300 MG capsule, Take 300 mg by mouth 3 times daily.   insulin aspart protamine-insulin aspart (NOVOLOG MIX 70/30 FLEXPEN) (70-30) 100 UNIT/ML injection, Inject into the skin 2 times daily (with meals) Inject 55 units at breakfast and 45 units at dinner subcutaneously  predniSONE (DELTASONE) 10 MG tablet, Take 10 mg by mouth daily   albuterol sulfate HFA (VENTOLIN HFA) 108 (90 Base) MCG/ACT inhaler, Inhale 2 puffs into the lungs every 4 hours as needed for Wheezing or Shortness of Breath  FLUoxetine (PROZAC) 40 MG capsule, Take 40 mg by mouth every morning  fluticasone (FLONASE) 50 MCG/ACT nasal spray, 1 spray by Each Nostril route daily  metoprolol succinate (TOPROL XL) 25 MG extended release tablet, Take 25 mg by mouth daily  nystatin (MYCOSTATIN) 008947 UNIT/GM powder, Apply topically 2 times daily Apply to affected area twice daily  furosemide (LASIX) 40 MG tablet, Take 40-80 mg by mouth See Admin Instructions Take 1 tablet by mouth every morning, 2 tablets mid day and 1 tablet at bedtime. acetaminophen (TYLENOL) 325 MG tablet, Take 650 mg by mouth every 6 hours as needed for Pain  metFORMIN (GLUCOPHAGE) 1000 MG tablet, Take 1,000 mg by mouth 2 times daily (with meals)  famotidine (PEPCID) 40 MG tablet, Take 40 mg by mouth nightly  vitamin D (CHOLECALCIFEROL) 50 MCG (2000 UT) TABS tablet, Take 2,000 Units by mouth daily  ascorbic acid (VITAMIN C) 500 MG tablet, Take 500 mg by mouth daily  oxyCODONE-acetaminophen (PERCOCET) 5-325 MG per tablet, Take 1 tablet by mouth every 12 hours as needed for Pain. apixaban (ELIQUIS) 5 MG TABS tablet, Take 5 mg by mouth 2 times daily  rosuvastatin (CRESTOR) 10 MG tablet, Take 10 mg by mouth daily    Allergies    Amoxicillin, Amoxicillin-pot clavulanate, Atorvastatin, Cefuroxime axetil, Clavulanic acid, Codeine, Dextroamphetamine, Adhesive tape, and Cephalosporins  Social History     Social History     Tobacco Use    Smoking status: Never Smoker    Smokeless tobacco: Never Used   Substance Use Topics    Alcohol use: Not Currently     Family History          Problem Relation Age of Onset    Coronary Art Dis Mother     Kidney Disease Mother      ROS - 11 systems   Able to obtain detailed ROS secondary to patient mental status    Vitals     height is 5' 5\" (1.651 m) and weight is 184 lb (83.5 kg). Her temporal temperature is 97.7 °F (36.5 °C). Her blood pressure is 101/54 (abnormal) and her pulse is 63. Her respiration is 21 and oxygen saturation is 96%.    Body mass index is 30.62 kg/m². I/O        Intake/Output Summary (Last 24 hours) at 11/4/2021 1112  Last data filed at 11/4/2021 1000  Gross per 24 hour   Intake 2402.98 ml   Output 1045 ml   Net 1357.98 ml     I/O last 3 completed shifts: In: 3290.5 [I.V.:1465.5; NG/GT:75; IV Piggyback:1750]  Out: 0939 [Urine:608; Drains:200; Chest Tube:530]   Patient Vitals for the past 96 hrs (Last 3 readings):   Weight   11/04/21 0400 184 lb (83.5 kg)   11/01/21 0400 184 lb (83.5 kg)     Exam   General Appearance   Awake, alert, moaning, not cooperative with exam  HEENT - Head is normocephalic, atraumatic. Pupil reactive to light  Neck - Supple,  trachea midline and straight  Lungs -moderate air exchange, positive rhonchi and crackles  Cardiovascular - Heart sounds are normal.  Regular rhythm normal rate without murmur, gallop or rub. Abdomen - Soft, nontender, nondistended, no masses or organomegaly  Neurologic - CN II-XII are grossly intact. There are no focal motor or sensory deficits  Skin - No bruising or bleeding  Extremities - No cyanosis, clubbing. Trace edema.     Labs  - Old records and notes have been reviewed in [facility]   CBC     Lab Results   Component Value Date    WBC 16.4 11/04/2021    RBC 3.03 11/04/2021    HGB 7.8 11/04/2021    HCT 26.6 11/04/2021     11/04/2021    MCV 87.8 11/04/2021    MCH 25.7 11/04/2021    MCHC 29.3 11/04/2021    RDW 16.0 11/04/2021    LYMPHOPCT 8 11/03/2021    MONOPCT 5 11/03/2021    BASOPCT 0 11/03/2021    MONOSABS 0.64 11/03/2021    LYMPHSABS 1.07 11/03/2021    EOSABS <0.03 11/03/2021    BASOSABS 0.06 11/03/2021    DIFFTYPE NOT REPORTED 11/03/2021     BMP   Lab Results   Component Value Date     11/04/2021    K 4.6 11/04/2021     11/04/2021    CO2 19 11/04/2021    BUN 16 11/04/2021    CREATININE 0.85 11/04/2021    GLUCOSE 123 11/04/2021    CALCIUM 7.8 11/04/2021    MG 2.1 11/04/2021     LFTS  Lab Results   Component Value Date    ALKPHOS 33 11/03/2021    ALT <5 11/03/2021    AST 21 11/03/2021    PROT 5.3 11/03/2021    BILITOT 1.22 11/03/2021    BILIDIR 0.74 11/03/2021    IBILI 0.48 11/03/2021    LABALBU 4.4 11/03/2021     ABG   Lab Results   Component Value Date    LEC5CTU NOT REPORTED 11/04/2021     PTT  Lab Results   Component Value Date    APTT 41.1 (H) 11/03/2021     INR   Lab Results   Component Value Date    INR 1.8 11/04/2021    INR 1.8 11/03/2021    INR 1.7 11/03/2021    PROTIME 20.6 (H) 11/04/2021    PROTIME 20.7 (H) 11/03/2021    PROTIME 19.7 (H) 11/03/2021       Radiology    CXR       CT Scans    (See actual reports for details)    \"Thank you for asking us to see this patient\"    Case discussed with nurse and patient. Questions and concerns addressed.     Electronically signed by     Shannon Ferrer MD on 11/4/2021 at 12:22 PM  Pulmonary [address] and Sleep Medicine,  [facility]  Cell: [phone]  Office: [phone]

## 2021-11-04 NOTE — PROGRESS NOTES
Physical Therapy  DATE: 2021    NAME: Terri Palacio  MRN: [id number]   : 1955    Patient not seen this date for Physical Therapy due to:      [] Cancel by RN or physician due to:    [] Hemodialysis    [] Critical Lab Value Level     [] Blood transfusion in progress    [] Acute or unstable cardiovascular status   _MAP < 55 or more than >115  _HR < 40 or > 130    [] Acute or unstable pulmonary status   -FiO2 > 60%   _RR < 5 or >40    _O2 sats < 85%    [] Strict Bedrest    [] Off Unit for surgery or procedure    [] Off Unit for testing       [] Pending imaging to R/O fracture    [x] Pt remains intubated on vent       [] Other      [] PT being discontinued at this time. Patient independent. No further needs. [] PT being discontinued at this time as the patient has been transferred to hospice care. No further needs.       [facility], PT

## 2021-11-04 NOTE — FLOWSHEET NOTE
11/03/21 2244   Provider Notification   Reason for Communication Review case   Provider Name Chad Wise   Provider Notification Advance Practice Clinician (CNS/NP/CNM/CRNA/PA)   Method of Communication Call   Response See orders     Notified of continuing agitation, pt still isn't following commands. Also notified of abnormal ABG result. Said to give 1 amp sodium bicarb and recheck ABG in 1 hr, if ABG and mentation do not improve leave intubated until morning.

## 2021-11-04 NOTE — PROGRESS NOTES
[facility]  Daily Progress Note    Surgeon: Dr. Marcellus Cortez   S/P : CABG X 2 w/ SLIMA to LAD and RSVG1 to large high Diag- With a delayed sternal closure   POD#: 2    Subjective:  Ms. Evin Johnson continues to be intubated and sedated on the vent. Plan of care reviewed and questions answered with RN staff. Physical Exam  Vital Signs: BP (!) 90/55   Pulse 56   Temp 97.7 °F (36.5 °C) (Bladder)   Resp 16   Ht 5' 5\" (1.651 m)   Wt 184 lb (83.5 kg)   SpO2 96%   BMI 30.62 kg/m²  O2 Flow Rate (L/min): 2 L/min   Admit Weight: Weight: 199 lb (90.3 kg)   WEIGHTWeight: 184 lb (83.5 kg)     General: Patient is sedated and remains intubated  Heart: Normal S1 and S2.  Regular rhythm. No murmurs, gallops, or rubs. Pacing Wires: Yes -To be clipped prior to discharge  Lungs: clear to auscultation bilaterally and diminished breath sounds bibasilar Chest tubes: Yes, Air Leak: No  Abdomen: soft, non tender, non distended, BS hypoactive x4  Extremities: 1+ edema  Wounds: clean and dry, healing appropriately. Prevena to Sternum.      Sternum: Covered with Prevena dressing  SVG sites: Healing    Scheduled Meds:    sodium chloride flush  10 mL IntraVENous 2 times per day    aspirin  81 mg Oral Daily    amiodarone  200 mg Oral TID    mupirocin   Nasal BID    polyethylene glycol  17 g Oral Daily    metoprolol tartrate  25 mg Oral BID    pantoprazole  40 mg Oral Daily    pantoprazole  40 mg IntraVENous Daily    And    sodium chloride (PF)  10 mL IntraVENous Daily    ipratropium-albuterol  1 ampule Inhalation Q4H WA    sennosides-docusate sodium  1 tablet Oral BID    hydrocortisone sodium succinate PF  50 mg IntraVENous Q8H    erythromycin   Both Eyes Nightly    ciprofloxacin  500 mg Oral 2 times per day    gabapentin  300 mg Oral TID    FLUoxetine  40 mg Oral QAM    insulin glargine  56 Units SubCUTAneous Daily    And    insulin lispro  10 Units SubCUTAneous TID WC    [Held by provider] metFORMIN  1,000 mg Oral BID WC    rosuvastatin  20 mg Oral Nightly    influenza virus vaccine  0.5 mL IntraMUSCular Prior to discharge     Continuous Infusions:    sodium chloride      dextrose      dexmedetomidine (PRECEDEX) IV infusion 0.3 mcg/kg/hr (11/04/21 0326)    insulin 0.59 Units/hr (11/04/21 0500)    nitroGLYCERIN Stopped (11/02/21 2021)    norepinephrine 0.02 mcg/kg/min (11/04/21 0558)    EPINEPHrine infusion Stopped (11/02/21 2003)    propofol 15 mcg/kg/min (11/04/21 0539)    phenylephrine (RODNEY-SYNEPHRINE) 50mg/250mL infusion Stopped (11/02/21 2100)    vasopressin (Septic Shock) infusion Stopped (11/03/21 1309)       Data:  CBC:   Recent Labs     11/03/21  0045 11/03/21  0530 11/03/21  1345   WBC 13.8* 14.6* 14.9*   HGB 8.0* 8.2* 7.9*   HCT 25.8* 26.7* 25.8*   MCV 83.8 84.5 85.4    158 148     BMP:   Recent Labs     11/03/21  0045 11/03/21  0045 11/03/21  0530 11/03/21  1345 11/03/21  2229 11/04/21  0020     --  146* 141  --   --    K 3.5*   < > 3.9 4.5  --  4.8   *  --  116* 114*  --   --    CO2 21  --  17* 19*  --   --    BUN 13  --  13 13  --   --    CREATININE 0.66   < > 0.65 0.77 0.96  --     < > = values in this interval not displayed. PT/INR:   Recent Labs     11/02/21  2100 11/03/21  0530 11/03/21  1345   PROTIME 20.3* 19.7* 20.7*   INR 1.8 1.7 1.8     APTT:   Recent Labs     11/02/21  1540 11/03/21  1345   APTT 38.2* 41.1*       Chest X-Ray:      FINDINGS:   Median sternotomy fixation devices are again noted.  Endotracheal tube   remains in place with distal tip in the mid trachea.  Enteric tube extends   below the left hemidiaphragm with distal tip overlying the proximal stomach. Mediastinal drain and left chest tube remain in place.  Right IJ central   venous catheter remains in place.  Cardiomegaly is again noted.  Vascular   congestion appears mildly increased.  There is also increased opacity at the   lung bases.  No evidence of pneumothorax.        I/O: I/O last 3 completed shifts: In: 3331.5 [I.V.:1921.5; NG/GT:75; IV Piggyback:1335]  Out: 65 [Urine:989; Drains:550; Chest Tube:405]    Assessment/Plan:      Diagnosis Date    Asthma     Diabetes mellitus ([facility]     Fibromyalgia     Headache     Lymphedema of both lower extremities     Myasthenia gravis ([facility]       Neuro: Sedated and on vent   Lungs: clear, diminished in the bases   HD: VSS- weaning off drips as tolerated   Edema: 1+ peripheral   GI: Hypoactive bowel sounds X4   : Marginal urine output    Beta-Blocker: yes- with holding parameters  ASA: yes  Plavix: yes  GI: yes  Statin: yes  Coumadin: no  ACE-I: no  EF: 70% pre-operative     Oxygen as needed to maintain SpO2 > 92%   Chest x-ray daily   Keep Chest Tubes to wall suction   Will add lasix BID   Wean off vasopressors as tolerated   Change sedation to precedex and d/c diprivan as tolerated   Consult pulmonology to assist with lung compliance/extubation   Encourage incentive spirometry, acapella and ambulation once extubated   Replace electrolytes as needed per sliding scale and recheck per policy   Case Management consult for discharge planning- will likely need SNF placement    The above recommendations including medications and orders were discussed and agreed upon with Dr. Ugo Swann, the attending on service for the cardiothoracic surgery group today. Electronically signed by CYNDY Cole CNP on 11/4/2021 at 6:01 AM    On this date 11/4/2021 I have spent 40 minutes reviewing previous notes, test results and face to face with the patient discussing the diagnosis and importance of compliance with the treatment plan as well as documenting on the day of the visit. At least 50% of the time documented was spent with the patient to provide counseling and/or coordination of care.     This note was created with the assistance of a speech-recognition program.  Although the intention is to generate a document that actually reflects the content of the visit, no guarantees can be provided that every mistake has been identified and corrected by editing. Note was updated later by me after  physical examination and  completion of the assessment.

## 2021-11-04 NOTE — PROGRESS NOTES
RN contacted VARINDER Ugalde regarding an increase in pt.'s chest tube output. VARINDER Ugalde acknowledged this information and advised RN/writer to continue to monitor.

## 2021-11-05 ENCOUNTER — APPOINTMENT (OUTPATIENT)
Dept: GENERAL RADIOLOGY | Age: 66
DRG: 233 | End: 2021-11-05
Payer: MEDICARE

## 2021-11-05 LAB
ANION GAP SERPL CALCULATED.3IONS-SCNC: 12 MMOL/L (ref 9–17)
BLD PROD TYP BPU: NORMAL
BLD PROD TYP BPU: NORMAL
BUN BLDV-MCNC: 22 MG/DL (ref 8–23)
BUN/CREAT BLD: 19 (ref 9–20)
CALCIUM SERPL-MCNC: 7.6 MG/DL (ref 8.6–10.4)
CHLORIDE BLD-SCNC: 110 MMOL/L (ref 98–107)
CO2: 20 MMOL/L (ref 20–31)
CREAT SERPL-MCNC: 1.16 MG/DL (ref 0.5–0.9)
DISPENSE STATUS BLOOD BANK: NORMAL
DISPENSE STATUS BLOOD BANK: NORMAL
GFR AFRICAN AMERICAN: 57 ML/MIN
GFR NON-AFRICAN AMERICAN: 47 ML/MIN
GFR SERPL CREATININE-BSD FRML MDRD: ABNORMAL ML/MIN/{1.73_M2}
GFR SERPL CREATININE-BSD FRML MDRD: ABNORMAL ML/MIN/{1.73_M2}
GLUCOSE BLD-MCNC: 141 MG/DL (ref 65–105)
GLUCOSE BLD-MCNC: 144 MG/DL (ref 65–105)
GLUCOSE BLD-MCNC: 164 MG/DL (ref 65–105)
GLUCOSE BLD-MCNC: 174 MG/DL (ref 70–99)
GLUCOSE BLD-MCNC: 176 MG/DL (ref 65–105)
HCT VFR BLD CALC: 31.6 % (ref 36.3–47.1)
HEMOGLOBIN: 9.4 G/DL (ref 11.9–15.1)
INR BLD: 1.4
MAGNESIUM: 2.1 MG/DL (ref 1.6–2.6)
MCH RBC QN AUTO: 26 PG (ref 25.2–33.5)
MCHC RBC AUTO-ENTMCNC: 29.7 G/DL (ref 28.4–34.8)
MCV RBC AUTO: 87.3 FL (ref 82.6–102.9)
NRBC AUTOMATED: 0.1 PER 100 WBC
PDW BLD-RTO: 16.6 % (ref 11.8–14.4)
PLATELET # BLD: 176 K/UL (ref 138–453)
PMV BLD AUTO: 11.4 FL (ref 8.1–13.5)
POTASSIUM SERPL-SCNC: 4.2 MMOL/L (ref 3.7–5.3)
PROTHROMBIN TIME: 17 SEC (ref 11.5–14.2)
RBC # BLD: 3.62 M/UL (ref 3.95–5.11)
SODIUM BLD-SCNC: 142 MMOL/L (ref 135–144)
TRANSFUSION STATUS: NORMAL
TRANSFUSION STATUS: NORMAL
UNIT DIVISION: 0
UNIT DIVISION: 0
UNIT NUMBER: NORMAL
UNIT NUMBER: NORMAL
WBC # BLD: 16.2 K/UL (ref 3.5–11.3)

## 2021-11-05 PROCEDURE — 97164 PT RE-EVAL EST PLAN CARE: CPT

## 2021-11-05 PROCEDURE — 2060000000 HC ICU INTERMEDIATE R&B

## 2021-11-05 PROCEDURE — 6370000000 HC RX 637 (ALT 250 FOR IP): Performed by: NURSE PRACTITIONER

## 2021-11-05 PROCEDURE — 97535 SELF CARE MNGMENT TRAINING: CPT

## 2021-11-05 PROCEDURE — 99024 POSTOP FOLLOW-UP VISIT: CPT | Performed by: NURSE PRACTITIONER

## 2021-11-05 PROCEDURE — 2580000003 HC RX 258: Performed by: NURSE PRACTITIONER

## 2021-11-05 PROCEDURE — 94640 AIRWAY INHALATION TREATMENT: CPT

## 2021-11-05 PROCEDURE — 97167 OT EVAL HIGH COMPLEX 60 MIN: CPT

## 2021-11-05 PROCEDURE — 6360000002 HC RX W HCPCS: Performed by: NURSE PRACTITIONER

## 2021-11-05 PROCEDURE — 99232 SBSQ HOSP IP/OBS MODERATE 35: CPT | Performed by: STUDENT IN AN ORGANIZED HEALTH CARE EDUCATION/TRAINING PROGRAM

## 2021-11-05 PROCEDURE — 6370000000 HC RX 637 (ALT 250 FOR IP): Performed by: STUDENT IN AN ORGANIZED HEALTH CARE EDUCATION/TRAINING PROGRAM

## 2021-11-05 PROCEDURE — 85027 COMPLETE CBC AUTOMATED: CPT

## 2021-11-05 PROCEDURE — 94669 MECHANICAL CHEST WALL OSCILL: CPT

## 2021-11-05 PROCEDURE — 83735 ASSAY OF MAGNESIUM: CPT

## 2021-11-05 PROCEDURE — 97530 THERAPEUTIC ACTIVITIES: CPT

## 2021-11-05 PROCEDURE — 82947 ASSAY GLUCOSE BLOOD QUANT: CPT

## 2021-11-05 PROCEDURE — 71045 X-RAY EXAM CHEST 1 VIEW: CPT

## 2021-11-05 PROCEDURE — 94761 N-INVAS EAR/PLS OXIMETRY MLT: CPT

## 2021-11-05 PROCEDURE — 85610 PROTHROMBIN TIME: CPT

## 2021-11-05 PROCEDURE — 2700000000 HC OXYGEN THERAPY PER DAY

## 2021-11-05 PROCEDURE — 97110 THERAPEUTIC EXERCISES: CPT

## 2021-11-05 PROCEDURE — APPSS45 APP SPLIT SHARED TIME 31-45 MINUTES: Performed by: NURSE PRACTITIONER

## 2021-11-05 PROCEDURE — 94003 VENT MGMT INPAT SUBQ DAY: CPT

## 2021-11-05 PROCEDURE — 97168 OT RE-EVAL EST PLAN CARE: CPT

## 2021-11-05 PROCEDURE — 80048 BASIC METABOLIC PNL TOTAL CA: CPT

## 2021-11-05 RX ORDER — KETOROLAC TROMETHAMINE 30 MG/ML
30 INJECTION, SOLUTION INTRAMUSCULAR; INTRAVENOUS ONCE
Status: COMPLETED | OUTPATIENT
Start: 2021-11-05 | End: 2021-11-05

## 2021-11-05 RX ORDER — LANOLIN ALCOHOL/MO/W.PET/CERES
3 CREAM (GRAM) TOPICAL NIGHTLY PRN
Status: DISCONTINUED | OUTPATIENT
Start: 2021-11-05 | End: 2021-11-10 | Stop reason: HOSPADM

## 2021-11-05 RX ORDER — METOCLOPRAMIDE 10 MG/1
10 TABLET ORAL
Status: DISCONTINUED | OUTPATIENT
Start: 2021-11-05 | End: 2021-11-10 | Stop reason: HOSPADM

## 2021-11-05 RX ORDER — ROSUVASTATIN CALCIUM 40 MG/1
40 TABLET, COATED ORAL NIGHTLY
Status: DISCONTINUED | OUTPATIENT
Start: 2021-11-05 | End: 2021-11-10 | Stop reason: HOSPADM

## 2021-11-05 RX ADMIN — IPRATROPIUM BROMIDE AND ALBUTEROL SULFATE 1 AMPULE: .5; 2.5 SOLUTION RESPIRATORY (INHALATION) at 10:21

## 2021-11-05 RX ADMIN — FUROSEMIDE 20 MG: 10 INJECTION, SOLUTION INTRAMUSCULAR; INTRAVENOUS at 17:05

## 2021-11-05 RX ADMIN — IPRATROPIUM BROMIDE AND ALBUTEROL SULFATE 1 AMPULE: .5; 2.5 SOLUTION RESPIRATORY (INHALATION) at 15:20

## 2021-11-05 RX ADMIN — GABAPENTIN 300 MG: 300 CAPSULE ORAL at 08:44

## 2021-11-05 RX ADMIN — IPRATROPIUM BROMIDE AND ALBUTEROL SULFATE 1 AMPULE: .5; 2.5 SOLUTION RESPIRATORY (INHALATION) at 06:06

## 2021-11-05 RX ADMIN — DOCUSATE SODIUM 50MG AND SENNOSIDES 8.6MG 1 TABLET: 8.6; 5 TABLET, FILM COATED ORAL at 21:29

## 2021-11-05 RX ADMIN — AMIODARONE HYDROCHLORIDE 200 MG: 200 TABLET ORAL at 08:44

## 2021-11-05 RX ADMIN — ROSUVASTATIN CALCIUM 40 MG: 40 TABLET, FILM COATED ORAL at 21:29

## 2021-11-05 RX ADMIN — PREDNISONE 10 MG: 5 TABLET ORAL at 08:43

## 2021-11-05 RX ADMIN — DOCUSATE SODIUM 50MG AND SENNOSIDES 8.6MG 1 TABLET: 8.6; 5 TABLET, FILM COATED ORAL at 08:43

## 2021-11-05 RX ADMIN — SODIUM CHLORIDE, PRESERVATIVE FREE 10 ML: 5 INJECTION INTRAVENOUS at 09:00

## 2021-11-05 RX ADMIN — POLYETHYLENE GLYCOL 3350 17 G: 17 POWDER, FOR SOLUTION ORAL at 08:43

## 2021-11-05 RX ADMIN — PANTOPRAZOLE SODIUM 40 MG: 40 TABLET, DELAYED RELEASE ORAL at 08:44

## 2021-11-05 RX ADMIN — SODIUM CHLORIDE, PRESERVATIVE FREE 10 ML: 5 INJECTION INTRAVENOUS at 21:29

## 2021-11-05 RX ADMIN — FUROSEMIDE 20 MG: 10 INJECTION, SOLUTION INTRAMUSCULAR; INTRAVENOUS at 08:45

## 2021-11-05 RX ADMIN — METOCLOPRAMIDE 10 MG: 10 TABLET ORAL at 10:54

## 2021-11-05 RX ADMIN — GABAPENTIN 300 MG: 300 CAPSULE ORAL at 14:42

## 2021-11-05 RX ADMIN — AMIODARONE HYDROCHLORIDE 200 MG: 200 TABLET ORAL at 14:42

## 2021-11-05 RX ADMIN — IPRATROPIUM BROMIDE AND ALBUTEROL SULFATE 1 AMPULE: .5; 2.5 SOLUTION RESPIRATORY (INHALATION) at 18:19

## 2021-11-05 RX ADMIN — INSULIN LISPRO 10 UNITS: 100 INJECTION, SOLUTION INTRAVENOUS; SUBCUTANEOUS at 16:53

## 2021-11-05 RX ADMIN — METOCLOPRAMIDE 10 MG: 10 TABLET ORAL at 16:29

## 2021-11-05 RX ADMIN — GABAPENTIN 300 MG: 300 CAPSULE ORAL at 21:29

## 2021-11-05 RX ADMIN — ASPIRIN 81 MG: 81 TABLET, COATED ORAL at 08:44

## 2021-11-05 RX ADMIN — FLUOXETINE 40 MG: 20 CAPSULE ORAL at 08:44

## 2021-11-05 RX ADMIN — AMIODARONE HYDROCHLORIDE 200 MG: 200 TABLET ORAL at 21:29

## 2021-11-05 RX ADMIN — INSULIN LISPRO 10 UNITS: 100 INJECTION, SOLUTION INTRAVENOUS; SUBCUTANEOUS at 10:22

## 2021-11-05 RX ADMIN — MUPIROCIN: 20 OINTMENT TOPICAL at 21:29

## 2021-11-05 RX ADMIN — ERYTHROMYCIN: 5 OINTMENT OPHTHALMIC at 22:49

## 2021-11-05 RX ADMIN — METOPROLOL TARTRATE 25 MG: 25 TABLET, FILM COATED ORAL at 21:30

## 2021-11-05 RX ADMIN — METOCLOPRAMIDE 10 MG: 10 TABLET ORAL at 21:29

## 2021-11-05 RX ADMIN — OXYCODONE AND ACETAMINOPHEN 2 TABLET: 5; 325 TABLET ORAL at 10:22

## 2021-11-05 RX ADMIN — METOCLOPRAMIDE 10 MG: 10 TABLET ORAL at 08:44

## 2021-11-05 RX ADMIN — INSULIN LISPRO 10 UNITS: 100 INJECTION, SOLUTION INTRAVENOUS; SUBCUTANEOUS at 12:58

## 2021-11-05 ASSESSMENT — PAIN DESCRIPTION - PROGRESSION
CLINICAL_PROGRESSION: NOT CHANGED
CLINICAL_PROGRESSION: NOT CHANGED

## 2021-11-05 ASSESSMENT — PAIN SCALES - GENERAL
PAINLEVEL_OUTOF10: 0
PAINLEVEL_OUTOF10: 0
PAINLEVEL_OUTOF10: 5
PAINLEVEL_OUTOF10: 8
PAINLEVEL_OUTOF10: 7
PAINLEVEL_OUTOF10: 0
PAINLEVEL_OUTOF10: 7

## 2021-11-05 ASSESSMENT — PAIN DESCRIPTION - DESCRIPTORS
DESCRIPTORS: ACHING;DISCOMFORT

## 2021-11-05 ASSESSMENT — PAIN DESCRIPTION - PAIN TYPE
TYPE: SURGICAL PAIN;CHRONIC PAIN
TYPE: CHRONIC PAIN;SURGICAL PAIN
TYPE: CHRONIC PAIN;SURGICAL PAIN
TYPE: SURGICAL PAIN;CHRONIC PAIN
TYPE: CHRONIC PAIN;SURGICAL PAIN

## 2021-11-05 ASSESSMENT — PAIN DESCRIPTION - FREQUENCY
FREQUENCY: CONTINUOUS

## 2021-11-05 ASSESSMENT — PAIN DESCRIPTION - ONSET
ONSET: ON-GOING
ONSET: ON-GOING

## 2021-11-05 ASSESSMENT — PAIN - FUNCTIONAL ASSESSMENT
PAIN_FUNCTIONAL_ASSESSMENT: PREVENTS OR INTERFERES WITH MANY ACTIVE NOT PASSIVE ACTIVITIES

## 2021-11-05 ASSESSMENT — PAIN DESCRIPTION - LOCATION
LOCATION: CHEST;BACK
LOCATION: CHEST
LOCATION: CHEST;BACK

## 2021-11-05 ASSESSMENT — PAIN DESCRIPTION - ORIENTATION
ORIENTATION: MID

## 2021-11-05 ASSESSMENT — PULMONARY FUNCTION TESTS: PIF_VALUE: 9

## 2021-11-05 NOTE — PROGRESS NOTES
Section of Cardiology  Progress Note      Date:  11/5/2021  Patient: Tolu Camarillo  Admission:  10/24/2021  6:48 AM  Admit DX: NSTEMI (non-ST elevated myocardial infarction) ([facility] [I21.4]  Non-ST elevation MI (NSTEMI) ([facility]) [I21.4]  Lymphedema of both lower extremities [I89.0]  Chronic bilateral low back pain without sciatica [M54.50, G89.29]  Acute on chronic congestive heart failure, unspecified heart failure type ([facility] [I50.9]  CAD in native artery [I25.10]  Age:  61 y.o., 1955     LOS: 12 days     Reason for evaluation:   NSTEMI and CAD      SUBJECTIVE:     The patient was seen and examined. Notes and labs reviewed. The patient continues to have surgical incision pain and is receiving pain medication currently. Denies having any shortness of breath or palpitations. She is sitting up in the chair. OBJECTIVE:      EXAM:   Vitals:    VITALS:  /65   Pulse 67   Temp 96.9 °F (36.1 °C) (Temporal)   Resp 21   Ht 5' 5\" (1.651 m)   Wt 184 lb (83.5 kg)   SpO2 97%   BMI 30.62 kg/m²   24HR INTAKE/OUTPUT:      Intake/Output Summary (Last 24 hours) at 11/5/2021 1020  Last data filed at 11/5/2021 0800  Gross per 24 hour   Intake 865 ml   Output 1698 ml   Net -833 ml       CONSTITUTIONAL: Alert, no signs of acute distress  HEENT: No JVD  LUNGS: clear throughout anteriorly, nonlabored  CARDIOVASCULAR:  regular rate and rhythm, normal S1 and S2, no S3 or S4, and no rub noted. SKIN: Warm and dry.   EXTREMITIES: Mild to moderate bilateral lower extremity edema with right lower leg wrap    Current Inpatient Medications:   metoclopramide  10 mg Oral 4x Daily AC & HS    rosuvastatin  40 mg Oral Nightly    furosemide  20 mg IntraVENous BID    predniSONE  10 mg Oral Daily    sodium chloride flush  10 mL IntraVENous 2 times per day    aspirin  81 mg Oral Daily    amiodarone  200 mg Oral TID    mupirocin   Nasal BID    polyethylene glycol  17 g Oral Daily    metoprolol tartrate  25 mg Oral BID    pantoprazole  40 mg Oral Daily    ipratropium-albuterol  1 ampule Inhalation Q4H WA    sennosides-docusate sodium  1 tablet Oral BID    erythromycin   Both Eyes Nightly    gabapentin  300 mg Oral TID    FLUoxetine  40 mg Oral QAM    insulin glargine  56 Units SubCUTAneous Daily    And    insulin lispro  10 Units SubCUTAneous TID WC    [Held by provider] metFORMIN  1,000 mg Oral BID     influenza virus vaccine  0.5 mL IntraMUSCular Prior to discharge       IV Infusions (if any):   sodium chloride      dextrose         Diagnostics:   Telemetry: Sinus rhythm    Labs:   CBC:  Recent Labs     11/04/21  0500 11/05/21  0800   WBC 16.4* 16.2*   HGB 7.8* 9.4*   HCT 26.6* 31.6*   * 176     Magnesium:  Recent Labs     11/04/21  0500 11/05/21  0800   MG 2.1 2.1     BMP:  Recent Labs     11/04/21  0500 11/05/21  0800    142   K 4.6 4.2   CALCIUM 7.8* 7.6*   CO2 19* 20   BUN 16 22   CREATININE 0.85 1.16*   LABGLOM >60 47*   GLUCOSE 123* 174*     BNP:  Recent Labs     11/02/21  2100   PROBNP 738*     PT/INR:  Recent Labs     11/04/21  0500 11/05/21  0800   PROTIME 20.6* 17.0*   INR 1.8 1.4     APTT:  Recent Labs     11/02/21  1540 11/03/21  1345   APTT 38.2* 41.1*     CARDIAC ENZYMES:No results for input(s): MYOGLOBIN, CKTOTAL, CKMB, CKMBINDEX, TROPHS, TROPONINT in the last 72 hours.   FASTING LIPID PANEL:  Lab Results   Component Value Date    HDL 40 10/25/2021    TRIG 107 10/25/2021     LIVER PROFILE:  Recent Labs     11/03/21  0530   AST 21   ALT <5*   LABALBU 4.4   ALKPHOS 33*   BILITOT 1.22*   BILIDIR 0.74*   IBILI 0.48   PROT 5.3*   GLOB NOT REPORTED   ALBUMIN NOT REPORTED        ASSESSMENT:    · CAD with multivessel disease status post CABG x2 with LIMA to LAD and SVG to diagonal, OM exploration was not graftable 11/2/2021 and status post delayed sternal closure 11/3/2021, wound VAC remains in place  · Borderline troponin elevation with possible non-STEMI-preserved LV systolic function on echocardiogram done 10/25/2021  · Diabetes, managed by others  · Lymphedema and nonhealing wounds, managed by others  · Peripheral vascular disease, managed by others  · Dyslipidemia, treated  · Lumbar compression fracture, managed by others    PLAN:  Continue current cardiac medications. Conservative cardiac management at this time. Increase activity as tolerated. Discussed with RN, patient, and Dr. Tabatha Saha.     Jeanette Rendon, APRN - CNP

## 2021-11-05 NOTE — PROGRESS NOTES
[facility]  Office: [address], DO, Gera Coughlin, DO, Tahmina Rosales, DO, Brina Pulido Blood, DO, Jen De Santiago MD, Jemima Carrera MD, Adrian Lane MD, Sada Darling MD, Libby Case MD, Freddy Najera MD, Corry Cuevas MD, Loreto Welch MD, Meenu Canseco DO, Royal Salgado DO, Negin Parker MD,  Barbie Adkins DO, Janine Fitzpatrick MD, Abdias Scott MD, Karla Son MD, Alla Hadley MD, Judy Silva MD, Kika Mon MD, Marquise Oshea, [facility], [facility], CNP, Mira Salas, CNP, Brii Candelario, CNS, Fidelina Garsia, [facility], Sherry Castle, CNP, Angel Luis Mock, CNP, Johanna Ortega, CNP, Lance De Los Santos, CNP, James Waterman, CNP, DANIELLA Genao-C, Cleone [place], [facility], Campos Hector, CNP, Ashley Greene, CNP, Fani Cooper, CNP, Denilson Mak, CNP, Emily Wagner, CNP, Kat Quarles, CNP, Leonardo Major, [facility]    Progress Note    11/5/2021    9:06 AM    Name:   Willa Mills  MRN:     [id number]     [place]:      [de-identified]   Room:   2028/2028-01   Day:  12  Admit Date:  10/24/2021  6:48 AM    PCP:   Denilson Mak MD  Code Status:  Full Code    Subjective:     C/C:   Chief Complaint   Patient presents with    Back Pain     Interval History Status: significantly improved. Patient was seen and examined at bedside this AM. She was extubated yesterday. The patient reports feeling \"okay\" but continues to endorse pain along sternal incision. Brief History:     Patient is a 79-year-old female who presented to the emergency department on 10/24/2021 complaining of back and chest pain. The patient was admitted to internal medicine for further management of NSTEMI. Ischemic workup was remarkable for multivessel disease. Cardiothoracic surgery was consulted and performed a CABG on 11/2.      Review of Systems:     Constitutional:  negative for chills, fevers, sweats  Respiratory:  negative for cough, dyspnea on exertion, metoprolol, sodium bicarbonate, diphenhydrAMINE, bisacodyl, fleet, albumin human, glucose, dextrose, glucagon (rDNA), dextrose, albuterol, naloxone, calcium gluconate **OR** calcium gluconate **OR** calcium gluconate **OR** calcium gluconate, benzonatate    Data:     Past Medical History:   has a past medical history of Asthma, Diabetes mellitus ([facility] Fibromyalgia, Headache, Lymphedema of both lower extremities, and [facility]henia gravis ([facility] Social History:   reports that she has never smoked. She has never used smokeless tobacco. She reports previous alcohol use. She reports previous drug use. Family History:   Family History   Problem Relation Age of Onset    Coronary Art Dis Mother     Kidney Disease Mother        Vitals:  BP (!) 107/58   Pulse 67   Temp 96.9 °F (36.1 °C) (Temporal)   Resp 17   Ht 5' 5\" (1.651 m)   Wt 184 lb (83.5 kg)   SpO2 97%   BMI 30.62 kg/m²   Temp (24hrs), Av.5 °F (36.4 °C), Min:96.8 °F (36 °C), Max:98.3 °F (36.8 °C)    Recent Labs     21  1156 21  1648 21  1941 21  0720   POCGLU 156* 174* 139* 141*       I/O (24Hr):     Intake/Output Summary (Last 24 hours) at 2021 0906  Last data filed at 2021 0800  Gross per 24 hour   Intake 865 ml   Output 1743 ml   Net -878 ml       Labs:  Hematology:  Recent Labs     21  1345 21  0500 21  0800   WBC 14.9* 16.4* 16.2*   RBC 3.02* 3.03* 3.62*   HGB 7.9* 7.8* 9.4*   HCT 25.8* 26.6* 31.6*   MCV 85.4 87.8 87.3   MCH 26.2 25.7 26.0   MCHC 30.6 29.3 29.7   RDW 15.9* 16.0* 16.6*    137* 176   MPV 10.6 11.0 11.4   INR 1.8 1.8 1.4     Chemistry:  Recent Labs     21  2100 21  0045 21  1345 21  1345 21  2229 21  0020 21  0500 21  0800   NA  --    < > 141  --   --   --  144 142   K  --    < > 4.5   < >  --  4.8 4.6 4.2   CL  --    < > 114*  --   --   --  113* 110*   CO2  --    < > 19*  --   --   --  19* 20   GLUCOSE  --    < > 98  --   -- --  123* 174*   BUN  --    < > 13  --   --   --  16 22   CREATININE  --    < > 0.77  --  0.96  --  0.85 1.16*   MG  --    < > 1.9  --   --   --  2.1 2.1   ANIONGAP  --    < > 8*  --   --   --  12 12   LABGLOM  --    < > >60   < > NOT REPORTED  --  >60 47*   GFRAA  --    < > >60  --   --   --  >60 57*   CALCIUM  --    < > 7.7*  --   --   --  7.8* 7.6*   PROBNP 738*  --   --   --   --   --   --   --     < > = values in this interval not displayed. Recent Labs     11/03/21  0530 11/03/21  0605 11/04/21  0510 11/04/21  0736 11/04/21  1156 11/04/21  1648 11/04/21  1941 11/05/21  0720   PROT 5.3*  --   --   --   --   --   --   --    LABALBU 4.4  --   --   --   --   --   --   --    AST 21  --   --   --   --   --   --   --    ALT <5*  --   --   --   --   --   --   --    ALKPHOS 33*  --   --   --   --   --   --   --    BILITOT 1.22*  --   --   --   --   --   --   --    BILIDIR 0.74*  --   --   --   --   --   --   --    POCGLU  --    < > 119* 118* 156* 174* 139* 141*    < > = values in this interval not displayed. ABG:  Lab Results   Component Value Date    POCPH 7.284 11/04/2021    POCPCO2 42.6 11/04/2021    POCPO2 87.1 11/04/2021    POCHCO3 20.2 11/04/2021    NBEA 6 11/04/2021    PBEA NOT REPORTED 11/04/2021    KVH9NLP NOT REPORTED 11/04/2021    DRXS1JZB 95 11/04/2021    FIO2 40.0 11/04/2021     Lab Results   Component Value Date/Time    SPECIAL NOT REPORTED 10/28/2021 11:30 AM     Lab Results   Component Value Date/Time    CULTURE KLEBSIELLA PNEUMONIAE >759501 CFU/ML (A) 10/28/2021 11:30 AM    CULTURE ENTEROCOCCUS FAECALIS >460180 CFU/ML (A) 10/28/2021 11:30 AM       Radiology:  XR CHEST PORTABLE    Result Date: 11/5/2021  Interval removal of endotracheal and enteric tubes. Shallow inflation with findings of vascular congestion, subsegmental atelectasis and probable small effusions again demonstrated. No new airspace disease or evidence for pneumothorax. XR CHEST PORTABLE    Result Date: 11/4/2021  1.   Support lines and tubes are relatively stable in position. 2.  Increased vascular congestion and bibasilar opacities, likely pleural effusion with atelectasis. XR CHEST PORTABLE    Result Date: 11/3/2021  1. New changes of median sternotomy. 2. Persistence of at least small right and trace left pleural effusions with underlying bibasilar passive atelectasis. Superimposed pneumonia and aspiration are not excluded on the left. 3. Pulmonary vascular congestion with questionable perihilar edema, potentially congestive heart failure given recent heart surgery, the above pleural effusions, and suspected mild cardiomegaly. XR CHEST PORTABLE    Result Date: 11/3/2021  Stable postop findings with low lung volumes, bibasilar atelectasis and small effusions, greater on the left. XR CHEST PORTABLE    Result Date: 11/2/2021  Small bilateral pleural effusions and bibasilar atelectasis. Physical Examination:        General appearance:  alert, cooperative and no distress  Mental Status:  oriented to person, place and time and normal affect  Lungs:  clear to auscultation bilaterally, normal effort  Heart:  Sternal incision present. Chest tube present.    Abdomen:  soft, nontender, nondistended, normal bowel sounds, no masses, hepatomegaly, splenomegaly  Extremities:  1+ edema in bilateral lower extremities   Skin:  no gross lesions, rashes, induration    Assessment:        Hospital Problems         Last Modified POA    * (Principal) NSTEMI (non-ST elevated myocardial infarction) ([address] 11/3/2021 Yes    Type 2 diabetes mellitus with hyperglycemia, without long-term current use of insulin ([address] 10/24/2021 Yes    Class 1 obesity due to excess calories with serious comorbidity and body mass index (BMI) of 33.0 to 33.9 in adult 10/24/2021 Yes    Lymphedema of both lower extremities 10/24/2021 Yes    Acute on chronic diastolic congestive heart failure ([address] 10/26/2021 Yes    Pathological fracture of thoracic vertebra due to secondary osteoporosis ([facility] 10/26/2021 Yes    Pathological fracture of lumbar vertebra due to secondary osteoporosis ([facility] 10/26/2021 Yes    Intractable back pain 10/26/2021 Yes    Age-related osteoporosis with current pathological fracture 10/26/2021 Yes    History of kyphoplasty 10/26/2021 Yes    Facet arthritis, degenerative, lumbar spine 10/26/2021 Yes    Degenerative spondylolisthesis 10/26/2021 Yes    DDD (degenerative disc disease), thoracolumbar 10/26/2021 Yes    Acute on chronic congestive heart failure ([facility] 10/27/2021 Yes    Chronic bilateral low back pain without sciatica 10/27/2021 Yes    Allergic conjunctivitis of both eyes 10/31/2021 Yes    CAD in native artery 11/3/2021 Yes    S/P CABG x 2 (Chronic) 11/4/2021 Yes    Overview Signed 11/4/2021  6:16 AM by CYNDY Kimball - CNP     SLIMA to LAD and RSVG1 to large high Diag, OM exploration (not graftable)                Plan:        CAD s/p CABG   -Management per cardiothoracic surgery   -POD #3  -Continue amiodarone   -Continue aspirin     DM2   -Continue Lantus 55 units in AM and 45 units with dinner   -Continue Humalog 10 units tid     HFpEF   -Not in acute exacerbation   -Continue IV Lasix 20 mg bid     HTN   -Continue metoprolol 25 mg bid     HLD  -Increase Crestor to 40 mg daily     Depression   -Continue home fluoxetine 40 mg daily     Hx of multiple sclerosis   -S/P stress-dose steroids   -Restart home prednisone 10 mg daily     Obesity 2/2 excessive caloric intake   -Will  on dietary modifications when appropriate       Bill Raymundo MD  11/5/2021  9:06 AM

## 2021-11-05 NOTE — PROGRESS NOTES
Physical Therapy    Facility/Department: [facility]  Re-Assessment    NAME: Shashank Sims  : 1955  MRN: [id number]    Date of Service: 2021    Discharge Recommendations:  Patient would benefit from continued therapy after discharge         Pt underwent CABG on 21 2* multivessel CAD. Sternal closure was delayed until 11/3 2* coagulopathy & pt remained intubated until 21    RN reports patient is medically stable for therapy treatment this date. Chart reviewed prior to treatment and patient is agreeable for therapy. Assessment   Body structures, Functions, Activity limitations: Decreased functional mobility ; Decreased balance;Decreased cognition;Decreased ROM; Decreased strength;Decreased endurance; Increased pain;Decreased posture;Decreased sensation  Assessment: Pt with Max to dependent deficits of bed mobility, transfers, balance,  safety awareness and endurance,  & required 2 assist for any functional mobility,  & in a globally deconditioned state. Pt HIGH risk for falls & requires continued PT to maximize independence with functional mobility, balance, safety awareness & activity tolerance. Pt currently functioning below baseline. Would suggest additional therapy at time of discharge to maximize long term outcomes and prevent re-admission. Please refer to AM-PAC score for current level of function. Specific instructions for Next Treatment: Co-Tx  Prognosis: Good  Decision Making: High Complexity  Exam: ROM, MMT, functional mobility, activity tolerance, Balance, & [employer] AM-PAC 6 Clicks Basic Mobility  Clinical Presentation: unstable  PT Education: Goals;PT Role;Plan of Care; Functional Mobility Training;Precautions;Pressure Relief;Energy Conservation;Transfer Training;General Safety  Patient Education: Ed pt on functional mobility, safety awareness, importance of being up with progression of functional mobility  to regain strength, & prevention of sedentary complications, circulation ex's, pressure relief & optimal breathing techniques  Barriers to Learning: questionable cognition  REQUIRES PT FOLLOW UP: Yes  Activity Tolerance  Activity Tolerance: Patient limited by fatigue;Patient limited by pain; Patient limited by endurance;Treatment limited secondary to agitation  Activity Tolerance: pt is quick to desaturate with activity to <90% on 1L O2, is very anxious & requires alot of cueing of optimal breathing techniques       Patient Diagnosis(es): The primary encounter diagnosis was S/P CABG x 2. Diagnoses of Non-ST elevation MI (NSTEMI) ([facility] [address] Chronic bilateral low back pain without sciatica, Lymphedema of both lower extremities, and Acute on chronic congestive heart failure, unspecified heart failure type [facility]) were also pertinent to this visit. has a past medical history of Asthma, Diabetes mellitus ([facility] [address] Fibromyalgia, Headache, Lymphedema of both lower extremities, and Myasthenia gravis ([facility] [address] has a past surgical history that includes Toe amputation; Carpal tunnel release; fracture surgery; Sternum Debridement (N/A, 11/3/2021); and Coronary artery bypass graft (N/A, 11/2/2021). Restrictions  Restrictions/Precautions  Restrictions/Precautions: General Precautions, Fall Risk, Surgical Protocols  Required Braces or Orthoses?: Yes (heart hugger)  Required Braces or Orthoses  Other: Heart Hugger Brace (notified RN that pt did not have on and she came and assisted with proper latesha in recliner)  Position Activity Restriction  Sternal Precautions: 5# Lifting Restrictions  Other position/activity restrictions: Up in chair, chest harness/surgical bra, strict sternal precautions, chest tubes, telemetry, bloom catheter, Prevana wound vac with dressing at sternal incision, BUE IV's,  bloom catheter, [email or web address]  Vision/Hearing  Vision: Impaired (pt states she has drainage/ blurry B eyes (L >Rt) Pt states \"eyelash duct problem. \")  Vision Exceptions: Wears glasses for reading (pt reports Eyad blurry & double vision)  Hearing: Exceptions to [facility]  Hearing Exceptions: Hard of hearing/hearing concerns     Subjective  General  Patient assessed for rehabilitation services?: Yes  Additional Pertinent Hx: CAD, DM, severe back pain, fibromyalgia, lymphedema BLE, spinal compression fracture  General Comment  Comments: RN okays PT  Subjective  Subjective: Pt agreeable to PT  Pain Screening  Patient Currently in Pain: Yes  Pain Assessment  Pain Assessment: 0-10 (10/10 chest, 5/10 low back)  Pain Type: Surgical pain;Chronic pain  Pain Location: Chest;Back  Vital Signs  BP Location: Left upper arm  Level of Consciousness: Alert (0)  Patient Currently in Pain: Yes  Oxygen Therapy  O2 Device: Nasal cannula       Orientation  Orientation  Overall Orientation Status: Within Functional Limits  Social/Functional History  Social/Functional History  Lives With: Alone  Type of Home: Apartment (In[employer] senior apt.)  Home Layout: One level  Home Access: Level entry  Bathroom Shower/Tub: Walk-in shower  Bathroom Toilet: Standard  Bathroom Equipment: Grab bars in shower, Shower chair, Toilet raiser, Grab bars around toilet  Bathroom Accessibility: Wheelchair accessible  Home Equipment: Wheelchair-electric, Hospital bed, Rolling walker, Alert Button, Reacher, Lift chair (sleeps in recliner, doing stand/pivot bed to [employer])  Receives Help From: Family (pt states she has a supportive cousin locally)  ADL Assistance: Independent  Homemaking Assistance: Independent (pt states indep with cooking, cleaning and laundry.   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 Pt was up in recliner at arrival and nursing used yola lift.   Pt has sternal vac dressing s/p CABG 11/2/21, pt has very whiny & whimpering voice, & respirations, pt needs max edu and encouragement to complete tasks for herself as able to improve Indep; Pt does not want to move her body in any manner  Edema: BLE and R hand/skin dry and flaky; RLE ace wrapped    PROM RLE (degrees)  RLE General PROM: WFL  AROM RLE (degrees)  RLE General AROM: minimal AROM at ankle;  AROM LLE (degrees)  LLE AROM : WFL  LLE General AROM: with assist  Strength RLE  Comment: ankle DF 2-/5, hip, 2+/5, knee 3-/5  Strength LLE  Comment: hip, 2+/5, knee 3-/5, ankle 2+/5  Strength RUE  Comment: See OT assessment  Strength LUE  Comment: See OT assessment  Strength Other  Other: *  Tone RLE  RLE Tone: Normotonic  Tone LLE  LLE Tone: Hypotonic  Sensation  Overall Sensation Status: Impaired (pt c/o B hands/feet paresthesias/neuropathy;  constant in feet & intermittent in hands)  Bed mobility  Rolling to Left: Unable to assess  Rolling to Right: Unable to assess  Supine to Sit: Unable to assess  Sit to Supine: Unable to assess  Scooting: Unable to assess (sitting EOB to scoot fwd. to square up and get feet on floor, as well as in supine)  Comment: pt up in chair & was dependent lift via maxi yola by nursing earlier this AM  Transfers  Sit to Stand: Unable to assess (x2 from raised bed with LUCERO OCHOA)  Stand to sit: Unable to assess  Bed to Chair: Dependent/Total (via Maxi yola by nursing)  Comment: Ky Navarro with max support to assist UB support onto front bar of device to attempt sit to stand but pt unable to even initiate stand to clear bottom from chair to attempt sit to stand  Ambulation  Ambulation?: No     Balance  Posture: Fair (kyphotic; fwd. head)  Sitting - Static: Fair;- (Max A initially to maintain balance)  Sitting - Dynamic:  (Max 2)  Exercises  Comments: Ed circulation ex's, pursed lip breathing techniques to inc control of breathing,   deep breathing with incentive spirometer including technique, frequency and purpose, pressure relief, & ex's to move what moves to maintain strength & joint congruency     Plan   Plan  Times per week: 1-2x/day, 6-7D/week  Specific instructions for Next Treatment: Co-Tx  Current Treatment Recommendations: Strengthening, ROM, Balance Training, Functional Mobility Training, Transfer Training, Endurance Training, Safety Education & Training, Home Exercise Program, Patient/Caregiver Education & Training  Safety Devices  Type of devices: All fall risk precautions in place, Gait belt, Patient at risk for falls, Call light within reach, Nurse notified, Left in chair  Restraints  Initially in place: No    G-Code       OutComes Score                                                  AM-PAC Score    AM-PAC Inpatient Mobility Raw Score : 8  AM-PAC Inpatient T-Scale Score : 28.52  Mobility Inpatient CMS 0-100% Score: 86.62  Mobility Inpatient CMS G-Code Modifier:CM                   Goals  Short term goals  Time Frame for Short term goals: 12 visits:  Short term goal 1:  Inc bed mobility to [facility] term goal 2: Pt able to transfer from bed to chair with mod assist using safest device  Short term goal 3: Pt to tolerate sitting with unsupported trunk up to 20 minutes with UB/LB support to improve core stability & repositioning for pressure relief, prepare for standing,  & prevent sedentary complications  Short term goal 4: Pt able to tolerate 30-40 min of activity to include 15-20 reps of ex, NMR & functional mobility to facilitate activity tolerance to [facility]  Short term goal 5: Ed strengthening & balance ex program, activity guidelines, pressure relief, edema control of LE'S, energy conservation & optimal breathing techniques, & issue written pt education  Patient Goals   Patient goals : regain independence       Therapy Time   Individual Concurrent Group Co-treatment   Time In 0911         Time Out 1003         Minutes 52+10=62              Additional 10 minutes for chart review  Co-treatment with OT warranted secondary to decreased safety and independence requiring 2 skilled therapy professionals to address individual discipline's goals. PT addressing pre gait trunk strengthening, weight shifting prior to transfers, transfer training and postural control in sitting.         Treatment time: 47 minutes      [address], PT

## 2021-11-05 NOTE — PROGRESS NOTES
[facility]  Daily Progress Note    Surgeon: Dr. Juan C Krueger   S/P : CABG X 2 w/ SLIMA to LAD and RSVG1 to large high Diag- With a delayed sternal closure   POD#: 3    Subjective:  Ms. Soraida De La Torre is up in the chair. Denies any chest pain or shortness of breath. States she is not passing any gas at this time. No distress noted     Physical Exam  Vital Signs: /64   Pulse 62   Temp 98.2 °F (36.8 °C) (Oral)   Resp 17   Ht 5' 5\" (1.651 m)   Wt 184 lb (83.5 kg)   SpO2 98%   BMI 30.62 kg/m²  O2 Flow Rate (L/min): 2 L/min   Admit Weight: Weight: 199 lb (90.3 kg)   WEIGHTWeight: 184 lb (83.5 kg)     General: Patient is alert and oriented. Up in chair  Heart: Normal S1 and S2.  Regular rhythm. No murmurs, gallops, or rubs. Pacing Wires: Yes -To be clipped prior to discharge  Lungs: clear to auscultation bilaterally and diminished breath sounds bibasilar Chest tubes: Yes, Air Leak: No  Abdomen: soft, non tender, non distended, BS hypoactive x4  Extremities: 1+ edema  Wounds: clean and dry, healing appropriately. Prevena to Sternum.      Sternum: Covered with Prevena dressing  SVG sites: Healing and covered    Scheduled Meds:    metoclopramide  10 mg Oral 4x Daily AC & HS    furosemide  20 mg IntraVENous BID    predniSONE  10 mg Oral Daily    sodium chloride flush  10 mL IntraVENous 2 times per day    aspirin  81 mg Oral Daily    amiodarone  200 mg Oral TID    mupirocin   Nasal BID    polyethylene glycol  17 g Oral Daily    metoprolol tartrate  25 mg Oral BID    pantoprazole  40 mg Oral Daily    ipratropium-albuterol  1 ampule Inhalation Q4H WA    sennosides-docusate sodium  1 tablet Oral BID    erythromycin   Both Eyes Nightly    ciprofloxacin  500 mg Oral 2 times per day    gabapentin  300 mg Oral TID    FLUoxetine  40 mg Oral QAM    insulin glargine  56 Units SubCUTAneous Daily    And    insulin lispro  10 Units SubCUTAneous TID WC    [Held by provider] metFORMIN  1,000 mg Oral BID     rosuvastatin  20 mg Oral Nightly    influenza virus vaccine  0.5 mL IntraMUSCular Prior to discharge     Continuous Infusions:    sodium chloride      dextrose         Data:  CBC:   Recent Labs     11/03/21  0530 11/03/21  1345 11/04/21  0500   WBC 14.6* 14.9* 16.4*   HGB 8.2* 7.9* 7.8*   HCT 26.7* 25.8* 26.6*   MCV 84.5 85.4 87.8    148 137*     BMP:   Recent Labs     11/03/21  0530 11/03/21  0530 11/03/21  1345 11/03/21  2229 11/04/21  0020 11/04/21  0500   *  --  141  --   --  144   K 3.9   < > 4.5  --  4.8 4.6   *  --  114*  --   --  113*   CO2 17*  --  19*  --   --  19*   BUN 13  --  13  --   --  16   CREATININE 0.65   < > 0.77 0.96  --  0.85    < > = values in this interval not displayed. PT/INR:   Recent Labs     11/03/21  0530 11/03/21  1345 11/04/21  0500   PROTIME 19.7* 20.7* 20.6*   INR 1.7 1.8 1.8     APTT:   Recent Labs     11/02/21  1540 11/03/21  1345   APTT 38.2* 41.1*       Chest X-Ray:   FINDINGS:   Interval extubation.  Right internal jugular sheath and chest tubes remain in   place.  Shallow inflation.  The cardiac and mediastinal contours appear   unchanged.  Vascular congestion, perihilar and basilar atelectasis again   demonstrated.  Probable small effusions.  No pneumothorax identified. I/O:  I/O last 3 completed shifts:   In: 2442.6 [I.V.:692.6; IV Piggyback:1750]  Out: 3351 [Urine:917; Drains:25; Chest Tube:510]    Assessment/Plan:      Diagnosis Date    Asthma     Diabetes mellitus ([facility] [address]     Fibromyalgia     Headache     Lymphedema of both lower extremities     Myasthenia gravis ([facility] [address]       Neuro: Intact   Lungs: clear, diminished in the bases   HD: VSS   Edema: 1+ peripheral   GI: Hypoactive bowel sounds X4   : Marginal urine output    Beta-Blocker: yes  ASA: yes  Plavix: yes  GI: yes  Statin: yes  Coumadin: no  ACE-I: no  EF: 70% pre-operative     Oxygen as needed to maintain SpO2 > 92%  o Wean as toleated   Chest x-ray daily   Keep Chest Tubes to wall suction  o Will evaluate for possible removal later today.  D/C wolf   Encourage incentive spirometry, acapella and ambulation once extubated   Replace electrolytes as needed per sliding scale and recheck per policy   Case Management consult for discharge planning- will likely need SNF placement    The above recommendations including medications and orders were discussed and agreed upon with Dr. Saravanan Barillas, the attending on service for the cardiothoracic surgery group today. Electronically signed by CYNDY Tidwell CNP on 11/5/2021 at 5:57 AM    On this date 11/5/2021 I have spent 31 minutes reviewing previous notes, test results and face to face with the patient discussing the diagnosis and importance of compliance with the treatment plan as well as documenting on the day of the visit. At least 50% of the time documented was spent with the patient to provide counseling and/or coordination of care. This note was created with the assistance of a speech-recognition program.  Although the intention is to generate a document that actually reflects the content of the visit, no guarantees can be provided that every mistake has been identified and corrected by editing. Note was updated later by me after  physical examination and  completion of the assessment.

## 2021-11-05 NOTE — CARE COORDINATION
Social work: Spoke with admissions/[facility] [address] [facility], and asked for precert to be submitted as PT/OT evals are in.

## 2021-11-05 NOTE — PROGRESS NOTES
Pulmonary Critical Care Progress Note  Preston Landers MD     Patient seen for the follow up of  NSTEMI (non-ST elevated myocardial infarction) [facility])     Subjective:  She was successfully extubated on 11/4/2021. Today she is much more awake and alert, she is currently sitting up in the bedside chair. She is on supplemental oxygen. Shortness of breath is better. She has occasional productive cough with yellow sputum. She has surgical chest discomfort. Examination:  Vitals: BP (!) 114/58   Pulse 72   Temp 97 °F (36.1 °C) (Temporal)   Resp 16   Ht 5' 5\" (1.651 m)   Wt 184 lb (83.5 kg)   SpO2 92%   BMI 30.62 kg/m²   General appearance: alert and cooperative with exam, up in the bedside chair  Neck: No JVD  Lungs:  Moderate air exchange, positive crackles  Heart: regular rate and rhythm, S1, S2 normal, no gallop  Abdomen: Soft, non tender, + BS  Extremities: no cyanosis or clubbing.+ edema    LABs:  CBC:   Recent Labs     11/04/21  0500 11/05/21  0800   WBC 16.4* 16.2*   HGB 7.8* 9.4*   HCT 26.6* 31.6*   * 176     BMP:   Recent Labs     11/04/21  0500 11/05/21  0800    142   K 4.6 4.2   CO2 19* 20   BUN 16 22   CREATININE 0.85 1.16*   LABGLOM >60 47*   GLUCOSE 123* 174*     PT/INR:   Recent Labs     11/04/21  0500 11/05/21  0800   PROTIME 20.6* 17.0*   INR 1.8 1.4     APTT:  Recent Labs     11/02/21  1540 11/03/21  1345   APTT 38.2* 41.1*     LIVER PROFILE:  Recent Labs     11/03/21  0530   AST 21   ALT <5*   LABALBU 4.4     ABG:  Lab Results   Component Value Date    JPR0GGE NOT REPORTED 11/04/2021    FIO2 40.0 11/04/2021       Lab Results   Component Value Date    POCPH 7.284 11/04/2021    POCPCO2 42.6 11/04/2021    POCPO2 87.1 11/04/2021    POCHCO3 20.2 11/04/2021    NBEA 6 11/04/2021    PBEA NOT REPORTED 11/04/2021    WAP2QFN NOT REPORTED 11/04/2021    IOSQ8NEY 95 11/04/2021    FIO2 40.0 11/04/2021     Radiology:  11/5/2021      Impression:  · Acute hypoxic respiratory failure  · CAD s/p CABG 1/3/2021, successfully extubated 11/4/2021  · Asthma  · Myasthenia gravis  · Suspected obstructive sleep apnea/Obesity  · Pulmonary edema/small bilateral pleural effusions/atelectasis    Recommendations:  · Oxygen via nasal cannula, keep SPO2 90% or greater  · BiPAP support while asleep if necessary  · Incentive spirometry every hour while awake, encourage  · Home oxygen evaluation prior to discharge  · Continue diuresis with IV Lasix 20 mg twice daily  · Monitor renal function  · Chest tube management per CT surgery  · Albuterol and Ipratropium Q 4 hours and prn  · Cipro for UTI  · X-ray chest in am  · Labs: CBC and BMP in am  · DVT prophylaxis  · PUD prophylaxis, Protonix  · PT/OT  · PFTs as an outpatient  · Sleep apnea evaluation as an outpatient  · Discussed with RN  · Will follow with you    Electronically signed by     Miriam Hager MD on 11/5/2021 at 3:14 PM  Pulmonary Critical Care and Sleep Medicine,  [facility]  Cell: [phone]  Office: [phone]

## 2021-11-05 NOTE — PROGRESS NOTES
Occupational Therapy   Occupational Therapy RE-Assessment  Date: 2021   Patient Name: Ashley Argueta  MRN: [id number]     : 1955    RN Jocelin Isidro reports patient is medically stable for therapy treatment this date. Chart reviewed prior to treatment and patient is agreeable for therapy. All lines intact and patient positioned comfortably at end of treatment. All patient needs addressed prior to ending therapy session. Pt currently functioning below baseline. Would suggest additional therapy at time of discharge to maximize long term outcomes and prevent re-admission. Please refer to AM-PAC score for current level of function. Date of Service: 2021    Discharge Recommendations:  Patient would benefit from continued therapy after discharge  OT Equipment Recommendations  Equipment Needed: Yes (continue to assess)  ADL Assistive Devices: Toileting - 3-in-1 Commode;Sock-Aid Soft;Long-handled Shoe Horn;Long-handled Sponge    Assessment   Performance deficits / Impairments: Decreased functional mobility ; Decreased safe awareness;Decreased balance;Decreased coordination;Decreased ADL status; Decreased strength;Decreased sensation;Decreased fine motor control;Decreased endurance;Decreased high-level IADLs;Decreased ROM; Decreased cognition;Decreased vision/visual deficit; Decreased posture  Assessment: Re-assessment completed this date as pt had open heart sx/goals modified. Unable to lift pt with max assist of 2 staff from recliner with use of marciano steady. Pt requires max edu and encouragment to move self and attempt tasks that she is able to complete to maintain I. Pt is guarded with any movements due to increased pain issues and RN was informed and aware. Skilled OT is indocated to maximize functional I and safety as able to reduce caregiver assist/burden.   Prognosis: Fair;Guarded  Decision Making: High Complexity  OT Education: OT Role;Plan of Care;Transfer Training;Energy Conservation;Orientation;Precautions  Patient Education: safety in function, pursed lip breathing, recommendations for therapy services and benefits of being up as able and therapy participation, edema mgt tech, benefits of completing functional tasks as able to maintain I, call light use/fall prevention, postural control  Barriers to Learning: meory issues and increased pain issues as well/difficulties redirecting pt  REQUIRES OT FOLLOW UP: Yes  Activity Tolerance  Activity Tolerance: Patient limited by fatigue;Patient limited by pain;Treatment limited secondary to decreased cognition (pt with increased pain issues/limoted by resp status as well)  Activity Tolerance: poor; pt needs max edu and encouragment to move her body and participate with therapy. Pt is resistive to any movements and does not want to move her body parts that she is able/extremely guarded. Safety Devices  Safety Devices in place: Yes  Type of devices: All fall risk precautions in place;Gait belt;Call light within reach; Chair alarm in place;Nurse notified; Left in chair (in recliner and BLE's elevated and pillow under to increase overall comfort and reduce swelling. Pt was edu on benefits of elevation and B ankle AROM/pumping as able to reduce edema. Pt with fair understanding.)           Patient Diagnosis(es): The primary encounter diagnosis was S/P CABG x 2. Diagnoses of Non-ST elevation MI (NSTEMI) ([address] Chronic bilateral low back pain without sciatica, Lymphedema of both lower extremities, and Acute on chronic congestive heart failure, unspecified heart failure type [facility]) were also pertinent to this visit. has a past medical history of Asthma, Diabetes mellitus ([address] Fibromyalgia, Headache, Lymphedema of both lower extremities, and Myasthenia gravis ([address]    has a past surgical history that includes Toe amputation; Carpal tunnel release; fracture surgery; Sternum Debridement (N/A, 11/3/2021); and Coronary artery bypass graft (N/A, 11/2/2021). Pt is s/p:   Procedure Summary      Date: 11/02/21 Room / Location: [facility] [address] [place] [address] / [facility] 12     Anesthesia Start: 1000 Anesthesia Stop: 9088     Procedure: CABG CORONARY ARTERY BYPASS  LESLIE (N/A Chest) Diagnosis: (DX CAD)     Surgeons: Elio Morales MD Responsible Provider: Madyson Rodríguez MD     Anesthesia Type: general ASA Status: 4         Restrictions  Restrictions/Precautions  Restrictions/Precautions: General Precautions, Fall Risk, Surgical Protocols  Required Braces or Orthoses?: Yes (heart hugger)  Required Braces or Orthoses  Other: Heart Hugger Brace (notified RN that pt did not have on and she came and assisted with proper latesha in recliner)  Position Activity Restriction  Sternal Precautions: 5# Lifting Restrictions  Other position/activity restrictions: Up in chair, chest harness/surgical bra, strict sternal precautions, chest tubes, telemetry, bloom catheter, Prevana wound vac with dressing at sternal incision, BUE IV's,  bloom catheter, [email or web address]    Subjective   General  Chart Reviewed: Yes  Patient assessed for rehabilitation services?: Yes  Additional Pertinent Hx: Pt. agreeable for treatment  Response to previous treatment: Patient with no complaints from previous session  Family / Caregiver Present: No  Patient Currently in Pain: Yes  Pre Treatment Pain Screening  Intervention List: Nurse/Physician notified  Comments / Details: Pt states her chest and back hurts and rates 10/10.     Social/Functional History  Social/Functional History  Lives With: Alone  Type of Home: Apartment (Indep. senior apt.)  Home Layout: One level  Home Access: Level entry  Bathroom Shower/Tub: Walk-in shower  Bathroom Toilet: Standard  Bathroom Equipment: Grab bars in shower, Shower chair, Toilet raiser, Grab bars around toilet  Bathroom Accessibility: Wheelchair accessible  Home Equipment: [address] bed, Rolling walker, Alert Button, Reacher, Lift chair (sleeps in recliner, doing stand/pivot bed to W/C)  Receives Help From: Family (pt states she has a supportive cousin locally)  ADL Assistance: Independent  Homemaking Assistance: Independent (pt states indep with cooking, cleaning and laundry. 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[employer]: [employer]  IADL Comments: Question reliability of info pt reported as she is a poor historian, will need verified for accuracy  Additional Comments: Pt reports she fell in July, pulling at rollator & had LOB       Objective   Vision: Impaired  Vision Exceptions: Wears glasses for reading (pt reports B eyes have diplopia and floaters; states her vision has gotten worse since sx)  Hearing: Exceptions to [facility]  Hearing Exceptions: Hard of hearing/hearing concerns    Orientation  Overall Orientation Status: Within Functional Limits (slow and delayed processing)  Orientation Level: Oriented to place; Disoriented to situation;Oriented to person;Oriented to situation;Disoriented to time (pt alert to year and not month)  Observation/Palpation  Posture: Poor (in recliner and increased posterior lean noted and assist with forward flexion/upright sitting)  Observation: O2 per NC, bloom, mult lines. Pt was up in recliner at arrival and nursing used yola lift. Pt has sternal vac dressing s/p CABG 11/2/21; pt needs max edu and encouragement to complete tasks for herself as able to improve I/pt does not want to move her body in any manner and is guarded due to pain issues.   Edema: BLE's and R hand; skin dry and flaky; RLE ace wrapped  Balance  Sitting Balance: Maximum assistance (in recliner as pt has posterior lean and due to pain issues is resisitive/pushes back to avoid movements)  Standing Balance: Unable to assess-attempted with max assist of 2 however unable to lift pt   Standing Balance  Time: attempted to stand pt from recliner with use of marciano stedy and was unable to lift/clear bottom off of recliner with max assist of 2 staff. Functional Mobility  Functional Mobility Comments: N/T and not safe or appropriate and unable to use marciano stedy to stand from recliner  Toilet Transfers  Toilet Transfers Comments: N/T and pt with bloom    ADL  Feeding: Moderate assistance;Setup (Pt could benefit with bulit up utensils to increase ease with grasp; assisted pt with a couple of bites of apple sauce/drinking apple juice and protein shake. Noted some coughing after eating and drinking and recommended ST eval to RN to eval and treat to assess if pt has appropriate diet/reduce risk for aspiration.)  Grooming: Setup;Maximum assistance  UE Bathing: Setup;Maximum assistance;Dependent/Total  LE Bathing: Setup;Dependent/Total  UE Dressing: Setup;Maximum assistance;Dependent/Total (x2 staff of writer and RN to [employer] heart hugger/hosp gown adjustment/tying in recline and with increased time)  LE Dressing: Setup;Maximum assistance;Dependent/Total  Toileting: Dependent/Total (bloom)  Additional Comments: Pt is DEP for all care and use of yola lift. Tone RUE  RUE Tone: Normotonic  Tone LUE  LUE Tone: Normotonic  Coordination  Movements Are Fluid And Coordinated: No  Coordination and Movement description: Fine motor impairments; Ataxia     Bed mobility  Supine to Sit: Unable to assess  Sit to Supine: Unable to assess  Comment: Pt up in recliner upon arrival.  Transfers  Stand Step Transfers: Unable to assess (unable to lift pt off of recliner with use of marciano stedy and max assist of 2)  Sit to stand: 2 Person assistance;Dependent/Total;Maximum assistance (attempted to stand pt in recliner with use of marciano stedy and unable to lift/clear bottom off of recliner and RN was informed. Nursing used yola lift to transfer pt out of bed and to recliner.)  Stand to sit: Maximum assistance;2 Person assistance;Dependent/Total  Transfer Comments: MAX verbal instruction/tactile assist for B hand placement on marciano stedy, max assist with heart hugger closure, forward flexion and weight shifting, nose over toes as able, pursed lip breathing tech and awareness/assist with mult lines to increase overall safety. Cognition  Overall Cognitive Status: Exceptions  Arousal/Alertness: Delayed responses to stimuli  Following Commands: Follows one step commands with increased time; Follows one step commands with repetition; Inconsistently follows commands  Attention Span: Difficulty attending to directions; Attends with cues to redirect  Memory: Decreased short term memory;Decreased long term memory  Safety Judgement: Decreased awareness of need for assistance;Decreased awareness of need for safety  Problem Solving: Assistance required to identify errors made;Assistance required to implement solutions;Assistance required to correct errors made;Assistance required to generate solutions;Decreased awareness of errors  Insights: Not aware of deficits  Initiation: Requires cues for all  Sequencing: Requires cues for all  Perception  Overall Perceptual Status: WFL     Sensation  Overall Sensation Status: Impaired (pt c/o B hands/feet paresthesias/neuropathy;  constant in feet & intermittent in hands)        LUE AROM (degrees)  LUE AROM : WFL  LUE General AROM: BUE AROM WFLS with increased time and max edu and encouragment to move BUE's(shld movements within cardiac precautions and edu pt on 90 degree rule for shld movements at this time for healing  RUE AROM (degrees)  RUE AROM : WFL  LUE Strength  Gross LUE Strength: Exceptions to [facility]  LUE Strength Comment: BUE MMT N/T due to open heart sx  RUE Strength  Gross RUE Strength: Exceptions to [address]  Times per week: 5x/week 1x/day as ksenia  Times per day: Daily  Current Treatment Recommendations: Strengthening, ROM, Balance Training, Safety Education & Training, Positioning, Endurance Training, Neuromuscular Re-education, Patient/Caregiver Education & Training, Equipment Evaluation, Education, & procurement, Self-Care / ADL, Cognitive/Perceptual Training, Home Management Training, Pain Management, Cognitive Reorientation  Plan Comment: Cont with stated POC                                                      AM-PAC Score   8    Co-treatment with PT warranted secondary to decreased safety and independence requiring 2 skilled therapy professionals to address individual discipline's goals. OT addressing preparation for ADL transfer, sitting and standing balance for increased ADL performance, sitting/activity tolerance, functional reaching, environmental safety/scanning, fall prevention, ability to sequence and follow directions and functional UE strength. Goals  Short term goals  Time Frame for Short term goals: by discharge, pt to demo  Short term goal 1: bed mob tasks with use of rail as needed to mod assist of 2. Short term goal 2: UB ADL to min assist/set up and LB ADL to max assist of 1 supine in bed as needed and with use of bed rail/AE. Short term goal 3: postural control EOB or in recliner to min assist/CG and ksenia > 15 mins to increase core strength/reduce falls in function. Short term goal 4: ADL transfers with lift/AD as needed to max assist of 2. Short term goal 5: participate in BUE Ther Ex/Act as able for 15 mins to increase UE functional use for ADL tasks. Long term goals  Time Frame for Long term goals : Short term goal 6: Pt to increase self feeding to SBA with use of AE/built up foam and with adaptations as needed.   Long term goal 1: Pt to stand with AD and min assist ksenia > 5 mins as able to reduce fall risk with ADL and functional tasks. Long term goal 2: Caregivers to be I with edema mgt, cardiac BUE HEP, proper positioning, pressure relief, EC/WS and fall prevention tech as well as DME/AE and AD recommendations with use of handouts. Patient Goals   Patient goals : Pt states she wants to be able to transfer on her own!        Therapy Time   Individual Concurrent Group Co-treatment   Time In 0911 (plus 10 min chart review/nursing communication)         Time Out 1004         Minutes 53         Timed Code Treatment Minutes: [address] [place], [place]

## 2021-11-06 ENCOUNTER — APPOINTMENT (OUTPATIENT)
Dept: GENERAL RADIOLOGY | Age: 66
DRG: 233 | End: 2021-11-06
Payer: MEDICARE

## 2021-11-06 LAB
-: ABNORMAL
ABO/RH: NORMAL
AMORPHOUS: ABNORMAL
ANION GAP SERPL CALCULATED.3IONS-SCNC: 14 MMOL/L (ref 9–17)
ANTIBODY SCREEN: NEGATIVE
ARM BAND NUMBER: NORMAL
BACTERIA: ABNORMAL
BILIRUBIN URINE: NEGATIVE
BLD PROD TYP BPU: NORMAL
BUN BLDV-MCNC: 29 MG/DL (ref 8–23)
BUN/CREAT BLD: 22 (ref 9–20)
CALCIUM SERPL-MCNC: 8.6 MG/DL (ref 8.6–10.4)
CASTS UA: ABNORMAL /LPF
CASTS UA: ABNORMAL /LPF
CHLORIDE BLD-SCNC: 108 MMOL/L (ref 98–107)
CO2: 18 MMOL/L (ref 20–31)
COLOR: YELLOW
COMMENT UA: ABNORMAL
CREAT SERPL-MCNC: 1.31 MG/DL (ref 0.5–0.9)
CREATININE URINE: 42.3 MG/DL (ref 28–217)
CROSSMATCH RESULT: NORMAL
CRYSTALS, UA: ABNORMAL /HPF
DISPENSE STATUS BLOOD BANK: NORMAL
EPITHELIAL CELLS UA: ABNORMAL /HPF (ref 0–5)
EXPIRATION DATE: NORMAL
GFR AFRICAN AMERICAN: 50 ML/MIN
GFR NON-AFRICAN AMERICAN: 41 ML/MIN
GFR SERPL CREATININE-BSD FRML MDRD: ABNORMAL ML/MIN/{1.73_M2}
GFR SERPL CREATININE-BSD FRML MDRD: ABNORMAL ML/MIN/{1.73_M2}
GLUCOSE BLD-MCNC: 117 MG/DL (ref 65–105)
GLUCOSE BLD-MCNC: 135 MG/DL (ref 65–105)
GLUCOSE BLD-MCNC: 141 MG/DL (ref 70–99)
GLUCOSE BLD-MCNC: 174 MG/DL (ref 65–105)
GLUCOSE BLD-MCNC: 200 MG/DL (ref 65–105)
GLUCOSE URINE: NEGATIVE
HCT VFR BLD CALC: 31.5 % (ref 36.3–47.1)
HEMOGLOBIN: 8.7 G/DL (ref 11.9–15.1)
INR BLD: 1.3
KETONES, URINE: NEGATIVE
LEUKOCYTE ESTERASE, URINE: NEGATIVE
MAGNESIUM: 2.5 MG/DL (ref 1.6–2.6)
MCH RBC QN AUTO: 25.4 PG (ref 25.2–33.5)
MCHC RBC AUTO-ENTMCNC: 27.6 G/DL (ref 28.4–34.8)
MCV RBC AUTO: 92.1 FL (ref 82.6–102.9)
MUCUS: ABNORMAL
NITRITE, URINE: NEGATIVE
NRBC AUTOMATED: 0.3 PER 100 WBC
OTHER OBSERVATIONS UA: ABNORMAL
PDW BLD-RTO: 17 % (ref 11.8–14.4)
PH UA: 5.5 (ref 5–8)
PLATELET # BLD: 296 K/UL (ref 138–453)
PMV BLD AUTO: 11.1 FL (ref 8.1–13.5)
POTASSIUM SERPL-SCNC: 4.6 MMOL/L (ref 3.7–5.3)
PROTEIN UA: ABNORMAL
PROTHROMBIN TIME: 16.2 SEC (ref 11.5–14.2)
RBC # BLD: 3.42 M/UL (ref 3.95–5.11)
RBC UA: ABNORMAL /HPF (ref 0–2)
RENAL EPITHELIAL, UA: ABNORMAL /HPF
SODIUM BLD-SCNC: 140 MMOL/L (ref 135–144)
SPECIFIC GRAVITY UA: 1.02 (ref 1–1.03)
TOTAL PROTEIN, URINE: 58 MG/DL
TRANSFUSION STATUS: NORMAL
TRICHOMONAS: ABNORMAL
TURBIDITY: ABNORMAL
UNIT DIVISION: 0
UNIT NUMBER: NORMAL
URINE HGB: ABNORMAL
URINE TOTAL PROTEIN CREATININE RATIO: 1.37 (ref 0–0.2)
UROBILINOGEN, URINE: NORMAL
WBC # BLD: 20.9 K/UL (ref 3.5–11.3)
WBC UA: ABNORMAL /HPF (ref 0–5)
YEAST: ABNORMAL

## 2021-11-06 PROCEDURE — 6370000000 HC RX 637 (ALT 250 FOR IP): Performed by: STUDENT IN AN ORGANIZED HEALTH CARE EDUCATION/TRAINING PROGRAM

## 2021-11-06 PROCEDURE — 97530 THERAPEUTIC ACTIVITIES: CPT

## 2021-11-06 PROCEDURE — 97110 THERAPEUTIC EXERCISES: CPT

## 2021-11-06 PROCEDURE — 94761 N-INVAS EAR/PLS OXIMETRY MLT: CPT

## 2021-11-06 PROCEDURE — 80048 BASIC METABOLIC PNL TOTAL CA: CPT

## 2021-11-06 PROCEDURE — 6360000002 HC RX W HCPCS: Performed by: NURSE PRACTITIONER

## 2021-11-06 PROCEDURE — 99232 SBSQ HOSP IP/OBS MODERATE 35: CPT | Performed by: STUDENT IN AN ORGANIZED HEALTH CARE EDUCATION/TRAINING PROGRAM

## 2021-11-06 PROCEDURE — 82947 ASSAY GLUCOSE BLOOD QUANT: CPT

## 2021-11-06 PROCEDURE — 71045 X-RAY EXAM CHEST 1 VIEW: CPT

## 2021-11-06 PROCEDURE — 6370000000 HC RX 637 (ALT 250 FOR IP): Performed by: NURSE PRACTITIONER

## 2021-11-06 PROCEDURE — 2580000003 HC RX 258: Performed by: NURSE PRACTITIONER

## 2021-11-06 PROCEDURE — 94669 MECHANICAL CHEST WALL OSCILL: CPT

## 2021-11-06 PROCEDURE — 85610 PROTHROMBIN TIME: CPT

## 2021-11-06 PROCEDURE — 81001 URINALYSIS AUTO W/SCOPE: CPT

## 2021-11-06 PROCEDURE — 97535 SELF CARE MNGMENT TRAINING: CPT

## 2021-11-06 PROCEDURE — 84156 ASSAY OF PROTEIN URINE: CPT

## 2021-11-06 PROCEDURE — 94640 AIRWAY INHALATION TREATMENT: CPT

## 2021-11-06 PROCEDURE — 82570 ASSAY OF URINE CREATININE: CPT

## 2021-11-06 PROCEDURE — 2060000000 HC ICU INTERMEDIATE R&B

## 2021-11-06 PROCEDURE — 51798 US URINE CAPACITY MEASURE: CPT

## 2021-11-06 PROCEDURE — 36415 COLL VENOUS BLD VENIPUNCTURE: CPT

## 2021-11-06 PROCEDURE — 83735 ASSAY OF MAGNESIUM: CPT

## 2021-11-06 PROCEDURE — 85027 COMPLETE CBC AUTOMATED: CPT

## 2021-11-06 PROCEDURE — 2700000000 HC OXYGEN THERAPY PER DAY

## 2021-11-06 RX ORDER — FUROSEMIDE 10 MG/ML
20 INJECTION INTRAMUSCULAR; INTRAVENOUS DAILY
Status: DISCONTINUED | OUTPATIENT
Start: 2021-11-07 | End: 2021-11-08

## 2021-11-06 RX ADMIN — AMIODARONE HYDROCHLORIDE 200 MG: 200 TABLET ORAL at 22:11

## 2021-11-06 RX ADMIN — GABAPENTIN 300 MG: 300 CAPSULE ORAL at 10:12

## 2021-11-06 RX ADMIN — FLUOXETINE 40 MG: 20 CAPSULE ORAL at 10:12

## 2021-11-06 RX ADMIN — POLYETHYLENE GLYCOL 3350 17 G: 17 POWDER, FOR SOLUTION ORAL at 10:12

## 2021-11-06 RX ADMIN — METOCLOPRAMIDE 10 MG: 10 TABLET ORAL at 10:13

## 2021-11-06 RX ADMIN — BISACODYL 5 MG: 5 TABLET, COATED ORAL at 10:13

## 2021-11-06 RX ADMIN — ROSUVASTATIN CALCIUM 40 MG: 40 TABLET, FILM COATED ORAL at 22:22

## 2021-11-06 RX ADMIN — OXYCODONE AND ACETAMINOPHEN 2 TABLET: 5; 325 TABLET ORAL at 05:11

## 2021-11-06 RX ADMIN — GABAPENTIN 300 MG: 300 CAPSULE ORAL at 22:11

## 2021-11-06 RX ADMIN — METOCLOPRAMIDE 10 MG: 10 TABLET ORAL at 22:11

## 2021-11-06 RX ADMIN — METOCLOPRAMIDE 10 MG: 10 TABLET ORAL at 17:07

## 2021-11-06 RX ADMIN — MUPIROCIN: 20 OINTMENT TOPICAL at 22:10

## 2021-11-06 RX ADMIN — MUPIROCIN: 20 OINTMENT TOPICAL at 10:00

## 2021-11-06 RX ADMIN — METOPROLOL TARTRATE 25 MG: 25 TABLET, FILM COATED ORAL at 22:11

## 2021-11-06 RX ADMIN — ERYTHROMYCIN: 5 OINTMENT OPHTHALMIC at 22:11

## 2021-11-06 RX ADMIN — AMIODARONE HYDROCHLORIDE 200 MG: 200 TABLET ORAL at 10:14

## 2021-11-06 RX ADMIN — SODIUM CHLORIDE, PRESERVATIVE FREE 10 ML: 5 INJECTION INTRAVENOUS at 10:13

## 2021-11-06 RX ADMIN — PREDNISONE 10 MG: 5 TABLET ORAL at 10:13

## 2021-11-06 RX ADMIN — METOPROLOL TARTRATE 25 MG: 25 TABLET, FILM COATED ORAL at 10:12

## 2021-11-06 RX ADMIN — OXYCODONE AND ACETAMINOPHEN 2 TABLET: 5; 325 TABLET ORAL at 10:12

## 2021-11-06 RX ADMIN — IPRATROPIUM BROMIDE AND ALBUTEROL SULFATE 1 AMPULE: .5; 2.5 SOLUTION RESPIRATORY (INHALATION) at 15:30

## 2021-11-06 RX ADMIN — FUROSEMIDE 20 MG: 10 INJECTION, SOLUTION INTRAMUSCULAR; INTRAVENOUS at 10:13

## 2021-11-06 RX ADMIN — METOCLOPRAMIDE 10 MG: 10 TABLET ORAL at 07:06

## 2021-11-06 RX ADMIN — GABAPENTIN 300 MG: 300 CAPSULE ORAL at 15:43

## 2021-11-06 RX ADMIN — AMIODARONE HYDROCHLORIDE 200 MG: 200 TABLET ORAL at 15:43

## 2021-11-06 RX ADMIN — DOCUSATE SODIUM 50MG AND SENNOSIDES 8.6MG 1 TABLET: 8.6; 5 TABLET, FILM COATED ORAL at 22:11

## 2021-11-06 RX ADMIN — INSULIN LISPRO 10 UNITS: 100 INJECTION, SOLUTION INTRAVENOUS; SUBCUTANEOUS at 17:07

## 2021-11-06 RX ADMIN — PANTOPRAZOLE SODIUM 40 MG: 40 TABLET, DELAYED RELEASE ORAL at 10:17

## 2021-11-06 RX ADMIN — DOCUSATE SODIUM 50MG AND SENNOSIDES 8.6MG 1 TABLET: 8.6; 5 TABLET, FILM COATED ORAL at 10:12

## 2021-11-06 RX ADMIN — ASPIRIN 81 MG: 81 TABLET, COATED ORAL at 10:17

## 2021-11-06 RX ADMIN — IPRATROPIUM BROMIDE AND ALBUTEROL SULFATE 1 AMPULE: .5; 2.5 SOLUTION RESPIRATORY (INHALATION) at 08:50

## 2021-11-06 RX ADMIN — IPRATROPIUM BROMIDE AND ALBUTEROL SULFATE 1 AMPULE: .5; 2.5 SOLUTION RESPIRATORY (INHALATION) at 11:59

## 2021-11-06 RX ADMIN — SODIUM CHLORIDE, PRESERVATIVE FREE 10 ML: 5 INJECTION INTRAVENOUS at 22:11

## 2021-11-06 RX ADMIN — IPRATROPIUM BROMIDE AND ALBUTEROL SULFATE 1 AMPULE: .5; 2.5 SOLUTION RESPIRATORY (INHALATION) at 20:06

## 2021-11-06 ASSESSMENT — PAIN SCALES - GENERAL
PAINLEVEL_OUTOF10: 8
PAINLEVEL_OUTOF10: 0
PAINLEVEL_OUTOF10: 4
PAINLEVEL_OUTOF10: 6
PAINLEVEL_OUTOF10: 0
PAINLEVEL_OUTOF10: 7
PAINLEVEL_OUTOF10: 4
PAINLEVEL_OUTOF10: 0
PAINLEVEL_OUTOF10: 0

## 2021-11-06 ASSESSMENT — PAIN DESCRIPTION - PROGRESSION
CLINICAL_PROGRESSION: NOT CHANGED

## 2021-11-06 ASSESSMENT — PAIN DESCRIPTION - LOCATION
LOCATION: INCISION
LOCATION: CHEST;INCISION
LOCATION: CHEST;INCISION

## 2021-11-06 ASSESSMENT — PAIN DESCRIPTION - FREQUENCY: FREQUENCY: CONTINUOUS

## 2021-11-06 ASSESSMENT — PAIN DESCRIPTION - PAIN TYPE
TYPE: SURGICAL PAIN

## 2021-11-06 ASSESSMENT — PAIN DESCRIPTION - ONSET
ONSET: ON-GOING
ONSET: ON-GOING

## 2021-11-06 ASSESSMENT — PAIN DESCRIPTION - DESCRIPTORS
DESCRIPTORS: NAGGING;ACHING;SORE
DESCRIPTORS: ACHING;DISCOMFORT

## 2021-11-06 ASSESSMENT — PAIN DESCRIPTION - ORIENTATION
ORIENTATION: MID
ORIENTATION: MID

## 2021-11-06 ASSESSMENT — PAIN - FUNCTIONAL ASSESSMENT: PAIN_FUNCTIONAL_ASSESSMENT: PREVENTS OR INTERFERES WITH MANY ACTIVE NOT PASSIVE ACTIVITIES

## 2021-11-06 NOTE — PROGRESS NOTES
Pulmonary Critical Care Progress Note       Patient seen for the follow up of  NSTEMI (non-ST elevated myocardial infarction) ([address]     Subjective:  She has been requiring oxygen at 2 L nasal cannula. She is sitting in the chair. She tolerated oral intake. She denies pain at this point. No increase in shortness of breath. She denies smoking history. She was successfully extubated on 11/4/2021. Examination:  Vitals: /68   Pulse 71   Temp 98.5 °F (36.9 °C) (Oral)   Resp 20   Ht 5' 5\" (1.651 m)   Wt 184 lb (83.5 kg)   SpO2 100%   BMI 30.62 kg/m²   General appearance: alert and cooperative with exam, up in the bedside chair  Neck: No JVD  Lungs: Moderate air exchan decreased breath sounds right chest tube in place  Heart: regular rate and rhythm, S1, S2 normal, no gallop  Abdomen: Soft, non tender, + BS  Extremities: no cyanosis or clubbing.+ edema    LABs:  CBC:   Recent Labs     11/05/21  0800 11/06/21  0505   WBC 16.2* 20.9*   HGB 9.4* 8.7*   HCT 31.6* 31.5*    296     BMP:   Recent Labs     11/05/21  0800 11/06/21  0505    140   K 4.2 4.6   CO2 20 18*   BUN 22 29*   CREATININE 1.16* 1.31*   LABGLOM 47* 41*   GLUCOSE 174* 141*     PT/INR:   Recent Labs     11/05/21  0800 11/06/21  0505   PROTIME 17.0* 16.2*   INR 1.4 1.3     ABG:  Lab Results   Component Value Date    KSK6TOJ NOT REPORTED 11/04/2021    FIO2 40.0 11/04/2021       Lab Results   Component Value Date    POCPH 7.284 11/04/2021    POCPCO2 42.6 11/04/2021    POCPO2 87.1 11/04/2021    POCHCO3 20.2 11/04/2021    NBEA 6 11/04/2021    PBEA NOT REPORTED 11/04/2021    CTS9YGJ NOT REPORTED 11/04/2021    GLNS7JDX 95 11/04/2021    FIO2 40.0 11/04/2021     Radiology:  11/6/2021    Shallow inflation.  The cardiac and mediastinal contours appear unchanged. Chest tubes remain in place.  Vascular congestion, basilar opacities and   pleural effusions are again demonstrated without appreciable change.  No new   airspace disease identified in the interval.  No evidence for pneumothorax.           Impression:  · Acute hypoxic respiratory failure  · CAD s/p CABG 1/3/2021, successfully extubated 11/4/2021  · Asthma  · Myasthenia gravis  · Suspected obstructive sleep apnea/Obesity  · Pulmonary edema/small bilateral pleural effusions/atelectasis    Recommendations:  · Oxygen via nasal cannula, keep SPO2 90% or greater  · BiPAP support while asleep if necessary  · Incentive spirometry every hour while awake, encourage  · Home oxygen evaluation prior to discharge  · Chest tube management per CT surgery  · Albuterol and Ipratropium Q 4 hours and prn  · Cipro for UTI  · Diet as tolerated  · Continue diuresis with IV Lasix 20 mg twice daily  · Monitor renal function  · Physical therapy bedside  · PFTs as an outpatient  · Sleep apnea evaluation as an outpatient  · Discussed with RN  · DVT prophylaxis    Electronically signed by     Bee Lynn MD on 11/6/2021 at 3:56 PM  Pulmonary Critical Care and Sleep Medicine,  [facility] AT [place]: [phone]

## 2021-11-06 NOTE — PROGRESS NOTES
Patient has remained stable through the night in normal sinus with rates in the 60s. Has remained on 2L nasal cannula with pox in the mid 90s. Bowens removed early in the shift; 75 ml out via purewick before due to void. Total 120 ml out via chest tubes. Pt resting comfortably in the chair at this time. Will continue to monitor.

## 2021-11-06 NOTE — PROGRESS NOTES
[id number] [facility] Cardiothoracic Surgery  Progress Note    2021 11:36 AM    Subjective:  Ms. Grayson Fritz feels okay with mild SOB  PULSE OXIMETRY RANGE: SpO2  Av %  Min: 91 %  Max: 98 %  SUPPLEMENTAL O2: O2 Flow Rate (L/min): 2 L/min   Vital Signs: BP (!) 117/58   Pulse 71   Temp 98.2 °F (36.8 °C) (Oral)   Resp 18   Ht 5' 5\" (1.651 m)   Wt 184 lb (83.5 kg)   SpO2 98%   BMI 30.62 kg/m²    Weight: Weight: 184 lb (83.5 kg)     Rhythm: regular rate and rhythm      Scheduled Meds:    [START ON 2021] furosemide  20 mg IntraVENous Daily    metoclopramide  10 mg Oral 4x Daily AC & HS    rosuvastatin  40 mg Oral Nightly    predniSONE  10 mg Oral Daily    sodium chloride flush  10 mL IntraVENous 2 times per day    aspirin  81 mg Oral Daily    amiodarone  200 mg Oral TID    mupirocin   Nasal BID    polyethylene glycol  17 g Oral Daily    metoprolol tartrate  25 mg Oral BID    pantoprazole  40 mg Oral Daily    ipratropium-albuterol  1 ampule Inhalation Q4H WA    sennosides-docusate sodium  1 tablet Oral BID    erythromycin   Both Eyes Nightly    gabapentin  300 mg Oral TID    FLUoxetine  40 mg Oral QAM    insulin glargine  56 Units SubCUTAneous Daily    And    insulin lispro  10 Units SubCUTAneous TID WC    [Held by provider] metFORMIN  1,000 mg Oral BID     influenza virus vaccine  0.5 mL IntraMUSCular Prior to discharge     Continuous Infusions:    sodium chloride      dextrose       Exam:   General appearance: Alert, oriented, no acute distress   Neck: no bruits, no JVD, No lymph nodes   Pulmonary/Chest: clear to auscultation bilaterally- no wheezes, rales or rhonchi, normal air movement, no respiratory distress  Heart: Normal S1 and S2.  Regular rhythm. No murmurs, gallops, or rubs.    Abdomen: positive bowel sounds, no bruits, no masses  Extremities: no cyanosis, clubbing or edema present    Incisions: healing well    Labs:   CBC:   Recent Labs     21  0500 21  0800 11/06/21  0505   WBC 16.4* 16.2* 20.9*   HGB 7.8* 9.4* 8.7*   HCT 26.6* 31.6* 31.5*   MCV 87.8 87.3 92.1   * 176 296     BMP:   Recent Labs     11/04/21  0500 11/05/21  0800 11/06/21  0505    142 140   K 4.6 4.2 4.6   * 110* 108*   CO2 19* 20 18*   BUN 16 22 29*   CREATININE 0.85 1.16* 1.31*     PT/INR:   Recent Labs     11/04/21  0500 11/05/21  0800 11/06/21  0505   PROTIME 20.6* 17.0* 16.2*   INR 1.8 1.4 1.3     APTT:   Recent Labs     11/03/21  1345   APTT 41.1*     Chest X-Ray:  normal   I/O: I/O last 3 completed shifts:  In: -   Out: 545 [Urine:425; Chest Tube:120]    Assessment:      Patient Active Problem List   Diagnosis    NSTEMI (non-ST elevated myocardial infarction) ([facility])    Type 2 diabetes mellitus with hyperglycemia, without long-term current use of insulin ([facility])    Class 1 obesity due to excess calories with serious comorbidity and body mass index (BMI) of 33.0 to 33.9 in adult    Lymphedema of both lower extremities    Acute on chronic diastolic congestive heart failure (HCC)    Pathological fracture of thoracic vertebra due to secondary osteoporosis (HCC)    Pathological fracture of lumbar vertebra due to secondary osteoporosis (HCC)    Intractable back pain    Age-related osteoporosis with current pathological fracture    History of kyphoplasty    Facet arthritis, degenerative, lumbar spine    Degenerative spondylolisthesis    DDD (degenerative disc disease), thoracolumbar    Acute on chronic congestive heart failure (HCC)    Chronic bilateral low back pain without sciatica    Allergic conjunctivitis of both eyes    CAD in native artery    S/P CABG x 2       Plan:   1.  Remove chest tubes  CXR in 4 hours  Increase PT    Armen Llamas MD

## 2021-11-06 NOTE — PROGRESS NOTES
Physical Therapy  Facility/Department: [facility] CVICU  Daily Treatment Note  NAME: Keith King  : 1955  MRN: [id number]    Date of Service: 2021    Discharge Recommendations:  Patient would benefit from continued therapy after discharge   Assessment   Body structures, Functions, Activity limitations: Decreased functional mobility ; Decreased balance; Decreased cognition; Decreased ROM; Decreased strength; Decreased endurance; Increased pain; Decreased posture; Decreased sensation  Assessment: Pt with slight improvement in mobility this date as she initiated a partial stand using a marciano stedy and was able to weight shift for upright posture in the chair. Pt continues with maximum to dependent deficits of bed mobility, transfers, balance,  safety awareness and endurance. Pt is currently functioning below baseline. Would suggest additional therapy at time of discharge to maximize long term outcomes and prevent re-admission. Please refer to AM-PAC score for current level of function. Specific instructions for Next Treatment: Co-Tx  Prognosis: Good  Decision Making: High Complexity  Clinical Presentation: unstable  REQUIRES PT FOLLOW UP: Yes  Activity Tolerance  Activity Tolerance: Patient limited by fatigue; Patient limited by endurance; Patient limited by pain     Patient Diagnosis(es): The primary encounter diagnosis was S/P CABG x 2. Diagnoses of Non-ST elevation MI (NSTEMI) ([facility] [address] Chronic bilateral low back pain without sciatica, Lymphedema of both lower extremities, and Acute on chronic congestive heart failure, unspecified heart failure type [facility]) were also pertinent to this visit. has a past medical history of Asthma, Diabetes mellitus ([facility] [address] Fibromyalgia, Headache, Lymphedema of both lower extremities, and Myasthenia gravis ([facility] [address]    has a past surgical history that includes Toe amputation; Carpal tunnel release; fracture surgery; Sternum Debridement (N/A, 11/3/2021); and Coronary artery bypass graft (N/A, 11/2/2021). Restrictions  Restrictions/Precautions  Restrictions/Precautions: General Precautions, Fall Risk, Surgical Protocols  Required Braces or Orthoses?: Yes  Required Braces or Orthoses  Other: Heart Hugger Brace  Position Activity Restriction  Sternal Precautions: 5# Lifting Restrictions  Other position/activity restrictions: Up in chair, chest harness/surgical bra, strict sternal precautions, chest tubes, telemetry, bloom catheter, Prevana wound vac with dressing at sternal incision, BUE IV's,  bloom catheter, [email or web address]  Subjective   General  Chart Reviewed: Yes  Additional Pertinent Hx: CAD, DM, severe back pain, fibromyalgia, lymphedema BLE, spinal compression fracture  Family / Caregiver Present: No  Subjective  Subjective: Pt was motivated and requested to work with therapy this date  General Comment  Comments: RN okays PT  Pain Screening  Patient Currently in Pain: Yes  Vital Signs  Patient Currently in Pain: Yes       Orientation  Orientation  Overall Orientation Status: Within Functional Limits  Objective   Transfers  Sit to Stand: Dependent/Total  Stand to sit: Dependent/Total  Bed to Chair: Dependent/Total  Ambulation  Ambulation?: No  Stairs/Curb  Stairs?: No     Balance  Posture: Fair  Sitting - Static: Fair; -  Exercises  Quad Sets: 10  Knee Long Arc Quad: 2 x 10  Ankle Pumps: 2 x 10  Attempted sit to stands x 3 with maximal assistance of 2 person. The pt only achieved a minimal partial stand ( slight lift off ) for approx 10 seconds with maximum assistance x 2  Other exercises included weight shifting to edge of chair for sit to stands and then back in the chair for upright posture and comfort.       AM-PAC Score     AM-PAC Inpatient Mobility without Stair Climbing Raw Score : 7 (11/06/21 1119)  AM-PAC Inpatient without Stair Climbing T-Scale Score : 28.66 (11/06/21 1119)  Mobility Inpatient CMS 0-100% Score: 86.29 (11/06/21 1119)  Mobility Inpatient without Stair CMS G-Code Modifier : CM (11/06/21 1119)       Goals  Short term goals  Time Frame for Short term goals: 12 visits:  Short term goal 1: Inc bed mobility to [facility] term goal 2: Pt able to transfer from bed to chair with mod assist using safest device  Short term goal 3: Pt to tolerate sitting with unsupported trunk up to 20 minutes with UB/LB support to improve core stability & repositioning for pressure relief, prepare for standing,  & prevent sedentary complications  Short term goal 4: Pt able to tolerate 30-40 min of activity to include 15-20 reps of ex, NMR & functional mobility to facilitate activity tolerance to [facility]  Short term goal 5: Ed strengthening & balance ex program, activity guidelines, pressure relief, edema control of LE'S, energy conservation & optimal breathing techniques, & issue written pt education  Patient Goals   Patient goals : regain independence    Plan    Plan  Times per week: 1-2x/day, 6-7D/week  Specific instructions for Next Treatment: Co-Tx  Current Treatment Recommendations: Strengthening, ROM, Balance Training, Functional Mobility Training, Transfer Training, Endurance Training, Safety Education & Training, Home Exercise Program, Patient/Caregiver Education & Training  Safety Devices  Type of devices: All fall risk precautions in place, Gait belt, Patient at risk for falls, Call light within reach, Nurse notified, Left in chair  Restraints  Initially in place: No     Therapy Time   Individual Concurrent Group Co-treatment   Time In        1015   Time Out        1047   Minutes        28      Co-treatment with OT warranted secondary to decreased safety and independence requiring 2 skilled therapy professionals to address individual discipline's goals. PT addressing \"pre gait trunk strengthening\",\"weight shifting prior to transfers\",\"transfer training\",\"postural control in sitting\".   Yesenia Love, PTA

## 2021-11-06 NOTE — PROGRESS NOTES
[facility]  Office: [address], DO, Nathaniel Anderson, DO, Cookie Darling, DO, Blayne Alexander Natalee, DO, Alphonse Carrera MD, Addie Shannon MD, Reggie Dockery MD, Nir Maza MD, Suresh Short MD, Axel Atkinson MD, Kimberly Wilson MD, Ashley Hill MD, Walker Davenport, DO, Addie Clifford, DO, Bert Olmos MD,  Melissa Galeano, DO, Connie Hinds MD, Clyde Lao MD, Qing Wheat MD, Bill Raymundo MD, Regla Dockery MD, Alondra Lyons MD, Carmita Don, [facility], [facility] Roderickosso, CNP, Tricia Cancel, CNP, Yarely Fret, CNS, Esme Lacey, CNP, Melissa Rebollarry, CNP, Camille Brain, CNP, Anderson Pickering, CNP, Marisol Hill, CNP, Fernando Anand, CNP, Nayan Beckwith PA-C, Janette Alejandre, [facility], Jono Haywood, CNP, Lorrie Ledesma, CNP, Kaykay Horner, CNP, Crissy Mccrary, CNP, Chilo Gaspar, CNP, Jesus Manuel Bowles, CNP, Deejay [facility]    Progress Note    11/6/2021    7:40 AM    Name:   Stone Rodriguez  MRN:     [id number]     [place]:      [de-identified]   Room:   2028/2028-01   Day:  13  Admit Date:  10/24/2021  6:48 AM    PCP:   Crissy Mccrary MD  Code Status:  Full Code    Subjective:     C/C:   Chief Complaint   Patient presents with    Back Pain     Interval History Status: improved. Patient was seen and examined at bedside this AM. She reports feeling well and is without complaint. She states that the pain along her sternal wound is well-controlled. Denies fever/chills, nausea/vomiting, shortness of breath or chest pain. Patient has had low urine output over the past 24-hours. Creatinine is slightly worse today. Brief History:     Patient is a 30-year-old female who presented to the emergency department on 10/24/2021 complaining of back and chest pain. The patient was admitted to internal medicine for further management of NSTEMI. Ischemic workup was remarkable for multivessel disease.  Cardiothoracic surgery was consulted and performed a CABG on 11/2. Review of Systems:     Constitutional:  negative for chills, fevers, sweats  Respiratory:  negative for cough, dyspnea on exertion, shortness of breath, wheezing  Cardiovascular:  Positive for pain along sternal incision   Gastrointestinal:  negative for abdominal pain, constipation, diarrhea, nausea, vomiting  Neurological:  negative for dizziness, headache    Medications: Allergies:     Allergies   Allergen Reactions    Amoxicillin Diarrhea and Other (See Comments)    Amoxicillin-Pot Clavulanate Diarrhea    Atorvastatin Other (See Comments)     Other reaction(s): Myalgia  Weakness      Cefuroxime Axetil Other (See Comments)    Clavulanic Acid Diarrhea and Other (See Comments)    Codeine Other (See Comments)     Unless suspended in syrup      Dextroamphetamine     Adhesive Tape Rash     Itching     Cephalosporins Rash       Current Meds:   Scheduled Meds:    metoclopramide  10 mg Oral 4x Daily AC & HS    rosuvastatin  40 mg Oral Nightly    furosemide  20 mg IntraVENous BID    predniSONE  10 mg Oral Daily    sodium chloride flush  10 mL IntraVENous 2 times per day    aspirin  81 mg Oral Daily    amiodarone  200 mg Oral TID    mupirocin   Nasal BID    polyethylene glycol  17 g Oral Daily    metoprolol tartrate  25 mg Oral BID    pantoprazole  40 mg Oral Daily    ipratropium-albuterol  1 ampule Inhalation Q4H WA    sennosides-docusate sodium  1 tablet Oral BID    erythromycin   Both Eyes Nightly    gabapentin  300 mg Oral TID    FLUoxetine  40 mg Oral QAM    insulin glargine  56 Units SubCUTAneous Daily    And    insulin lispro  10 Units SubCUTAneous TID WC    [Held by provider] metFORMIN  1,000 mg Oral BID WC    influenza virus vaccine  0.5 mL IntraMUSCular Prior to discharge     Continuous Infusions:    sodium chloride      dextrose       PRN Meds: melatonin, sodium bicarbonate, potassium chloride, sodium chloride flush, sodium chloride, ondansetron, acetaminophen, oxyCODONE-acetaminophen **OR** oxyCODONE-acetaminophen, fentanNYL **OR** [DISCONTINUED] fentanNYL, hydrALAZINE, metoprolol, sodium bicarbonate, diphenhydrAMINE, bisacodyl, fleet, albumin human, glucose, dextrose, glucagon (rDNA), dextrose, albuterol, naloxone, calcium gluconate **OR** calcium gluconate **OR** calcium gluconate **OR** calcium gluconate, benzonatate    Data:     Past Medical History:   has a past medical history of Asthma, Diabetes mellitus ([facility] [address] Fibromyalgia, Headache, Lymphedema of both lower extremities, and Myasthenia gravis ([facility] [address] Social History:   reports that she has never smoked. She has never used smokeless tobacco. She reports previous alcohol use. She reports previous drug use. Family History:   Family History   Problem Relation Age of Onset    Coronary Art Dis Mother     Kidney Disease Mother        Vitals:  /61   Pulse 65   Temp 98 °F (36.7 °C) (Oral)   Resp 12   Ht 5' 5\" (1.651 m)   Wt 184 lb (83.5 kg)   SpO2 97%   BMI 30.62 kg/m²   Temp (24hrs), Av.4 °F (36.3 °C), Min:96.8 °F (36 °C), Max:98 °F (36.7 °C)    Recent Labs     21  1118 21  1636 21  0718   POCGLU 176* 164* 144* 117*       I/O (24Hr):     Intake/Output Summary (Last 24 hours) at 2021 0740  Last data filed at 2021 0522  Gross per 24 hour   Intake --   Output 545 ml   Net -545 ml       Labs:  Hematology:  Recent Labs     21  0500 21  0800 21  0505   WBC 16.4* 16.2* 20.9*   RBC 3.03* 3.62* 3.42*   HGB 7.8* 9.4* 8.7*   HCT 26.6* 31.6* 31.5*   MCV 87.8 87.3 92.1   MCH 25.7 26.0 25.4   MCHC 29.3 29.7 27.6*   RDW 16.0* 16.6* 17.0*   * 176 296   MPV 11.0 11.4 11.1   INR 1.8 1.4 1.3     Chemistry:  Recent Labs     21  0500 21  0800 21  0505    142 140   K 4.6 4.2 4.6   * 110* 108*   CO2 19* 20 18*   GLUCOSE 123* 174* 141*   BUN 16 22 29*   CREATININE 0.85 1.16* 1.31*   MG 2.1 2.1 2.5   ANIONGAP 12 12 14   LABGLOM >60 47* 41*   GFRAA >60 57* 50*   CALCIUM 7.8* 7.6* 8.6     Recent Labs     11/04/21  1941 11/05/21  0720 11/05/21  1118 11/05/21  1636 11/05/21  2009 11/06/21  0718   POCGLU 139* 141* 176* 164* 144* 117*     ABG:  Lab Results   Component Value Date    POCPH 7.284 11/04/2021    POCPCO2 42.6 11/04/2021    POCPO2 87.1 11/04/2021    POCHCO3 20.2 11/04/2021    NBEA 6 11/04/2021    PBEA NOT REPORTED 11/04/2021    NSS8OJE NOT REPORTED 11/04/2021    UHFZ0TKG 95 11/04/2021    FIO2 40.0 11/04/2021     Lab Results   Component Value Date/Time    SPECIAL NOT REPORTED 10/28/2021 11:30 AM     Lab Results   Component Value Date/Time    CULTURE KLEBSIELLA PNEUMONIAE >319015 CFU/ML (A) 10/28/2021 11:30 AM    CULTURE ENTEROCOCCUS FAECALIS >203930 CFU/ML (A) 10/28/2021 11:30 AM       Radiology:  XR CHEST PORTABLE    Result Date: 11/5/2021  Interval removal of endotracheal and enteric tubes. Shallow inflation with findings of vascular congestion, subsegmental atelectasis and probable small effusions again demonstrated. No new airspace disease or evidence for pneumothorax. XR CHEST PORTABLE    Result Date: 11/4/2021  1. Support lines and tubes are relatively stable in position. 2.  Increased vascular congestion and bibasilar opacities, likely pleural effusion with atelectasis. XR CHEST PORTABLE    Result Date: 11/3/2021  1. New changes of median sternotomy. 2. Persistence of at least small right and trace left pleural effusions with underlying bibasilar passive atelectasis. Superimposed pneumonia and aspiration are not excluded on the left. 3. Pulmonary vascular congestion with questionable perihilar edema, potentially congestive heart failure given recent heart surgery, the above pleural effusions, and suspected mild cardiomegaly. XR CHEST PORTABLE    Result Date: 11/3/2021  Stable postop findings with low lung volumes, bibasilar atelectasis and small effusions, greater on the left.      XR CHEST PORTABLE    Result Date: 11/2/2021  Small bilateral pleural effusions and bibasilar atelectasis. Physical Examination:        General appearance:  alert, cooperative and no distress  Mental Status:  oriented to person, place and time and normal affect  Lungs:  clear to auscultation bilaterally, normal effort  Heart:  Sternal incision present. Chest tube present.    Abdomen:  soft, nontender, nondistended, normal bowel sounds, no masses, hepatomegaly, splenomegaly  Extremities:  1+ edema in bilateral lower extremities   Skin:  no gross lesions, rashes, induration    Assessment:        Hospital Problems           Last Modified POA    * (Principal) NSTEMI (non-ST elevated myocardial infarction) ([address] 11/3/2021 Yes    Type 2 diabetes mellitus with hyperglycemia, without long-term current use of insulin ([address] 10/24/2021 Yes    Class 1 obesity due to excess calories with serious comorbidity and body mass index (BMI) of 33.0 to 33.9 in adult 10/24/2021 Yes    Lymphedema of both lower extremities 10/24/2021 Yes    Acute on chronic diastolic congestive heart failure ([address] 10/26/2021 Yes    Pathological fracture of thoracic vertebra due to secondary osteoporosis ([address] 10/26/2021 Yes    Pathological fracture of lumbar vertebra due to secondary osteoporosis ([address] 10/26/2021 Yes    Intractable back pain 10/26/2021 Yes    Age-related osteoporosis with current pathological fracture 10/26/2021 Yes    History of kyphoplasty 10/26/2021 Yes    Facet arthritis, degenerative, lumbar spine 10/26/2021 Yes    Degenerative spondylolisthesis 10/26/2021 Yes    DDD (degenerative disc disease), thoracolumbar 10/26/2021 Yes    Acute on chronic congestive heart failure ([address] 10/27/2021 Yes    Chronic bilateral low back pain without sciatica 10/27/2021 Yes    Allergic conjunctivitis of both eyes 10/31/2021 Yes    CAD in native artery 11/3/2021 Yes    S/P CABG x 2 (Chronic) 11/4/2021 Yes    Overview Signed 11/4/2021  6:16 AM by Florentino Herbert, APRN - JOSEPH Dow

## 2021-11-06 NOTE — PROGRESS NOTES
Pt moved by sling lift from chair at the bedside to the bed lying supine on 2L nasal cannula. Pt tolerated being moved well with no oxygenation issues and minimal pain issues. Pt needs assistance with repositioning and is unable to stand under her own strength at this time.

## 2021-11-06 NOTE — PROGRESS NOTES
Section of Cardiology  Progress Note      Date:  11/6/2021  Patient: Gary Araujo  Admission:  10/24/2021  6:48 AM  Admit DX: NSTEMI (non-ST elevated myocardial infarction) ([facility] [I21.4]  Non-ST elevation MI (NSTEMI) ([facility]) [I21.4]  Lymphedema of both lower extremities [I89.0]  Chronic bilateral low back pain without sciatica [M54.50, G89.29]  Acute on chronic congestive heart failure, unspecified heart failure type ([facility] [address] [I50.9]  CAD in native artery [I25.10]  Age:  61 y.o., 1955     LOS: 13 days     Reason for evaluation:   NSTEMI and CAD      SUBJECTIVE:     The patient was seen and examined. Notes and labs reviewed. The patient feels her pain is better controlled today. She is sitting up in the chair eating breakfast.  She denies having any shortness of breath or palpitations. She continues to have chest tube in place and wound VAC to her chest.    OBJECTIVE:      EXAM:   Vitals:    VITALS:  /61   Pulse 65   Temp 98.2 °F (36.8 °C) (Oral)   Resp 18   Ht 5' 5\" (1.651 m)   Wt 184 lb (83.5 kg)   SpO2 98%   BMI 30.62 kg/m²   24HR INTAKE/OUTPUT:      Intake/Output Summary (Last 24 hours) at 11/6/2021 0859  Last data filed at 11/6/2021 0522  Gross per 24 hour   Intake --   Output 495 ml   Net -495 ml       CONSTITUTIONAL: Alert, awake, no signs of acute distress  HEENT: No JVD  LUNGS: Clear throughout all lung fields, nonlabored  CARDIOVASCULAR:  regular rate and rhythm, normal S1 and S2, no S3 or S4, and no rub noted. SKIN: Warm and dry.   EXTREMITIES: Mild bilateral lower extremity edema with right lower leg wrap    Current Inpatient Medications:   metoclopramide  10 mg Oral 4x Daily AC & HS    rosuvastatin  40 mg Oral Nightly    furosemide  20 mg IntraVENous BID    predniSONE  10 mg Oral Daily    sodium chloride flush  10 mL IntraVENous 2 times per day    aspirin  81 mg Oral Daily    amiodarone  200 mg Oral TID    mupirocin   Nasal BID    polyethylene glycol  17 g Oral Daily    metoprolol tartrate  25 mg Oral BID    pantoprazole  40 mg Oral Daily    ipratropium-albuterol  1 ampule Inhalation Q4H WA    sennosides-docusate sodium  1 tablet Oral BID    erythromycin   Both Eyes Nightly    gabapentin  300 mg Oral TID    FLUoxetine  40 mg Oral QAM    insulin glargine  56 Units SubCUTAneous Daily    And    insulin lispro  10 Units SubCUTAneous TID WC    [Held by provider] metFORMIN  1,000 mg Oral BID     influenza virus vaccine  0.5 mL IntraMUSCular Prior to discharge       IV Infusions (if any):   sodium chloride      dextrose         Diagnostics:   Telemetry: Sinus rhythm    Labs:   CBC:  Recent Labs     11/05/21  0800 11/06/21  0505   WBC 16.2* 20.9*   HGB 9.4* 8.7*   HCT 31.6* 31.5*    296     Magnesium:  Recent Labs     11/05/21  0800 11/06/21  0505   MG 2.1 2.5     BMP:  Recent Labs     11/05/21  0800 11/06/21  0505    140   K 4.2 4.6   CALCIUM 7.6* 8.6   CO2 20 18*   BUN 22 29*   CREATININE 1.16* 1.31*   LABGLOM 47* 41*   GLUCOSE 174* 141*     BNP:  No results for input(s): BNP, PROBNP in the last 72 hours. PT/INR:  Recent Labs     11/05/21  0800 11/06/21  0505   PROTIME 17.0* 16.2*   INR 1.4 1.3     APTT:  Recent Labs     11/03/21  1345   APTT 41.1*     CARDIAC ENZYMES:No results for input(s): MYOGLOBIN, CKTOTAL, CKMB, CKMBINDEX, TROPHS, TROPONINT in the last 72 hours. FASTING LIPID PANEL:  Lab Results   Component Value Date    HDL 40 10/25/2021    TRIG 107 10/25/2021     LIVER PROFILE:  No results for input(s): AST, ALT, LABALBU, ALKPHOS, BILITOT, BILIDIR, IBILI, PROT, GLOB, ALBUMIN in the last 72 hours.      ASSESSMENT:    · CAD with multivessel disease status post CABG x2 with LIMA to LAD and SVG to diagonal, OM exploration was not graftable 11/2/2021 and status post delayed sternal closure 11/3/2021, wound VAC remains in place-no clinical angina  · Borderline troponin elevation with possible non-STEMI-preserved LV systolic function on echocardiogram done 10/25/2021  · Diabetes, managed by others  · Lymphedema and nonhealing wounds, managed by others  · Peripheral vascular disease, managed by others  · Dyslipidemia, treated  · Lumbar compression fracture, managed by others    PLAN:  Continue current cardiac medications. Increase activity as tolerated. Conservative cardiac management at this time. Discussed with patient, and Dr. Edmund Benjamin.     Parul Torres, APRN - CNP

## 2021-11-06 NOTE — PROGRESS NOTES
Occupational Therapy  [facility]  Daily Treatment Note  NAME: Kristin Urban  : 1955  MRN: [id number]    Date of Service: 2021   RN reports patient is medically stable for therapy treatment this date. Chart reviewed prior to treatment and patient is agreeable for therapy. All lines intact and patient positioned comfortably at end of treatment. All patient needs addressed prior to ending therapy session. Discharge Recommendations:  Pt currently functioning below baseline. Would suggest additional therapy at time of discharge to maximize long term outcomes and prevent re-admission. Please refer to AM-PAC score for current level of function. Patient would benefit from continued therapy after discharge  OT Equipment Recommendations  ADL Assistive Devices: Toileting - 3-in-1 Commode; Sock-Aid Soft; Long-handled Shoe Horn; Long-handled Sponge    Assessment   Performance deficits / Impairments: Decreased functional mobility ; Decreased safe awareness; Decreased balance; Decreased coordination; Decreased ADL status; Decreased strength; Decreased sensation; Decreased fine motor control; Decreased endurance; Decreased high-level IADLs; Decreased ROM; Decreased cognition; Decreased vision/visual deficit; Decreased posture  Assessment: Pt motivated to participate with therapy to this date and attempt standing. Pt however required Max A x2 to attempt standing in marciano stedy and was unsuccessful with full stand. Pt still requiring yola lift if needing to transfer to bed or chair. Skilled OT is indocated to maximize functional I and safety as able to reduce caregiver assist/burden.   Prognosis: Fair; Guarded  OT Education: OT Role; Plan of Care; Transfer Training; Energy Conservation; Orientation; Precautions  Patient Education: safety in function, pursed lip breathing, recommendations for therapy services and benefits of being up as able and therapy participation, benefits of completing functional tasks as able to maintain I, call light use/fall prevention, postural control, benefits of completing UE ROM  Barriers to Learning: meory issues and increased pain  REQUIRES OT FOLLOW UP: Yes  Activity Tolerance  Activity Tolerance: Patient limited by fatigue; Patient limited by pain; Treatment limited secondary to decreased cognition  Safety Devices  Safety Devices in place: Yes  Type of devices: All fall risk precautions in place; Gait belt; Call light within reach; Chair alarm in place; Nurse notified; Left in chair         Patient Diagnosis(es): The primary encounter diagnosis was S/P CABG x 2. Diagnoses of Non-ST elevation MI (NSTEMI) ([facility] Chronic bilateral low back pain without sciatica, Lymphedema of both lower extremities, and Acute on chronic congestive heart failure, unspecified heart failure type [facility]) were also pertinent to this visit. has a past medical history of Asthma, Diabetes mellitus ([facility] Fibromyalgia, Headache, Lymphedema of both lower extremities, and Myasthenia gravis ([facility] has a past surgical history that includes Toe amputation; Carpal tunnel release; fracture surgery; Sternum Debridement (N/A, 11/3/2021); and Coronary artery bypass graft (N/A, 11/2/2021).     Restrictions  Restrictions/Precautions  Restrictions/Precautions: General Precautions, Fall Risk, Surgical Protocols  Required Braces or Orthoses?: Yes  Required Braces or Orthoses  Other: Heart Hugger Brace  Position Activity Restriction  Sternal Precautions: 5# Lifting Restrictions  Other position/activity restrictions: Up in chair, chest harness/surgical bra, strict sternal precautions, chest tubes, telemetry, bloom catheter, Prevana wound vac with dressing at sternal incision, ROHITH IV's,  bloom catheter, [email or web address]  Subjective   General  Chart Reviewed: Yes  Patient assessed for rehabilitation services?: Yes  Additional Pertinent Hx: Pt. agreeable for treatment  Response to previous treatment: Patient with no complaints from previous session  Family / Caregiver Present: No  Subjective  Subjective: Pt. sitting in recliner and agreeable for treatment  Vital Signs  Patient Currently in Pain: Yes (7/10 under arms. Nurse in room)   Orientation  Orientation  Overall Orientation Status: Within Functional Limits  Orientation Level: Oriented to place; Disoriented to situation; Oriented to person; Oriented to situation; Disoriented to time  Objective                Bed mobility  Supine to Sit: Unable to assess  Sit to Supine: Unable to assess  Comment: Pt. sitting in recliner  Transfers  Sit to stand: 2 Person assistance; Dependent/Total; Maximum assistance  Stand to sit: Maximum assistance; 2 Person assistance; Dependent/Total  Transfer Comments: Pt attempted x3 stands with marciano stedy. First two stands were failed attempts but on 3rd stand pt was able to complete a partial squat and bear some weight through LEs. Pt requied Max verbal/tactile cues for proper hand placement on marciano stedy, pursed lip breathing, use of heart hugger, nose over toes, and awareness/assist with setting lines up for transfer and managing during transfer attempt. Pt attempting to grab lines to assist with pulling self up. Cognition  Overall Cognitive Status: Exceptions  Arousal/Alertness: Delayed responses to stimuli  Following Commands: Follows one step commands with increased time; Follows one step commands with repetition; Inconsistently follows commands  Attention Span: Difficulty attending to directions;  Attends with cues to redirect  Memory: Decreased short term memory; Decreased long term memory  Safety Judgement: Decreased awareness of need for assistance; Decreased awareness of need for safety  Problem Solving: Assistance required to identify errors made; Assistance required to implement solutions; Assistance required to correct errors made; Assistance required to generate solutions; Decreased awareness of errors  Insights: Not aware of deficits  Initiation: Requires cues for all  Sequencing: Requires cues for all                                         Plan   Plan  Times per week: 5x/week 1x/day as ksenia  Times per day: Daily  Current Treatment Recommendations: Strengthening, ROM, Balance Training, Safety Education & Training, Positioning, Endurance Training, Neuromuscular Re-education, Patient/Caregiver Education & Training, Equipment Evaluation, Education, & procurement, Self-Care / ADL, Cognitive/Perceptual Training, Home Management Training, Pain Management, Cognitive Reorientation  Plan Comment: Cont with stated POC                                                  AM-PAC Score        AM-PAC Inpatient Daily Activity Raw Score: 8 (11/06/21 1308)  AM-PAC Inpatient ADL T-Scale Score : 22.86 (11/06/21 1308)  ADL Inpatient CMS 0-100% Score: 85.69 (11/06/21 1308)  ADL Inpatient CMS G-Code Modifier : CM (11/06/21 1308)    Goals  Short term goals  Time Frame for Short term goals: by discharge, pt to demo  Short term goal 1: bed mob tasks with use of rail as needed to mod assist of 2. Short term goal 2: UB ADL to min assist/set up and LB ADL to max assist of 1 supine in bed as needed and with use of bed rail/AE. Short term goal 3: postural control EOB or in recliner to min assist/CG and ksenia > 15 mins to increase core strength/reduce falls in function. Short term goal 4: ADL transfers with lift/AD as needed to max assist of 2. Short term goal 5: participate in BUE Ther Ex/Act as able for 15 mins to increase UE functional use for ADL tasks. Long term goals  Time Frame for Long term goals : Short term goal 6: Pt to increase self feeding to SBA with use of AE/built up foam and with adaptations as needed. Long term goal 1: Pt to stand with AD and min assist ksenia > 5 mins as able to reduce fall risk with ADL and functional tasks.   Long term goal 2: Caregivers to be I with edema mgt, cardiac BUE HEP, proper positioning, pressure relief, EC/WS and fall prevention tech as well as DME/AE and AD recommendations with use of handouts. Patient Goals   Patient goals : Pt states she wants to be able to transfer on her own! Therapy Time   Co-Treat Concurrent Group Co-treatment   Time In 1015         Time Out 0         Minutes 28               Co-treatment with PT warranted secondary to decreased safety and independence requiring 2 skilled therapy professionals to address individual discipline's goals. OT addressing preparation for ADL transfer, sitting balance for increased ADL performance, sitting/activity tolerance, functional reaching, safety, fall prevention, ability to sequence and follow directions and functional UE strength. Upon writer exit, call light within reach, pt retired to recliner. All lines intact and patient positioned comfortably. Recliner alarm in place. All patient needs addressed prior to ending therapy session. Chart reviewed prior to treatment and patient is agreeable for therapy. RN reports patient is medically stable for therapy treatment this date.     CAMRYN Rubin

## 2021-11-06 NOTE — CONSULTS
Nephrology Consult Note    Reason for Consult:  Acute kidney injury  Requesting Physician:  Erika Chaparro MD     Chief Complaint:  Status post CABG  History Obtained From:  patient, electronic medical record    History of Present Illness: This is a 61 y.o. female who presents with multivessel coronary artery disease requiring CABG x2 with LIMA to LAD and saphenous vein graft to diagonal with obtuse marginal branch exploration non-graftable from 11/2/2021. The patient then had delayed sternal wound closure on 11/3/2021 with wound VAC in place. The patient was having significant pain yesterday and did receive Toradol  IV x1 dose. Her baseline creatinine appears relatively normal.  She does have a history of type 2 diabetes mellitus and states she also has a history of diabetic retinopathy. She has never seen a nephrologist to her knowledge. Over the last couple of days her creatinine has slowly gone up. Serum creatinine Baseline appears to be 0.6-0.8. The creatinine did increase to 1.16 yesterday and up further to 1.31 today. Creatinine at the time of surgery was 0. 7.she is on Lasix 20 mg IV twice daily and her chest x-ray does show evidence of pulmonary vascular congestion with basilar opacities and pleural effusions without appreciable change. .     Cardiac echo from 10/25/2021 showed LVEF estimated at 70% with mild LVH and grade 1 left ventricular diastolic dysfunction with mild MR and no pericardial effusion. Nursing comments today the patient's oral intake has been low. No maintenance IV fluids are running. Patient did have a fair amount of her breakfast today. She denies any significant swelling of the upper extremities but does have chronic lymphedema. Her swelling appears mild today. Urine output was only recorded at 40 mL during the overnight shift. Past medical history includes diabetes mellitus, fibromyalgia, lymphedema, myasthenia gravis, asthma and headache.   Past surgical history includes carpal tunnel release surgery, toe amputation and fracture surgery. Medications are reviewed. She has numerous allergies that are documented in the chart. Family history of coronary disease. There is also a family history of renal failure in her mother towards the end of her life. Her mother did require dialysis for a short time. Her mother did not have diabetes mellitus. Socially, she is a never smoker and does not use alcohol. No documented illicit drug use. She has postoperative chest discomfort which she rates about a 4/10 currently. Her only activity this morning is going from the bed to the chair to have breakfast.  She is not particularly short of breath. She is on 2 L of oxygen by nasal cannula. No nausea or vomiting. She states she had a bowel movement yesterday. She does state her lymphedema is better than it usually is likely secondary to patient positioning in a supine position for the last few days for the most part. The patient has had significant anemia during the admission and has received multiple blood transfusions.     Past Medical History:        Diagnosis Date    Asthma     Diabetes mellitus ([facility]     Fibromyalgia     Headache     Lymphedema of both lower extremities     Myasthenia gravis ([facility])        Past Surgical History:        Procedure Laterality Date    CARPAL TUNNEL RELEASE      CORONARY ARTERY BYPASS GRAFT N/A 11/2/2021    CABG CORONARY ARTERY BYPASS  LESLIE performed by Jules Koehler MD at [facility] N/A 11/3/2021    POST OP CHEST CLOSURE performed by Jules Koehler MD at [address] [place] [address] TOE AMPUTATION         Current Medications:    metoclopramide (REGLAN) tablet 10 mg, 4x Daily AC & HS  rosuvastatin (CRESTOR) tablet 40 mg, Nightly  melatonin tablet 3 mg, Nightly PRN  furosemide (LASIX) injection 20 mg, BID  predniSONE (DELTASONE) tablet 10 mg, Daily  sodium bicarbonate 8.4 % injection 50 mEq, Q30 Min PRN  potassium chloride 20 mEq/50 mL IVPB (Central Line), PRN  sodium chloride flush 0.9 % injection 10 mL, 2 times per day  sodium chloride flush 0.9 % injection 10 mL, PRN  0.9 % sodium chloride infusion, PRN  ondansetron (ZOFRAN) injection 4 mg, Q8H PRN  aspirin EC tablet 81 mg, Daily  acetaminophen (TYLENOL) tablet 650 mg, Q4H PRN  oxyCODONE-acetaminophen (PERCOCET) 5-325 MG per tablet 1 tablet, Q4H PRN   Or  oxyCODONE-acetaminophen (PERCOCET) 5-325 MG per tablet 2 tablet, Q4H PRN  fentaNYL (SUBLIMAZE) injection 25 mcg, Q1H PRN  amiodarone (CORDARONE) tablet 200 mg, TID  hydrALAZINE (APRESOLINE) injection 5 mg, Q5 Min PRN  metoprolol (LOPRESSOR) injection 2.5 mg, Q10 Min PRN  mupirocin (BACTROBAN) 2 % ointment, BID  sodium bicarbonate 8.4 % injection 50 mEq, Q30 Min PRN  diphenhydrAMINE (BENADRYL) tablet 25 mg, Nightly PRN  polyethylene glycol (GLYCOLAX) packet 17 g, Daily  bisacodyl (DULCOLAX) EC tablet 5 mg, Daily PRN  fleet rectal enema 1 enema, Daily PRN  metoprolol tartrate (LOPRESSOR) tablet 25 mg, BID  pantoprazole (PROTONIX) tablet 40 mg, Daily  ipratropium-albuterol (DUONEB) nebulizer solution 1 ampule, Q4H WA  albumin human 5 % IV solution 25 g, PRN  glucose (GLUTOSE) 40 % oral gel 15 g, PRN  dextrose 50 % IV solution, PRN  glucagon (rDNA) injection 1 mg, PRN  dextrose 5 % solution, PRN  albuterol (PROVENTIL) nebulizer solution 2.5 mg, Q4H PRN  sennosides-docusate sodium (SENOKOT-S) 8.6-50 MG tablet 1 tablet, BID  naloxone (NARCAN) injection 0.4 mg, PRN  calcium gluconate 1,000 mg in dextrose 5 % 100 mL IVPB, PRN   Or  calcium gluconate 2,000 mg in dextrose 5 % 100 mL IVPB, PRN   Or  calcium gluconate 3,000 mg in dextrose 5 % 100 mL IVPB, PRN   Or  calcium gluconate 4,000 mg in dextrose 5 % 100 mL IVPB, PRN  erythromycin (ROMYCIN) ophthalmic ointment, Nightly  benzonatate (TESSALON) capsule 100 mg, TID PRN  gabapentin (NEURONTIN) capsule 300 mg, TID  FLUoxetine (PROZAC) capsule 40 mg, QAM  insulin glargine (LANTUS) injection vial 56 Units, Daily   And  insulin lispro (HUMALOG) injection vial 10 Units, TID WC  [Held by provider] metFORMIN (GLUCOPHAGE) tablet 1,000 mg, BID WC  influenza quadrivalent split vaccine (FLUZONE;FLUARIX;FLULAVAL;AFLURIA) injection 0.5 mL, Prior to discharge        Allergies:  Amoxicillin, Amoxicillin-pot clavulanate, Atorvastatin, Cefuroxime axetil, Clavulanic acid, Codeine, Dextroamphetamine, Adhesive tape, and Cephalosporins    Social History:   Social History     Socioeconomic History    Marital status: Single     Spouse name: Not on file    Number of children: Not on file    Years of education: Not on file    Highest education level: Not on file   Occupational History    Not on file   Tobacco Use    Smoking status: Never Smoker    Smokeless tobacco: Never Used   Substance and Sexual Activity    Alcohol use: Not Currently    Drug use: Not Currently    Sexual activity: Not on file   Other Topics Concern    Not on file   Social History Narrative    Not on file     Social Determinants of Health     Financial Resource Strain:     Difficulty of Paying Living Expenses: Not on file   Food Insecurity:     Worried About Running Out of Food in the Last Year: Not on file    Mikala of Food in the Last Year: Not on file   Transportation Needs:     Lack of Transportation (Medical): Not on file    Lack of Transportation (Non-Medical):  Not on file   Physical Activity:     Days of Exercise per Week: Not on file    Minutes of Exercise per Session: Not on file   Stress:     Feeling of Stress : Not on file   Social Connections:     Frequency of Communication with Friends and Family: Not on file    Frequency of Social Gatherings with Friends and Family: Not on file    Attends [employer] Services: Not on file    Active Member of Clubs or Organizations: Not on file    Attends Club or Organization Meetings: Not on file    Marital Status: Not on file Intimate Partner Violence:     Fear of Current or Ex-Partner: Not on file    Emotionally Abused: Not on file    Physically Abused: Not on file    Sexually Abused: Not on file   Housing Stability:     Unable to Pay for Housing in the Last Year: Not on file    Number of [place] in the Last Year: Not on file    Unstable Housing in the Last Year: Not on file       Family History:   Family History   Problem Relation Age of Onset    Coronary Art Dis Mother     Kidney Disease Mother        Review of Systems:    Pertinent positives stated above in HPI. All other systems were reviewed and were negative.     Objective:  CURRENT TEMPERATURE:  Temp: 98.2 °F (36.8 °C)  MAXIMUM TEMPERATURE OVER 24HRS:  Temp (24hrs), Av.6 °F (36.4 °C), Min:96.8 °F (36 °C), Max:98.2 °F (36.8 °C)    CURRENT RESPIRATORY RATE:  Resp: 18  CURRENT PULSE:  Pulse: 65  CURRENT BLOOD PRESSURE:  BP: 109/61  24HR BLOOD PRESSURE RANGE:  Systolic (63MSY), QPH:343 , Min:99 , OYR:419   ; Diastolic (12QSB), AVZ:66, Min:48, Max:67    24HR INTAKE/OUTPUT:      Intake/Output Summary (Last 24 hours) at 2021 4157  Last data filed at 2021 0522  Gross per 24 hour   Intake --   Output 495 ml   Net -495 ml       Physical Exam:  General appearance:Awake, alert, in no acute distress  Skin: warm and dry, no rash or erythema  Eyes: conjunctivae pale and sclera anicteric  ENT: relatively moist mucous membranes, on 2 L of oxygen by nasal cannula   Neck: no obvious JVD, no accessory muscle use, midline trachea   Pulmonary: bilateral air entry with diminished breath sounds in the bases and some crackles over the lower lung fields  Cardiovascular: Regular rate and rhythm with positive S1 and S2 and no gallops, Abdomen: soft and not significantly tender and mildly distended with positive bowel sounds  Extremities: 1+ bilateral lower [place] edema with no cyanosis appreciated    Labs:   CBC:   Recent Labs     21  0500 21  0800 21  0505 WBC 16.4* 16.2* 20.9*   RBC 3.03* 3.62* 3.42*   HGB 7.8* 9.4* 8.7*   HCT 26.6* 31.6* 31.5*   MCV 87.8 87.3 92.1   MCH 25.7 26.0 25.4   MCHC 29.3 29.7 27.6*   RDW 16.0* 16.6* 17.0*   * 176 296   MPV 11.0 11.4 11.1      BMP:   Recent Labs     11/04/21  0500 11/05/21  0800 11/06/21  0505    142 140   K 4.6 4.2 4.6   * 110* 108*   CO2 19* 20 18*   BUN 16 22 29*   CREATININE 0.85 1.16* 1.31*   GLUCOSE 123* 174* 141*   CALCIUM 7.8* 7.6* 8.6        Phosphorus:  No results for input(s): PHOS in the last 72 hours. Magnesium:   Recent Labs     11/04/21  0500 11/05/21  0800 11/06/21  0505   MG 2.1 2.1 2.5     Albumin: No results for input(s): LABALBU in the last 72 hours. SPEP:   Lab Results   Component Value Date    PROT 5.3 11/03/2021     UPEP: No results found for: TPU     C3: No results found for: C3  C4: No results found for: C4  MPO ANCA:  No results found for: MPO . PR3 ANCA:  No results found for: PR3  Urine Sodium:  No results found for: MEGAN   Urine Potassium:  No results found for: KUR  Urine Chloride:  No components found for: CLU  Urine Ph:  No components found for: PO4U  Urine Osmolarity:  No results found for: OSMOU  Urine Creatinine:  No results found for: LABCREA  Urine Eosinophils: No components found for: EOSU  Urine Protein:  No results found for: TPU  Urinalysis:  U/A:   Lab Results   Component Value Date    NITRU NEGATIVE 11/02/2021    COLORU Yellow 11/02/2021    PHUR 6.0 11/02/2021    WBCUA None 11/02/2021    RBCUA 2 TO 5 11/02/2021    MUCUS NOT REPORTED 11/02/2021    TRICHOMONAS NOT REPORTED 11/02/2021    YEAST NOT REPORTED 11/02/2021    BACTERIA FEW 11/02/2021    SPECGRAV 1.010 11/02/2021    LEUKOCYTESUR NEGATIVE 11/02/2021    UROBILINOGEN Normal 11/02/2021    BILIRUBINUR NEGATIVE 11/02/2021    GLUCOSEU NEGATIVE 11/02/2021    [address] NEGATIVE 11/02/2021    AMORPHOUS NOT REPORTED 11/02/2021         Radiology:  Reviewed as available. Assessment:  1.  Type 2 diabetes mellitus with a history of retinopathy with essentially normal baseline creatinine of 0.6-0.7 range  2. Acute kidney injury in the postoperative state likely secondary to Toradol use intravenously times one in the setting of likely a degree of intravascular line depletion as the patient has been on twice daily IV Lasix to help with her diastolic CHF  3. A degree of diastolic CHF on chest x-ray and examination of the patient currently on 2 L of supplemental oxygen by nasal cannula   4. Status post CABG x2 on 11/2/2021 with subsequent delayed sternal wound closure  5. Chronic lymphedema       Plan:  1. Stay off any further nonsteroidal anti-inflammatory drug   2. Decrease dose of IV Lasix to once a day starting today with evaluation daily of the dosing of diuretic  3. Urinalysis along with a random urine protein to creatinine ratio  4. Renal ultrasound  5. Follow lab data as ordered  6. Follow intake and output    Thank you for the consultation. Please do not hesitate to call with questions.     Electronically signed by Florentino Hess MD on 11/6/2021 at 9:37 AM

## 2021-11-07 ENCOUNTER — APPOINTMENT (OUTPATIENT)
Dept: GENERAL RADIOLOGY | Age: 66
DRG: 233 | End: 2021-11-07
Payer: MEDICARE

## 2021-11-07 LAB
ANION GAP SERPL CALCULATED.3IONS-SCNC: 12 MMOL/L (ref 9–17)
BUN BLDV-MCNC: 28 MG/DL (ref 8–23)
BUN/CREAT BLD: 22 (ref 9–20)
CALCIUM SERPL-MCNC: 9.3 MG/DL (ref 8.6–10.4)
CHLORIDE BLD-SCNC: 103 MMOL/L (ref 98–107)
CO2: 23 MMOL/L (ref 20–31)
CREAT SERPL-MCNC: 1.28 MG/DL (ref 0.5–0.9)
GFR AFRICAN AMERICAN: 51 ML/MIN
GFR NON-AFRICAN AMERICAN: 42 ML/MIN
GFR SERPL CREATININE-BSD FRML MDRD: ABNORMAL ML/MIN/{1.73_M2}
GFR SERPL CREATININE-BSD FRML MDRD: ABNORMAL ML/MIN/{1.73_M2}
GLUCOSE BLD-MCNC: 131 MG/DL (ref 65–105)
GLUCOSE BLD-MCNC: 135 MG/DL (ref 65–105)
GLUCOSE BLD-MCNC: 146 MG/DL (ref 65–105)
GLUCOSE BLD-MCNC: 201 MG/DL (ref 65–105)
GLUCOSE BLD-MCNC: 250 MG/DL (ref 70–99)
HCT VFR BLD CALC: 28.9 % (ref 36.3–47.1)
HEMOGLOBIN: 8.5 G/DL (ref 11.9–15.1)
INR BLD: 1.3
MAGNESIUM: 2.4 MG/DL (ref 1.6–2.6)
MCH RBC QN AUTO: 25.7 PG (ref 25.2–33.5)
MCHC RBC AUTO-ENTMCNC: 29.4 G/DL (ref 28.4–34.8)
MCV RBC AUTO: 87.3 FL (ref 82.6–102.9)
NRBC AUTOMATED: 0.3 PER 100 WBC
PDW BLD-RTO: 16.3 % (ref 11.8–14.4)
PHOSPHORUS: 3.3 MG/DL (ref 2.6–4.5)
PLATELET # BLD: 301 K/UL (ref 138–453)
PMV BLD AUTO: 10.5 FL (ref 8.1–13.5)
POTASSIUM SERPL-SCNC: 4.5 MMOL/L (ref 3.7–5.3)
PROTHROMBIN TIME: 15.7 SEC (ref 11.5–14.2)
RBC # BLD: 3.31 M/UL (ref 3.95–5.11)
SODIUM BLD-SCNC: 138 MMOL/L (ref 135–144)
WBC # BLD: 14.6 K/UL (ref 3.5–11.3)

## 2021-11-07 PROCEDURE — 94640 AIRWAY INHALATION TREATMENT: CPT

## 2021-11-07 PROCEDURE — 82947 ASSAY GLUCOSE BLOOD QUANT: CPT

## 2021-11-07 PROCEDURE — 6370000000 HC RX 637 (ALT 250 FOR IP): Performed by: NURSE PRACTITIONER

## 2021-11-07 PROCEDURE — 85027 COMPLETE CBC AUTOMATED: CPT

## 2021-11-07 PROCEDURE — 94761 N-INVAS EAR/PLS OXIMETRY MLT: CPT

## 2021-11-07 PROCEDURE — 2580000003 HC RX 258: Performed by: NURSE PRACTITIONER

## 2021-11-07 PROCEDURE — 94669 MECHANICAL CHEST WALL OSCILL: CPT

## 2021-11-07 PROCEDURE — 80048 BASIC METABOLIC PNL TOTAL CA: CPT

## 2021-11-07 PROCEDURE — 2060000000 HC ICU INTERMEDIATE R&B

## 2021-11-07 PROCEDURE — 2700000000 HC OXYGEN THERAPY PER DAY

## 2021-11-07 PROCEDURE — 71045 X-RAY EXAM CHEST 1 VIEW: CPT

## 2021-11-07 PROCEDURE — 83735 ASSAY OF MAGNESIUM: CPT

## 2021-11-07 PROCEDURE — 6360000002 HC RX W HCPCS: Performed by: INTERNAL MEDICINE

## 2021-11-07 PROCEDURE — 6370000000 HC RX 637 (ALT 250 FOR IP): Performed by: STUDENT IN AN ORGANIZED HEALTH CARE EDUCATION/TRAINING PROGRAM

## 2021-11-07 PROCEDURE — 97110 THERAPEUTIC EXERCISES: CPT

## 2021-11-07 PROCEDURE — 36415 COLL VENOUS BLD VENIPUNCTURE: CPT

## 2021-11-07 PROCEDURE — 85610 PROTHROMBIN TIME: CPT

## 2021-11-07 PROCEDURE — 97530 THERAPEUTIC ACTIVITIES: CPT

## 2021-11-07 PROCEDURE — 99232 SBSQ HOSP IP/OBS MODERATE 35: CPT | Performed by: STUDENT IN AN ORGANIZED HEALTH CARE EDUCATION/TRAINING PROGRAM

## 2021-11-07 PROCEDURE — 84100 ASSAY OF PHOSPHORUS: CPT

## 2021-11-07 RX ORDER — INSULIN GLARGINE 100 [IU]/ML
56 INJECTION, SOLUTION SUBCUTANEOUS DAILY
Status: DISCONTINUED | OUTPATIENT
Start: 2021-11-07 | End: 2021-11-08

## 2021-11-07 RX ADMIN — METOCLOPRAMIDE 10 MG: 10 TABLET ORAL at 20:25

## 2021-11-07 RX ADMIN — DOCUSATE SODIUM 50MG AND SENNOSIDES 8.6MG 1 TABLET: 8.6; 5 TABLET, FILM COATED ORAL at 20:25

## 2021-11-07 RX ADMIN — AMIODARONE HYDROCHLORIDE 200 MG: 200 TABLET ORAL at 09:12

## 2021-11-07 RX ADMIN — OXYCODONE AND ACETAMINOPHEN 2 TABLET: 5; 325 TABLET ORAL at 07:58

## 2021-11-07 RX ADMIN — SODIUM CHLORIDE, PRESERVATIVE FREE 10 ML: 5 INJECTION INTRAVENOUS at 09:14

## 2021-11-07 RX ADMIN — IPRATROPIUM BROMIDE AND ALBUTEROL SULFATE 1 AMPULE: .5; 2.5 SOLUTION RESPIRATORY (INHALATION) at 15:05

## 2021-11-07 RX ADMIN — INSULIN LISPRO 15 UNITS: 100 INJECTION, SOLUTION INTRAVENOUS; SUBCUTANEOUS at 18:24

## 2021-11-07 RX ADMIN — METOCLOPRAMIDE 10 MG: 10 TABLET ORAL at 12:49

## 2021-11-07 RX ADMIN — INSULIN GLARGINE 55 UNITS: 100 INJECTION, SOLUTION SUBCUTANEOUS at 09:21

## 2021-11-07 RX ADMIN — IPRATROPIUM BROMIDE AND ALBUTEROL SULFATE 1 AMPULE: .5; 2.5 SOLUTION RESPIRATORY (INHALATION) at 07:27

## 2021-11-07 RX ADMIN — PREDNISONE 10 MG: 5 TABLET ORAL at 09:13

## 2021-11-07 RX ADMIN — ROSUVASTATIN CALCIUM 40 MG: 40 TABLET, FILM COATED ORAL at 20:25

## 2021-11-07 RX ADMIN — METOPROLOL TARTRATE 25 MG: 25 TABLET, FILM COATED ORAL at 09:13

## 2021-11-07 RX ADMIN — METOCLOPRAMIDE 10 MG: 10 TABLET ORAL at 06:19

## 2021-11-07 RX ADMIN — MUPIROCIN: 20 OINTMENT TOPICAL at 09:13

## 2021-11-07 RX ADMIN — IPRATROPIUM BROMIDE AND ALBUTEROL SULFATE 1 AMPULE: .5; 2.5 SOLUTION RESPIRATORY (INHALATION) at 11:31

## 2021-11-07 RX ADMIN — BISACODYL 5 MG: 5 TABLET, COATED ORAL at 09:12

## 2021-11-07 RX ADMIN — GABAPENTIN 300 MG: 300 CAPSULE ORAL at 20:25

## 2021-11-07 RX ADMIN — DOCUSATE SODIUM 50MG AND SENNOSIDES 8.6MG 1 TABLET: 8.6; 5 TABLET, FILM COATED ORAL at 09:12

## 2021-11-07 RX ADMIN — ASPIRIN 81 MG: 81 TABLET, COATED ORAL at 09:13

## 2021-11-07 RX ADMIN — ERYTHROMYCIN: 5 OINTMENT OPHTHALMIC at 20:24

## 2021-11-07 RX ADMIN — MUPIROCIN: 20 OINTMENT TOPICAL at 20:24

## 2021-11-07 RX ADMIN — INSULIN GLARGINE 45 UNITS: 100 INJECTION, SOLUTION SUBCUTANEOUS at 18:23

## 2021-11-07 RX ADMIN — OXYCODONE AND ACETAMINOPHEN 2 TABLET: 5; 325 TABLET ORAL at 02:26

## 2021-11-07 RX ADMIN — METOCLOPRAMIDE 10 MG: 10 TABLET ORAL at 18:24

## 2021-11-07 RX ADMIN — AMIODARONE HYDROCHLORIDE 200 MG: 200 TABLET ORAL at 15:09

## 2021-11-07 RX ADMIN — PANTOPRAZOLE SODIUM 40 MG: 40 TABLET, DELAYED RELEASE ORAL at 09:13

## 2021-11-07 RX ADMIN — IPRATROPIUM BROMIDE AND ALBUTEROL SULFATE 1 AMPULE: .5; 2.5 SOLUTION RESPIRATORY (INHALATION) at 19:06

## 2021-11-07 RX ADMIN — POLYETHYLENE GLYCOL 3350 17 G: 17 POWDER, FOR SOLUTION ORAL at 09:12

## 2021-11-07 RX ADMIN — GABAPENTIN 300 MG: 300 CAPSULE ORAL at 09:12

## 2021-11-07 RX ADMIN — FUROSEMIDE 20 MG: 10 INJECTION, SOLUTION INTRAMUSCULAR; INTRAVENOUS at 09:12

## 2021-11-07 RX ADMIN — AMIODARONE HYDROCHLORIDE 200 MG: 200 TABLET ORAL at 20:25

## 2021-11-07 RX ADMIN — SODIUM CHLORIDE, PRESERVATIVE FREE 10 ML: 5 INJECTION INTRAVENOUS at 20:24

## 2021-11-07 RX ADMIN — FLUOXETINE 40 MG: 20 CAPSULE ORAL at 09:13

## 2021-11-07 RX ADMIN — METOPROLOL TARTRATE 25 MG: 25 TABLET, FILM COATED ORAL at 20:25

## 2021-11-07 RX ADMIN — INSULIN LISPRO 15 UNITS: 100 INJECTION, SOLUTION INTRAVENOUS; SUBCUTANEOUS at 09:20

## 2021-11-07 RX ADMIN — GABAPENTIN 300 MG: 300 CAPSULE ORAL at 15:09

## 2021-11-07 ASSESSMENT — PAIN SCALES - GENERAL
PAINLEVEL_OUTOF10: 0
PAINLEVEL_OUTOF10: 0
PAINLEVEL_OUTOF10: 7
PAINLEVEL_OUTOF10: 0
PAINLEVEL_OUTOF10: 7
PAINLEVEL_OUTOF10: 6

## 2021-11-07 ASSESSMENT — PAIN DESCRIPTION - ORIENTATION: ORIENTATION: MID

## 2021-11-07 ASSESSMENT — PAIN DESCRIPTION - PAIN TYPE: TYPE: SURGICAL PAIN

## 2021-11-07 ASSESSMENT — PAIN DESCRIPTION - DESCRIPTORS: DESCRIPTORS: BURNING;ACHING

## 2021-11-07 ASSESSMENT — PAIN DESCRIPTION - LOCATION: LOCATION: INCISION

## 2021-11-07 ASSESSMENT — PAIN DESCRIPTION - ONSET: ONSET: ON-GOING

## 2021-11-07 ASSESSMENT — PAIN DESCRIPTION - FREQUENCY: FREQUENCY: CONTINUOUS

## 2021-11-07 ASSESSMENT — PAIN - FUNCTIONAL ASSESSMENT: PAIN_FUNCTIONAL_ASSESSMENT: PREVENTS OR INTERFERES SOME ACTIVE ACTIVITIES AND ADLS

## 2021-11-07 ASSESSMENT — PAIN DESCRIPTION - PROGRESSION: CLINICAL_PROGRESSION: NOT CHANGED

## 2021-11-07 NOTE — PROGRESS NOTES
Physical Therapy  Facility/Department: [facility] CVICU  Daily Treatment Note  NAME: Christian Duvall  : 1955  MRN: [id number]    Date of Service: 2021    Discharge Recommendations:  Patient would benefit from continued therapy after discharge     Pt currently functioning below baseline. Would suggest additional therapy at time of discharge to maximize long term outcomes and prevent re-admission. Please refer to AM-PAC score for current level of function. Assessment   Body structures, Functions, Activity limitations: Decreased functional mobility ; Decreased balance; Decreased cognition; Decreased ROM; Decreased strength; Decreased endurance; Increased pain; Decreased posture; Decreased sensation  Assessment: Patient is dependent x2 with yola lift for repositioning in recliner. Patient with decreased safety awareness and requries max x2 assist for all mobility tasks. Specific instructions for Next Treatment: Co-Tx  Prognosis: Good  Decision Making: High Complexity  Exam: ROM, MMT, functional mobility, activity tolerance, Balance, & [employer] AM-PAC 6 Clicks Basic Mobility  Clinical Presentation: unstable  PT Education: Goals; PT Role; Plan of Care; Functional Mobility Training; Precautions; Pressure Relief; Energy Conservation; Transfer Training; General Safety  Patient Education: Ed pt on functional mobility, safety awareness, importance of being up with progression of functional mobility  to regain strength, & prevention of sedentary complications, circulation ex's, pressure relief & optimal breathing techniques  Barriers to Learning: questionable cognition  REQUIRES PT FOLLOW UP: Yes  Activity Tolerance  Activity Tolerance: Patient limited by fatigue; Patient limited by endurance; Patient limited by pain  Activity Tolerance: Patient fatigues quickly and requires increased rest breaks throughout treatment this date. Patient requires max encouragement and vc's to promote motion initiation.      Patient Diagnosis(es): The primary encounter diagnosis was S/P CABG x 2. Diagnoses of Non-ST elevation MI (NSTEMI) ([facility] [address] Chronic bilateral low back pain without sciatica, Lymphedema of both lower extremities, and Acute on chronic congestive heart failure, unspecified heart failure type [facility]) were also pertinent to this visit. has a past medical history of Asthma, Diabetes mellitus ([facility] Fibromyalgia, Headache, Lymphedema of both lower extremities, and Myasthenia gravis ([facility] has a past surgical history that includes Toe amputation; Carpal tunnel release; fracture surgery; Sternum Debridement (N/A, 11/3/2021); and Coronary artery bypass graft (N/A, 11/2/2021). Restrictions  Restrictions/Precautions  Restrictions/Precautions: General Precautions, Fall Risk, Surgical Protocols  Required Braces or Orthoses?: Yes  Required Braces or Orthoses  Other: Heart Hugger Brace  Position Activity Restriction  Sternal Precautions: 5# Lifting Restrictions  Other position/activity restrictions: Up in chair, chest harness/surgical bra, strict sternal precautions, chest tubes, telemetry, bloom catheter, Prevana wound vac with dressing at sternal incision, BUE IV's,  bloom catheter, [email or web address]  Subjective   General  Chart Reviewed: Yes  Additional Pertinent Hx: CAD, DM, severe back pain, fibromyalgia, lymphedema BLE, spinal compression fracture  Response To Previous Treatment: Not applicable  Subjective  Subjective: Patient up in side chair upon therapists arrival this date  General Comment  Comments: LEANN cerda PT          Orientation  Orientation  Overall Orientation Status: Within Functional Limits  Cognition      Objective   Bed mobility  Bridging: Unable to assess  Comment: Patient up in recliner upon therapists arrival. At the conclusion of PT treatment LEANN Waller and therapist repositioned patient using yola lift as patient after 7 attempts at self repositioning was unable to scoot back into chair for safety.   Patient requires max vc's for all positioning techniques with poor to fair carry over  Transfers  Sit to Stand: Dependent/Total  Comment: Patient attempts sit to stand with MAX x2 for support and stability patient unable to clear chair. LEANN Waller assisted therapist in attempt this date. Patient worked on rocking anteriorly to promote transfer techniques x10 reps to break task down and work on individual tasks to progress transfer activity. Ambulation  Ambulation?: No        Exercises  Quad Sets: 10 x1 CINDI in long sitting in recliner  Gluteal Sets: 10 x1 CINDI in long sitting in recliner  Hip Abduction: 10 x1 CINDI in long sitting in recliner  Knee Long Arc Quad: 10 x1 CINDI in sitting in recliner  Ankle Pumps: 10 x1 CINDI in long sitting in recliner  Core Strengthening: 10 x1 engaging core musculature to promote control and stability and promote transfer activity  Comments: Patient educated on the importance of continued AROM to maximize joint congruency and maximize stability and support and promote proper posture to maximize therapeutic activities. Patient given printed out CINDI HEP of   Quad Sets: 10 x1 CINDI in supine  Gluteal Sets: 10 x1 CINDI in supine  Hip Abduction: 10 x1 CINDI supine  Ankle Pumps: 10 x1 CINDI in supine  Patient with good verbal understanding of supine HEP and good return on demo this date. AM-PAC Score  AM-PAC Inpatient Mobility Raw Score : 6 (11/07/21 1028)  AM-PAC Inpatient T-Scale Score : 23.55 (11/07/21 1028)  Mobility Inpatient CMS 0-100% Score: 100 (11/07/21 1028)  Mobility Inpatient CMS G-Code Modifier : CN (11/07/21 1028)          Goals  Short term goals  Time Frame for Short term goals: 12 visits:  Short term goal 1:  Inc bed mobility to [facility] term goal 2: Pt able to transfer from bed to chair with mod assist using safest device  Short term goal 3: Pt to tolerate sitting with unsupported trunk up to 20 minutes with UB/LB support to improve core stability & repositioning for pressure relief, prepare for standing,  & prevent sedentary complications  Short term goal 4: Pt able to tolerate 30-40 min of activity to include 15-20 reps of ex, NMR & functional mobility to facilitate activity tolerance to [facility]  Short term goal 5: Ed strengthening & balance ex program, activity guidelines, pressure relief, edema control of LE'S, energy conservation & optimal breathing techniques, & issue written pt education  Patient Goals   Patient goals : regain independence    Plan    Plan  Times per week: 1-2x/day, 6-7D/week  Specific instructions for Next Treatment: Co-Tx  Current Treatment Recommendations: Strengthening, ROM, Balance Training, Functional Mobility Training, Transfer Training, Endurance Training, Safety Education & Training, Home Exercise Program, Patient/Caregiver Education & Training  Safety Devices  Type of devices:  All fall risk precautions in place, Gait belt, Patient at risk for falls, Call light within reach, Nurse notified, Left in chair  Restraints  Initially in place: No     Therapy Time   Individual Concurrent Group Co-treatment   Time In 0920         Time Out 1014         Minutes [id number] [place], [place]

## 2021-11-07 NOTE — PROGRESS NOTES
Nephrology Progress Note      SUBJECTIVE       patient was seen in initial consultation yesterday for acute kidney injury likely secondary primarily to nonsteroidal anti-inflammatory drug use with IV Toradol x1 in the setting of diuretic use. Toradol has not been given again. Creatinine is a little bit better today from yesterday. The patient's IV Lasix dose was decreased to 20 mg a day. Chest x-ray result is pending today. Yesterday, the patient did have evidence of pulmonary vascular congestion. The patient is up in a chair today. She is on 2 L of oxygen by nasal cannula, same as yesterday. She does have some chronic cellulitis and lower extremity edema. She has postoperative day #5 status post CABG x2 from 2021. The patient had then had delaying that the sternal wound closure on 11/3/2021. Chest tubes removed yesterday. Appetite is fair. Urine output picked up significantly in the overnight shift likely after starting to show some improvement in renal function. The patient in 1 L of urine output in the last 8 hour shift. Labs today show sodium 138 with potassium 4.5, chloride 103 and CO2 23 with BUN 28 creatinine 1.28 down a bit from 1.32 yesterday. Calcium is 9.3 with phosphorus 3.3. Magnesium is 2.4. The patient's Lasix dose is 20 mg IV daily. No bowel movement yet today. The patient did state she had a bowel movement the last couple of days.         OBJECTIVE      CURRENT TEMPERATURE:  Temp: 96.8 °F (36 °C)  MAXIMUM TEMPERATURE OVER 24HRS:  Temp (24hrs), Av.5 °F (36.4 °C), Min:96.4 °F (35.8 °C), Max:98.5 °F (36.9 °C)    CURRENT RESPIRATORY RATE:  Resp: 14  CURRENT PULSE:  Pulse: 73  CURRENT BLOOD PRESSURE:  BP: (!) 151/63  24HR BLOOD PRESSURE RANGE:  Systolic (71LCI), FUV:458 , Min:115 , WSR:939   ; Diastolic (30MRL), AOE:43, Min:58, Max:131    24HR INTAKE/OUTPUT:      Intake/Output Summary (Last 24 hours) at 2021 0705  Last data filed at 2021 0400  Gross per 24 hour   Intake --   Output 1370 ml   Net -1370 ml       PHYSICAL EXAM      GENERAL APPEARANCE:Awake, alert, in no acute distress, on 2 L of oxygen by nasal cannula  SKIN: warm and dry, no rash or erythema  EYES: conjunctivae pale and sclera anicteric  ENT: no thrush, moist mucous membranes  NECK:  No apparent JVD   PULMONARY: bilateral air entry with basilar crackles bilaterally  CARDIOVASCULAR: Regular rate and rhythm with positive S1 and S2 and no rubs  ABDOMEN: Soft and not significant tenderness and actually distended with active bowel sounds  EXTREMITIES: 1+ bilateral lower extremity edema and chronic cellulitic changes will    CURRENT MEDICATIONS      furosemide (LASIX) injection 20 mg, Daily  metoclopramide (REGLAN) tablet 10 mg, 4x Daily AC & HS  rosuvastatin (CRESTOR) tablet 40 mg, Nightly  melatonin tablet 3 mg, Nightly PRN  predniSONE (DELTASONE) tablet 10 mg, Daily  sodium bicarbonate 8.4 % injection 50 mEq, Q30 Min PRN  potassium chloride 20 mEq/50 mL IVPB (Central Line), PRN  sodium chloride flush 0.9 % injection 10 mL, 2 times per day  sodium chloride flush 0.9 % injection 10 mL, PRN  0.9 % sodium chloride infusion, PRN  ondansetron (ZOFRAN) injection 4 mg, Q8H PRN  aspirin EC tablet 81 mg, Daily  acetaminophen (TYLENOL) tablet 650 mg, Q4H PRN  oxyCODONE-acetaminophen (PERCOCET) 5-325 MG per tablet 1 tablet, Q4H PRN   Or  oxyCODONE-acetaminophen (PERCOCET) 5-325 MG per tablet 2 tablet, Q4H PRN  fentaNYL (SUBLIMAZE) injection 25 mcg, Q1H PRN  amiodarone (CORDARONE) tablet 200 mg, TID  hydrALAZINE (APRESOLINE) injection 5 mg, Q5 Min PRN  metoprolol (LOPRESSOR) injection 2.5 mg, Q10 Min PRN  mupirocin (BACTROBAN) 2 % ointment, BID  diphenhydrAMINE (BENADRYL) tablet 25 mg, Nightly PRN  polyethylene glycol (GLYCOLAX) packet 17 g, Daily  bisacodyl (DULCOLAX) EC tablet 5 mg, Daily PRN  fleet rectal enema 1 enema, Daily PRN  metoprolol tartrate (LOPRESSOR) tablet 25 mg, BID  pantoprazole (PROTONIX) tablet 40 mg, Daily  ipratropium-albuterol (DUONEB) nebulizer solution 1 ampule, Q4H WA  albumin human 5 % IV solution 25 g, PRN  glucose (GLUTOSE) 40 % oral gel 15 g, PRN  dextrose 50 % IV solution, PRN  glucagon (rDNA) injection 1 mg, PRN  dextrose 5 % solution, PRN  albuterol (PROVENTIL) nebulizer solution 2.5 mg, Q4H PRN  sennosides-docusate sodium (SENOKOT-S) 8.6-50 MG tablet 1 tablet, BID  naloxone (NARCAN) injection 0.4 mg, PRN  calcium gluconate 1,000 mg in dextrose 5 % 100 mL IVPB, PRN   Or  calcium gluconate 2,000 mg in dextrose 5 % 100 mL IVPB, PRN   Or  calcium gluconate 3,000 mg in dextrose 5 % 100 mL IVPB, PRN   Or  calcium gluconate 4,000 mg in dextrose 5 % 100 mL IVPB, PRN  erythromycin (ROMYCIN) ophthalmic ointment, Nightly  benzonatate (TESSALON) capsule 100 mg, TID PRN  gabapentin (NEURONTIN) capsule 300 mg, TID  FLUoxetine (PROZAC) capsule 40 mg, QAM  insulin glargine (LANTUS) injection vial 56 Units, Daily   And  insulin lispro (HUMALOG) injection vial 10 Units, TID WC  [Held by provider] metFORMIN (GLUCOPHAGE) tablet 1,000 mg, BID WC  influenza quadrivalent split vaccine (FLUZONE;FLUARIX;FLULAVAL;AFLURIA) injection 0.5 mL, Prior to discharge          LABS      CBC:   Recent Labs     11/05/21  0800 11/06/21  0505 11/07/21  0334   WBC 16.2* 20.9* 14.6*   RBC 3.62* 3.42* 3.31*   HGB 9.4* 8.7* 8.5*   HCT 31.6* 31.5* 28.9*   MCV 87.3 92.1 87.3   MCH 26.0 25.4 25.7   MCHC 29.7 27.6* 29.4   RDW 16.6* 17.0* 16.3*    296 301   MPV 11.4 11.1 10.5      BMP:   Recent Labs     11/05/21  0800 11/06/21  0505 11/07/21  0334    140 138   K 4.2 4.6 4.5   * 108* 103   CO2 20 18* 23   BUN 22 29* 28*   CREATININE 1.16* 1.31* 1.28*   GLUCOSE 174* 141* 250*   CALCIUM 7.6* 8.6 9.3      BNP:No results found for: BNP  PHOSPHORUS:    Recent Labs     11/07/21  0334   PHOS 3.3     MAGNESIUM:   Recent Labs     11/05/21  0800 11/06/21  0505 11/07/21  0334   MG 2.1 2.5 2.4     ALBUMIN: No results for input(s): LABALBU in the last 72 hours. IRON:  No results found for: IRON  IRON SATURATION:  No results found for: LABIRON  TIBC:  No results found for: TIBC  FERRITIN:  No results found for: FERRITIN  VLAD: No results found for: VLAD    SPEP:   Lab Results   Component Value Date    PROT 5.3 11/03/2021     UPEP: No results found for: TPU   HEPBSAG:No results found for: HEPBSAG  HEPCAB:No results found for: HEPCAB  C3: No results found for: C3  C4: No results found for: C4  MPO ANCA: No results found for: MPO . PR3 ANCA:  No results found for: PR3  URINE SODIUM:  No results found for: MEGAN   URINE POTASSIUM:  No results found for: KUR  URINE CHLORIDE:  No components found for: CLU  URINE PH:  No components found for: PO4U  URINE OSMOLARITY:  No results found for: OSMOU  URINE CREATININE:    Lab Results   Component Value Date    LABCREA 42.3 11/06/2021     URINE EOSINOPHILS: No components found for: EOSU  URINE PROTEIN:  No results found for: TPU  URINALYSIS:  U/A:   Lab Results   Component Value Date    NITRU NEGATIVE 11/06/2021    COLORU Yellow 11/06/2021    PHUR 5.5 11/06/2021    WBCUA 2 TO 5 11/06/2021    RBCUA 2 TO 5 11/06/2021    MUCUS NOT REPORTED 11/06/2021    TRICHOMONAS NOT REPORTED 11/06/2021    YEAST FEW 11/06/2021    BACTERIA NOT REPORTED 11/06/2021    SPECGRAV 1.025 11/06/2021    LEUKOCYTESUR NEGATIVE 11/06/2021    UROBILINOGEN Normal 11/06/2021    BILIRUBINUR NEGATIVE 11/06/2021    GLUCOSEU NEGATIVE 11/06/2021    [address] NEGATIVE 11/06/2021    AMORPHOUS NOT REPORTED 11/06/2021     ANTIGBM:No results found for: GBMABIGG      RADIOLOGY      Reviewed as available. ASSESSMENT      1. Type 2 diabetes mellitus with a history of retinopathy and essentially normal baseline creatinine of 0.6-0.7 MG/DL  2.  Acute kidney injury in the postoperative state likely secondary to Toradol use intravenously times one in the setting of loop diuretic use which likely resulted in some intravascular volume depletion with the Toradol was given. Lasix dose was decreased to daily IV, and there is some slight improvement in renal function this morning with an improvement in urine output overnight  3. A degree of diastolic CHF on chest x-ray and on exam the patient currently on 2 L of supplemental oxygen by nasal cannula  4. Status post CABG x2 on 11/2/2021 with subsequent delayed sternal wound closure on 11/3/2021  5. Chronic lymphedema    PLAN      1. Stable IV Lasix dose at 20 mg once daily   2. Monitor intake and output, anticipate continued excellent urine output  3. Avoid any further nonsteroidal anti-inflammatory drug  4. Await renal ultrasound that was ordered  5. Monitor lab data as ordered      Please do not hesitate to call with questions.     Electronically signed by Danielle North MD on 11/7/2021 at 7:05 AM

## 2021-11-07 NOTE — PROGRESS NOTES
[facility]  Office: [address], DO, Santiago Crocker, DO, Albin Mayelin, DO, Alpahortencia Rolon Blood, DO, Sheela Ruiz MD, Srinath Moeller MD, Claudia Cabral MD, Alla Castro MD, Aleja Gardner MD, Octavia De Souza MD, Ashley Avendano MD, Judith Kunz MD, Nica Andres DO, Frank Simmonds, DO, Hunter Garsia MD,  Arlin Sanders DO, Susan Hwang MD, Sanket Raza MD, Nivia Peralta MD, Maricel Velázquez MD, Marciano Bernheim, MD, Jose Hassan MD, Yumi Gaston, [facility], Darlyn Bentley, CNP, Reid Alas, CNS, Blake Weiss, CNP, Jodie Gallegos, CNP, Darling Greer, CNP, Lele Marino, CNP, Rosaura Valerio, CNP, Aneudy Kitchen, CNP, Maryanne Shannon PA-C, Elena Wood, [facility], Cindy Early, CNP, Carmen Waggoner, CNP, Woodward Brittle, CNP, Priscilla Tellez, CNP, Austen Bar, CNP, Sandi Patterson, CNP, Eugene Braham, [facility]    Progress Note    11/7/2021    7:17 AM    Name:   Carmel Covarrubias  MRN:     [id number]     [place]:      [de-identified]   Room:   2028/2028-01   Day:  14  Admit Date:  10/24/2021  6:48 AM    PCP:   Priscilla Tellez MD  Code Status:  Full Code    Subjective:     C/C:   Chief Complaint   Patient presents with    Back Pain     Interval History Status: improved. Patient was seen and examined at bedside this AM. She reports feeling well and is without complaint other than mild discomfort along sternal wound. Chest tube removed yesterday. Brief History:     Patient is a 61-year-old female who presented to the emergency department on 10/24/2021 complaining of back and chest pain. The patient was admitted to internal medicine for further management of NSTEMI. Ischemic workup was remarkable for multivessel disease. Cardiothoracic surgery was consulted and performed a CABG on 11/2.      Review of Systems:     Constitutional:  negative for chills, fevers, sweats  Respiratory:  negative for cough, dyspnea on exertion, shortness of breath, wheezing  Cardiovascular:  Positive for pain along sternal incision   Gastrointestinal:  negative for abdominal pain, constipation, diarrhea, nausea, vomiting  Neurological:  negative for dizziness, headache    Medications: Allergies:     Allergies   Allergen Reactions    Amoxicillin Diarrhea and Other (See Comments)    Amoxicillin-Pot Clavulanate Diarrhea    Atorvastatin Other (See Comments)     Other reaction(s): Myalgia  Weakness      Cefuroxime Axetil Other (See Comments)    Clavulanic Acid Diarrhea and Other (See Comments)    Codeine Other (See Comments)     Unless suspended in syrup      Dextroamphetamine     Adhesive Tape Rash     Itching     Cephalosporins Rash       Current Meds:   Scheduled Meds:    insulin lispro  15 Units SubCUTAneous TID WC    And    insulin glargine  56 Units SubCUTAneous Daily    furosemide  20 mg IntraVENous Daily    metoclopramide  10 mg Oral 4x Daily AC & HS    rosuvastatin  40 mg Oral Nightly    predniSONE  10 mg Oral Daily    sodium chloride flush  10 mL IntraVENous 2 times per day    aspirin  81 mg Oral Daily    amiodarone  200 mg Oral TID    mupirocin   Nasal BID    polyethylene glycol  17 g Oral Daily    metoprolol tartrate  25 mg Oral BID    pantoprazole  40 mg Oral Daily    ipratropium-albuterol  1 ampule Inhalation Q4H WA    sennosides-docusate sodium  1 tablet Oral BID    erythromycin   Both Eyes Nightly    gabapentin  300 mg Oral TID    FLUoxetine  40 mg Oral QAM    [Held by provider] metFORMIN  1,000 mg Oral BID WC    influenza virus vaccine  0.5 mL IntraMUSCular Prior to discharge     Continuous Infusions:    sodium chloride      dextrose       PRN Meds: melatonin, sodium bicarbonate, potassium chloride, sodium chloride flush, sodium chloride, ondansetron, acetaminophen, oxyCODONE-acetaminophen **OR** oxyCODONE-acetaminophen, fentanNYL **OR** [DISCONTINUED] fentanNYL, hydrALAZINE, metoprolol, diphenhydrAMINE, bisacodyl, fleet, albumin human, glucose, dextrose, glucagon (rDNA), dextrose, albuterol, naloxone, calcium gluconate **OR** calcium gluconate **OR** calcium gluconate **OR** calcium gluconate, benzonatate    Data:     Past Medical History:   has a past medical history of Asthma, Diabetes mellitus ([facility] Fibromyalgia, Headache, Lymphedema of both lower extremities, and Myasthenia gravis ([facility] [address] Social History:   reports that she has never smoked. She has never used smokeless tobacco. She reports previous alcohol use. She reports previous drug use. Family History:   Family History   Problem Relation Age of Onset    Coronary Art Dis Mother     Kidney Disease Mother        Vitals:  BP (!) 151/63   Pulse 73   Temp 96.8 °F (36 °C) (Temporal)   Resp 14   Ht 5' 5\" (1.651 m)   Wt 184 lb (83.5 kg)   SpO2 94%   BMI 30.62 kg/m²   Temp (24hrs), Av.5 °F (36.4 °C), Min:96.4 °F (35.8 °C), Max:98.5 °F (36.9 °C)    Recent Labs     21  0718 21  1113 21  1607 21  1856   POCGLU 117* 135* 174* 200*       I/O (24Hr):     Intake/Output Summary (Last 24 hours) at 2021 0717  Last data filed at 2021 0400  Gross per 24 hour   Intake --   Output 1370 ml   Net -1370 ml       Labs:  Hematology:  Recent Labs     21  0800 21  0505 21  0334   WBC 16.2* 20.9* 14.6*   RBC 3.62* 3.42* 3.31*   HGB 9.4* 8.7* 8.5*   HCT 31.6* 31.5* 28.9*   MCV 87.3 92.1 87.3   MCH 26.0 25.4 25.7   MCHC 29.7 27.6* 29.4   RDW 16.6* 17.0* 16.3*    296 301   MPV 11.4 11.1 10.5   INR 1.4 1.3 1.3     Chemistry:  Recent Labs     21  0800 21  0505 21  0334    140 138   K 4.2 4.6 4.5   * 108* 103   CO2 20 18* 23   GLUCOSE 174* 141* 250*   BUN 22 29* 28*   CREATININE 1.16* 1.31* 1.28*   MG 2.1 2.5 2.4   ANIONGAP 12 14 12   LABGLOM 47* 41* 42*   GFRAA 57* 50* 51*   CALCIUM 7.6* 8.6 9.3   PHOS  --   --  3.3     Recent Labs     21  1636 21  0718 21  1113 11/06/21  1607 11/06/21  1856   POCGLU 164* 144* 117* 135* 174* 200*     ABG:  Lab Results   Component Value Date    POCPH 7.284 11/04/2021    POCPCO2 42.6 11/04/2021    POCPO2 87.1 11/04/2021    POCHCO3 20.2 11/04/2021    NBEA 6 11/04/2021    PBEA NOT REPORTED 11/04/2021    XCO5IWP NOT REPORTED 11/04/2021    WADH0LBK 95 11/04/2021    FIO2 40.0 11/04/2021     Lab Results   Component Value Date/Time    SPECIAL NOT REPORTED 10/28/2021 11:30 AM     Lab Results   Component Value Date/Time    CULTURE KLEBSIELLA PNEUMONIAE >714104 CFU/ML (A) 10/28/2021 11:30 AM    CULTURE ENTEROCOCCUS FAECALIS >433837 CFU/ML (A) 10/28/2021 11:30 AM       Radiology:  XR CHEST PORTABLE    Result Date: 11/5/2021  Interval removal of endotracheal and enteric tubes. Shallow inflation with findings of vascular congestion, subsegmental atelectasis and probable small effusions again demonstrated. No new airspace disease or evidence for pneumothorax. XR CHEST PORTABLE    Result Date: 11/4/2021  1. Support lines and tubes are relatively stable in position. 2.  Increased vascular congestion and bibasilar opacities, likely pleural effusion with atelectasis. XR CHEST PORTABLE    Result Date: 11/3/2021  1. New changes of median sternotomy. 2. Persistence of at least small right and trace left pleural effusions with underlying bibasilar passive atelectasis. Superimposed pneumonia and aspiration are not excluded on the left. 3. Pulmonary vascular congestion with questionable perihilar edema, potentially congestive heart failure given recent heart surgery, the above pleural effusions, and suspected mild cardiomegaly. XR CHEST PORTABLE    Result Date: 11/3/2021  Stable postop findings with low lung volumes, bibasilar atelectasis and small effusions, greater on the left. XR CHEST PORTABLE    Result Date: 11/2/2021  Small bilateral pleural effusions and bibasilar atelectasis.        Physical Examination:        General appearance:  alert, cooperative and no distress  Mental Status:  oriented to person, place and time and normal affect  Lungs:  clear to auscultation bilaterally, normal effort  Heart:  Sternal incision present. Chest tube present.    Abdomen:  soft, nontender, nondistended, normal bowel sounds, no masses, hepatomegaly, splenomegaly  Extremities:  1+ edema in bilateral lower extremities   Skin:  no gross lesions, rashes, induration    Assessment:        Hospital Problems           Last Modified POA    * (Principal) NSTEMI (non-ST elevated myocardial infarction) ([address] 11/3/2021 Yes    Type 2 diabetes mellitus with hyperglycemia, without long-term current use of insulin ([address] 10/24/2021 Yes    Class 1 obesity due to excess calories with serious comorbidity and body mass index (BMI) of 33.0 to 33.9 in adult 10/24/2021 Yes    Lymphedema of both lower extremities 10/24/2021 Yes    Acute on chronic diastolic congestive heart failure ([address] 10/26/2021 Yes    Pathological fracture of thoracic vertebra due to secondary osteoporosis ([address] 10/26/2021 Yes    Pathological fracture of lumbar vertebra due to secondary osteoporosis ([address] 10/26/2021 Yes    Intractable back pain 10/26/2021 Yes    Age-related osteoporosis with current pathological fracture 10/26/2021 Yes    History of kyphoplasty 10/26/2021 Yes    Facet arthritis, degenerative, lumbar spine 10/26/2021 Yes    Degenerative spondylolisthesis 10/26/2021 Yes    DDD (degenerative disc disease), thoracolumbar 10/26/2021 Yes    Acute on chronic congestive heart failure ([address] 10/27/2021 Yes    Chronic bilateral low back pain without sciatica 10/27/2021 Yes    Allergic conjunctivitis of both eyes 10/31/2021 Yes    CAD in native artery 11/3/2021 Yes    S/P CABG x 2 (Chronic) 11/4/2021 Yes    Overview Signed 11/4/2021  6:16 AM by CYNDY Wills - CNP     SLIMA to LAD and RSVG1 to large high Diag, OM exploration (not graftable)                Plan:        CAD s/p CABG   -Management per cardiothoracic surgery   -POD #5  -Continue amiodarone   -Continue aspirin     NAKITA   -Creatinine improving   -Nephrology following   -Decrease Lasix to 20 mg daily   -Retroperitoneal ultrasound pending     DM2   -Continue Lantus 55 units in AM and 45 units with dinner   -Increase Humalog to 15 units tid     HFpEF   -Not in acute exacerbation   -Lasix decreased to 20 mg daily     HTN   -Continue metoprolol 25 mg bid     HLD  -Continue rosuvastatin 40 mg daily     Depression   -Continue home fluoxetine 40 mg daily     Hx of multiple sclerosis   -S/P stress-dose steroids   -Continue home prednisone 10 mg daily     Obesity 2/2 excessive caloric intake   -Will  on dietary modifications when appropriate     Magno Moyer MD  11/7/2021  7:17 AM

## 2021-11-07 NOTE — PROGRESS NOTES
Section of Cardiology  Progress Note      Date:  11/7/2021  Patient: Carmel Covarrubias  Admission:  10/24/2021  6:48 AM  Admit DX: NSTEMI (non-ST elevated myocardial infarction) ([facility] [I21.4]  Non-ST elevation MI (NSTEMI) ([facility]) [I21.4]  Lymphedema of both lower extremities [I89.0]  Chronic bilateral low back pain without sciatica [M54.50, G89.29]  Acute on chronic congestive heart failure, unspecified heart failure type ([facility] [I50.9]  CAD in native artery [I25.10]  Age:  61 y.o., 1955     LOS: 14 days     Reason for evaluation:   NSTEMI and CAD      SUBJECTIVE:     The patient was seen and examined. Notes and labs reviewed. Has maintained sinus rhythm. Was sitting in a chair when I saw her. Denied angina but she has sternal incision discomfort. Breathing is okay. OBJECTIVE:      EXAM:   Vitals:    VITALS:  BP (!) 149/72   Pulse 76   Temp 97 °F (36.1 °C) (Temporal)   Resp 20   Ht 5' 5\" (1.651 m)   Wt 184 lb (83.5 kg)   SpO2 100%   BMI 30.62 kg/m²   24HR INTAKE/OUTPUT:      Intake/Output Summary (Last 24 hours) at 11/7/2021 1050  Last data filed at 11/7/2021 0400  Gross per 24 hour   Intake --   Output 1370 ml   Net -1370 ml       CONSTITUTIONAL: Alert, awake, no signs of acute distress  HEENT: No JVD  LUNGS: Clear  CARDIOVASCULAR:  regular rate and rhythm, normal S1 and S2, no S3 or S4, and no rub noted. SKIN: Warm and dry.   EXTREMITIES: Mild leg edema bilaterally   current Inpatient Medications:   insulin lispro  15 Units SubCUTAneous TID WC    And    insulin glargine  56 Units SubCUTAneous Daily    furosemide  20 mg IntraVENous Daily    metoclopramide  10 mg Oral 4x Daily AC & HS    rosuvastatin  40 mg Oral Nightly    predniSONE  10 mg Oral Daily    sodium chloride flush  10 mL IntraVENous 2 times per day    aspirin  81 mg Oral Daily    amiodarone  200 mg Oral TID    mupirocin   Nasal BID    polyethylene glycol  17 g Oral Daily    metoprolol tartrate  25 mg Oral BID    pantoprazole 40 mg Oral Daily    ipratropium-albuterol  1 ampule Inhalation Q4H WA    sennosides-docusate sodium  1 tablet Oral BID    erythromycin   Both Eyes Nightly    gabapentin  300 mg Oral TID    FLUoxetine  40 mg Oral QAM    [Held by provider] metFORMIN  1,000 mg Oral BID     influenza virus vaccine  0.5 mL IntraMUSCular Prior to discharge       IV Infusions (if any):   sodium chloride      dextrose         Diagnostics:   Telemetry: Sinus rhythm and stable    Labs:   CBC:  Recent Labs     11/06/21  0505 11/07/21  0334   WBC 20.9* 14.6*   HGB 8.7* 8.5*   HCT 31.5* 28.9*    301     Magnesium:  Recent Labs     11/06/21  0505 11/07/21  0334   MG 2.5 2.4     BMP:  Recent Labs     11/06/21  0505 11/07/21  0334    138   K 4.6 4.5   CALCIUM 8.6 9.3   CO2 18* 23   BUN 29* 28*   CREATININE 1.31* 1.28*   LABGLOM 41* 42*   GLUCOSE 141* 250*     BNP:  No results for input(s): BNP, PROBNP in the last 72 hours. PT/INR:  Recent Labs     11/06/21  0505 11/07/21 0334   PROTIME 16.2* 15.7*   INR 1.3 1.3     APTT:  No results for input(s): APTT in the last 72 hours. CARDIAC ENZYMES:No results for input(s): MYOGLOBIN, CKTOTAL, CKMB, CKMBINDEX, TROPHS, TROPONINT in the last 72 hours. FASTING LIPID PANEL:  Lab Results   Component Value Date    HDL 40 10/25/2021    TRIG 107 10/25/2021     LIVER PROFILE:  No results for input(s): AST, ALT, LABALBU, ALKPHOS, BILITOT, BILIDIR, IBILI, PROT, GLOB, ALBUMIN in the last 72 hours. ASSESSMENT:    · CAD with multivessel disease status post CABG x2 with LIMA to LAD and SVG to diagonal, OM exploration was not graftable 11/2/2021 and status post delayed sternal closure 11/3/2021, wound VAC remains in place-has incisional discomfort but no angina.     · Borderline troponin elevation with possible non-STEMI-preserved LV systolic function on echocardiogram done 10/25/2021  · Diabetes, managed by others  · Lymphedema and nonhealing wounds, managed by others  · Peripheral vascular

## 2021-11-07 NOTE — PROGRESS NOTES
Pulmonary Critical Care Progress Note       Patient seen for the follow up of  NSTEMI (non-ST elevated myocardial infarction) ([address]     Subjective:  She has been requiring oxygen at 2 L nasal cannula. Chest tubes removed. She is sitting in the chair. She tolerated oral intake. She denies pain at this point. No increase in shortness of breath. She denies smoking history. She was successfully extubated on 11/4/2021. Examination:  Vitals: BP (!) 110/53   Pulse 77   Temp 96.8 °F (36 °C) (Temporal)   Resp 21   Ht 5' 5\" (1.651 m)   Wt 184 lb (83.5 kg)   SpO2 91%   BMI 30.62 kg/m²   General appearance: alert and cooperative with exam, up in the bedside chair  Neck: No JVD  Lungs: Moderate air exchan decreased breath sounds right chest tube in place  Heart: regular rate and rhythm, S1, S2 normal, no gallop  Abdomen: Soft, non tender, + BS  Extremities: no cyanosis or clubbing.+ edema    LABs:  CBC:   Recent Labs     11/06/21  0505 11/07/21 0334   WBC 20.9* 14.6*   HGB 8.7* 8.5*   HCT 31.5* 28.9*    301     BMP:   Recent Labs     11/06/21  0505 11/07/21 0334    138   K 4.6 4.5   CO2 18* 23   BUN 29* 28*   CREATININE 1.31* 1.28*   LABGLOM 41* 42*   GLUCOSE 141* 250*     PT/INR:   Recent Labs     11/06/21  0505 11/07/21 0334   PROTIME 16.2* 15.7*   INR 1.3 1.3     ABG:  Lab Results   Component Value Date    VED4DMJ NOT REPORTED 11/04/2021    FIO2 40.0 11/04/2021       Lab Results   Component Value Date    POCPH 7.284 11/04/2021    POCPCO2 42.6 11/04/2021    POCPO2 87.1 11/04/2021    POCHCO3 20.2 11/04/2021    NBEA 6 11/04/2021    PBEA NOT REPORTED 11/04/2021    CCK2GLO NOT REPORTED 11/04/2021    PFHK9QRN 95 11/04/2021    FIO2 40.0 11/04/2021     Radiology:  11/7/2021  Suboptimal positioning degrades this exam.  No appreciable change in   bilateral airspace disease and effusions.          Impression:  · Acute hypoxic respiratory failure  · CAD s/p CABG 1/3/2021, successfully extubated 11/4/2021  · Asthma  · Myasthenia gravis  · Suspected obstructive sleep apnea/Obesity  · Pulmonary edema/small bilateral pleural effusions/atelectasis    Recommendations:  · Oxygen via nasal cannula, keep SPO2 90% or greater  · BiPAP support while asleep if necessary  · Incentive spirometry every hour while awake, encourage  · Home oxygen evaluation prior to discharge  · Albuterol and Ipratropium Q 4 hours and prn  · Cipro for UTI  · CT surgery following  · Diet as tolerated  · Continue diuresis with IV Lasix 20 mg twice daily  · Monitor renal function/nephrology on consult  · Physical therapy bedside  · PFTs as an outpatient  · Sleep apnea evaluation as an outpatient  · Discussed with family at bedside  · DVT prophylaxis    Electronically signed by     Kings Martin MD on 11/7/2021 at 5:24 PM  Pulmonary Critical Care and Sleep Medicine,  [facility] AT [place]: [phone]

## 2021-11-07 NOTE — PLAN OF CARE
Problem: Falls - Risk of:  Goal: Will remain free from falls  Description: Will remain free from falls  11/7/2021 0120 by Ravin Chance RN  Outcome: Ongoing    Goal: Absence of physical injury  Description: Absence of physical injury  11/7/2021 0120 by Ravin Chance RN  Outcome: Ongoing    Problem: Discharge Planning:  Goal: Discharged to appropriate level of care  Description: Discharged to appropriate level of care  11/7/2021 0120 by Ravin Chance RN  Outcome: Ongoing    Problem: Cardiac Output - Decreased:  Goal: Hemodynamic stability will improve  Description: Hemodynamic stability will improve  11/7/2021 0120 by Ravin Chance RN  Outcome: Ongoing  Goal: Cardiac output within specified parameters  Description: Cardiac output within specified parameters  11/7/2021 0120 by Ravin Chance RN  Outcome: Ongoing    Problem: Pain:  Goal: Pain level will decrease  Description: Pain level will decrease  11/7/2021 0120 by Ravin Chance RN  Outcome: Ongoing  Goal: Control of acute pain  Description: Control of acute pain  11/7/2021 0120 by Ravin Chance RN  Outcome: Ongoing  Goal: Control of chronic pain  Description: Control of chronic pain  11/7/2021 0120 by Ravin Chance RN  Outcome: Ongoing    Problem: Tissue Perfusion - Cardiopulmonary, Altered:  Goal: Hemodynamic stability will improve  Description: Hemodynamic stability will improve  11/7/2021 0120 by Ravin Chance RN  Outcome: Ongoing  Goal: Circulatory function within specified parameters  Description: Circulatory function within specified parameters  11/7/2021 0120 by Ravin Chance RN  Outcome: Ongoing  Goal: Absence of angina  Description: Absence of angina  11/7/2021 0120 by Ravin Chance RN  Outcome: Ongoing  Goal: Will show no evidence of cardiac arrhythmias  Description: Will show no evidence of cardiac arrhythmias  11/7/2021 0120 by Ravin Chance RN  Outcome: Ongoing    Problem: Skin Integrity:  Goal: Will show no infection signs and symptoms  Description: Will show no infection signs and symptoms  11/7/2021 0120 by Andressa Smith RN  Outcome: Ongoing    Goal: Absence of new skin breakdown  Description: Absence of new skin breakdown  11/7/2021 0120 by Andressa Smith RN  Outcome: Ongoing    Problem: Pain:  Goal: Pain level will decrease  Description: Pain level will decrease  11/7/2021 0120 by Andressa Smith RN  Outcome: Ongoing    Problem: Nutrition  Goal: Optimal nutrition therapy  11/7/2021 0120 by Andressa Smith RN  Outcome: Ongoing    Problem:  Activity Intolerance:  Goal: Able to perform prescribed physical activity  Description: Able to perform prescribed physical activity  11/7/2021 0120 by Andressa Smith RN  Outcome: Ongoing    Goal: Ability to tolerate increased activity will improve  Description: Ability to tolerate increased activity will improve  11/7/2021 0120 by Andressa Smith RN  Outcome: Ongoing    Problem: Anxiety:  Goal: Level of anxiety will decrease  Description: Level of anxiety will decrease  11/7/2021 0120 by Andressa Smith RN  Outcome: Ongoing    Problem: Fluid Volume - Imbalance:  Goal: Ability to achieve a balanced intake and output will improve  Description: Ability to achieve a balanced intake and output will improve  11/7/2021 0120 by Andressa Smith RN  Outcome: Ongoing  Goal: Chest tube drainage is within specified parameters  Description: Chest tube drainage is within specified parameters  11/7/2021 0120 by Andressa Smith RN  Outcome: Ongoing    Problem: Gas Exchange - Impaired:  Goal: Levels of oxygenation will improve  Description: Levels of oxygenation will improve  11/7/2021 0120 by Andressa Smith RN  Outcome: Ongoing

## 2021-11-07 NOTE — RT PROTOCOL NOTE
RT Inhaler-Nebulizer Bronchodilator Protocol Note    There is a bronchodilator order in the chart from a provider indicating to follow the RT Bronchodilator Protocol and there is an Initiate RT Inhaler-Nebulizer Bronchodilator Protocol order as well (see protocol at bottom of note). CXR Findings:  XR CHEST PORTABLE    Result Date: 11/7/2021  Suboptimal positioning degrades this exam.  No appreciable change in bilateral airspace disease and effusions. XR CHEST PORTABLE    Result Date: 11/6/2021  No significant interval change. The findings from the last RT Protocol Assessment were as follows:   History Pulmonary Disease: Chronic pulmonary disease  Respiratory Pattern: Mild dyspnea at rest, irregular pattern, or RR 21-25 bpm  Breath Sounds: Slightly diminished and/or crackles  Cough: Unable to generate effective cough  Indication for Bronchodilator Therapy: Decreased or absent breath sounds  Bronchodilator Assessment Score: 12    Aerosolized bronchodilator medication orders have been revised according to the RT Inhaler-Nebulizer Bronchodilator Protocol below. Respiratory Therapist to perform RT Therapy Protocol Assessment initially then follow the protocol. Repeat RT Therapy Protocol Assessment PRN for score 0-3 or on second treatment, BID, and PRN for scores above 3. No Indications - adjust the frequency to every 6 hours PRN wheezing or bronchospasm, if no treatments needed after 48 hours then discontinue using Per Protocol order mode. If indication present, adjust the RT bronchodilator orders based on the Bronchodilator Assessment Score as indicated below.   Use Inhaler orders unless patient has one or more of the following: on home nebulizer, not able to hold breath for 10 seconds, is not alert and oriented, cannot activate and use MDI correctly, or respiratory rate 25 breaths per minute or more, then use the equivalent nebulizer order(s) with same Frequency and PRN reasons based on the score.  If a patient is on this medication at home then do not decrease Frequency below that used at home. 0-3 - enter or revise RT bronchodilator order(s) to equivalent RT Bronchodilator order with Frequency of every 4 hours PRN for wheezing or increased work of breathing using Per Protocol order mode. 4-6 - enter or revise RT Bronchodilator order(s) to two equivalent RT bronchodilator orders with one order with BID Frequency and one order with Frequency of every 4 hours PRN wheezing or increased work of breathing using Per Protocol order mode. 7-10 - enter or revise RT Bronchodilator order(s) to two equivalent RT bronchodilator orders with one order with TID Frequency and one order with Frequency of every 4 hours PRN wheezing or increased work of breathing using Per Protocol order mode. 11-13 - enter or revise RT Bronchodilator order(s) to one equivalent RT bronchodilator order with QID Frequency and an Albuterol order with Frequency of every 4 hours PRN wheezing or increased work of breathing using Per Protocol order mode. Greater than 13 - enter or revise RT Bronchodilator order(s) to one equivalent RT bronchodilator order with every 4 hours Frequency and an Albuterol order with Frequency of every 2 hours PRN wheezing or increased work of breathing using Per Protocol order mode. RT to enter RT Home Evaluation for COPD & MDI Assessment order using Per Protocol order mode.     Electronically signed by Kristen Smith RCP on 11/7/2021 at 9:32 AM

## 2021-11-08 ENCOUNTER — APPOINTMENT (OUTPATIENT)
Dept: ULTRASOUND IMAGING | Age: 66
DRG: 233 | End: 2021-11-08
Payer: MEDICARE

## 2021-11-08 ENCOUNTER — APPOINTMENT (OUTPATIENT)
Dept: GENERAL RADIOLOGY | Age: 66
DRG: 233 | End: 2021-11-08
Payer: MEDICARE

## 2021-11-08 LAB
ANION GAP SERPL CALCULATED.3IONS-SCNC: 11 MMOL/L (ref 9–17)
BUN BLDV-MCNC: 25 MG/DL (ref 8–23)
BUN/CREAT BLD: 20 (ref 9–20)
CALCIUM SERPL-MCNC: 9.7 MG/DL (ref 8.6–10.4)
CHLORIDE BLD-SCNC: 104 MMOL/L (ref 98–107)
CO2: 26 MMOL/L (ref 20–31)
CREAT SERPL-MCNC: 1.25 MG/DL (ref 0.5–0.9)
GFR AFRICAN AMERICAN: 52 ML/MIN
GFR NON-AFRICAN AMERICAN: 43 ML/MIN
GFR SERPL CREATININE-BSD FRML MDRD: ABNORMAL ML/MIN/{1.73_M2}
GFR SERPL CREATININE-BSD FRML MDRD: ABNORMAL ML/MIN/{1.73_M2}
GLUCOSE BLD-MCNC: 110 MG/DL (ref 70–99)
GLUCOSE BLD-MCNC: 120 MG/DL (ref 65–105)
GLUCOSE BLD-MCNC: 65 MG/DL (ref 65–105)
GLUCOSE BLD-MCNC: 65 MG/DL (ref 65–105)
GLUCOSE BLD-MCNC: 80 MG/DL (ref 65–105)
GLUCOSE BLD-MCNC: 85 MG/DL (ref 65–105)
GLUCOSE BLD-MCNC: 90 MG/DL (ref 65–105)
HCT VFR BLD CALC: 30 % (ref 36.3–47.1)
HEMOGLOBIN: 8.7 G/DL (ref 11.9–15.1)
INR BLD: 1.2
MAGNESIUM: 2.3 MG/DL (ref 1.6–2.6)
MCH RBC QN AUTO: 25.4 PG (ref 25.2–33.5)
MCHC RBC AUTO-ENTMCNC: 29 G/DL (ref 28.4–34.8)
MCV RBC AUTO: 87.5 FL (ref 82.6–102.9)
NRBC AUTOMATED: 0.3 PER 100 WBC
PDW BLD-RTO: 16.3 % (ref 11.8–14.4)
PLATELET # BLD: 343 K/UL (ref 138–453)
PMV BLD AUTO: 10.1 FL (ref 8.1–13.5)
POTASSIUM SERPL-SCNC: 4.3 MMOL/L (ref 3.7–5.3)
PROTHROMBIN TIME: 14.9 SEC (ref 11.5–14.2)
RBC # BLD: 3.43 M/UL (ref 3.95–5.11)
SODIUM BLD-SCNC: 141 MMOL/L (ref 135–144)
WBC # BLD: 14.6 K/UL (ref 3.5–11.3)

## 2021-11-08 PROCEDURE — 2580000003 HC RX 258: Performed by: NURSE PRACTITIONER

## 2021-11-08 PROCEDURE — 6370000000 HC RX 637 (ALT 250 FOR IP): Performed by: NURSE PRACTITIONER

## 2021-11-08 PROCEDURE — 99024 POSTOP FOLLOW-UP VISIT: CPT | Performed by: NURSE PRACTITIONER

## 2021-11-08 PROCEDURE — 97112 NEUROMUSCULAR REEDUCATION: CPT

## 2021-11-08 PROCEDURE — 94640 AIRWAY INHALATION TREATMENT: CPT

## 2021-11-08 PROCEDURE — 76770 US EXAM ABDO BACK WALL COMP: CPT

## 2021-11-08 PROCEDURE — 2700000000 HC OXYGEN THERAPY PER DAY

## 2021-11-08 PROCEDURE — 80048 BASIC METABOLIC PNL TOTAL CA: CPT

## 2021-11-08 PROCEDURE — 36415 COLL VENOUS BLD VENIPUNCTURE: CPT

## 2021-11-08 PROCEDURE — 6370000000 HC RX 637 (ALT 250 FOR IP): Performed by: STUDENT IN AN ORGANIZED HEALTH CARE EDUCATION/TRAINING PROGRAM

## 2021-11-08 PROCEDURE — 94669 MECHANICAL CHEST WALL OSCILL: CPT

## 2021-11-08 PROCEDURE — 97530 THERAPEUTIC ACTIVITIES: CPT

## 2021-11-08 PROCEDURE — 6360000002 HC RX W HCPCS: Performed by: INTERNAL MEDICINE

## 2021-11-08 PROCEDURE — 97535 SELF CARE MNGMENT TRAINING: CPT

## 2021-11-08 PROCEDURE — 82947 ASSAY GLUCOSE BLOOD QUANT: CPT

## 2021-11-08 PROCEDURE — 85610 PROTHROMBIN TIME: CPT

## 2021-11-08 PROCEDURE — 83735 ASSAY OF MAGNESIUM: CPT

## 2021-11-08 PROCEDURE — 94761 N-INVAS EAR/PLS OXIMETRY MLT: CPT

## 2021-11-08 PROCEDURE — 71045 X-RAY EXAM CHEST 1 VIEW: CPT

## 2021-11-08 PROCEDURE — 2060000000 HC ICU INTERMEDIATE R&B

## 2021-11-08 PROCEDURE — 85027 COMPLETE CBC AUTOMATED: CPT

## 2021-11-08 PROCEDURE — APPSS30 APP SPLIT SHARED TIME 16-30 MINUTES: Performed by: NURSE PRACTITIONER

## 2021-11-08 PROCEDURE — 99232 SBSQ HOSP IP/OBS MODERATE 35: CPT | Performed by: STUDENT IN AN ORGANIZED HEALTH CARE EDUCATION/TRAINING PROGRAM

## 2021-11-08 RX ORDER — TORSEMIDE 20 MG/1
20 TABLET ORAL DAILY
Status: DISCONTINUED | OUTPATIENT
Start: 2021-11-08 | End: 2021-11-10 | Stop reason: HOSPADM

## 2021-11-08 RX ORDER — FUROSEMIDE 20 MG/1
20 TABLET ORAL DAILY
Status: DISCONTINUED | OUTPATIENT
Start: 2021-11-09 | End: 2021-11-08

## 2021-11-08 RX ORDER — INSULIN GLARGINE 100 [IU]/ML
45 INJECTION, SOLUTION SUBCUTANEOUS DAILY
Status: DISCONTINUED | OUTPATIENT
Start: 2021-11-09 | End: 2021-11-09

## 2021-11-08 RX ADMIN — FUROSEMIDE 20 MG: 10 INJECTION, SOLUTION INTRAMUSCULAR; INTRAVENOUS at 09:00

## 2021-11-08 RX ADMIN — DOCUSATE SODIUM 50MG AND SENNOSIDES 8.6MG 1 TABLET: 8.6; 5 TABLET, FILM COATED ORAL at 08:59

## 2021-11-08 RX ADMIN — METOPROLOL TARTRATE 25 MG: 25 TABLET, FILM COATED ORAL at 08:56

## 2021-11-08 RX ADMIN — METOCLOPRAMIDE 10 MG: 10 TABLET ORAL at 20:42

## 2021-11-08 RX ADMIN — IPRATROPIUM BROMIDE AND ALBUTEROL SULFATE 1 AMPULE: .5; 2.5 SOLUTION RESPIRATORY (INHALATION) at 07:27

## 2021-11-08 RX ADMIN — GABAPENTIN 300 MG: 300 CAPSULE ORAL at 08:57

## 2021-11-08 RX ADMIN — SODIUM CHLORIDE, PRESERVATIVE FREE 10 ML: 5 INJECTION INTRAVENOUS at 20:42

## 2021-11-08 RX ADMIN — OXYCODONE AND ACETAMINOPHEN 2 TABLET: 5; 325 TABLET ORAL at 07:57

## 2021-11-08 RX ADMIN — ASPIRIN 81 MG: 81 TABLET, COATED ORAL at 08:59

## 2021-11-08 RX ADMIN — FLUOXETINE 40 MG: 20 CAPSULE ORAL at 08:59

## 2021-11-08 RX ADMIN — DOCUSATE SODIUM 50MG AND SENNOSIDES 8.6MG 1 TABLET: 8.6; 5 TABLET, FILM COATED ORAL at 20:43

## 2021-11-08 RX ADMIN — IPRATROPIUM BROMIDE AND ALBUTEROL SULFATE 1 AMPULE: .5; 2.5 SOLUTION RESPIRATORY (INHALATION) at 11:19

## 2021-11-08 RX ADMIN — METOPROLOL TARTRATE 25 MG: 25 TABLET, FILM COATED ORAL at 20:43

## 2021-11-08 RX ADMIN — OXYCODONE AND ACETAMINOPHEN 1 TABLET: 5; 325 TABLET ORAL at 00:09

## 2021-11-08 RX ADMIN — AMIODARONE HYDROCHLORIDE 200 MG: 200 TABLET ORAL at 08:59

## 2021-11-08 RX ADMIN — GABAPENTIN 300 MG: 300 CAPSULE ORAL at 14:19

## 2021-11-08 RX ADMIN — ROSUVASTATIN CALCIUM 40 MG: 40 TABLET, FILM COATED ORAL at 20:43

## 2021-11-08 RX ADMIN — ERYTHROMYCIN: 5 OINTMENT OPHTHALMIC at 20:42

## 2021-11-08 RX ADMIN — SODIUM CHLORIDE, PRESERVATIVE FREE 10 ML: 5 INJECTION INTRAVENOUS at 09:01

## 2021-11-08 RX ADMIN — BISACODYL 5 MG: 5 TABLET, COATED ORAL at 11:48

## 2021-11-08 RX ADMIN — IPRATROPIUM BROMIDE AND ALBUTEROL SULFATE 1 AMPULE: .5; 2.5 SOLUTION RESPIRATORY (INHALATION) at 19:02

## 2021-11-08 RX ADMIN — AMIODARONE HYDROCHLORIDE 200 MG: 200 TABLET ORAL at 20:43

## 2021-11-08 RX ADMIN — GABAPENTIN 300 MG: 300 CAPSULE ORAL at 20:43

## 2021-11-08 RX ADMIN — METOCLOPRAMIDE 10 MG: 10 TABLET ORAL at 16:31

## 2021-11-08 RX ADMIN — INSULIN GLARGINE 56 UNITS: 100 INJECTION, SOLUTION SUBCUTANEOUS at 09:06

## 2021-11-08 RX ADMIN — IPRATROPIUM BROMIDE AND ALBUTEROL SULFATE 1 AMPULE: .5; 2.5 SOLUTION RESPIRATORY (INHALATION) at 14:44

## 2021-11-08 RX ADMIN — PREDNISONE 10 MG: 5 TABLET ORAL at 08:57

## 2021-11-08 RX ADMIN — POLYETHYLENE GLYCOL 3350 17 G: 17 POWDER, FOR SOLUTION ORAL at 08:59

## 2021-11-08 RX ADMIN — AMIODARONE HYDROCHLORIDE 200 MG: 200 TABLET ORAL at 14:19

## 2021-11-08 RX ADMIN — METOCLOPRAMIDE 10 MG: 10 TABLET ORAL at 06:12

## 2021-11-08 RX ADMIN — METOCLOPRAMIDE 10 MG: 10 TABLET ORAL at 11:48

## 2021-11-08 RX ADMIN — PANTOPRAZOLE SODIUM 40 MG: 40 TABLET, DELAYED RELEASE ORAL at 08:57

## 2021-11-08 RX ADMIN — INSULIN LISPRO 15 UNITS: 100 INJECTION, SOLUTION INTRAVENOUS; SUBCUTANEOUS at 09:07

## 2021-11-08 ASSESSMENT — PAIN SCALES - GENERAL
PAINLEVEL_OUTOF10: 0
PAINLEVEL_OUTOF10: 4
PAINLEVEL_OUTOF10: 7
PAINLEVEL_OUTOF10: 4
PAINLEVEL_OUTOF10: 2
PAINLEVEL_OUTOF10: 4
PAINLEVEL_OUTOF10: 0
PAINLEVEL_OUTOF10: 7
PAINLEVEL_OUTOF10: 6
PAINLEVEL_OUTOF10: 0
PAINLEVEL_OUTOF10: 0
PAINLEVEL_OUTOF10: 7

## 2021-11-08 ASSESSMENT — PAIN DESCRIPTION - LOCATION
LOCATION: MEDIASTINUM
LOCATION: MEDIASTINUM

## 2021-11-08 ASSESSMENT — PAIN DESCRIPTION - DESCRIPTORS
DESCRIPTORS: DISCOMFORT
DESCRIPTORS: ACHING

## 2021-11-08 ASSESSMENT — PAIN DESCRIPTION - FREQUENCY
FREQUENCY: INTERMITTENT
FREQUENCY: INTERMITTENT

## 2021-11-08 ASSESSMENT — PAIN DESCRIPTION - ONSET
ONSET: GRADUAL
ONSET: ON-GOING

## 2021-11-08 ASSESSMENT — PAIN DESCRIPTION - ORIENTATION: ORIENTATION: MID

## 2021-11-08 ASSESSMENT — PAIN - FUNCTIONAL ASSESSMENT: PAIN_FUNCTIONAL_ASSESSMENT: PREVENTS OR INTERFERES SOME ACTIVE ACTIVITIES AND ADLS

## 2021-11-08 ASSESSMENT — PAIN DESCRIPTION - PAIN TYPE
TYPE: SURGICAL PAIN
TYPE: SURGICAL PAIN

## 2021-11-08 NOTE — PROGRESS NOTES
Occupational Therapy  Facility/Department: [facility]  Daily Treatment Note  NAME: Stone Rodriguez  : 1955  MRN: [id number]    Date of Service: 2021    Discharge Recommendations:  Patient would benefit from continued therapy after discharge       Assessment   Performance deficits / Impairments: Decreased functional mobility ; Decreased safe awareness; Decreased balance; Decreased coordination; Decreased ADL status; Decreased strength; Decreased sensation; Decreased fine motor control; Decreased endurance; Decreased high-level IADLs; Decreased ROM; Decreased cognition; Decreased vision/visual deficit; Decreased posture  Assessment: Pt. completed marciano stedy transfer from EOB with heavy max assist x2 with constant verbal cues to participate and stay focused on task. Pt. completed bed mobility to sit up on EOB with max assist x2 and tolerated sitting upright with mod assist x1. Pt. limited by poor endurance and decreased strength. Skilled OT is warranted to promote I/safety to return pt. to prior living arrangement with assist as needed. Prognosis: Fair  OT Education: OT Role; Precautions; ADL Adaptive Strategies; Transfer Training; Orientation; Equipment  Patient Education: Pt. educated on proper positioning while sitting EOB with proper posture control, marciano stedy tech to insure safety and positioning. Activity Tolerance  Activity Tolerance: Patient limited by fatigue; Patient limited by pain; Treatment limited secondary to decreased cognition  Activity Tolerance: Poor activity tolerance with constant cues to participate and stay forcused on task at hand. Safety Devices  Safety Devices in place: Yes  Type of devices: All fall risk precautions in place; Call light within reach; Chair alarm in place; Left in chair; Nurse notified; Gait belt         Patient Diagnosis(es): The primary encounter diagnosis was S/P CABG x 2.  Diagnoses of Non-ST elevation MI (NSTEMI) ([facility] [address] Chronic bilateral low back pain without sciatica, Lymphedema of both lower extremities, and Acute on chronic congestive heart failure, unspecified heart failure type [facility]) were also pertinent to this visit. has a past medical history of Asthma, Diabetes mellitus ([facility] [address] Fibromyalgia, Headache, Lymphedema of both lower extremities, and Myasthenia gravis ([facility] [address] has a past surgical history that includes Toe amputation; Carpal tunnel release; fracture surgery; Sternum Debridement (N/A, 11/3/2021); and Coronary artery bypass graft (N/A, 11/2/2021). Restrictions  Restrictions/Precautions  Restrictions/Precautions: General Precautions, Fall Risk, Surgical Protocols  Required Braces or Orthoses?: No  Required Braces or Orthoses  Other: Heart Hugger Brace  Position Activity Restriction  Sternal Precautions: 5# Lifting Restrictions  Other position/activity restrictions: Up in chair, chest harness/surgical bra, strict sternal precautions, chest tubes, telemetry, bloom catheter, Prevana wound vac with dressing at sternal incision, BUE IV's,  bloom catheter, [email or web address]  Subjective   General  Chart Reviewed: Yes  Patient assessed for rehabilitation services?: Yes  Additional Pertinent Hx: Pt. agreeable for treatment  Response to previous treatment: Patient with no complaints from previous session  Family / Caregiver Present: No  Subjective  Subjective: Pt. in bed upon arrival to room and agreeable to get up in chair. Orientation  Orientation  Overall Orientation Status: Impaired  Orientation Level: Oriented to place;  Disoriented to situation; Oriented to person; Oriented to situation; Disoriented to time  Objective             Balance  Sitting Balance: Maximum assistance  Standing Balance: Dependent/Total (Heavy max assist x2 to complete marciano stedy transfer)  Standing Balance  Time: stand to marciano stedy <1 min  Activity: transfer from bed to recliner  Bed mobility  Bridging: Dependent/Total  Rolling to Left: Dependent/Total  Rolling to Right: Dependent/Total  Supine to Sit: Maximum assistance; 2 Person assistance (max assist x2 to insure safety)  Scooting: Dependent/Total  Comment: Pt. sat EOB with max assist x2 for safety. One sitting pt tolerated sitting 4-5 min with mod assist x1. Transfers  Stand Step Transfers: 2 Person assistance; Maximum assistance (Heavy max assist x2 to stand from EOB with marciano steady.)  Sit to stand: 2 Person assistance; Dependent/Total; Maximum assistance  Stand to sit: Maximum assistance; 2 Person assistance; Dependent/Total  Transfer Comments: Pt. completed marciano stedy transfer with max assist x2 with constant cues for participation and proper posture to hold self upright. Cognition  Overall Cognitive Status: Exceptions  Arousal/Alertness: Delayed responses to stimuli  Following Commands: Follows one step commands with increased time; Follows one step commands with repetition; Inconsistently follows commands  Attention Span: Difficulty attending to directions;  Attends with cues to redirect  Memory: Decreased short term memory; Decreased long term memory  Safety Judgement: Decreased awareness of need for assistance; Decreased awareness of need for safety  Problem Solving: Assistance required to identify errors made; Assistance required to implement solutions; Assistance required to correct errors made; Assistance required to generate solutions; Decreased awareness of errors  Insights: Not aware of deficits  Initiation: Requires cues for all  Sequencing: Requires cues for all  Cognition Comment: Pt. pleasant but required multiple cues to complete each task     Perception  Overall Perceptual Status: Impaired        Plan   Plan  Times per week: 5x/week 1x/day as ksenia  Times per day: Daily  Current Treatment Recommendations: Strengthening, ROM, Balance Training, Safety Education & Training, Positioning, Endurance Training, Neuromuscular Re-education, Patient/Caregiver Education & Training, Equipment Evaluation, Education, & procurement, Self-Care / ADL, Cognitive/Perceptual Training, Home Management Training, Pain Management, Cognitive Reorientation  Plan Comment: Cont with stated POC    AM-PAC Score        AM-PAC Inpatient Daily Activity Raw Score: 8 (11/08/21 1144)  AM-PAC Inpatient ADL T-Scale Score : 22.86 (11/08/21 1144)  ADL Inpatient CMS 0-100% Score: 85.69 (11/08/21 1144)  ADL Inpatient CMS G-Code Modifier : CM (11/08/21 1144)    Goals  Short term goals  Time Frame for Short term goals: by discharge, pt to demo  Short term goal 1: bed mob tasks with use of rail as needed to mod assist of 2. Short term goal 2: UB ADL to min assist/set up and LB ADL to max assist of 1 supine in bed as needed and with use of bed rail/AE. Short term goal 3: postural control EOB or in recliner to min assist/CG and ksenia > 15 mins to increase core strength/reduce falls in function. Short term goal 4: ADL transfers with lift/AD as needed to max assist of 2. Short term goal 5: participate in BUE Ther Ex/Act as able for 15 mins to increase UE functional use for ADL tasks. Long term goals  Time Frame for Long term goals : Short term goal 6: Pt to increase self feeding to SBA with use of AE/built up foam and with adaptations as needed. Long term goal 1: Pt to stand with AD and min assist ksenia > 5 mins as able to reduce fall risk with ADL and functional tasks. Long term goal 2: Caregivers to be I with edema mgt, cardiac BUE HEP, proper positioning, pressure relief, EC/WS and fall prevention tech as well as DME/AE and AD recommendations with use of handouts. Patient Goals   Patient goals : Pt states she wants to be able to transfer on her own! Therapy Time   Co-treat Concurrent Group Co-treatment   Time In 1022         Time Out 1045         Minutes 23              Co-treatment with PT warranted secondary to decreased safety and independence requiring 2 skilled therapy professionals to address individual discipline's goals.  OT addressing :\"preparation for ADL transfer\",\"sitting balance for increased ADL performance\",\"sitting/activity tolerance\",\"functional reaching\",\"environmental safety/scanning\",\"fall prevention\",\"functional mobility for ADL transfers\",\"ability to sequence and follow directions\",\"functional UE strength\".     Katy Lopez CAMRYN

## 2021-11-08 NOTE — PLAN OF CARE
Problem: Falls - Risk of:  Goal: Will remain free from falls  Description: Will remain free from falls  11/8/2021 1305 by Lambert Tobin RN  Outcome: Met This Shift  Pt repositioned with chidi, working with PT/OT  Problem: Falls - Risk of:  Goal: Absence of physical injury  Description: Absence of physical injury  11/8/2021 1305 by Lambert Tobin RN  Outcome: Met This Shift   No falls this shift, Patient is a fall risk during this admission. Fall risk assessment was performed. Patient is absent of falls. Bed is in the lowest position. Wheels on the bed are locked. Call light and bed side table are within reach. Clutter is removed. Patient was educated to call out when needing assistance. Patient offered toileting assistance during rounding. Hourly rounds have been performed.     Problem: Cardiac Output - Decreased:  Goal: Hemodynamic stability will improve  Description: Hemodynamic stability will improve  11/8/2021 1305 by Lambert Tobin RN  Outcome: Met This Shift  Vital signs stable  Problem: Tissue Perfusion - Cardiopulmonary, Altered:  Goal: Hemodynamic stability will improve  Description: Hemodynamic stability will improve  11/8/2021 1305 by Lambert Tobin RN  Outcome: Met This Shift    Problem: Anxiety:  Goal: Level of anxiety will decrease  Description: Level of anxiety will decrease  11/8/2021 1305 by Lambert Tobin RN  Outcome: Met This Shift  Pt is calm  Problem: Discharge Planning:  Goal: Discharged to appropriate level of care  Description: Discharged to appropriate level of care  11/8/2021 1305 by Lambert Tobin RN  Outcome: Ongoing  Awaiting precert  Problem: Pain:  Goal: Pain level will decrease  Description: Pain level will decrease  11/8/2021 1305 by Lambert Tobin RN  Outcome: Ongoing     Pt is tolerating prn medication  Problem: Skin Integrity:  Goal: Will show no infection signs and symptoms  Description: Will show no infection signs and symptoms  11/8/2021 1305 by Lambert Tobin RN  Outcome: Ongoing    Problem: Nutrition  Goal: Optimal nutrition therapy  11/8/2021 1305 by Kimberly Chavez RN  Outcome: Ongoing  Problem:  Activity Intolerance:  Goal: Able to perform prescribed physical activity  Description: Able to perform prescribed physical activity  11/8/2021 1305 by Kimberly Chavez RN  Outcome: Ongoing    Problem: Fluid Volume - Imbalance:  Goal: Ability to achieve a balanced intake and output will improve  Description: Ability to achieve a balanced intake and output will improve  11/8/2021 1305 by Kimberly Chavez RN  Outcome: Ongoing  Pt is incouraged to eat and drink, I and O monitored  Problem: Gas Exchange - Impaired:  Goal: Levels of oxygenation will improve  Description: Levels of oxygenation will improve  11/8/2021 1305 by Kimberly Chavez RN  Outcome: Ongoing    Monitoring pulse ox, incouraged to deep breathe/cough, perform incentive spirometer every hour

## 2021-11-08 NOTE — PROGRESS NOTES
Pt given scheduled Lantus and scheduled Humalog at 0800. Humalog held at 1200 Glucose- 80, and 1700 Glucose -65 .  Dr. Jason Howe, and MD placed new orders for  Humalog and Lantus adjusted

## 2021-11-08 NOTE — PROGRESS NOTES
[facility]  Office: [address], DO, Maryjane Nyhan, DO, Olivia Reardon, DO, Mathew Gino Blood, DO, Zeke Willard MD, Katelyn Ariza MD, Darrin Vasquez MD, Akbar Chaidez MD, Fozia Abdullahi MD, Carlos Bhatti MD, Kaiden Jo MD, Radha Aldridge MD, Zeb Wheeler, DO, Cate Briones, DO, Delbert Manuel MD,  Jeff Torres DO, Thomas Parish MD, Sal Salazar MD, Tarsha Drummond MD, Pauline Burkett MD, Ashley Anderson MD, Sorin Hilton MD, Gilberto Rizvi, [facility], [facility], CNP, Sabi Soler, CNP, Micheal Dye, CNS, Nicola Vann, CNP, Tatianna Mayo, CNP, Braydon Baker, CNP, Mabel Vázquez, CNP, Herman Ortiz, CNP, Sveta Blackwood, CNP, Adrienne Sin PA-C, Gill Sheth, [facility], Delonte Stearns, CNP, Jameson Worthington, CNP, Anand More, CNP, Shelby Ballesteros, CNP, Kelley Mast, CNP, Juliet Crawley, CNP, Franca Cruz [facility]    Progress Note    11/8/2021    7:11 AM    Name:   Terri Palacio  MRN:     [id number]     [place]:      [de-identified]   Room:   2028/2028-01   Day:  15  Admit Date:  10/24/2021  6:48 AM    PCP:   Shelby Ballesteros MD  Code Status:  Full Code    Subjective:     C/C:   Chief Complaint   Patient presents with    Back Pain     Interval History Status: improved. Patient was seen and examined at bedside this AM. She reports feeling well and is without complaint. Denies fever/chills, nausea/vomiting, shortness of breath or chest pain. Awaiting placement. Brief History:     Patient is a 62-year-old female who presented to the emergency department on 10/24/2021 complaining of back and chest pain. The patient was admitted to internal medicine for further management of NSTEMI. Ischemic workup was remarkable for multivessel disease. Cardiothoracic surgery was consulted and performed a CABG on 11/2.      Review of Systems:     Constitutional:  negative for chills, fevers, sweats  Respiratory:  negative for cough, dyspnea on exertion, shortness of breath, wheezing  Cardiovascular:  Negative for chest pain. Gastrointestinal:  negative for abdominal pain, constipation, diarrhea, nausea, vomiting  Neurological:  negative for dizziness, headache    Medications: Allergies:     Allergies   Allergen Reactions    Amoxicillin Diarrhea and Other (See Comments)    Amoxicillin-Pot Clavulanate Diarrhea    Atorvastatin Other (See Comments)     Other reaction(s): Myalgia  Weakness      Cefuroxime Axetil Other (See Comments)    Clavulanic Acid Diarrhea and Other (See Comments)    Codeine Other (See Comments)     Unless suspended in syrup      Dextroamphetamine     Adhesive Tape Rash     Itching     Cephalosporins Rash       Current Meds:   Scheduled Meds:    insulin lispro  15 Units SubCUTAneous TID WC    And    insulin glargine  56 Units SubCUTAneous Daily    furosemide  20 mg IntraVENous Daily    metoclopramide  10 mg Oral 4x Daily AC & HS    rosuvastatin  40 mg Oral Nightly    predniSONE  10 mg Oral Daily    sodium chloride flush  10 mL IntraVENous 2 times per day    aspirin  81 mg Oral Daily    amiodarone  200 mg Oral TID    mupirocin   Nasal BID    polyethylene glycol  17 g Oral Daily    metoprolol tartrate  25 mg Oral BID    pantoprazole  40 mg Oral Daily    ipratropium-albuterol  1 ampule Inhalation Q4H WA    sennosides-docusate sodium  1 tablet Oral BID    erythromycin   Both Eyes Nightly    gabapentin  300 mg Oral TID    FLUoxetine  40 mg Oral QAM    [Held by provider] metFORMIN  1,000 mg Oral BID     influenza virus vaccine  0.5 mL IntraMUSCular Prior to discharge     Continuous Infusions:    sodium chloride      dextrose       PRN Meds: melatonin, sodium bicarbonate, potassium chloride, sodium chloride flush, sodium chloride, ondansetron, acetaminophen, oxyCODONE-acetaminophen **OR** oxyCODONE-acetaminophen, fentanNYL **OR** [DISCONTINUED] fentanNYL, hydrALAZINE, metoprolol, diphenhydrAMINE, bisacodyl, fleet, albumin human, glucose, dextrose, glucagon (rDNA), dextrose, albuterol, naloxone, calcium gluconate **OR** calcium gluconate **OR** calcium gluconate **OR** calcium gluconate, benzonatate    Data:     Past Medical History:   has a past medical history of Asthma, Diabetes mellitus ([facility] Fibromyalgia, Headache, Lymphedema of both lower extremities, and Myasthenia gravis ([facility] [address] Social History:   reports that she has never smoked. She has never used smokeless tobacco. She reports previous alcohol use. She reports previous drug use. Family History:   Family History   Problem Relation Age of Onset    Coronary Art Dis Mother     Kidney Disease Mother        Vitals:  BP (!) 146/72   Pulse 71   Temp 97 °F (36.1 °C) (Oral)   Resp 19   Ht 5' 5\" (1.651 m)   Wt 184 lb (83.5 kg)   SpO2 93%   BMI 30.62 kg/m²   Temp (24hrs), Av.1 °F (36.2 °C), Min:96.8 °F (36 °C), Max:98.1 °F (36.7 °C)    Recent Labs     21  0708 21  1123 21  1634 21  1928   POCGLU 201* 131* 146* 135*       I/O (24Hr):     Intake/Output Summary (Last 24 hours) at 2021 0711  Last data filed at 2021 0400  Gross per 24 hour   Intake 200 ml   Output 1875 ml   Net -1675 ml       Labs:  Hematology:  Recent Labs     21  0505 21  0334 21  0446   WBC 20.9* 14.6* 14.6*   RBC 3.42* 3.31* 3.43*   HGB 8.7* 8.5* 8.7*   HCT 31.5* 28.9* 30.0*   MCV 92.1 87.3 87.5   MCH 25.4 25.7 25.4   MCHC 27.6* 29.4 29.0   RDW 17.0* 16.3* 16.3*    301 343   MPV 11.1 10.5 10.1   INR 1.3 1.3 1.2     Chemistry:  Recent Labs     21  0505 21  0334 21  0446    138 141   K 4.6 4.5 4.3   * 103 104   CO2 18* 23 26   GLUCOSE 141* 250* 110*   BUN 29* 28* 25*   CREATININE 1.31* 1.28* 1.25*   MG 2.5 2.4 2.3   ANIONGAP 14 12 11   LABGLOM 41* 42* 43*   GFRAA 50* 51* 52*   CALCIUM 8.6 9.3 9.7   PHOS  --  3.3  --      Recent Labs     21  1607 21  1856 21  0708 21  1123 11/07/21  1634 11/07/21  1928   POCGLU 174* 200* 201* 131* 146* 135*     ABG:  Lab Results   Component Value Date    POCPH 7.284 11/04/2021    POCPCO2 42.6 11/04/2021    POCPO2 87.1 11/04/2021    POCHCO3 20.2 11/04/2021    NBEA 6 11/04/2021    PBEA NOT REPORTED 11/04/2021    ZOH2BFL NOT REPORTED 11/04/2021    WJHR5MZU 95 11/04/2021    FIO2 40.0 11/04/2021     Lab Results   Component Value Date/Time    SPECIAL NOT REPORTED 10/28/2021 11:30 AM     Lab Results   Component Value Date/Time    CULTURE KLEBSIELLA PNEUMONIAE >457821 CFU/ML (A) 10/28/2021 11:30 AM    CULTURE ENTEROCOCCUS FAECALIS >386587 CFU/ML (A) 10/28/2021 11:30 AM       Radiology:  XR CHEST PORTABLE    Result Date: 11/5/2021  Interval removal of endotracheal and enteric tubes. Shallow inflation with findings of vascular congestion, subsegmental atelectasis and probable small effusions again demonstrated. No new airspace disease or evidence for pneumothorax. XR CHEST PORTABLE    Result Date: 11/4/2021  1. Support lines and tubes are relatively stable in position. 2.  Increased vascular congestion and bibasilar opacities, likely pleural effusion with atelectasis. XR CHEST PORTABLE    Result Date: 11/3/2021  1. New changes of median sternotomy. 2. Persistence of at least small right and trace left pleural effusions with underlying bibasilar passive atelectasis. Superimposed pneumonia and aspiration are not excluded on the left. 3. Pulmonary vascular congestion with questionable perihilar edema, potentially congestive heart failure given recent heart surgery, the above pleural effusions, and suspected mild cardiomegaly. XR CHEST PORTABLE    Result Date: 11/3/2021  Stable postop findings with low lung volumes, bibasilar atelectasis and small effusions, greater on the left. XR CHEST PORTABLE    Result Date: 11/2/2021  Small bilateral pleural effusions and bibasilar atelectasis.        Physical Examination:        General appearance:  alert, cooperative and no distress  Mental Status:  oriented to person, place and time and normal affect  Lungs:  clear to auscultation bilaterally, normal effort  Heart:  Sternal incision present. Chest tube present.    Abdomen:  soft, nontender, nondistended, normal bowel sounds, no masses, hepatomegaly, splenomegaly  Extremities:  1+ edema in bilateral lower extremities   Skin:  no gross lesions, rashes, induration    Assessment:        Hospital Problems           Last Modified POA    * (Principal) NSTEMI (non-ST elevated myocardial infarction) ([address] 11/3/2021 Yes    Type 2 diabetes mellitus with hyperglycemia, without long-term current use of insulin ([address] 10/24/2021 Yes    Class 1 obesity due to excess calories with serious comorbidity and body mass index (BMI) of 33.0 to 33.9 in adult 10/24/2021 Yes    Lymphedema of both lower extremities 10/24/2021 Yes    Acute on chronic diastolic congestive heart failure ([address] 10/26/2021 Yes    Pathological fracture of thoracic vertebra due to secondary osteoporosis ([address] 10/26/2021 Yes    Pathological fracture of lumbar vertebra due to secondary osteoporosis ([address] 10/26/2021 Yes    Intractable back pain 10/26/2021 Yes    Age-related osteoporosis with current pathological fracture 10/26/2021 Yes    History of kyphoplasty 10/26/2021 Yes    Facet arthritis, degenerative, lumbar spine 10/26/2021 Yes    Degenerative spondylolisthesis 10/26/2021 Yes    DDD (degenerative disc disease), thoracolumbar 10/26/2021 Yes    Acute on chronic congestive heart failure ([address] 10/27/2021 Yes    Chronic bilateral low back pain without sciatica 10/27/2021 Yes    Allergic conjunctivitis of both eyes 10/31/2021 Yes    CAD in native artery 11/3/2021 Yes    S/P CABG x 2 (Chronic) 11/4/2021 Yes    Overview Signed 11/4/2021  6:16 AM by CYNDY Box - CNP     SLIMA to LAD and RSVG1 to large high Diag, OM exploration (not graftable)                Plan:        CAD s/p CABG   -Management per cardiothoracic surgery   -POD #6  -Continue amiodarone   -Continue aspirin     NAKITA   -Creatinine improving   -Nephrology following   -Decrease Lasix to 20 mg daily   -Retroperitoneal ultrasound pending     DM2   -Continue Lantus 55 units in AM and 45 units with dinner   -Increase Humalog to 15 units tid     HFpEF   -Not in acute exacerbation   -Lasix decreased to 20 mg daily     HTN   -Continue metoprolol 25 mg bid     HLD  -Continue rosuvastatin 40 mg daily     Depression   -Continue home fluoxetine 40 mg daily     Hx of multiple sclerosis   -S/P stress-dose steroids   -Continue home prednisone 10 mg daily     Obesity 2/2 excessive caloric intake   -Will  on dietary modifications when appropriate     Erika Chaparro MD  11/8/2021  7:11 AM

## 2021-11-08 NOTE — PROGRESS NOTES
Section of Cardiology  Progress Note      Date:  11/8/2021  Patient: Gary Araujo  Admission:  10/24/2021  6:48 AM  Admit DX: NSTEMI (non-ST elevated myocardial infarction) ([facility] [I21.4]  Non-ST elevation MI (NSTEMI) ([facility]) [I21.4]  Lymphedema of both lower extremities [I89.0]  Chronic bilateral low back pain without sciatica [M54.50, G89.29]  Acute on chronic congestive heart failure, unspecified heart failure type ([facility] [facility] [I50.9]  CAD in native artery [I25.10]  Age:  61 y.o., 1955     LOS: 15 days     Reason for evaluation:   NSTEMI and CAD      SUBJECTIVE:     Notes and chart reviewed. Pt doing well post operatively. No acute issues overnight. Pt working on getting stronger. OBJECTIVE:      EXAM:   Vitals:    VITALS:  /72   Pulse 71   Temp 99.5 °F (37.5 °C) (Oral)   Resp 20   Ht 5' 5\" (1.651 m)   Wt 184 lb (83.5 kg)   SpO2 97%   BMI 30.62 kg/m²   24HR INTAKE/OUTPUT:      Intake/Output Summary (Last 24 hours) at 11/8/2021 0849  Last data filed at 11/8/2021 5699  Gross per 24 hour   Intake 200 ml   Output 2475 ml   Net -2275 ml       CONSTITUTIONAL: Alert, awake, no signs of acute distress  HEENT: No JVD  LUNGS: Clear  CARDIOVASCULAR:  regular rate and rhythm, normal S1 and S2, no S3 or S4, and no rub noted. MSK: Chest wall tenderness, wearing brace  SKIN: Warm and dry. EXTREMITIES: No leg edema.      current Inpatient Medications:   insulin lispro  15 Units SubCUTAneous TID WC    And    insulin glargine  56 Units SubCUTAneous Daily    furosemide  20 mg IntraVENous Daily    metoclopramide  10 mg Oral 4x Daily AC & HS    rosuvastatin  40 mg Oral Nightly    predniSONE  10 mg Oral Daily    sodium chloride flush  10 mL IntraVENous 2 times per day    aspirin  81 mg Oral Daily    amiodarone  200 mg Oral TID    mupirocin   Nasal BID    polyethylene glycol  17 g Oral Daily    metoprolol tartrate  25 mg Oral BID    pantoprazole  40 mg Oral Daily    ipratropium-albuterol  1 ampule Inhalation Q4H WA    sennosides-docusate sodium  1 tablet Oral BID    erythromycin   Both Eyes Nightly    gabapentin  300 mg Oral TID    FLUoxetine  40 mg Oral QAM    [Held by provider] metFORMIN  1,000 mg Oral BID     influenza virus vaccine  0.5 mL IntraMUSCular Prior to discharge       IV Infusions (if any):   sodium chloride      dextrose         Diagnostics:   Telemetry: Sinus rhythm and stable    Labs:   CBC:  Recent Labs     11/07/21  0334 11/08/21 0446   WBC 14.6* 14.6*   HGB 8.5* 8.7*   HCT 28.9* 30.0*    343     Magnesium:  Recent Labs     11/07/21  0334 11/08/21 0446   MG 2.4 2.3     BMP:  Recent Labs     11/07/21 0334 11/08/21 0446    141   K 4.5 4.3   CALCIUM 9.3 9.7   CO2 23 26   BUN 28* 25*   CREATININE 1.28* 1.25*   LABGLOM 42* 43*   GLUCOSE 250* 110*     BNP:  No results for input(s): BNP, PROBNP in the last 72 hours. PT/INR:  Recent Labs     11/07/21 0334 11/08/21 0446   PROTIME 15.7* 14.9*   INR 1.3 1.2     APTT:  No results for input(s): APTT in the last 72 hours. CARDIAC ENZYMES:No results for input(s): MYOGLOBIN, CKTOTAL, CKMB, CKMBINDEX, TROPHS, TROPONINT in the last 72 hours. FASTING LIPID PANEL:  Lab Results   Component Value Date    HDL 40 10/25/2021    TRIG 107 10/25/2021     LIVER PROFILE:  No results for input(s): AST, ALT, LABALBU, ALKPHOS, BILITOT, BILIDIR, IBILI, PROT, GLOB, ALBUMIN in the last 72 hours. ASSESSMENT:    · CAD with multivessel disease status post CABG x2 with LIMA to LAD and SVG to diagonal, OM exploration was not graftable 11/2/2021 and status post delayed sternal closure 11/3/2021, wound VAC remains in place-has incisional discomfort but no angina.   · Borderline troponin elevation with possible non-STEMI-preserved LV systolic function on echocardiogram done 10/25/2021  · Diabetes, managed by others  · Lymphedema and nonhealing wounds, managed by others  · Peripheral vascular disease, managed by others  · Dyslipidemia, treated  · Lumbar compression fracture, managed by others  · NAKITA - managed by others    PLAN:  1. Vitals stable. No more chest pain. Cont. Current medications  2. Continue cardiac rehab  3. Pt being worked up for mild post-op NAKITA.    4. Will monitor vitals and adjust medications on as needed basis    Discussed with pt and nurse     Jhonathan Angulo MD

## 2021-11-08 NOTE — RT PROTOCOL NOTE
equivalent nebulizer order(s) with same Frequency and PRN reasons based on the score. If a patient is on this medication at home then do not decrease Frequency below that used at home. 0-3 - enter or revise RT bronchodilator order(s) to equivalent RT Bronchodilator order with Frequency of every 4 hours PRN for wheezing or increased work of breathing using Per Protocol order mode. 4-6 - enter or revise RT Bronchodilator order(s) to two equivalent RT bronchodilator orders with one order with BID Frequency and one order with Frequency of every 4 hours PRN wheezing or increased work of breathing using Per Protocol order mode. 7-10 - enter or revise RT Bronchodilator order(s) to two equivalent RT bronchodilator orders with one order with TID Frequency and one order with Frequency of every 4 hours PRN wheezing or increased work of breathing using Per Protocol order mode. 11-13 - enter or revise RT Bronchodilator order(s) to one equivalent RT bronchodilator order with QID Frequency and an Albuterol order with Frequency of every 4 hours PRN wheezing or increased work of breathing using Per Protocol order mode. Greater than 13 - enter or revise RT Bronchodilator order(s) to one equivalent RT bronchodilator order with every 4 hours Frequency and an Albuterol order with Frequency of every 2 hours PRN wheezing or increased work of breathing using Per Protocol order mode. RT to enter RT Home Evaluation for COPD & MDI Assessment order using Per Protocol order mode.     Electronically signed by Jie Blandon RCP on 11/8/2021 at 2:39 PM

## 2021-11-08 NOTE — PLAN OF CARE
Problem: Falls - Risk of:  Goal: Will remain free from falls  Description: Will remain free from falls  11/8/2021 0225 by Zay Huffman RN  Outcome: Ongoing    Goal: Absence of physical injury  Description: Absence of physical injury  11/8/2021 0225 by Zay Huffman RN  Outcome: Ongoing    Problem: Discharge Planning:  Goal: Discharged to appropriate level of care  Description: Discharged to appropriate level of care  11/8/2021 0225 by Zay Huffman RN  Outcome: Ongoing    Problem: Cardiac Output - Decreased:  Goal: Hemodynamic stability will improve  Description: Hemodynamic stability will improve  11/8/2021 0225 by Zay Huffman RN  Outcome: Ongoing    Goal: Cardiac output within specified parameters  Description: Cardiac output within specified parameters  11/8/2021 0225 by Zay Huffman RN  Outcome: Ongoing    Problem: Pain:  Goal: Pain level will decrease  Description: Pain level will decrease  11/8/2021 0225 by Zay Huffman RN  Outcome: Ongoing    Goal: Control of acute pain  Description: Control of acute pain  11/8/2021 0225 by Zay Huffman RN  Outcome: Ongoing    Goal: Control of chronic pain  Description: Control of chronic pain  11/8/2021 0225 by Zay Huffman RN  Outcome: Ongoing    Problem: Tissue Perfusion - Cardiopulmonary, Altered:  Goal: Hemodynamic stability will improve  Description: Hemodynamic stability will improve  11/8/2021 0225 by Zay Huffman RN  Outcome: Ongoing    Goal: Circulatory function within specified parameters  Description: Circulatory function within specified parameters  11/8/2021 0225 by Zay Huffman RN  Outcome: Ongoing    Goal: Absence of angina  Description: Absence of angina  11/8/2021 0225 by Zay Huffman RN  Outcome: Ongoing    Goal: Will show no evidence of cardiac arrhythmias  Description: Will show no evidence of cardiac arrhythmias  11/8/2021 0225 by Zay Huffman RN  Outcome: Ongoing    Problem: Skin Integrity:  Goal: Will show no infection signs and symptoms  Description: Will show no infection signs and symptoms  11/8/2021 0225 by Vasquez Abreu RN  Outcome: Ongoing    Goal: Absence of new skin breakdown  Description: Absence of new skin breakdown  11/8/2021 0225 by Vasquez Abreu RN  Outcome: Ongoing    Problem: Pain:  Goal: Pain level will decrease  Description: Pain level will decrease  11/8/2021 0225 by Vasquez Abreu RN  Outcome: Ongoing    Goal: Control of acute pain  Description: Control of acute pain  11/8/2021 0225 by Vasquez Abreu RN  Outcome: Ongoing    Goal: Control of chronic pain  Description: Control of chronic pain  11/8/2021 0225 by Vasquez Abreu RN  Outcome: Ongoing    Problem: IP BALANCE  Goal: LTG - patient will maintain standing balance to allow for completion of daily activities  11/8/2021 0225 by Vasquez Abreu RN  Outcome: Ongoing    Problem: Nutrition  Goal: Optimal nutrition therapy  11/8/2021 0225 by Vasquez Abreu RN  Outcome: Ongoing    Problem:  Activity Intolerance:  Goal: Able to perform prescribed physical activity  Description: Able to perform prescribed physical activity  11/8/2021 0225 by Vasquez Abreu RN  Outcome: Ongoing    Goal: Ability to tolerate increased activity will improve  Description: Ability to tolerate increased activity will improve  11/8/2021 0225 by Vasquez Abreu RN  Outcome: Ongoing    Problem: Anxiety:  Goal: Level of anxiety will decrease  Description: Level of anxiety will decrease  11/8/2021 0225 by Vasquez Abreu RN  Outcome: Ongoing    Problem: Fluid Volume - Imbalance:  Goal: Ability to achieve a balanced intake and output will improve  Description: Ability to achieve a balanced intake and output will improve  11/8/2021 0225 by Vasquez Abreu RN  Outcome: Ongoing    Goal: Chest tube drainage is within specified parameters  Description: Chest tube drainage is within specified parameters  11/8/2021 0225 by Vasquez Abreu RN  Outcome: Ongoing    Problem: Gas Exchange - Impaired:  Goal: Levels of oxygenation will improve  Description: Levels of oxygenation will improve  11/8/2021 0225 by Carlene Correia RN  Outcome: Ongoing    Goal: Ability to maintain adequate ventilation will improve  Description: Ability to maintain adequate ventilation will improve  11/8/2021 0225 by Carlene Correia RN  Outcome: Ongoing    Problem: Tobacco Use:  Goal: Will participate in inpatient tobacco-use cessation counseling  Description: Will participate in inpatient tobacco-use cessation counseling  11/8/2021 0225 by Carlene Correia RN  Outcome: Ongoing

## 2021-11-08 NOTE — PROGRESS NOTES
Pulmonary Critical Care Progress Note       Patient seen for the follow up of  NSTEMI (non-ST elevated myocardial infarction) ([address]     Subjective:  She has been requiring oxygen at 2 L nasal cannula. She is not able to walk due to significant weakness. She is sitting in the chair. She   Has good appetite. She denies pain at this point. No increase in shortness of breath. Examination:  Vitals: BP (!) 105/53   Pulse 71   Temp 98 °F (36.7 °C) (Oral)   Resp 16   Ht 5' 5\" (1.651 m)   Wt 184 lb (83.5 kg)   SpO2 98%   BMI 30.62 kg/m²   General appearance: alert and cooperative with exam, up in the bedside chair  Neck: No JVD  Lungs:  Moderate air exchan decreased breath sounds right chest tube in place  Heart: regular rate and rhythm, S1, S2 normal, no gallop  Abdomen: Soft, non tender, + BS  Extremities: no cyanosis or clubbing.+ edema    LABs:  CBC:   Recent Labs     11/07/21  0334 11/08/21 0446   WBC 14.6* 14.6*   HGB 8.5* 8.7*   HCT 28.9* 30.0*    343     BMP:   Recent Labs     11/07/21  0334 11/08/21  0446    141   K 4.5 4.3   CO2 23 26   BUN 28* 25*   CREATININE 1.28* 1.25*   LABGLOM 42* 43*   GLUCOSE 250* 110*     PT/INR:   Recent Labs     11/07/21  0334 11/08/21 0446   PROTIME 15.7* 14.9*   INR 1.3 1.2     ABG:  Lab Results   Component Value Date    OKS2RJJ NOT REPORTED 11/04/2021    FIO2 40.0 11/04/2021       Lab Results   Component Value Date    POCPH 7.284 11/04/2021    POCPCO2 42.6 11/04/2021    POCPO2 87.1 11/04/2021    POCHCO3 20.2 11/04/2021    NBEA 6 11/04/2021    PBEA NOT REPORTED 11/04/2021    XEY9PKH NOT REPORTED 11/04/2021    MSMF9RFW 95 11/04/2021    FIO2 40.0 11/04/2021     Radiology:   chest x-ray 11/8  Shallow lung volumes with suspected bilateral pleural effusions and   bibasilar atelectasis, unchanged            October 28, 2021  CT chest            Impression:  · Acute hypoxic respiratory failure  · CAD s/p CABG 1/3/2021, successfully extubated 11/4/2021  · Asthma  ·  pulmonary edema/ effusion/atelectasis  ·  chronically elevated right hemidiaphragm  · Myasthenia gravis  · Suspected obstructive sleep apnea/Obesity      Recommendations:  · Oxygen via nasal cannula, keep SPO2 90% or greater  · BiPAP support while asleep if necessary  · Incentive spirometry every hour while awake, encourage  · Home oxygen evaluation prior to discharge  · Albuterol and Ipratropium Q 4 hours and prn  · Cipro for UTI  · CT surgery following  · Diet as tolerated  ·  prednisone 10 mg p.o. daily  ·  on Demadex 20 mg p.o. daily /monitor urine output  · Nephrology on consult  · Physical therapy bedside  · PFTs as an outpatient  · Sleep apnea evaluation as an outpatient  ·  Rehab discharge evaluation  · DVT prophylaxis    Electronically signed by     Mitzi Blanco MD on 11/8/2021 at 4:32 PM  Pulmonary Critical Care and Sleep Medicine,  [facility] AT [place]: [phone]

## 2021-11-08 NOTE — PROGRESS NOTES
Nephrology Progress Note      SUBJECTIVE       patient was seen in initial consultation yesterday for acute kidney injury likely secondary primarily to nonsteroidal anti-inflammatory drug use with IV Toradol x1 in the setting of diuretic use. Toradol has not been given again. Creatinine is a little bit better today from yesterday. The patient's IV Lasix dose was decreased to 20 mg a day. Chest x-ray from today unchanged, no significant infiltrates, basilar atelectasis and lower lung volumes seen. The patient is up in a chair today. She is on 2 L of oxygen by nasal cannula, same as yesterday. She does have some chronic cellulitis and lower extremity edema. She has postoperative day #5 status post CABG x2 from 2021. The patient had then had delaying that the sternal wound closure on 11/3/2021. Chest tubes removed yesterday. Appetite is fair. Urine output picked up significantly in the overnight shift likely after starting to show some improvement in renal function. The patient in 1 L of urine output in the last 8 hour shift. Labs today show sodium 138 with potassium 4.5, chloride 103 and CO2 23 with BUN 28 creatinine 1.2 down a bit from 1.32 yesterday. Calcium is 9.3 with phosphorus 3.3. Magnesium is 2.4. The patient's Lasix dose is 20 mg IV daily. No bowel movement yet today. The patient did state she had a bowel movement the last couple of days.         OBJECTIVE      CURRENT TEMPERATURE:  Temp: 98 °F (36.7 °C)  MAXIMUM TEMPERATURE OVER 24HRS:  Temp (24hrs), Av.6 °F (36.4 °C), Min:96.8 °F (36 °C), Max:99.5 °F (37.5 °C)    CURRENT RESPIRATORY RATE:  Resp: 13  CURRENT PULSE:  Pulse: 71  CURRENT BLOOD PRESSURE:  BP: (!) 105/53  24HR BLOOD PRESSURE RANGE:  Systolic (96NYX), MBK:176 , Min:105 , DAYLIN:285   ; Diastolic (23QLR), FAHEEM:49, Min:53, Max:81    24HR INTAKE/OUTPUT:      Intake/Output Summary (Last 24 hours) at 2021 1443  Last data filed at 2021 1400  Gross per 24 hour Intake 905 ml   Output 2475 ml   Net -1570 ml       PHYSICAL EXAM      GENERAL APPEARANCE:Awake, alert, in no acute distress, on 2 L of oxygen by nasal cannula  SKIN: warm and dry, no rash or erythema  EYES: conjunctivae pale and sclera anicteric  ENT: no thrush, moist mucous membranes  NECK:  No apparent JVD   PULMONARY: bilateral air entry with basilar crackles bilaterally  CARDIOVASCULAR: Regular rate and rhythm with positive S1 and S2 and no rubs  ABDOMEN: Soft and not significant tenderness and actually distended with active bowel sounds  EXTREMITIES: 1+ bilateral lower extremity edema and chronic cellulitic changes will    CURRENT MEDICATIONS      torsemide (DEMADEX) tablet 20 mg, Daily  insulin lispro (HUMALOG) injection vial 15 Units, TID WC   And  insulin glargine (LANTUS) injection vial 56 Units, Daily  furosemide (LASIX) injection 20 mg, Daily  metoclopramide (REGLAN) tablet 10 mg, 4x Daily AC & HS  rosuvastatin (CRESTOR) tablet 40 mg, Nightly  melatonin tablet 3 mg, Nightly PRN  predniSONE (DELTASONE) tablet 10 mg, Daily  sodium bicarbonate 8.4 % injection 50 mEq, Q30 Min PRN  potassium chloride 20 mEq/50 mL IVPB (Central Line), PRN  sodium chloride flush 0.9 % injection 10 mL, 2 times per day  sodium chloride flush 0.9 % injection 10 mL, PRN  0.9 % sodium chloride infusion, PRN  ondansetron (ZOFRAN) injection 4 mg, Q8H PRN  aspirin EC tablet 81 mg, Daily  acetaminophen (TYLENOL) tablet 650 mg, Q4H PRN  oxyCODONE-acetaminophen (PERCOCET) 5-325 MG per tablet 1 tablet, Q4H PRN   Or  oxyCODONE-acetaminophen (PERCOCET) 5-325 MG per tablet 2 tablet, Q4H PRN  fentaNYL (SUBLIMAZE) injection 25 mcg, Q1H PRN  amiodarone (CORDARONE) tablet 200 mg, TID  hydrALAZINE (APRESOLINE) injection 5 mg, Q5 Min PRN  metoprolol (LOPRESSOR) injection 2.5 mg, Q10 Min PRN  diphenhydrAMINE (BENADRYL) tablet 25 mg, Nightly PRN  polyethylene glycol (GLYCOLAX) packet 17 g, Daily  bisacodyl (DULCOLAX) EC tablet 5 mg, Daily PRN  fleet rectal enema 1 enema, Daily PRN  metoprolol tartrate (LOPRESSOR) tablet 25 mg, BID  pantoprazole (PROTONIX) tablet 40 mg, Daily  ipratropium-albuterol (DUONEB) nebulizer solution 1 ampule, Q4H WA  albumin human 5 % IV solution 25 g, PRN  glucose (GLUTOSE) 40 % oral gel 15 g, PRN  dextrose 50 % IV solution, PRN  glucagon (rDNA) injection 1 mg, PRN  dextrose 5 % solution, PRN  albuterol (PROVENTIL) nebulizer solution 2.5 mg, Q4H PRN  sennosides-docusate sodium (SENOKOT-S) 8.6-50 MG tablet 1 tablet, BID  naloxone (NARCAN) injection 0.4 mg, PRN  calcium gluconate 1,000 mg in dextrose 5 % 100 mL IVPB, PRN   Or  calcium gluconate 2,000 mg in dextrose 5 % 100 mL IVPB, PRN   Or  calcium gluconate 3,000 mg in dextrose 5 % 100 mL IVPB, PRN   Or  calcium gluconate 4,000 mg in dextrose 5 % 100 mL IVPB, PRN  erythromycin (ROMYCIN) ophthalmic ointment, Nightly  benzonatate (TESSALON) capsule 100 mg, TID PRN  gabapentin (NEURONTIN) capsule 300 mg, TID  FLUoxetine (PROZAC) capsule 40 mg, QAM  [Held by provider] metFORMIN (GLUCOPHAGE) tablet 1,000 mg, BID WC  influenza quadrivalent split vaccine (FLUZONE;FLUARIX;FLULAVAL;AFLURIA) injection 0.5 mL, Prior to discharge          LABS      CBC:   Recent Labs     11/06/21  0505 11/07/21  0334 11/08/21  0446   WBC 20.9* 14.6* 14.6*   RBC 3.42* 3.31* 3.43*   HGB 8.7* 8.5* 8.7*   HCT 31.5* 28.9* 30.0*   MCV 92.1 87.3 87.5   MCH 25.4 25.7 25.4   MCHC 27.6* 29.4 29.0   RDW 17.0* 16.3* 16.3*    301 343   MPV 11.1 10.5 10.1      BMP:   Recent Labs     11/06/21  0505 11/07/21  0334 11/08/21  0446    138 141   K 4.6 4.5 4.3   * 103 104   CO2 18* 23 26   BUN 29* 28* 25*   CREATININE 1.31* 1.28* 1.25*   GLUCOSE 141* 250* 110*   CALCIUM 8.6 9.3 9.7      BNP:No results found for: BNP  PHOSPHORUS:    Recent Labs     11/07/21  0334   PHOS 3.3     MAGNESIUM:   Recent Labs     11/06/21  0505 11/07/21  0334 11/08/21  0446   MG 2.5 2.4 2.3     ALBUMIN: No results for input(s): LABALBU in the last 72 hours. IRON:  No results found for: IRON  IRON SATURATION:  No results found for: LABIRON  TIBC:  No results found for: TIBC  FERRITIN:  No results found for: FERRITIN  VLAD: No results found for: VLAD    SPEP:   Lab Results   Component Value Date    PROT 5.3 11/03/2021     UPEP: No results found for: TPU   HEPBSAG:No results found for: HEPBSAG  HEPCAB:No results found for: HEPCAB  C3: No results found for: C3  C4: No results found for: C4  MPO ANCA: No results found for: MPO . PR3 ANCA:  No results found for: PR3  URINE SODIUM:  No results found for: MEGAN   URINE POTASSIUM:  No results found for: KUR  URINE CHLORIDE:  No components found for: CLU  URINE PH:  No components found for: PO4U  URINE OSMOLARITY:  No results found for: OSMOU  URINE CREATININE:    Lab Results   Component Value Date    LABCREA 42.3 11/06/2021     URINE EOSINOPHILS: No components found for: EOSU  URINE PROTEIN:  No results found for: TPU  URINALYSIS:  U/A:   Lab Results   Component Value Date    NITRU NEGATIVE 11/06/2021    COLORU Yellow 11/06/2021    PHUR 5.5 11/06/2021    WBCUA 2 TO 5 11/06/2021    RBCUA 2 TO 5 11/06/2021    MUCUS NOT REPORTED 11/06/2021    TRICHOMONAS NOT REPORTED 11/06/2021    YEAST FEW 11/06/2021    BACTERIA NOT REPORTED 11/06/2021    SPECGRAV 1.025 11/06/2021    LEUKOCYTESUR NEGATIVE 11/06/2021    UROBILINOGEN Normal 11/06/2021    BILIRUBINUR NEGATIVE 11/06/2021    GLUCOSEU NEGATIVE 11/06/2021    [address] NEGATIVE 11/06/2021    AMORPHOUS NOT REPORTED 11/06/2021     ANTIGBM:No results found for: GBMABIGG      RADIOLOGY      Reviewed as available. ASSESSMENT      1. Type 2 diabetes mellitus with a history of retinopathy and essentially normal baseline creatinine of 0.6-0.7 MG/DL  2.  Acute kidney injury in the postoperative state likely secondary to Toradol use intravenously times one in the setting of loop diuretic use which likely resulted in some intravascular volume depletion with the Toradol was given. Lasix dose was decreased to daily IV, and there is some slight improvement in renal function this morning with an improvement in urine output overnight  3. A degree of diastolic CHF on chest x-ray and on exam the patient currently on 2 L of supplemental oxygen by nasal cannula  4. Status post CABG x2 on 11/2/2021 with subsequent delayed sternal wound closure on 11/3/2021  5. Chronic lymphedema    PLAN      1. Discontinue IV Lasix, start oral Demadex 20 mg daily  2. Monitor intake and output, anticipate continued excellent urine output  3. No NSAIDs  4. Fluid restriction 1.5 L a day  5. Continue to monitor renal function  6. Ultrasound scan of the kidney reviewed, echogenic kidney on the right, left not seen  7. Outpatient follow-up with nephrology in 3 to 4 weeks  8. Nothing else to add will sign off please call for questions      Please do not hesitate to call with questions.     Electronically signed by Jacquelyn Dent MD on 11/8/2021 at 2:43 PM

## 2021-11-08 NOTE — PROGRESS NOTES
[facility]  Daily Progress Note    Surgeon: Dr. Teresa Dockery   S/P : CABG X 2 w/ SLIMA to LAD and RSVG1 to large high Diag- With a delayed sternal closure   POD#: 6    Subjective:  Ms. Lynn Rodriguez is up in the chair. Denies any chest pain or shortness of breath. No distress noted     Physical Exam  Vital Signs: BP (!) 146/72   Pulse 71   Temp 97 °F (36.1 °C) (Oral)   Resp 19   Ht 5' 5\" (1.651 m)   Wt 184 lb (83.5 kg)   SpO2 93%   BMI 30.62 kg/m²  O2 Flow Rate (L/min): 1 L/min   Admit Weight: Weight: 199 lb (90.3 kg)   WEIGHTWeight: 184 lb (83.5 kg)     General: Patient is alert and oriented. Up in chair  Heart: Normal S1 and S2.  Regular rhythm. No murmurs, gallops, or rubs. Pacing Wires: Yes -To be clipped prior to discharge  Lungs: clear to auscultation bilaterally and diminished breath sounds bibasilar Chest tubes: No  Abdomen: soft, non tender, non distended, BS hypoactive x4  Extremities: 1+ edema  Wounds: clean and dry, healing appropriately. Prevena to Sternum.      Sternum: Covered with Prevena dressing  SVG sites: Healing and covered    Scheduled Meds:    insulin lispro  15 Units SubCUTAneous TID WC    And    insulin glargine  56 Units SubCUTAneous Daily    furosemide  20 mg IntraVENous Daily    metoclopramide  10 mg Oral 4x Daily AC & HS    rosuvastatin  40 mg Oral Nightly    predniSONE  10 mg Oral Daily    sodium chloride flush  10 mL IntraVENous 2 times per day    aspirin  81 mg Oral Daily    amiodarone  200 mg Oral TID    mupirocin   Nasal BID    polyethylene glycol  17 g Oral Daily    metoprolol tartrate  25 mg Oral BID    pantoprazole  40 mg Oral Daily    ipratropium-albuterol  1 ampule Inhalation Q4H WA    sennosides-docusate sodium  1 tablet Oral BID    erythromycin   Both Eyes Nightly    gabapentin  300 mg Oral TID    FLUoxetine  40 mg Oral QAM    [Held by provider] metFORMIN  1,000 mg Oral BID WC    influenza virus vaccine  0.5 mL IntraMUSCular Prior to discharge     Continuous Infusions:    sodium chloride      dextrose         Data:  CBC:   Recent Labs     11/06/21  0505 11/07/21  0334 11/08/21  0446   WBC 20.9* 14.6* 14.6*   HGB 8.7* 8.5* 8.7*   HCT 31.5* 28.9* 30.0*   MCV 92.1 87.3 87.5    301 343     BMP:   Recent Labs     11/06/21  0505 11/07/21 0334 11/08/21 0446    138 141   K 4.6 4.5 4.3   * 103 104   CO2 18* 23 26   PHOS  --  3.3  --    BUN 29* 28* 25*   CREATININE 1.31* 1.28* 1.25*     PT/INR:   Recent Labs     11/06/21  0505 11/07/21 0334 11/08/21 0446   PROTIME 16.2* 15.7* 14.9*   INR 1.3 1.3 1.2     APTT:   No results for input(s): APTT in the last 72 hours. Chest X-Ray:   1. Shallow lung volumes with suspected bilateral pleural effusions and   bibasilar atelectasis, unchanged. I/O:  I/O last 3 completed shifts: In: 200 [P.O.:200]  Out: 9634 [Urine:1825]    Assessment/Plan:      Diagnosis Date    Asthma     Diabetes mellitus ([facility]     Fibromyalgia     Headache     Lymphedema of both lower extremities     Myasthenia gravis ([facility]       Neuro: Intact   Lungs: clear, diminished in the bases   HD: VSS   Edema: 1+ peripheral   GI: Active bowel sounds X4   : good urine output    Beta-Blocker: yes  ASA: yes  Plavix: yes  GI: yes  Statin: yes  Coumadin: no  ACE-I: no  EF: 70% pre-operative     Oxygen as needed to maintain SpO2 > 92%  o Wean as toleated   Chest x-ray daily   Encourage incentive spirometry, acapella and ambulation once extubated   Replace electrolytes as needed per sliding scale and recheck per policy   Case Management consult for discharge planning- Awaiting pre-cert    The above recommendations including medications and orders were discussed and agreed upon with Dr. Winsome Hale, the attending on service for the cardiothoracic surgery group today.      Electronically signed by CYNDY Martins CNP on 11/8/2021 at 6:08 AM    On this date 11/8/2021 I have spent 27 minutes reviewing previous notes, test results and face to face with the patient discussing the diagnosis and importance of compliance with the treatment plan as well as documenting on the day of the visit. At least 50% of the time documented was spent with the patient to provide counseling and/or coordination of care. This note was created with the assistance of a speech-recognition program.  Although the intention is to generate a document that actually reflects the content of the visit, no guarantees can be provided that every mistake has been identified and corrected by editing. Note was updated later by me after  physical examination and  completion of the assessment.

## 2021-11-08 NOTE — PROGRESS NOTES
Nutrition Assessment     Type and Reason for Visit: Reassess    Nutrition Recommendations/Plan:   1. Continue current diet- add Low Fat/Low Chol/High Fiber/2 gm Na diet ? 2. Continue Glucerna 2x/d  3. Nutrition education on CAD provided   4. Monitor po intakes, ONS acceptance, weights and labs    Nutrition Assessment:  Patient seen for f/u date. Patient is status post CABG x2 chest closure (11/3). 11/7 Chest tubes removed. She was seen finishing lunch- consumed 100% cottage cheese and fruit plate and 5-71% chicken noodle soup. She is drinking 75% of ONS BID. She reports her appetite is slowly getting better- working with PT/OT. No wt changes x 4 days. Discussed CAD and CABG Nutrition Therapy. She reports consuming MOW 3x/week and on the weekends- on the other days she has canned soups, [place] food, [place] food, greek food etc. Reviewed foods recommended and how to monitor sodium/fat/cholesterol intake. Encouraged ONS 1x/d once discharged. Will continue to monitor po intakes, ONS acceptance, weights and labs. Malnutrition Assessment:  Malnutrition Status: At risk for malnutrition (Comment)    Estimated Daily Nutrient Needs:  Energy (kcal): 1642-0690 kcal (16/18 kcal/kg); Weight Used for Energy Requirements:  Current     Protein (g): 68-80 gm of protein (1.2-1.4 gm/kg); Weight Used for Protein Requirements:  Ideal          Nutrition Related Findings: Edema: RUE +1, LUE +2, BLE +2 pitting, facial +1. 11/8: constipation- present bowel sounds (4Q). Labs reviewed- WBC 14.6 (H). Wound Vac. Current Nutrition Therapies:    ADULT DIET;  Regular; 4 carb choices (60 gm/meal)  ADULT ORAL NUTRITION SUPPLEMENT; Breakfast, Dinner; Diabetic Oral Supplement    Anthropometric Measures:  · Height: 5' 5\" (165.1 cm)  · Current Body Wt: 184 lb (83.5 kg)   · BMI: 30.6    Nutrition Diagnosis:   · Inadequate protein-energy intake related to cardiac dysfunction, inadequate protein-energy intake as evidenced by intake 26-50%, intake 51-75%, other (comment) (status post CABG x2 chest closure (11/3). )    Nutrition Interventions:   Food and/or Nutrient Delivery:  Continue Current Diet, Continue Oral Nutrition Supplement  Nutrition Education/Counseling:  Education not indicated   Coordination of Nutrition Care:  Continue to monitor while inpatient    Goals:  PO intakes are greater than 75% at meals       Nutrition Monitoring and Evaluation:   Behavioral-Environmental Outcomes:  None Identified   Food/Nutrient Intake Outcomes:  Food and Nutrient Intake, Supplement Intake  Physical Signs/Symptoms Outcomes:  Biochemical Data, Fluid Status or Edema, Skin, Weight     Discharge Planning:    Continue current diet, Continue Oral Nutrition Supplement     Richar Mathew, [address] [place] [address]  Office Number: [phone]

## 2021-11-08 NOTE — PROGRESS NOTES
Physical Therapy  Facility/Department: [facility] CVICU  Daily Treatment Note  NAME: Baylee Motley  : 1955  MRN: [id number]    Date of Service: 2021    Discharge Recommendations:  Patient would benefit from continued therapy after discharge     Pt currently functioning below baseline. Would suggest additional therapy at time of discharge to maximize long term outcomes and prevent re-admission. Please refer to AM-PAC score for current level of function. Assessment   Body structures, Functions, Activity limitations: Decreased functional mobility ; Decreased balance; Decreased cognition; Decreased ROM; Decreased strength; Decreased endurance; Increased pain; Decreased posture; Decreased sensation  Assessment: Patient is dependent x2 with yola lift for repositioning in recliner. Patient with decreased safety awareness and requries max-dependent x2 assist for all mobility tasks. Prognosis: Good  Decision Making: High Complexity  PT Education: Goals; PT Role; Plan of Care; Functional Mobility Training; Precautions; Pressure Relief; Energy Conservation; Transfer Training; General Safety  REQUIRES PT FOLLOW UP: Yes  Activity Tolerance  Activity Tolerance: Patient limited by fatigue; Patient limited by endurance; Patient limited by pain  Activity Tolerance: Patient fatigues quickly and requires increased rest breaks throughout treatment this date. Patient requires max encouragement and vc's to promote motion initiation. Patient Diagnosis(es): The primary encounter diagnosis was S/P CABG x 2. Diagnoses of Non-ST elevation MI (NSTEMI) ([facility] [address] Chronic bilateral low back pain without sciatica, Lymphedema of both lower extremities, and Acute on chronic congestive heart failure, unspecified heart failure type [facility]) were also pertinent to this visit. has a past medical history of Asthma, Diabetes mellitus ([facility] [address] Fibromyalgia, Headache, Lymphedema of both lower extremities, and Myasthenia gravis ([facility] [address]    has a past surgical history that includes Toe amputation; Carpal tunnel release; fracture surgery; Sternum Debridement (N/A, 11/3/2021); and Coronary artery bypass graft (N/A, 11/2/2021). Restrictions  Restrictions/Precautions  Restrictions/Precautions: General Precautions, Fall Risk, Surgical Protocols  Required Braces or Orthoses?: No  Required Braces or Orthoses  Other: Heart Hugger Brace  Position Activity Restriction  Sternal Precautions: 5# Lifting Restrictions  Other position/activity restrictions: Up in chair, chest harness/surgical bra, strict sternal precautions, chest tubes, telemetry, bloom catheter, Prevana wound vac with dressing at sternal incision, BUE IV's,  bloom catheter, [email or web address]  Subjective   General  Chart Reviewed: Yes  Additional Pertinent Hx: CAD, DM, severe back pain, fibromyalgia, lymphedema BLE, spinal compression fracture  Response To Previous Treatment: Patient with no complaints from previous session. Family / Caregiver Present: No  Subjective  Subjective: Patient up in side chair upon therapists arrival this date  General Comment  Comments: RN Christin cerda PT  Pain Screening  Patient Currently in Pain: Yes  Vital Signs  Patient Currently in Pain: Yes       Orientation  Orientation  Overall Orientation Status: Within Functional Limits  Cognition   Cognition  Overall Cognitive Status: Exceptions  Arousal/Alertness: Delayed responses to stimuli  Following Commands: Follows one step commands with increased time; Follows one step commands with repetition; Inconsistently follows commands  Attention Span: Difficulty attending to directions;  Attends with cues to redirect  Memory: Decreased short term memory; Decreased long term memory  Safety Judgement: Decreased awareness of need for assistance; Decreased awareness of need for safety  Problem Solving: Assistance required to identify errors made; Assistance required to implement solutions; Assistance required to correct errors made; Assistance required to generate solutions; Decreased awareness of errors  Insights: Not aware of deficits  Initiation: Requires cues for all  Sequencing: Requires cues for all  Cognition Comment: Pt. pleasant but required multiple cues to complete each task  Objective   Bed mobility  Bridging: Dependent/Total  Rolling to Left: Dependent/Total  Rolling to Right: Dependent/Total  Supine to Sit: Maximum assistance; 2 Person assistance  Sit to Supine: Unable to assess  Scooting: Dependent/Total; 2 Person assistance  Comment: Pt. Required Max x 2 A for log roll, paitent at EOB x 4 mins with Mod A to correct Rt lateral back to midline. Transfers  Sit to Stand: Dependent/Total; 2 Person Assistance  Stand to sit: Dependent/Total; 2 Person Assistance  Bed to Chair: Dependent/Total  Stand Pivot Transfers: Dependent/Total  Lateral Transfers: Dependent/Total  Comment: 3 attempts for STS from EOB. Last try was from elevated bed and depenendent x 2 A to get [employer] stedy seat under her. Then during transfer to chair patient unable to push through arms and get upright posture. Max x A to assist shoulders upright and protect her sternum from the front bar of the marciano stedy. Dependent x 2 A to stand up from [address] [place] [address] stedy elevated seated and stand to sit on the recliner. Alber murillo sling in chair for RN staff to return her back to bed. Ambulation  Ambulation?: No  More Ambulation?: No  Neuromuscular Education  NDT Treatment: Sitting; Standing  Neuromuscular Comments: VCs req for proper breathing henrry (pursed lip breathing) during functional mobility. Tactile and VCs req for postural control during sit<>stands & amb to promote abdominal and erector spinae mm facilitation for increased stability and balance, decreasing kyphosis of the spine. Pt req VCs to correct for anterior translation of femur with squatting in addition to pressing firmly into ground with feet, to promote the appropriate body mechanics for sit<>stand transfers.   Balance  Posture: Fair  Sitting - Static: Fair; -  Sitting - Dynamic: Fair; -  Standing - Static: Poor; -  Comments: Standing balance within Estuardo Scar    AM-PAC Score  AM-PAC Inpatient Mobility Raw Score : 6 (11/08/21 1331)  AM-PAC Inpatient T-Scale Score : 23.55 (11/08/21 1331)  Mobility Inpatient CMS 0-100% Score: 100 (11/08/21 1331)  Mobility Inpatient CMS G-Code Modifier : CN (11/08/21 1331)          Goals  Short term goals  Time Frame for Short term goals: 12 visits:  Short term goal 1: Inc bed mobility to [facility] term goal 2: Pt able to transfer from bed to chair with mod assist using safest device  Short term goal 3: Pt to tolerate sitting with unsupported trunk up to 20 minutes with UB/LB support to improve core stability & repositioning for pressure relief, prepare for standing,  & prevent sedentary complications  Short term goal 4: Pt able to tolerate 30-40 min of activity to include 15-20 reps of ex, NMR & functional mobility to facilitate activity tolerance to [facility]  Short term goal 5: Ed strengthening & balance ex program, activity guidelines, pressure relief, edema control of LE'S, energy conservation & optimal breathing techniques, & issue written pt education  Patient Goals   Patient goals : regain independence    Plan    Plan  Times per week: 1-2x/day, 6-7D/week  Specific instructions for Next Treatment: Co-Tx  Current Treatment Recommendations: Strengthening, ROM, Balance Training, Functional Mobility Training, Transfer Training, Endurance Training, Safety Education & Training, Home Exercise Program, Patient/Caregiver Education & Training  Safety Devices  Type of devices:  All fall risk precautions in place, Gait belt, Patient at risk for falls, Call light within reach, Nurse notified, Left in chair  Restraints  Initially in place: No     Therapy Time   Individual Concurrent Group Co-treatment   Time In       1022   Time Out       1045   Minutes       23        Co-treatment with OT warranted secondary to decreased safety and independence requiring 2 skilled therapy professionals to address individual discipline's goals. PT addressing pre gait trunk strengthening, weight shifting prior to transfers, transfer training and postural control in sitting/standing.     Ziggy Todd, PTA

## 2021-11-09 ENCOUNTER — APPOINTMENT (OUTPATIENT)
Dept: GENERAL RADIOLOGY | Age: 66
DRG: 233 | End: 2021-11-09
Payer: MEDICARE

## 2021-11-09 LAB
ANION GAP SERPL CALCULATED.3IONS-SCNC: 10 MMOL/L (ref 9–17)
BUN BLDV-MCNC: 26 MG/DL (ref 8–23)
BUN/CREAT BLD: 19 (ref 9–20)
CALCIUM SERPL-MCNC: 9.6 MG/DL (ref 8.6–10.4)
CHLORIDE BLD-SCNC: 100 MMOL/L (ref 98–107)
CO2: 28 MMOL/L (ref 20–31)
CREAT SERPL-MCNC: 1.36 MG/DL (ref 0.5–0.9)
GFR AFRICAN AMERICAN: 47 ML/MIN
GFR NON-AFRICAN AMERICAN: 39 ML/MIN
GFR SERPL CREATININE-BSD FRML MDRD: ABNORMAL ML/MIN/{1.73_M2}
GFR SERPL CREATININE-BSD FRML MDRD: ABNORMAL ML/MIN/{1.73_M2}
GLUCOSE BLD-MCNC: 111 MG/DL (ref 65–105)
GLUCOSE BLD-MCNC: 122 MG/DL (ref 65–105)
GLUCOSE BLD-MCNC: 123 MG/DL (ref 65–105)
GLUCOSE BLD-MCNC: 170 MG/DL (ref 65–105)
GLUCOSE BLD-MCNC: 185 MG/DL (ref 65–105)
GLUCOSE BLD-MCNC: 45 MG/DL (ref 70–99)
GLUCOSE BLD-MCNC: 50 MG/DL (ref 65–105)
GLUCOSE BLD-MCNC: 56 MG/DL (ref 65–105)
GLUCOSE BLD-MCNC: 80 MG/DL (ref 65–105)
HCT VFR BLD CALC: 31.4 % (ref 36.3–47.1)
HEMOGLOBIN: 9.1 G/DL (ref 11.9–15.1)
INR BLD: 1.2
MAGNESIUM: 2.3 MG/DL (ref 1.6–2.6)
MCH RBC QN AUTO: 25 PG (ref 25.2–33.5)
MCHC RBC AUTO-ENTMCNC: 29 G/DL (ref 28.4–34.8)
MCV RBC AUTO: 86.3 FL (ref 82.6–102.9)
NRBC AUTOMATED: 0.2 PER 100 WBC
PDW BLD-RTO: 16 % (ref 11.8–14.4)
PLATELET # BLD: 452 K/UL (ref 138–453)
PMV BLD AUTO: 10.3 FL (ref 8.1–13.5)
POTASSIUM SERPL-SCNC: 4.1 MMOL/L (ref 3.7–5.3)
PROTHROMBIN TIME: 14.6 SEC (ref 11.5–14.2)
RBC # BLD: 3.64 M/UL (ref 3.95–5.11)
SARS-COV-2, RAPID: NOT DETECTED
SODIUM BLD-SCNC: 138 MMOL/L (ref 135–144)
SPECIMEN DESCRIPTION: NORMAL
WBC # BLD: 19.1 K/UL (ref 3.5–11.3)

## 2021-11-09 PROCEDURE — 85027 COMPLETE CBC AUTOMATED: CPT

## 2021-11-09 PROCEDURE — 71045 X-RAY EXAM CHEST 1 VIEW: CPT

## 2021-11-09 PROCEDURE — 83735 ASSAY OF MAGNESIUM: CPT

## 2021-11-09 PROCEDURE — 6370000000 HC RX 637 (ALT 250 FOR IP): Performed by: NURSE PRACTITIONER

## 2021-11-09 PROCEDURE — 94761 N-INVAS EAR/PLS OXIMETRY MLT: CPT

## 2021-11-09 PROCEDURE — 97112 NEUROMUSCULAR REEDUCATION: CPT

## 2021-11-09 PROCEDURE — APPSS45 APP SPLIT SHARED TIME 31-45 MINUTES: Performed by: NURSE PRACTITIONER

## 2021-11-09 PROCEDURE — 2700000000 HC OXYGEN THERAPY PER DAY

## 2021-11-09 PROCEDURE — 94640 AIRWAY INHALATION TREATMENT: CPT

## 2021-11-09 PROCEDURE — 6370000000 HC RX 637 (ALT 250 FOR IP): Performed by: INTERNAL MEDICINE

## 2021-11-09 PROCEDURE — 80048 BASIC METABOLIC PNL TOTAL CA: CPT

## 2021-11-09 PROCEDURE — 82947 ASSAY GLUCOSE BLOOD QUANT: CPT

## 2021-11-09 PROCEDURE — 85610 PROTHROMBIN TIME: CPT

## 2021-11-09 PROCEDURE — 2060000000 HC ICU INTERMEDIATE R&B

## 2021-11-09 PROCEDURE — 97110 THERAPEUTIC EXERCISES: CPT | Performed by: NURSE PRACTITIONER

## 2021-11-09 PROCEDURE — 6370000000 HC RX 637 (ALT 250 FOR IP): Performed by: STUDENT IN AN ORGANIZED HEALTH CARE EDUCATION/TRAINING PROGRAM

## 2021-11-09 PROCEDURE — 94669 MECHANICAL CHEST WALL OSCILL: CPT

## 2021-11-09 PROCEDURE — 99232 SBSQ HOSP IP/OBS MODERATE 35: CPT | Performed by: STUDENT IN AN ORGANIZED HEALTH CARE EDUCATION/TRAINING PROGRAM

## 2021-11-09 PROCEDURE — 97530 THERAPEUTIC ACTIVITIES: CPT

## 2021-11-09 PROCEDURE — 36415 COLL VENOUS BLD VENIPUNCTURE: CPT

## 2021-11-09 PROCEDURE — 2580000003 HC RX 258: Performed by: NURSE PRACTITIONER

## 2021-11-09 PROCEDURE — APPSS30 APP SPLIT SHARED TIME 16-30 MINUTES: Performed by: NURSE PRACTITIONER

## 2021-11-09 PROCEDURE — 87635 SARS-COV-2 COVID-19 AMP PRB: CPT

## 2021-11-09 RX ORDER — TORSEMIDE 20 MG/1
20 TABLET ORAL DAILY
Qty: 30 TABLET | Refills: 3 | Status: SHIPPED | OUTPATIENT
Start: 2021-11-10

## 2021-11-09 RX ORDER — INSULIN GLARGINE 100 [IU]/ML
25 INJECTION, SOLUTION SUBCUTANEOUS NIGHTLY
Status: DISCONTINUED | OUTPATIENT
Start: 2021-11-09 | End: 2021-11-10 | Stop reason: HOSPADM

## 2021-11-09 RX ORDER — ASPIRIN 81 MG/1
81 TABLET ORAL DAILY
Qty: 30 TABLET | Refills: 3 | Status: SHIPPED | OUTPATIENT
Start: 2021-11-10

## 2021-11-09 RX ORDER — AMIODARONE HYDROCHLORIDE 200 MG/1
200 TABLET ORAL 3 TIMES DAILY
Qty: 90 TABLET | Refills: 0 | Status: SHIPPED
Start: 2021-11-09 | End: 2021-11-10 | Stop reason: HOSPADM

## 2021-11-09 RX ORDER — INSULIN GLARGINE 100 [IU]/ML
15 INJECTION, SOLUTION SUBCUTANEOUS NIGHTLY
Status: DISCONTINUED | OUTPATIENT
Start: 2021-11-09 | End: 2021-11-09

## 2021-11-09 RX ADMIN — METOCLOPRAMIDE 10 MG: 10 TABLET ORAL at 09:22

## 2021-11-09 RX ADMIN — PREDNISONE 10 MG: 5 TABLET ORAL at 09:23

## 2021-11-09 RX ADMIN — GABAPENTIN 300 MG: 300 CAPSULE ORAL at 13:45

## 2021-11-09 RX ADMIN — IPRATROPIUM BROMIDE AND ALBUTEROL SULFATE 1 AMPULE: .5; 2.5 SOLUTION RESPIRATORY (INHALATION) at 18:13

## 2021-11-09 RX ADMIN — METOPROLOL TARTRATE 25 MG: 25 TABLET, FILM COATED ORAL at 21:16

## 2021-11-09 RX ADMIN — INSULIN LISPRO 2 UNITS: 100 INJECTION, SOLUTION INTRAVENOUS; SUBCUTANEOUS at 21:16

## 2021-11-09 RX ADMIN — PANTOPRAZOLE SODIUM 40 MG: 40 TABLET, DELAYED RELEASE ORAL at 09:23

## 2021-11-09 RX ADMIN — FLUOXETINE 40 MG: 20 CAPSULE ORAL at 09:23

## 2021-11-09 RX ADMIN — AMIODARONE HYDROCHLORIDE 200 MG: 200 TABLET ORAL at 09:22

## 2021-11-09 RX ADMIN — AMIODARONE HYDROCHLORIDE 200 MG: 200 TABLET ORAL at 13:46

## 2021-11-09 RX ADMIN — GABAPENTIN 300 MG: 300 CAPSULE ORAL at 21:16

## 2021-11-09 RX ADMIN — TORSEMIDE 20 MG: 20 TABLET ORAL at 09:22

## 2021-11-09 RX ADMIN — METOCLOPRAMIDE 10 MG: 10 TABLET ORAL at 06:03

## 2021-11-09 RX ADMIN — INSULIN LISPRO 2 UNITS: 100 INJECTION, SOLUTION INTRAVENOUS; SUBCUTANEOUS at 17:25

## 2021-11-09 RX ADMIN — METOPROLOL TARTRATE 25 MG: 25 TABLET, FILM COATED ORAL at 09:22

## 2021-11-09 RX ADMIN — ROSUVASTATIN CALCIUM 40 MG: 40 TABLET, FILM COATED ORAL at 21:16

## 2021-11-09 RX ADMIN — METOCLOPRAMIDE 10 MG: 10 TABLET ORAL at 21:16

## 2021-11-09 RX ADMIN — DOCUSATE SODIUM 50MG AND SENNOSIDES 8.6MG 1 TABLET: 8.6; 5 TABLET, FILM COATED ORAL at 09:22

## 2021-11-09 RX ADMIN — GABAPENTIN 300 MG: 300 CAPSULE ORAL at 09:22

## 2021-11-09 RX ADMIN — IPRATROPIUM BROMIDE AND ALBUTEROL SULFATE 1 AMPULE: .5; 2.5 SOLUTION RESPIRATORY (INHALATION) at 10:51

## 2021-11-09 RX ADMIN — METOCLOPRAMIDE 10 MG: 10 TABLET ORAL at 17:24

## 2021-11-09 RX ADMIN — BISACODYL 5 MG: 5 TABLET, COATED ORAL at 09:23

## 2021-11-09 RX ADMIN — INSULIN GLARGINE 25 UNITS: 100 INJECTION, SOLUTION SUBCUTANEOUS at 21:16

## 2021-11-09 RX ADMIN — SODIUM CHLORIDE, PRESERVATIVE FREE 10 ML: 5 INJECTION INTRAVENOUS at 09:38

## 2021-11-09 RX ADMIN — OXYCODONE AND ACETAMINOPHEN 1 TABLET: 5; 325 TABLET ORAL at 13:45

## 2021-11-09 RX ADMIN — IPRATROPIUM BROMIDE AND ALBUTEROL SULFATE 1 AMPULE: .5; 2.5 SOLUTION RESPIRATORY (INHALATION) at 07:18

## 2021-11-09 RX ADMIN — ASPIRIN 81 MG: 81 TABLET, COATED ORAL at 09:22

## 2021-11-09 RX ADMIN — DOCUSATE SODIUM 50MG AND SENNOSIDES 8.6MG 1 TABLET: 8.6; 5 TABLET, FILM COATED ORAL at 21:16

## 2021-11-09 RX ADMIN — IPRATROPIUM BROMIDE AND ALBUTEROL SULFATE 1 AMPULE: .5; 2.5 SOLUTION RESPIRATORY (INHALATION) at 14:24

## 2021-11-09 RX ADMIN — POLYETHYLENE GLYCOL 3350 17 G: 17 POWDER, FOR SOLUTION ORAL at 09:23

## 2021-11-09 RX ADMIN — SODIUM CHLORIDE, PRESERVATIVE FREE 10 ML: 5 INJECTION INTRAVENOUS at 21:16

## 2021-11-09 RX ADMIN — AMIODARONE HYDROCHLORIDE 200 MG: 200 TABLET ORAL at 21:16

## 2021-11-09 ASSESSMENT — PAIN DESCRIPTION - DESCRIPTORS: DESCRIPTORS: ACHING

## 2021-11-09 ASSESSMENT — PAIN SCALES - GENERAL
PAINLEVEL_OUTOF10: 0
PAINLEVEL_OUTOF10: 0
PAINLEVEL_OUTOF10: 6
PAINLEVEL_OUTOF10: 0
PAINLEVEL_OUTOF10: 1
PAINLEVEL_OUTOF10: 0

## 2021-11-09 ASSESSMENT — PAIN DESCRIPTION - PAIN TYPE: TYPE: SURGICAL PAIN

## 2021-11-09 ASSESSMENT — PAIN DESCRIPTION - FREQUENCY: FREQUENCY: INTERMITTENT

## 2021-11-09 ASSESSMENT — PAIN DESCRIPTION - PROGRESSION
CLINICAL_PROGRESSION: GRADUALLY IMPROVING
CLINICAL_PROGRESSION: GRADUALLY WORSENING

## 2021-11-09 ASSESSMENT — PAIN DESCRIPTION - LOCATION: LOCATION: CHEST;BACK

## 2021-11-09 ASSESSMENT — PAIN DESCRIPTION - ONSET: ONSET: GRADUAL

## 2021-11-09 ASSESSMENT — PAIN DESCRIPTION - ORIENTATION: ORIENTATION: MID

## 2021-11-09 ASSESSMENT — PAIN - FUNCTIONAL ASSESSMENT: PAIN_FUNCTIONAL_ASSESSMENT: PREVENTS OR INTERFERES SOME ACTIVE ACTIVITIES AND ADLS

## 2021-11-09 NOTE — RT PROTOCOL NOTE
RT Nebulizer Bronchodilator Protocol Note    There is a bronchodilator order in the chart from a provider indicating to follow the RT Bronchodilator Protocol and there is an Initiate RT Bronchodilator Protocol order as well (see protocol at bottom of note). CXR Findings:  XR CHEST PORTABLE    Result Date: 11/9/2021  Persistent low lung volumes with some increase in vascularity, ill-defined; vascular congestion should be considered. Persistent bibasilar atelectasis and small effusions. Status post open heart surgery. Right IJ sheath. Gastrostomy tube. XR CHEST PORTABLE    Result Date: 11/8/2021  1. Shallow lung volumes with suspected bilateral pleural effusions and bibasilar atelectasis, unchanged. The findings from the last RT Protocol Assessment were as follows:  Smoking: Chronic pulmonary disease  Respiratory Pattern: Mild dyspnea at rest, irregular pattern, or RR 21-25 bpm  Breath Sounds: Slightly diminished and/or crackles  Cough: Unable to generate effective cough  Indication for Bronchodilator Therapy: Decreased or absent breath sounds  Bronchodilator Assessment Score: 12    Aerosolized bronchodilator medication orders have been revised according to the RT Nebulizer Bronchodilator Protocol below. Respiratory Therapist to perform RT Therapy Protocol Assessment initially then follow the protocol. Repeat RT Therapy Protocol Assessment PRN for score 0-3 or on second treatment, BID, and PRN for scores above 3. No Indications - adjust the frequency to every 6 hours PRN wheezing or bronchospasm, if no treatments needed after 48 hours then discontinue using Per Protocol order mode. If indication present, adjust the RT bronchodilator orders based on the Bronchodilator Assessment Score as indicated below. If a patient is on this medication at home then do not decrease Frequency below that used at home.     0-3 - enter or revise RT bronchodilator order(s) to equivalent RT Bronchodilator order with Frequency of every 4 hours PRN for wheezing or increased work of breathing using Per Protocol order mode. 4-6 - enter or revise RT Bronchodilator order(s) to two equivalent RT bronchodilator orders with one order with BID Frequency and one order with Frequency of every 4 hours PRN wheezing or increased work of breathing using Per Protocol order mode. 7-10 - enter or revise RT Bronchodilator order(s) to two equivalent RT bronchodilator orders with one order with TID Frequency and one order with Frequency of every 4 hours PRN wheezing or increased work of breathing using Per Protocol order mode. 11-13 - enter or revise RT Bronchodilator order(s) to one equivalent RT bronchodilator order with QID Frequency and an Albuterol order with Frequency of every 4 hours PRN wheezing or increased work of breathing using Per Protocol order mode. Greater than 13 - enter or revise RT Bronchodilator order(s) to one equivalent RT bronchodilator order with every 4 hours Frequency and an Albuterol order with Frequency of every 2 hours PRN wheezing or increased work of breathing using Per Protocol order mode. RT to enter RT Home Evaluation for COPD & MDI Assessment order using Per Protocol order mode.     Electronically signed by Heather Wheeler RCP on 11/9/2021 at 9:12 AM

## 2021-11-09 NOTE — PROGRESS NOTES
[facility]  Daily Progress Note    Surgeon: Dr. Keyanna Tamayo   S/P : CABG X 2 w/ SLIMA to LAD and RSVG1 to large high Diag- With a delayed sternal closure   POD#: 7    Subjective:  Ms. Joaquin Hutchison denies any chest pain or shortness of breath. No distress noted     Physical Exam  Vital Signs: /61   Pulse 61   Temp 97.8 °F (36.6 °C) (Oral)   Resp 19   Ht 5' 5\" (1.651 m)   Wt 184 lb (83.5 kg)   SpO2 95%   BMI 30.62 kg/m²  O2 Flow Rate (L/min): 2 L/min   Admit Weight: Weight: 199 lb (90.3 kg)   WEIGHTWeight: 184 lb (83.5 kg)     General: Patient is alert and oriented. Up in chair  Heart: Normal S1 and S2.  Regular rhythm. No murmurs, gallops, or rubs. Pacing Wires: Yes -To be clipped prior to discharge  Lungs: clear to auscultation bilaterally and diminished breath sounds bibasilar Chest tubes: No  Abdomen: soft, non tender, non distended, BSactive x4  Extremities: 1+ edema  Wounds: clean and dry, healing appropriately. Prevena to Sternum.      Sternum: Covered with Prevena dressing  SVG sites: Healing and covered    Scheduled Meds:    torsemide  20 mg Oral Daily    insulin lispro  10 Units SubCUTAneous TID WC    And    insulin glargine  45 Units SubCUTAneous Daily    metoclopramide  10 mg Oral 4x Daily AC & HS    rosuvastatin  40 mg Oral Nightly    predniSONE  10 mg Oral Daily    sodium chloride flush  10 mL IntraVENous 2 times per day    aspirin  81 mg Oral Daily    amiodarone  200 mg Oral TID    polyethylene glycol  17 g Oral Daily    metoprolol tartrate  25 mg Oral BID    pantoprazole  40 mg Oral Daily    ipratropium-albuterol  1 ampule Inhalation Q4H WA    sennosides-docusate sodium  1 tablet Oral BID    erythromycin   Both Eyes Nightly    gabapentin  300 mg Oral TID    FLUoxetine  40 mg Oral QAM    [Held by provider] metFORMIN  1,000 mg Oral BID WC    influenza virus vaccine  0.5 mL IntraMUSCular Prior to discharge     Continuous Infusions:    sodium chloride  dextrose         Data:  CBC:   Recent Labs     11/07/21 0334 11/08/21 0446 11/09/21 0325   WBC 14.6* 14.6* 19.1*   HGB 8.5* 8.7* 9.1*   HCT 28.9* 30.0* 31.4*   MCV 87.3 87.5 86.3    343 452     BMP:   Recent Labs     11/07/21 0334 11/08/21 0446 11/09/21 0325    141 138   K 4.5 4.3 4.1    104 100   CO2 23 26 28   PHOS 3.3  --   --    BUN 28* 25* 26*   CREATININE 1.28* 1.25* 1.36*     PT/INR:   Recent Labs     11/07/21 0334 11/08/21 0446 11/09/21 0325   PROTIME 15.7* 14.9* 14.6*   INR 1.3 1.2 1.2     APTT:   No results for input(s): APTT in the last 72 hours. Chest X-Ray:   FINDINGS:   Midline sternal closure devices and clips; overlying ECG monitor leads and   gown snaps.  Right IJ sheath, probably in the a internal jugular vein. Gastrostomy tube previous lumbar kyphoplasties.       Persistent low lung volumes accentuating findings; cardiomediastinal shadow   stable.  Small effusions and bibasilar atelectasis again seen.  Vascularity   appears somewhat increased and ill-defined, raising the possibility of   vascular congestion no pneumothorax.       Bones unchanged       I/O:  I/O last 3 completed shifts:   In: 1185 [P.O.:1185]  Out: 2050 [Urine:2050]    Assessment/Plan:      Diagnosis Date    Asthma     Diabetes mellitus ([facility] [address]     Fibromyalgia     Headache     Lymphedema of both lower extremities     Myasthenia gravis ([facility] [address]       Neuro: Intact   Lungs: clear, diminished in the bases   HD: VSS   Edema: 1+ peripheral   GI: Active bowel sounds X4   : good urine output    Beta-Blocker: yes  ASA: yes  Plavix: yes  GI: yes  Statin: yes  Coumadin: no  ACE-I: no  EF: 70% pre-operative     Oxygen as needed to maintain SpO2 > 92%  o Wean as toleated   Chest x-ray daily   Encourage incentive spirometry, acapella and ambulation once extubated   Replace electrolytes as needed per sliding scale and recheck per policy   Case Management consult for discharge planning- Awaiting pre-cert    The above recommendations including medications and orders were discussed and agreed upon with Dr. Emma Stauffer, the attending on service for the cardiothoracic surgery group today. Electronically signed by CYNDY Juárez CNP on 11/9/2021 at 5:52 AM    On this date 11/9/2021 I have spent 26 minutes reviewing previous notes, test results and face to face with the patient discussing the diagnosis and importance of compliance with the treatment plan as well as documenting on the day of the visit. At least 50% of the time documented was spent with the patient to provide counseling and/or coordination of care. This note was created with the assistance of a speech-recognition program.  Although the intention is to generate a document that actually reflects the content of the visit, no guarantees can be provided that every mistake has been identified and corrected by editing. Note was updated later by me after  physical examination and  completion of the assessment.

## 2021-11-09 NOTE — PROGRESS NOTES
Progress Note    11/9/2021 7:08 AM  Subjective:   Admit Date: 10/24/2021  PCP: Marie Solorzano MD  Date of Discharge: Unknown    Medications:   Scheduled Meds:   torsemide  20 mg Oral Daily    insulin lispro  10 Units SubCUTAneous TID WC    And    insulin glargine  45 Units SubCUTAneous Daily    metoclopramide  10 mg Oral 4x Daily AC & HS    rosuvastatin  40 mg Oral Nightly    predniSONE  10 mg Oral Daily    sodium chloride flush  10 mL IntraVENous 2 times per day    aspirin  81 mg Oral Daily    amiodarone  200 mg Oral TID    polyethylene glycol  17 g Oral Daily    metoprolol tartrate  25 mg Oral BID    pantoprazole  40 mg Oral Daily    ipratropium-albuterol  1 ampule Inhalation Q4H WA    sennosides-docusate sodium  1 tablet Oral BID    erythromycin   Both Eyes Nightly    gabapentin  300 mg Oral TID    FLUoxetine  40 mg Oral QAM    [Held by provider] metFORMIN  1,000 mg Oral BID WC    influenza virus vaccine  0.5 mL IntraMUSCular Prior to discharge     Continuous Infusions:   sodium chloride      dextrose       PRN Meds:melatonin, sodium bicarbonate, potassium chloride, sodium chloride flush, sodium chloride, ondansetron, acetaminophen, oxyCODONE-acetaminophen **OR** oxyCODONE-acetaminophen, fentanNYL **OR** [DISCONTINUED] fentanNYL, hydrALAZINE, metoprolol, diphenhydrAMINE, bisacodyl, fleet, albumin human, glucose, dextrose, glucagon (rDNA), dextrose, albuterol, naloxone, calcium gluconate **OR** calcium gluconate **OR** calcium gluconate **OR** calcium gluconate, benzonatate    Diet:   Diet: ADULT DIET;  Regular; 4 carb choices (60 gm/meal)  ADULT ORAL NUTRITION SUPPLEMENT; Breakfast, Dinner; Diabetic Oral Supplement    Subjective:   Systemic or Specific Complaints: Spine pain    Objective:     Patient Vitals for the past 24 hrs:   BP Temp Temp src Pulse Resp SpO2 Weight   11/09/21 0400 120/61 97.8 °F (36.6 °C) Oral 61 19 95 % 184 lb (83.5 kg)   11/09/21 0000 130/62 97.5 °F (36.4 °C) Oral 63 13 94 % --   11/08/21 2000 129/78 98 °F (36.7 °C) Oral 75 20 94 % --   11/08/21 1902 -- -- -- -- 22 96 % --   11/08/21 1600 114/67 98.7 °F (37.1 °C) Oral 73 19 95 % --   11/08/21 1446 -- -- -- -- 16 98 % --   11/08/21 1200 (!) 105/53 -- -- 71 13 -- --   11/08/21 1130 -- -- -- -- 19 100 % --   11/08/21 1100 120/63 98 °F (36.7 °C) Oral 72 17 -- --   11/08/21 0856 -- -- -- 74 -- -- --   11/08/21 0805 -- -- -- -- -- 93 % --   11/08/21 0800 134/72 99.5 °F (37.5 °C) Oral 76 15 (!) 86 % --   11/08/21 0728 -- -- -- -- 20 97 % --     I/O last 3 completed shifts: In: 6830 [P.O.:1185]  Out: 1350 [Urine:1350]  No intake/output data recorded. General: alert, appears stated age, cooperative and moderate distress   Wound: Motion: Painful range of Motion in affected extremity   DVT Exam: No evidence of DVT seen on physical exam.  Negative Erwin's sign. No cords or calf tenderness. Additional exam:     Data Review  CBC:   Recent Labs     11/07/21  0334 11/08/21 0446 11/09/21  0325   WBC 14.6* 14.6* 19.1*   HGB 8.5* 8.7* 9.1*    343 452     BMP:    Recent Labs     11/07/21 0334 11/08/21 0446 11/09/21  0325    141 138   K 4.5 4.3 4.1    104 100   CO2 23 26 28   BUN 28* 25* 26*   CREATININE 1.28* 1.25* 1.36*   GLUCOSE 250* 110* 45*     Hepatic:   No results for input(s): AST, ALT, ALB, BILITOT, ALKPHOS in the last 72 hours. Troponin: No results for input(s): TROPONINI in the last 72 hours. BNP: No results for input(s): BNP in the last 72 hours. Lipids:   No results for input(s): CHOL, HDL in the last 72 hours. Invalid input(s): LDLCALCU  INR:   Recent Labs     11/07/21  0334 11/08/21  0446 11/09/21  0325   INR 1.3 1.2 1.2         Assessment:   [place] is unchanged from yesterday. Pain is not well controlled. She has no nausea and no vomiting. Current activity is up with assistance  Incision:       Plan:      1: MRI confirms acute pathological osteoporotic L3 fracture.   Low signal T1, high signal STIR changes. L5 fracture is chronic, previous kyphoplasty vertebral augmentation T12, L1.  2:  Continue Deep venous thrombosis prophylaxis mechanically   3:  Continue Pain Control  4. Patient has completed cardiothoracic surgery consequently not a surgical candidate at this point. Warm-and-form LSO ordered for as needed use.   Case discussed with nurses, patient    Electronically signed by Lewis Cancino MD on 11/9/2021 at 7:08 AM

## 2021-11-09 NOTE — PROGRESS NOTES
Occupational Therapy  Facility/Department: [facility]  Daily Treatment Note  NAME: Abdoul Ellsworth  : 1955  MRN: [id number]    Date of Service: 2021    Discharge Recommendations:  Patient would benefit from continued therapy after discharge   Pt currently functioning below baseline. Would suggest additional therapy at time of discharge to maximize long term outcomes and prevent re-admission. Please refer to AM-PAC score for current level of function. OT Equipment Recommendations  Equipment Needed: Yes  Mobility Devices: ADL Assistive Devices  ADL Assistive Devices: Toileting - 3-in-1 Commode; Sock-Aid Soft; Long-handled Shoe Horn; Long-handled Sponge    Assessment   Performance deficits / Impairments: Decreased functional mobility ; Decreased safe awareness; Decreased balance; Decreased coordination; Decreased ADL status; Decreased strength; Decreased sensation; Decreased fine motor control; Decreased endurance; Decreased high-level IADLs; Decreased ROM; Decreased cognition; Decreased vision/visual deficit; Decreased posture  Assessment: Pt would benefit from continued skilled OTservices to address functional deficits limiting safe return home to [facility] and to decrease caregiver burden. Prognosis: Fair  OT Education: OT Role; Precautions; Transfer Training; Orientation; Home Exercise Program; Energy Conservation  REQUIRES OT FOLLOW UP: Yes  Activity Tolerance  Activity Tolerance: Patient Tolerated treatment well  Activity Tolerance: Limited due to strict sternal precautions  Safety Devices  Safety Devices in place: Yes  Type of devices: All fall risk precautions in place; Call light within reach; Chair alarm in place; Left in chair; Nurse notified         Patient Diagnosis(es): The primary encounter diagnosis was S/P CABG x 2.  Diagnoses of Non-ST elevation MI (NSTEMI) ([facility] [address] Chronic bilateral low back pain without sciatica, Lymphedema of both lower extremities, and Acute on chronic congestive heart failure, unspecified heart failure type [facility]) were also pertinent to this visit. has a past medical history of Asthma, Diabetes mellitus ([facility] [address] Fibromyalgia, Headache, Lymphedema of both lower extremities, and Myasthenia gravis ([facility] [address] has a past surgical history that includes Toe amputation; Carpal tunnel release; fracture surgery; Sternum Debridement (N/A, 11/3/2021); and Coronary artery bypass graft (N/A, 11/2/2021). Restrictions  Restrictions/Precautions  Restrictions/Precautions: General Precautions, Fall Risk, Surgical Protocols  Required Braces or Orthoses?: No  Required Braces or Orthoses  Other: Heart Hugger Brace  Position Activity Restriction  Sternal Precautions: 5# Lifting Restrictions  Sternal Precautions: STRICT STERNAL PRECAUTIONS  Other position/activity restrictions: Up in chair, chest harness/surgical bra, strict sternal precautions, telemetry, bloom catheter, Prevana wound vac with dressing at sternal incision, BUE IV's,  bloom catheter, [email or web address]  Subjective   General  Chart Reviewed: Yes  Patient assessed for rehabilitation services?: Yes  Additional Pertinent Hx: Pt. agreeable for treatment  Response to previous treatment: Patient with no complaints from previous session  Family / Caregiver Present: No  Subjective  Subjective: Agreeable to treatment      Orientation     Objective    ADL  Grooming: Maximum assistance (Combing hair)        Balance  Sitting Balance: Maximum assistance  Standing Balance  Activity: Pt sat upright in chair with maxA at times to complete weightshifting forward and backward, L and R to perform pressure relief and increase postural conttrol with HHA. Pt completed 10 reps of each direction. Cognition  Overall Cognitive Status: Exceptions  Arousal/Alertness: Delayed responses to stimuli  Following Commands: Follows one step commands with increased time; Follows one step commands with repetition;  Inconsistently follows commands  Attention Span: Difficulty attending to directions; Attends with cues to redirect  Memory: Decreased short term memory; Decreased long term memory  Safety Judgement: Decreased awareness of need for assistance; Decreased awareness of need for safety  Problem Solving: Assistance required to identify errors made; Assistance required to implement solutions; Assistance required to correct errors made; Assistance required to generate solutions; Decreased awareness of errors  Insights: Decreased awareness of deficits  Initiation: Requires cues for some  Sequencing: Requires cues for some      Type of ROM/Therapeutic Exercise  Comment: Pt instructed on CINDI hand therex to maintian/increase strength to ensure safe transfers when able to. Plan   Plan  Times per week: 5x/week 1x/day as ksenia  Times per day: Daily  Current Treatment Recommendations: Strengthening, ROM, Balance Training, Safety Education & Training, Positioning, Endurance Training, Neuromuscular Re-education, Patient/Caregiver Education & Training, Equipment Evaluation, Education, & procurement, Self-Care / ADL, Cognitive/Perceptual Training, Home Management Training, Pain Management, Cognitive Reorientation  Plan Comment: Cont with stated POC    AM-PAC Score        AM-[facility] Inpatient Daily Activity Raw Score: 8 (11/09/21 1221)  AM-PAC Inpatient ADL T-Scale Score : 22.86 (11/09/21 1221)  ADL Inpatient CMS 0-100% Score: 85.69 (11/09/21 1221)  ADL Inpatient CMS G-Code Modifier : CM (11/09/21 1221)    Goals  Short term goals  Time Frame for Short term goals: by discharge, pt to demo  Short term goal 1: bed mob tasks with use of rail as needed to mod assist of 2. Short term goal 2: UB ADL to min assist/set up and LB ADL to max assist of 1 supine in bed as needed and with use of bed rail/AE. Short term goal 3: postural control EOB or in recliner to min assist/CG and ksenia > 15 mins to increase core strength/reduce falls in function.   Short term goal 4: ADL transfers with lift/AD as needed to max assist of 2. Short term goal 5: participate in BUE Ther Ex/Act as able for 15 mins to increase UE functional use for ADL tasks. Long term goals  Time Frame for Long term goals : Short term goal 6: Pt to increase self feeding to SBA with use of AE/built up foam and with adaptations as needed. Long term goal 1: Pt to stand with AD and min assist ksenia > 5 mins as able to reduce fall risk with ADL and functional tasks. Long term goal 2: Caregivers to be I with edema mgt, cardiac BUE HEP, proper positioning, pressure relief, EC/WS and fall prevention tech as well as DME/AE and AD recommendations with use of handouts. Patient Goals   Patient goals : Pt states she wants to be able to transfer on her own! Therapy Time   Individual Concurrent Group Co-treatment   Time In 1157         Time Out 80         Minutes 8              RN reports patient is medically stable for therapy treatment this date. Chart reviewed prior to treatment and patient is agreeable for therapy. All lines intact and patient positioned comfortably at end of treatment. All patient needs addressed prior to ending therapy session.         CAMRYN Rolle

## 2021-11-09 NOTE — PROGRESS NOTES
[facility]  Office: [address], DO, Gin Herndonet, DO, Ifrah Morrow, DO, Varsha Reyes Natalee, DO, Jono Thacker MD, Ciara Campbell MD, Anna Chamberlain MD, Jesús Quesada MD, Ema Wheeler MD, Jamal Sterling MD, Maggy Freedman MD, Brandy Fitzgerald MD, Stephenie Talley, DO, Geronimo Fitzgerald, DO, Tete Eason MD,  Claudetta Shilling, DO, Catrina Li MD, Ivania Chamberlain MD, Elena Ortiz MD, Ligia Mathis MD, Maikel Maravilla MD, Marisela Thomas MD, Garrett Wood, [facility] [place], [facility], CNP, Candice Jeff, CNP, Barney Solomon, CNS, Robert Krishna, CNP, Leonel Rahman, CNP, [place], [facility] [place], Huyen Tobarole, CNP, Nicole Etienne, CNP, Cara Paiz, CNP, Kae Rodriguez PA-C, Vincenzo Sevilla, [facility], Braydon Faust, CNP, Av Snider, CNP, Daniella Forbes, CNP, Clemencia Mcguire, CNP, Joaquin Shannon, CNP, Cesar Mcgill, CNP, Deejay Amezquita    Progress Note    11/9/2021    11:57 AM    Name:   Shashank Sims  MRN:     [id number]     [place]:      [de-identified]   Room:   2028/2028-01   Day:  16  Admit Date:  10/24/2021  6:48 AM    PCP:   Clemencia Mcguire MD  Code Status:  Full Code    Subjective:     C/C:   Chief Complaint   Patient presents with    Back Pain     Interval History Status: improved. Patient is sitting up in chair, she reports her pain is continuing to improve every day. VSS  Staff reports hypoglycemia overnight, morning insulin held   Brief History:     Patient is a 81-year-old female who presented to the emergency department on 10/24/2021 complaining of back and chest pain. The patient was admitted to internal medicine for further management of NSTEMI. Ischemic workup was remarkable for multivessel disease. Cardiothoracic surgery was consulted and performed a CABG on 11/2.      Review of Systems:     Constitutional:  negative for chills, fevers, sweats  Respiratory:  negative for cough, dyspnea on exertion, shortness of breath, wheezing  Cardiovascular:  Negative for chest pain. Gastrointestinal:  negative for abdominal pain, constipation, diarrhea, nausea, vomiting  Neurological:  negative for dizziness, headache    Medications: Allergies:     Allergies   Allergen Reactions    Amoxicillin Diarrhea and Other (See Comments)    Amoxicillin-Pot Clavulanate Diarrhea    Atorvastatin Other (See Comments)     Other reaction(s): Myalgia  Weakness      Cefuroxime Axetil Other (See Comments)    Clavulanic Acid Diarrhea and Other (See Comments)    Codeine Other (See Comments)     Unless suspended in syrup      Dextroamphetamine     Adhesive Tape Rash     Itching     Cephalosporins Rash       Current Meds:   Scheduled Meds:    insulin glargine  15 Units SubCUTAneous Nightly    torsemide  20 mg Oral Daily    metoclopramide  10 mg Oral 4x Daily AC & HS    rosuvastatin  40 mg Oral Nightly    predniSONE  10 mg Oral Daily    sodium chloride flush  10 mL IntraVENous 2 times per day    aspirin  81 mg Oral Daily    amiodarone  200 mg Oral TID    polyethylene glycol  17 g Oral Daily    metoprolol tartrate  25 mg Oral BID    pantoprazole  40 mg Oral Daily    ipratropium-albuterol  1 ampule Inhalation Q4H WA    sennosides-docusate sodium  1 tablet Oral BID    erythromycin   Both Eyes Nightly    gabapentin  300 mg Oral TID    FLUoxetine  40 mg Oral QAM    [Held by provider] metFORMIN  1,000 mg Oral BID WC    influenza virus vaccine  0.5 mL IntraMUSCular Prior to discharge     Continuous Infusions:    sodium chloride      dextrose       PRN Meds: melatonin, sodium bicarbonate, potassium chloride, sodium chloride flush, sodium chloride, ondansetron, acetaminophen, oxyCODONE-acetaminophen **OR** oxyCODONE-acetaminophen, fentanNYL **OR** [DISCONTINUED] fentanNYL, hydrALAZINE, metoprolol, diphenhydrAMINE, bisacodyl, fleet, albumin human, glucose, dextrose, glucagon (rDNA), dextrose, albuterol, naloxone, calcium gluconate **OR** calcium gluconate **OR** calcium gluconate **OR** calcium gluconate, benzonatate    Data:     Past Medical History:   has a past medical history of Asthma, Diabetes mellitus ([facility] [address] Fibromyalgia, Headache, Lymphedema of both lower extremities, and Myasthenia gravis ([facility] [address] Social History:   reports that she has never smoked. She has never used smokeless tobacco. She reports previous alcohol use. She reports previous drug use. Family History:   Family History   Problem Relation Age of Onset    Coronary Art Dis Mother     Kidney Disease Mother        Vitals:  /65   Pulse 70   Temp 96.8 °F (36 °C) (Temporal)   Resp 17   Ht 5' 5\" (1.651 m)   Wt 184 lb (83.5 kg)   SpO2 94%   BMI 30.62 kg/m²   Temp (24hrs), Av.8 °F (36.6 °C), Min:96.8 °F (36 °C), Max:98.7 °F (37.1 °C)    Recent Labs     21  0404 21  0602 21  0730 21  1134   POCGLU 80 123* 111* 122*       I/O (24Hr):     Intake/Output Summary (Last 24 hours) at 2021 1157  Last data filed at 2021 0956  Gross per 24 hour   Intake 945 ml   Output 1150 ml   Net -205 ml       Labs:  Hematology:  Recent Labs     216 21  0325   WBC 14.6* 14.6* 19.1*   RBC 3.31* 3.43* 3.64*   HGB 8.5* 8.7* 9.1*   HCT 28.9* 30.0* 31.4*   MCV 87.3 87.5 86.3   MCH 25.7 25.4 25.0*   MCHC 29.4 29.0 29.0   RDW 16.3* 16.3* 16.0*    343 452   MPV 10.5 10.1 10.3   INR 1.3 1.2 1.2     Chemistry:  Recent Labs     21  0334 21  0446 21  0325    141 138   K 4.5 4.3 4.1    104 100   CO2 23 26 28   GLUCOSE 250* 110* 45*   BUN 28* 25* 26*   CREATININE 1.28* 1.25* 1.36*   MG 2.4 2.3 2.3   ANIONGAP 12 11 10   LABGLOM 42* 43* 39*   GFRAA 51* 52* 47*   CALCIUM 9.3 9.7 9.6   PHOS 3.3  --   --      Recent Labs     21  0328 21  0345 21  0404 21  0602 21  0730 21  1134   POCGLU 50* 56* 80 123* 111* 122*     ABG:  Lab Results   Component Value Date    POCPH 7.284 2021    POCPCO2 42.6 11/04/2021    POCPO2 87.1 11/04/2021    POCHCO3 20.2 11/04/2021    NBEA 6 11/04/2021    PBEA NOT REPORTED 11/04/2021    RZQ4SCC NOT REPORTED 11/04/2021    UGBF4FLS 95 11/04/2021    FIO2 40.0 11/04/2021     Lab Results   Component Value Date/Time    SPECIAL NOT REPORTED 10/28/2021 11:30 AM     Lab Results   Component Value Date/Time    CULTURE KLEBSIELLA PNEUMONIAE >699845 CFU/ML (A) 10/28/2021 11:30 AM    CULTURE ENTEROCOCCUS FAECALIS >682820 CFU/ML (A) 10/28/2021 11:30 AM       Radiology:  XR CHEST PORTABLE    Result Date: 11/5/2021  Interval removal of endotracheal and enteric tubes. Shallow inflation with findings of vascular congestion, subsegmental atelectasis and probable small effusions again demonstrated. No new airspace disease or evidence for pneumothorax. XR CHEST PORTABLE    Result Date: 11/4/2021  1. Support lines and tubes are relatively stable in position. 2.  Increased vascular congestion and bibasilar opacities, likely pleural effusion with atelectasis. XR CHEST PORTABLE    Result Date: 11/3/2021  1. New changes of median sternotomy. 2. Persistence of at least small right and trace left pleural effusions with underlying bibasilar passive atelectasis. Superimposed pneumonia and aspiration are not excluded on the left. 3. Pulmonary vascular congestion with questionable perihilar edema, potentially congestive heart failure given recent heart surgery, the above pleural effusions, and suspected mild cardiomegaly. XR CHEST PORTABLE    Result Date: 11/3/2021  Stable postop findings with low lung volumes, bibasilar atelectasis and small effusions, greater on the left. XR CHEST PORTABLE    Result Date: 11/2/2021  Small bilateral pleural effusions and bibasilar atelectasis.        Physical Examination:        General appearance:  alert, cooperative and no distress  Mental Status:  oriented to person, place and time and normal affect  Lungs:  clear to auscultation bilaterally, normal effort  Heart:  Sternal incision present.  Wound vac in place  Abdomen:  soft, nontender, nondistended, normal bowel sounds, no masses, hepatomegaly, splenomegaly  Extremities:  1+ edema in bilateral lower extremities   Skin:  no gross lesions, rashes, induration    Assessment:        Hospital Problems             Last Modified POA    * (Principal) NSTEMI (non-ST elevated myocardial infarction) ([address] 11/3/2021 Yes    Type 2 diabetes mellitus with hyperglycemia, without long-term current use of insulin ([address] 10/24/2021 Yes    Class 1 obesity due to excess calories with serious comorbidity and body mass index (BMI) of 33.0 to 33.9 in adult 10/24/2021 Yes    Lymphedema of both lower extremities 10/24/2021 Yes    Acute on chronic diastolic congestive heart failure ([address] 10/26/2021 Yes    Pathological fracture of thoracic vertebra due to secondary osteoporosis ([address] 10/26/2021 Yes    Pathological fracture of lumbar vertebra due to secondary osteoporosis ([address] 10/26/2021 Yes    Intractable back pain 10/26/2021 Yes    Age-related osteoporosis with current pathological fracture 10/26/2021 Yes    History of kyphoplasty 10/26/2021 Yes    Facet arthritis, degenerative, lumbar spine 10/26/2021 Yes    Degenerative spondylolisthesis 10/26/2021 Yes    DDD (degenerative disc disease), thoracolumbar 10/26/2021 Yes    Acute on chronic congestive heart failure ([address] 10/27/2021 Yes    Chronic bilateral low back pain without sciatica 10/27/2021 Yes    Allergic conjunctivitis of both eyes 10/31/2021 Yes    CAD in native artery 11/3/2021 Yes    S/P CABG x 2 (Chronic) 11/4/2021 Yes    Overview Signed 11/4/2021  6:16 AM by CYNDY Box - CNP     SLIMA to LAD and RSVG1 to large high Diag, OM exploration (not graftable)                  Plan:        CAD s/p CABG   -Management per cardiothoracic surgery   -POD #7  -Continue amiodarone   -Continue aspirin     NAKITA   -Creatinine stable   -Nephrology following   -continue Lasix to 20 mg daily   -Retroperitoneal ultrasound suggestive of CKD, otherwise unremarkable      DM2   -decrease Lantus to 25 units nightly, discontinue humalog for now.  start medium dose ISS patient has had less oral intake than normal since surgery, will slowly titrate back up as needed    HFpEF   -Not in acute exacerbation   -on demadex    HTN   -Continue metoprolol 25 mg bid     HLD  -Continue rosuvastatin 40 mg daily     Depression   -Continue home fluoxetine 40 mg daily     Hx of multiple sclerosis   -S/P stress-dose steroids   -Continue home prednisone 10 mg daily     Obesity 2/2 excessive caloric intake   -Will  on dietary modifications when appropriate     Discharge to ECF when precert obtained   Plan discussed with patient and staff     CYNDY Scherer NP  11/9/2021  11:57 AM

## 2021-11-09 NOTE — PROGRESS NOTES
Pulmonary Critical Care Progress Note       Patient seen for the follow up of  NSTEMI (non-ST elevated myocardial infarction) ([address]     Subjective:  She has been requiring oxygen at 1 L nasal cannula. She is not able to walk due to significant weakness. She is sitting in the chair. She   Has good appetite. She denies pain at this point. No increase in shortness of breath. Examination:  Vitals: /67   Pulse 70   Temp 96.9 °F (36.1 °C) (Temporal)   Resp 21   Ht 5' 5\" (1.651 m)   Wt 184 lb (83.5 kg)   SpO2 99%   BMI 30.62 kg/m²   General appearance: alert and cooperative with exam, up in the bedside chair  Neck: No JVD  Lungs: Moderate air exchan decreased breath sounds right chest tube in place  Heart: regular rate and rhythm, S1, S2 normal, no gallop  Abdomen: Soft, non tender, + BS  Extremities: no cyanosis or clubbing.+ edema    LABs:  CBC:   Recent Labs     11/08/21 0446 11/09/21  0325   WBC 14.6* 19.1*   HGB 8.7* 9.1*   HCT 30.0* 31.4*    452     BMP:   Recent Labs     11/08/21  0446 11/09/21  0325    138   K 4.3 4.1   CO2 26 28   BUN 25* 26*   CREATININE 1.25* 1.36*   LABGLOM 43* 39*   GLUCOSE 110* 45*     PT/INR:   Recent Labs     11/08/21  0446 11/09/21  0325   PROTIME 14.9* 14.6*   INR 1.2 1.2     ABG:  Lab Results   Component Value Date    JIZ6FBP NOT REPORTED 11/04/2021    FIO2 40.0 11/04/2021       Lab Results   Component Value Date    POCPH 7.284 11/04/2021    POCPCO2 42.6 11/04/2021    POCPO2 87.1 11/04/2021    POCHCO3 20.2 11/04/2021    NBEA 6 11/04/2021    PBEA NOT REPORTED 11/04/2021    WYO0HCO NOT REPORTED 11/04/2021    PKIV2HRS 95 11/04/2021    FIO2 40.0 11/04/2021     Radiology:  Chest x-ray 11/9  Persistent low lung volumes with some increase in vascularity, ill-defined;   vascular congestion should be considered.  Persistent bibasilar atelectasis   and small effusions.  Status post open heart surgery.  Right IJ sheath. Gastrostomy tube.      chest x-ray 11/8 October 28, 2021  CT chest            Impression:  · Acute hypoxic respiratory failure  · CAD s/p CABG 1/3/2021, successfully extubated 11/4/2021  · Asthma  ·  pulmonary edema/ effusion/atelectasis  ·  chronically elevated right hemidiaphragm  · Myasthenia gravis  · Suspected obstructive sleep apnea/Obesity      Recommendations:  · Oxygen via nasal cannula, keep SPO2 90% or greater  · BiPAP support while asleep if necessary  · Incentive spirometry every hour while awake, encourage  · Home oxygen evaluation prior to discharge  · Albuterol and Ipratropium Q 4 hours and prn  · off Cipro for UTI  · CT surgery following  · Diet as tolerated  · around per cardiology  ·  prednisone 10 mg p.o. daily  ·  on Demadex 20 mg p.o. daily /monitor urine output  · Nephrology on consult  · Physical therapy bedside  · PFTs as an outpatient  · Sleep apnea evaluation as an outpatient  ·  Rehab discharge evaluation  · discussed with RN  · DVT prophylaxis    Electronically signed by     Toya Dubose MD on 11/9/2021 at 2:57 PM  Pulmonary Critical Care and Sleep Medicine,  [facility] [place]: [phone]

## 2021-11-09 NOTE — PROGRESS NOTES
Section of Cardiology  Progress Note      Date:  11/9/2021  Patient: Christian Duvall  Admission:  10/24/2021  6:48 AM  Admit DX: NSTEMI (non-ST elevated myocardial infarction) ([facility] [I21.4]  Non-ST elevation MI (NSTEMI) ([facility]) [I21.4]  Lymphedema of both lower extremities [I89.0]  Chronic bilateral low back pain without sciatica [M54.50, G89.29]  Acute on chronic congestive heart failure, unspecified heart failure type ([facility] [I50.9]  CAD in native artery [I25.10]  Age:  61 y.o., 1955     LOS: 16 days     Reason for evaluation:   NSTEMI and CAD      SUBJECTIVE:     Notes and chart reviewed. Pt doing well post operatively. No acute issues overnight. Pt working on getting stronger. Currently d/c planning    OBJECTIVE:      EXAM:   Vitals:    VITALS:  /65   Pulse 70   Temp 96.8 °F (36 °C) (Temporal)   Resp 17   Ht 5' 5\" (1.651 m)   Wt 184 lb (83.5 kg)   SpO2 97%   BMI 30.62 kg/m²   24HR INTAKE/OUTPUT:      Intake/Output Summary (Last 24 hours) at 11/9/2021 0928  Last data filed at 11/9/2021 0500  Gross per 24 hour   Intake 1185 ml   Output 750 ml   Net 435 ml       CONSTITUTIONAL: Alert, awake, no signs of acute distress  HEENT: No JVD  LUNGS: Clear  CARDIOVASCULAR:  regular rate and rhythm, normal S1 and S2, no S3 or S4, and no rub noted. MSK: Chest wall tenderness, wearing brace  SKIN: Warm and dry. EXTREMITIES: No leg edema.      current Inpatient Medications:   torsemide  20 mg Oral Daily    [Held by provider] insulin lispro  10 Units SubCUTAneous TID WC    And    [Held by provider] insulin glargine  45 Units SubCUTAneous Daily    metoclopramide  10 mg Oral 4x Daily AC & HS    rosuvastatin  40 mg Oral Nightly    predniSONE  10 mg Oral Daily    sodium chloride flush  10 mL IntraVENous 2 times per day    aspirin  81 mg Oral Daily    amiodarone  200 mg Oral TID    polyethylene glycol  17 g Oral Daily    metoprolol tartrate  25 mg Oral BID    pantoprazole  40 mg Oral Daily    ipratropium-albuterol  1 ampule Inhalation Q4H WA    sennosides-docusate sodium  1 tablet Oral BID    erythromycin   Both Eyes Nightly    gabapentin  300 mg Oral TID    FLUoxetine  40 mg Oral QAM    [Held by provider] metFORMIN  1,000 mg Oral BID     influenza virus vaccine  0.5 mL IntraMUSCular Prior to discharge       IV Infusions (if any):   sodium chloride      dextrose         Diagnostics:   Telemetry: Sinus rhythm and stable    Labs:   CBC:  Recent Labs     11/08/21 0446 11/09/21 0325   WBC 14.6* 19.1*   HGB 8.7* 9.1*   HCT 30.0* 31.4*    452     Magnesium:  Recent Labs     11/08/21 0446 11/09/21 0325   MG 2.3 2.3     BMP:  Recent Labs     11/08/21 0446 11/09/21 0325    138   K 4.3 4.1   CALCIUM 9.7 9.6   CO2 26 28   BUN 25* 26*   CREATININE 1.25* 1.36*   LABGLOM 43* 39*   GLUCOSE 110* 45*     BNP:  No results for input(s): BNP, PROBNP in the last 72 hours. PT/INR:  Recent Labs     11/08/21 0446 11/09/21 0325   PROTIME 14.9* 14.6*   INR 1.2 1.2     APTT:  No results for input(s): APTT in the last 72 hours. CARDIAC ENZYMES:No results for input(s): MYOGLOBIN, CKTOTAL, CKMB, CKMBINDEX, TROPHS, TROPONINT in the last 72 hours. FASTING LIPID PANEL:  Lab Results   Component Value Date    HDL 40 10/25/2021    TRIG 107 10/25/2021     LIVER PROFILE:  No results for input(s): AST, ALT, LABALBU, ALKPHOS, BILITOT, BILIDIR, IBILI, PROT, GLOB, ALBUMIN in the last 72 hours. ASSESSMENT:    · CAD with multivessel disease status post CABG x2 with LIMA to LAD and SVG to diagonal, OM exploration was not graftable 11/2/2021 and status post delayed sternal closure 11/3/2021, wound VAC remains in place-has incisional discomfort but no angina.   · Borderline troponin elevation with possible non-STEMI-preserved LV systolic function on echocardiogram done 10/25/2021  · Diabetes, managed by others  · Lymphedema and nonhealing wounds, managed by others  · Peripheral vascular disease, managed by others  · Dyslipidemia, treated  · Lumbar compression fracture, managed by others  · NAKITA - managed by others. Stable    PLAN:  1. Vitals stable. No more chest pain. Cont. Current medications  2. Continue cardiac rehab  3. Will monitor vitals and adjust medications on as needed basis  4. Please review amiodarone use with CTS team and plan on duration. This is listed as a post-op medication. No evidence of arrhythmia currently. Will defer final management of this medication to them.     Discussed with pt and nurse     Dee Acharya MD

## 2021-11-09 NOTE — PLAN OF CARE
Problem: Falls - Risk of:  Goal: Will remain free from falls  Description: Will remain free from falls  Outcome: Ongoing     Problem: Discharge Planning:  Goal: Discharged to appropriate level of care  Description: Discharged to appropriate level of care  Outcome: Ongoing     Problem: Cardiac Output - Decreased:  Goal: Hemodynamic stability will improve  Description: Hemodynamic stability will improve  Outcome: Ongoing     Problem: Pain:  Goal: Pain level will decrease  Description: Pain level will decrease  Outcome: Ongoing     Problem: Tissue Perfusion - Cardiopulmonary, Altered:  Goal: Hemodynamic stability will improve  Description: Hemodynamic stability will improve  Outcome: Ongoing     Problem: Pain:  Goal: Pain level will decrease  Description: Pain level will decrease  Outcome: Ongoing

## 2021-11-09 NOTE — PROGRESS NOTES
Physical Therapy  Facility/Department: [facility] CV[facility]  Daily Treatment Note  NAME: Stone Rodriguez  : 1955  MRN: [id number]    Date of Service: 2021    Discharge Recommendations:  Patient would benefit from continued therapy after discharge      Pt currently functioning below baseline. Would suggest additional therapy at time of discharge to maximize long term outcomes and prevent re-admission. Please refer to AM-PAC score for current level of function. Assessment   Body structures, Functions, Activity limitations: Decreased functional mobility ; Decreased balance; Decreased cognition; Decreased ROM; Decreased strength; Decreased endurance; Increased pain; Decreased posture; Decreased sensation  Assessment: Patient is dependent x2 with yola lift for repositioning in recliner. Patient with decreased safety awareness and requries max-dependent x2 assist for all mobility tasks. Prognosis: Good  Decision Making: Low Complexity  PT Education: Goals; PT Role; Plan of Care; Functional Mobility Training; Precautions; Pressure Relief; Energy Conservation; Transfer Training; General Safety  Patient Education: Ed pt on functional mobility, safety awareness, importance of being up with progression of functional mobility  to regain strength, & prevention of sedentary complications, circulation ex's, pressure relief & optimal breathing techniques  Activity Tolerance  Activity Tolerance: Patient limited by fatigue; Patient limited by endurance; Patient limited by pain     Patient Diagnosis(es): The primary encounter diagnosis was S/P CABG x 2. Diagnoses of Non-ST elevation MI (NSTEMI) ([facility] Chronic bilateral low back pain without sciatica, Lymphedema of both lower extremities, and Acute on chronic congestive heart failure, unspecified heart failure type [facility]) were also pertinent to this visit.      has a past medical history of Asthma, Diabetes mellitus ([facility] Fibromyalgia, Headache, Lymphedema of both lower extremities, and Myasthenia gravis ([facility] has a past surgical history that includes Toe amputation; Carpal tunnel release; fracture surgery; Sternum Debridement (N/A, 11/3/2021); and Coronary artery bypass graft (N/A, 11/2/2021). Restrictions  Restrictions/Precautions  Restrictions/Precautions: General Precautions, Fall Risk, Surgical Protocols  Required Braces or Orthoses?: No  Required Braces or Orthoses  Other: Heart Hugger Brace  Position Activity Restriction  Sternal Precautions: 5# Lifting Restrictions  Sternal Precautions: STRICT STERNAL PRECAUTIONS  Other position/activity restrictions: Up in chair, chest harness/surgical bra, strict sternal precautions, telemetry, bloom catheter, Prevana wound vac with dressing at sternal incision, BUE IV's,  bloom catheter, NC [email or web address]  Subjective   General  Chart Reviewed: Yes  Additional Pertinent Hx: CAD, DM, severe back pain, fibromyalgia, lymphedema BLE, spinal compression fracture  Response To Previous Treatment: Patient with no complaints from previous session. Family / Caregiver Present: No  Subjective  Subjective: Patient up in side chair upon therapists arrival this date  General Comment  Comments: LEANN Nunez PT          Orientation  Orientation  Overall Orientation Status: Within Functional Limits  Cognition   Cognition  Overall Cognitive Status: Exceptions  Arousal/Alertness: Delayed responses to stimuli  Following Commands: Follows one step commands with increased time; Follows one step commands with repetition; Inconsistently follows commands  Attention Span: Difficulty attending to directions;  Attends with cues to redirect  Memory: Decreased short term memory; Decreased long term memory  Safety Judgement: Decreased awareness of need for assistance; Decreased awareness of need for safety  Problem Solving: Assistance required to identify errors made; Assistance required to implement solutions; Assistance required to correct errors made; Assistance required to generate solutions; Decreased awareness of errors  Insights: Decreased awareness of deficits  Initiation: Requires cues for some  Sequencing: Requires cues for some  Cognition Comment: Pt. pleasant but required multiple cues to complete each task  Objective   Bed mobility  Comment: Patient up in recliner at beginning of treatment and retired to recliner at end of treatment. Transfers  Comment: did not attempt standing and yesterday was very unsafe and not approrpaite as we were unable to maintain strict sternal percautions during transfer. Worked on scooting in the chair. Forward/retro leans, and LT<>RT lateral leans 10 rep each x 2 sets to focus on core stregnth and stablity as patient tends to lean to the Lt when seated and unable to sit up without assistance if unsupported when  Ambulation  Ambulation?: No  More Ambulation?: No     Balance  Posture: Fair  Sitting - Static: Fair; -  Sitting - Dynamic: Fair; -  Exercises  Comments: Patient seated kory AAROM x 10 reps x 2 set with good understanding. Dicussed importance for LE strengthening to improve transfers. PROM RLE (degrees)  RLE General PROM: [facility] Score  AM-PAC Inpatient Mobility Raw Score : 6 (11/09/21 1800)  AM-PAC Inpatient T-Scale Score : 23.55 (11/09/21 1800)  Mobility Inpatient CMS 0-100% Score: 100 (11/09/21 1800)  Mobility Inpatient CMS G-Code Modifier : CN (11/09/21 1800)          Goals  Short term goals  Time Frame for Short term goals: 12 visits:  Short term goal 1:  Inc bed mobility to [facility] term goal 2: Pt able to transfer from bed to chair with mod assist using safest device  Short term goal 3: Pt to tolerate sitting with unsupported trunk up to 20 minutes with UB/LB support to improve core stability & repositioning for pressure relief, prepare for standing,  & prevent sedentary complications  Short term goal 4: Pt able to tolerate 30-40 min of activity to include 15-20 reps of ex, NMR & functional mobility to facilitate activity tolerance to [facility]  Short term goal 5: Ed strengthening & balance ex program, activity guidelines, pressure relief, edema control of LE'S, energy conservation & optimal breathing techniques, & issue written pt education  Patient Goals   Patient goals : regain independence    Plan    Plan  Times per week: 1-2x/day, 6-7D/week  Specific instructions for Next Treatment: Co-Tx  Current Treatment Recommendations: Strengthening, ROM, Balance Training, Functional Mobility Training, Transfer Training, Endurance Training, Safety Education & Training, Home Exercise Program, Patient/Caregiver Education & Training  Safety Devices  Type of devices:  All fall risk precautions in place, Gait belt, Patient at risk for falls, Call light within reach, Nurse notified, Left in chair  Restraints  Initially in place: No     Therapy Time   Individual Concurrent Group Co-treatment   Time In       1159   Time Out       1242   Minutes       [address] [place] [address]

## 2021-11-09 NOTE — PROGRESS NOTES
Patient called out clammy and warm, blood sugar taken. Glucose level at 50, pt receive apple juice and peanut butter. Rechecked 15 minutes later, glucose level at 56. Pt received crackers. Glucose level rechecked 15 minutes later and is now at 80. Pt alert and oriented x4. Will continue to monitor.

## 2021-11-09 NOTE — CARE COORDINATION
Writer spoke with Ella Parson [phone] in admissions at [address] [place] [address]. They have precert and can accept tomorrow. Transportation set up for [address] with tyson.

## 2021-11-09 NOTE — PROGRESS NOTES
[facility] in to fit patient for LSO brace. Applied and patient is to wear PRN while up.

## 2021-11-10 VITALS
TEMPERATURE: 97.6 F | OXYGEN SATURATION: 90 % | DIASTOLIC BLOOD PRESSURE: 54 MMHG | HEART RATE: 64 BPM | WEIGHT: 184 LBS | RESPIRATION RATE: 18 BRPM | HEIGHT: 65 IN | BODY MASS INDEX: 30.66 KG/M2 | SYSTOLIC BLOOD PRESSURE: 117 MMHG

## 2021-11-10 PROBLEM — I50.33 ACUTE ON CHRONIC DIASTOLIC CONGESTIVE HEART FAILURE (HCC): Status: RESOLVED | Noted: 2021-10-24 | Resolved: 2021-11-10

## 2021-11-10 LAB
ANION GAP SERPL CALCULATED.3IONS-SCNC: 11 MMOL/L (ref 9–17)
BUN BLDV-MCNC: 27 MG/DL (ref 8–23)
BUN/CREAT BLD: 17 (ref 9–20)
CALCIUM SERPL-MCNC: 9.3 MG/DL (ref 8.6–10.4)
CHLORIDE BLD-SCNC: 96 MMOL/L (ref 98–107)
CO2: 30 MMOL/L (ref 20–31)
CREAT SERPL-MCNC: 1.59 MG/DL (ref 0.5–0.9)
GFR AFRICAN AMERICAN: 40 ML/MIN
GFR NON-AFRICAN AMERICAN: 33 ML/MIN
GFR SERPL CREATININE-BSD FRML MDRD: ABNORMAL ML/MIN/{1.73_M2}
GFR SERPL CREATININE-BSD FRML MDRD: ABNORMAL ML/MIN/{1.73_M2}
GLUCOSE BLD-MCNC: 165 MG/DL (ref 65–105)
GLUCOSE BLD-MCNC: 187 MG/DL (ref 70–99)
HCT VFR BLD CALC: 29.5 % (ref 36.3–47.1)
HEMOGLOBIN: 8.5 G/DL (ref 11.9–15.1)
INR BLD: 1.2
MAGNESIUM: 2.3 MG/DL (ref 1.6–2.6)
MCH RBC QN AUTO: 25.3 PG (ref 25.2–33.5)
MCHC RBC AUTO-ENTMCNC: 28.8 G/DL (ref 28.4–34.8)
MCV RBC AUTO: 87.8 FL (ref 82.6–102.9)
NRBC AUTOMATED: 0.1 PER 100 WBC
PDW BLD-RTO: 16.3 % (ref 11.8–14.4)
PLATELET # BLD: 387 K/UL (ref 138–453)
PMV BLD AUTO: 10.3 FL (ref 8.1–13.5)
POTASSIUM SERPL-SCNC: 4.4 MMOL/L (ref 3.7–5.3)
PROTHROMBIN TIME: 14.5 SEC (ref 11.5–14.2)
RBC # BLD: 3.36 M/UL (ref 3.95–5.11)
SODIUM BLD-SCNC: 137 MMOL/L (ref 135–144)
WBC # BLD: 16.1 K/UL (ref 3.5–11.3)

## 2021-11-10 PROCEDURE — 06BQ3ZZ EXCISION OF LEFT SAPHENOUS VEIN, PERCUTANEOUS APPROACH: ICD-10-PCS | Performed by: THORACIC SURGERY (CARDIOTHORACIC VASCULAR SURGERY)

## 2021-11-10 PROCEDURE — 80048 BASIC METABOLIC PNL TOTAL CA: CPT

## 2021-11-10 PROCEDURE — 99024 POSTOP FOLLOW-UP VISIT: CPT | Performed by: NURSE PRACTITIONER

## 2021-11-10 PROCEDURE — 6370000000 HC RX 637 (ALT 250 FOR IP): Performed by: INTERNAL MEDICINE

## 2021-11-10 PROCEDURE — 36415 COLL VENOUS BLD VENIPUNCTURE: CPT

## 2021-11-10 PROCEDURE — 85027 COMPLETE CBC AUTOMATED: CPT

## 2021-11-10 PROCEDURE — 6370000000 HC RX 637 (ALT 250 FOR IP): Performed by: NURSE PRACTITIONER

## 2021-11-10 PROCEDURE — 5A1221Z PERFORMANCE OF CARDIAC OUTPUT, CONTINUOUS: ICD-10-PCS | Performed by: THORACIC SURGERY (CARDIOTHORACIC VASCULAR SURGERY)

## 2021-11-10 PROCEDURE — 94669 MECHANICAL CHEST WALL OSCILL: CPT

## 2021-11-10 PROCEDURE — 2700000000 HC OXYGEN THERAPY PER DAY

## 2021-11-10 PROCEDURE — 021X0JB BYPASS THORACIC AORTA, ASCENDING/ARCH TO SUBCLAVIAN WITH SYNTHETIC SUBSTITUTE, OPEN APPROACH: ICD-10-PCS | Performed by: THORACIC SURGERY (CARDIOTHORACIC VASCULAR SURGERY)

## 2021-11-10 PROCEDURE — 82947 ASSAY GLUCOSE BLOOD QUANT: CPT

## 2021-11-10 PROCEDURE — 85610 PROTHROMBIN TIME: CPT

## 2021-11-10 PROCEDURE — 0PQ00ZZ REPAIR STERNUM, OPEN APPROACH: ICD-10-PCS | Performed by: THORACIC SURGERY (CARDIOTHORACIC VASCULAR SURGERY)

## 2021-11-10 PROCEDURE — APPSS45 APP SPLIT SHARED TIME 31-45 MINUTES: Performed by: NURSE PRACTITIONER

## 2021-11-10 PROCEDURE — 02100AW BYPASS CORONARY ARTERY, ONE ARTERY FROM AORTA WITH AUTOLOGOUS ARTERIAL TISSUE, OPEN APPROACH: ICD-10-PCS | Performed by: THORACIC SURGERY (CARDIOTHORACIC VASCULAR SURGERY)

## 2021-11-10 PROCEDURE — 83735 ASSAY OF MAGNESIUM: CPT

## 2021-11-10 PROCEDURE — 94640 AIRWAY INHALATION TREATMENT: CPT

## 2021-11-10 PROCEDURE — 02100A9 BYPASS CORONARY ARTERY, ONE ARTERY FROM LEFT INTERNAL MAMMARY WITH AUTOLOGOUS ARTERIAL TISSUE, OPEN APPROACH: ICD-10-PCS | Performed by: THORACIC SURGERY (CARDIOTHORACIC VASCULAR SURGERY)

## 2021-11-10 PROCEDURE — 99239 HOSP IP/OBS DSCHRG MGMT >30: CPT | Performed by: STUDENT IN AN ORGANIZED HEALTH CARE EDUCATION/TRAINING PROGRAM

## 2021-11-10 RX ORDER — AMIODARONE HYDROCHLORIDE 200 MG/1
200 TABLET ORAL 2 TIMES DAILY
Qty: 40 TABLET | Refills: 0 | Status: SHIPPED | OUTPATIENT
Start: 2021-11-10

## 2021-11-10 RX ORDER — AMIODARONE HYDROCHLORIDE 200 MG/1
200 TABLET ORAL 2 TIMES DAILY
Status: DISCONTINUED | OUTPATIENT
Start: 2021-11-10 | End: 2021-11-10 | Stop reason: HOSPADM

## 2021-11-10 RX ORDER — CLOPIDOGREL BISULFATE 75 MG/1
75 TABLET ORAL DAILY
Status: DISCONTINUED | OUTPATIENT
Start: 2021-11-10 | End: 2021-11-10

## 2021-11-10 RX ORDER — OXYCODONE HYDROCHLORIDE AND ACETAMINOPHEN 5; 325 MG/1; MG/1
1 TABLET ORAL EVERY 12 HOURS PRN
Qty: 6 TABLET | Refills: 0 | Status: SHIPPED | OUTPATIENT
Start: 2021-11-10 | End: 2021-11-13

## 2021-11-10 RX ADMIN — IPRATROPIUM BROMIDE AND ALBUTEROL SULFATE 1 AMPULE: .5; 2.5 SOLUTION RESPIRATORY (INHALATION) at 07:03

## 2021-11-10 RX ADMIN — PREDNISONE 10 MG: 5 TABLET ORAL at 07:50

## 2021-11-10 RX ADMIN — METOPROLOL TARTRATE 25 MG: 25 TABLET, FILM COATED ORAL at 07:48

## 2021-11-10 RX ADMIN — AMIODARONE HYDROCHLORIDE 200 MG: 200 TABLET ORAL at 07:53

## 2021-11-10 RX ADMIN — ASPIRIN 81 MG: 81 TABLET, COATED ORAL at 07:50

## 2021-11-10 RX ADMIN — PANTOPRAZOLE SODIUM 40 MG: 40 TABLET, DELAYED RELEASE ORAL at 07:49

## 2021-11-10 RX ADMIN — METOCLOPRAMIDE 10 MG: 10 TABLET ORAL at 06:03

## 2021-11-10 RX ADMIN — IPRATROPIUM BROMIDE AND ALBUTEROL SULFATE 1 AMPULE: .5; 2.5 SOLUTION RESPIRATORY (INHALATION) at 10:47

## 2021-11-10 RX ADMIN — GABAPENTIN 300 MG: 300 CAPSULE ORAL at 07:48

## 2021-11-10 RX ADMIN — TORSEMIDE 20 MG: 20 TABLET ORAL at 07:50

## 2021-11-10 RX ADMIN — DOCUSATE SODIUM 50MG AND SENNOSIDES 8.6MG 1 TABLET: 8.6; 5 TABLET, FILM COATED ORAL at 07:48

## 2021-11-10 RX ADMIN — POLYETHYLENE GLYCOL 3350 17 G: 17 POWDER, FOR SOLUTION ORAL at 07:48

## 2021-11-10 RX ADMIN — APIXABAN 5 MG: 5 TABLET, FILM COATED ORAL at 07:53

## 2021-11-10 RX ADMIN — INSULIN LISPRO 2 UNITS: 100 INJECTION, SOLUTION INTRAVENOUS; SUBCUTANEOUS at 07:47

## 2021-11-10 RX ADMIN — FLUOXETINE 40 MG: 20 CAPSULE ORAL at 07:53

## 2021-11-10 ASSESSMENT — PAIN SCALES - GENERAL
PAINLEVEL_OUTOF10: 0
PAINLEVEL_OUTOF10: 0

## 2021-11-10 NOTE — FLOWSHEET NOTE
Gave report to [facility] of [address] [place] [address], all questions answered. Toled her drivers had packet for her and left name & number incase she has questions.

## 2021-11-10 NOTE — PLAN OF CARE
Problem: Falls - Risk of:  Goal: Will remain free from falls  Description: Will remain free from falls  11/10/2021 0203 by Talya Keller RN  Outcome: Ongoing  11/9/2021 1809 by Hodan Rao RN  Outcome: Ongoing  Goal: Absence of physical injury  Description: Absence of physical injury  Outcome: Ongoing     Problem: Discharge Planning:  Goal: Discharged to appropriate level of care  Description: Discharged to appropriate level of care  11/10/2021 0203 by Talya Keller RN  Outcome: Ongoing  11/9/2021 1809 by Hodan Rao RN  Outcome: Ongoing     Problem: Cardiac Output - Decreased:  Goal: Hemodynamic stability will improve  Description: Hemodynamic stability will improve  11/10/2021 0203 by Talya Keller RN  Outcome: Ongoing  11/9/2021 1809 by Hodan Rao RN  Outcome: Ongoing  Goal: Cardiac output within specified parameters  Description: Cardiac output within specified parameters  Outcome: Ongoing     Problem: Pain:  Goal: Pain level will decrease  Description: Pain level will decrease  11/10/2021 0203 by Talya Keller RN  Outcome: Ongoing  11/9/2021 1809 by Hodan Rao RN  Outcome: Ongoing  Goal: Control of acute pain  Description: Control of acute pain  Outcome: Ongoing  Goal: Control of chronic pain  Description: Control of chronic pain  Outcome: Ongoing     Problem: Tissue Perfusion - Cardiopulmonary, Altered:  Goal: Hemodynamic stability will improve  Description: Hemodynamic stability will improve  11/10/2021 0203 by Talya Keller RN  Outcome: Ongoing  11/9/2021 1809 by Hodan Rao RN  Outcome: Ongoing  Goal: Circulatory function within specified parameters  Description: Circulatory function within specified parameters  Outcome: Ongoing  Goal: Absence of angina  Description: Absence of angina  Outcome: Ongoing  Goal: Will show no evidence of cardiac arrhythmias  Description: Will show no evidence of cardiac arrhythmias  Outcome: Ongoing     Problem: Skin Integrity:  Goal: Will show no infection signs and symptoms  Description: Will show no infection signs and symptoms  Outcome: Ongoing  Goal: Absence of new skin breakdown  Description: Absence of new skin breakdown  Outcome: Ongoing     Problem: Pain:  Goal: Pain level will decrease  Description: Pain level will decrease  11/10/2021 0203 by Lana Joseph RN  Outcome: Ongoing  11/9/2021 1809 by Kendrick Coughlin RN  Outcome: Ongoing  Goal: Control of acute pain  Description: Control of acute pain  Outcome: Ongoing  Goal: Control of chronic pain  Description: Control of chronic pain  Outcome: Ongoing     Problem: IP BALANCE  Goal: LTG - patient will maintain standing balance to allow for completion of daily activities  Outcome: Ongoing     Problem: Nutrition  Goal: Optimal nutrition therapy  Outcome: Ongoing     Problem:  Activity Intolerance:  Goal: Able to perform prescribed physical activity  Description: Able to perform prescribed physical activity  Outcome: Ongoing  Goal: Ability to tolerate increased activity will improve  Description: Ability to tolerate increased activity will improve  Outcome: Ongoing     Problem: Anxiety:  Goal: Level of anxiety will decrease  Description: Level of anxiety will decrease  Outcome: Ongoing     Problem: Fluid Volume - Imbalance:  Goal: Ability to achieve a balanced intake and output will improve  Description: Ability to achieve a balanced intake and output will improve  Outcome: Ongoing  Goal: Chest tube drainage is within specified parameters  Description: Chest tube drainage is within specified parameters  Outcome: Ongoing     Problem: Gas Exchange - Impaired:  Goal: Levels of oxygenation will improve  Description: Levels of oxygenation will improve  Outcome: Ongoing  Goal: Ability to maintain adequate ventilation will improve  Description: Ability to maintain adequate ventilation will improve  Outcome: Ongoing     Problem: Tobacco Use:  Goal: Will participate in inpatient tobacco-use cessation counseling  Description: Will participate in inpatient tobacco-use cessation counseling  Outcome: Ongoing

## 2021-11-10 NOTE — FLOWSHEET NOTE
Transport here to  patient, packet given to them along with percocet script. All belongings with patient/transport. Patient left with [facility] transport and belongings, & information packet.

## 2021-11-10 NOTE — DISCHARGE SUMMARY
[facility]  Office: [address], DO, Cynthia Whitmore, DO, Jenice Duane, DO, Primo Murphy Natalee, DO, Radha Cheng MD, Joselito Miller MD, Ely Davis MD, Tatianna Chandler MD, Monika Lujan MD, Parag Gambino MD, Bita Gill MD, Patience Joyce, DO, Kilo Kwon, DO, Lillie Marcial MD,  Mavis Saunders, DO, Darvin Dueñas MD, Lubna France MD, Kerri Eden MD, Jean Hernandez MD, Negar Salazar MD, Kanu Aburto MD, Manas Acuna MD, Ana Jha [facility], [facility], CNP, Lopez Perez, CNP, Tanda Lesches, CNS, Fozia Mars, CNP, Luzmaria Moreland, CNP, Jessica Goodrich, CNP, Brianna Lee, CNP, Delbert Aase, CNP, Raquel Garza PA-C, Randa Devine, [facility], Aidan Ely, CNP, Lisseth Donovan, CNP, Edda Henry, CNP, Joe Vance, CNP, Harmony Caal, [facility], [facility], [facility]    Discharge Summary     Patient ID: Olivia Cook  :  1955   MRN: [id number]     ACCOUNT:  [de-identified]   Patient's PCP: Edda Henry MD  Admit Date: 10/24/2021   Discharge Date: 11/10/2021   Length of Stay: 17  Code Status:  Full Code  Admitting Physician: Kerri Eden MD  Discharge Physician: CYNDY John - VARINDER     Active Discharge Diagnoses:     Hospital Problem Lists:  Principal Problem:    NSTEMI (non-ST elevated myocardial infarction) [facility])  Active Problems:    Type 2 diabetes mellitus with hyperglycemia, without long-term current use of insulin (HCC)    Class 1 obesity due to excess calories with serious comorbidity and body mass index (BMI) of 33.0 to 33.9 in adult    Lymphedema of both lower extremities    Pathological fracture of thoracic vertebra due to secondary osteoporosis [facility])    Pathological fracture of lumbar vertebra due to secondary osteoporosis ([address]    Intractable back pain    Age-related osteoporosis with current pathological fracture    History of kyphoplasty    Facet arthritis, degenerative, lumbar spine    Degenerative spondylolisthesis    DDD (degenerative disc disease), thoracolumbar    Chronic bilateral low back pain without sciatica    CAD in native artery    S/P CABG x 2  Resolved Problems:    Acute on chronic diastolic congestive heart failure (HCC)    Acute on chronic congestive heart failure (HCC)    Allergic conjunctivitis of both eyes      Admission Condition:  serious     Discharged Condition: stable    Hospital Stay:     Hospital Course:  Tabatha Pettit is a 61 y.o. female who was admitted for the management of  NSTEMI (non-ST elevated myocardial infarction) ([facility] [address] , presented to ER with back pain. Imaging initially revealed compression fractured, probably non acute. Her cardiac enzymes were found to be significantly elevated on arrival. She was started on Heparin gtt and cardiac was consulted. Patient was stable without ST-T changes and cardiology decided to treat medically. Ortho was consulted for compression fracture, MRI was completed and revealed acute pathological osteoporotic fracture. Kyphoplasty was recommended if cleared by cardiology. Stress test was completed and was abnormal. Cardiac cath was completed on 10/28/21 which revealed MVCAD and CT surgery was consulted. On 11/2 patient underwent a CABG x2 with delayed closure on 11/3/21. Patient completed post up recovery over the next several days. She was evaluated by PT/OT and additional inpatient therapy was recommended and precert was sent. Precert was obtained and patient is stable for discharge.  She was given a back brace by ortho and she will need to have a kyphoplasty at a later date once she is fully recovered from heart surgery     Significant therapeutic interventions: see above     Significant Diagnostic Studies:   Labs / Micro:  CBC:   Lab Results   Component Value Date    WBC 16.1 11/10/2021    RBC 3.36 11/10/2021    HGB 8.5 11/10/2021    HCT 29.5 11/10/2021    MCV 87.8 11/10/2021    MCH 25.3 11/10/2021 MCHC 28.8 11/10/2021    RDW 16.3 11/10/2021     11/10/2021     BMP:    Lab Results   Component Value Date    GLUCOSE 187 11/10/2021     11/10/2021    K 4.4 11/10/2021    CL 96 11/10/2021    CO2 30 11/10/2021    ANIONGAP 11 11/10/2021    BUN 27 11/10/2021    CREATININE 1.59 11/10/2021    BUNCRER 17 11/10/2021    CALCIUM 9.3 11/10/2021    LABGLOM 33 11/10/2021    GFRAA 40 11/10/2021    GFR      11/10/2021    GFR NOT REPORTED 11/10/2021     FLP:    Lab Results   Component Value Date    CHOL 128 10/25/2021    TRIG 107 10/25/2021    HDL 40 10/25/2021     U/A:    Lab Results   Component Value Date    COLORU Yellow 11/06/2021    TURBIDITY SLIGHTLY CLOUDY 11/06/2021    SPECGRAV 1.025 11/06/2021    HGBUR 2+ 11/06/2021    PHUR 5.5 11/06/2021    PROTEINU TRACE 11/06/2021    GLUCOSEU NEGATIVE 11/06/2021    KETUA NEGATIVE 11/06/2021    BILIRUBINUR NEGATIVE 11/06/2021    UROBILINOGEN Normal 11/06/2021    NITRU NEGATIVE 11/06/2021    LEUKOCYTESUR NEGATIVE 11/06/2021     Radiology:  XR CHEST PORTABLE    Result Date: 11/9/2021  Persistent low lung volumes with some increase in vascularity, ill-defined; vascular congestion should be considered. Persistent bibasilar atelectasis and small effusions. Status post open heart surgery. Right IJ sheath. Gastrostomy tube. XR CHEST PORTABLE    Result Date: 11/8/2021  1. Shallow lung volumes with suspected bilateral pleural effusions and bibasilar atelectasis, unchanged. XR CHEST PORTABLE    Result Date: 11/7/2021  Suboptimal positioning degrades this exam.  No appreciable change in bilateral airspace disease and effusions. XR CHEST PORTABLE    Result Date: 11/6/2021  No significant interval change. XR CHEST PORTABLE    Result Date: 11/5/2021  Interval removal of endotracheal and enteric tubes. Shallow inflation with findings of vascular congestion, subsegmental atelectasis and probable small effusions again demonstrated.   No new airspace disease or evidence for pneumothorax. XR CHEST PORTABLE    Result Date: 11/4/2021  1. Support lines and tubes are relatively stable in position. 2.  Increased vascular congestion and bibasilar opacities, likely pleural effusion with atelectasis. XR CHEST PORTABLE    Result Date: 11/3/2021  1. New changes of median sternotomy. 2. Persistence of at least small right and trace left pleural effusions with underlying bibasilar passive atelectasis. Superimposed pneumonia and aspiration are not excluded on the left. 3. Pulmonary vascular congestion with questionable perihilar edema, potentially congestive heart failure given recent heart surgery, the above pleural effusions, and suspected mild cardiomegaly. US RETROPERITONEAL COMPLETE    Result Date: 11/8/2021  1. The left kidney is not visualized. 2.  Mildly echogenic right kidney, suggestive of intrinsic renal parenchymal disease. Consultations:    Consults:     Final Specialist Recommendations/Findings:   IP CONSULT TO CARDIOLOGY  IP CONSULT TO ORTHOPEDIC SURGERY  IP CONSULT TO CARDIOTHORACIC SURGERY  IP CONSULT TO SPIRITUAL SERVICES  IP CONSULT TO CARDIAC REHAB  IP CONSULT TO CASE MANAGEMENT  IP CONSULT TO DIETITIAN  IP CONSULT TO SPIRITUAL SERVICES  IP CONSULT TO CASE MANAGEMENT  IP CONSULT TO PULMONOLOGY  IP CONSULT TO PULMONOLOGY  IP CONSULT TO NEPHROLOGY      The patient was seen and examined on day of discharge and this discharge summary is in conjunction with any daily progress note from day of discharge. Discharge plan:     Disposition: To a non[facility] facility    Physician Follow Up:   [facility]  [address] [facility]  [phone]    They will call you to set up times for outpatient cardiac rehab.     Marta Ni MD  [place] [id number] [address]  [phone]    Go on 11/17/2021  Please come to our office for your post-op wound check on Wednesday, 11/17/2021 at 11:00 AM    Corazon Benites MD  [address] [place] [address]  Kyle Erwin [place] [address]    In 1 week      Yue Elliott MD  [address] [place] [address]    Schedule an appointment as soon as possible for a visit in 1 weeks      Micki Suarez MD  [address]  [phone]    In 2 weeks      Jesus Manuel Laguna MD  [address] [facility] [address] [facility]  [phone]    In 2 weeks      Srini Galeano MD  [address] [facility] [address] [facility]  [phone]    In 2 weeks         Requiring Further Evaluation/Follow Up POST HOSPITALIZATION/Incidental Findings: will need staged kyphoplasty when appropriate following recovery from CABG    Diet: cardiac diet and diabetic diet    Activity: As tolerated    Discharge Medications:      Medication List      START taking these medications    amiodarone 200 MG tablet  Commonly known as: CORDARONE  Take 1 tablet by mouth 2 times daily     aspirin 81 MG EC tablet  Take 1 tablet by mouth daily     torsemide 20 MG tablet  Commonly known as: DEMADEX  Take 1 tablet by mouth daily        CONTINUE taking these medications    acetaminophen 325 MG tablet  Commonly known as: TYLENOL     apixaban 5 MG Tabs tablet  Commonly known as: ELIQUIS     ascorbic acid 500 MG tablet  Commonly known as: VITAMIN C     famotidine 40 MG tablet  Commonly known as: PEPCID     FLUoxetine 40 MG capsule  Commonly known as: PROZAC     fluticasone 50 MCG/ACT nasal spray  Commonly known as: FLONASE     gabapentin 300 MG capsule  Commonly known as: NEURONTIN     metFORMIN 1000 MG tablet  Commonly known as: GLUCOPHAGE     metoprolol succinate 25 MG extended release tablet  Commonly known as: TOPROL XL     NovoLOG Mix 70/30 FlexPen (70-30) 100 UNIT/ML injection  Generic drug: insulin aspart protamine-insulin aspart     nystatin 756423 UNIT/GM powder  Commonly known as: MYCOSTATIN     oxyCODONE-acetaminophen 5-325 MG per tablet  Commonly known as: PERCOCET  Take 1 tablet by mouth every 12 hours as needed for Pain for up to 3 days.

## 2021-11-10 NOTE — PROGRESS NOTES
[facility]  Daily Progress Note    Surgeon: Dr. Teresa Dockery   S/P : CABG X 2 w/ SLIMA to LAD and RSVG1 to large high Diag- With a delayed sternal closure   POD#: 8    Subjective:  Ms. Lynn Rodriguez denies any chest pain or shortness of breath. Patient states that she had 2 BMs over night. No distress noted     Physical Exam  Vital Signs: BP (!) 117/54   Pulse 64   Temp 96.8 °F (36 °C) (Temporal)   Resp 18   Ht 5' 5\" (1.651 m)   Wt 184 lb (83.5 kg)   SpO2 90%   BMI 30.62 kg/m²  O2 Flow Rate (L/min): 2 L/min   Admit Weight: Weight: 199 lb (90.3 kg)   WEIGHTWeight: 184 lb (83.5 kg)     General: Patient is alert and oriented. Up in chair  Heart: Normal S1 and S2.  Regular rhythm. No murmurs, gallops, or rubs. Pacing Wires: No  Lungs: clear to auscultation bilaterally and diminished breath sounds bibasilar Chest tubes: No  Abdomen: soft, non tender, non distended, BS active x4  Extremities: 1+ edema  Wounds: clean and dry, healing appropriately.    Sternum: Healing  SVG sites: Healing     Scheduled Meds:    amiodarone  200 mg Oral BID    apixaban  5 mg Oral BID    insulin glargine  25 Units SubCUTAneous Nightly    insulin lispro  0-12 Units SubCUTAneous TID WC    insulin lispro  0-6 Units SubCUTAneous Nightly    torsemide  20 mg Oral Daily    metoclopramide  10 mg Oral 4x Daily AC & HS    rosuvastatin  40 mg Oral Nightly    predniSONE  10 mg Oral Daily    sodium chloride flush  10 mL IntraVENous 2 times per day    aspirin  81 mg Oral Daily    polyethylene glycol  17 g Oral Daily    metoprolol tartrate  25 mg Oral BID    pantoprazole  40 mg Oral Daily    ipratropium-albuterol  1 ampule Inhalation Q4H WA    sennosides-docusate sodium  1 tablet Oral BID    erythromycin   Both Eyes Nightly    gabapentin  300 mg Oral TID    FLUoxetine  40 mg Oral QAM    [Held by provider] metFORMIN  1,000 mg Oral BID WC    influenza virus vaccine  0.5 mL IntraMUSCular Prior to discharge Continuous Infusions:    sodium chloride      dextrose         Data:  CBC:   Recent Labs     11/08/21  0446 11/09/21  0325 11/10/21  0348   WBC 14.6* 19.1* 16.1*   HGB 8.7* 9.1* 8.5*   HCT 30.0* 31.4* 29.5*   MCV 87.5 86.3 87.8    452 387     BMP:   Recent Labs     11/08/21  0446 11/09/21  0325 11/10/21  0348    138 137   K 4.3 4.1 4.4    100 96*   CO2 26 28 30   BUN 25* 26* 27*   CREATININE 1.25* 1.36* 1.59*     PT/INR:   Recent Labs     11/08/21  0446 11/09/21  0325 11/10/21  0348   PROTIME 14.9* 14.6* 14.5*   INR 1.2 1.2 1.2     APTT:   No results for input(s): APTT in the last 72 hours. Chest X-Ray:   stable    I/O:  I/O last 3 completed shifts: In: 26 [P.O.:520]  Out: 0 [Urine:1050]    Assessment/Plan:      Diagnosis Date    Asthma     [facility]     Fibromyalgia     Headache     Lymphedema of both lower extremities     Myasthenia gravis ([facility]       Neuro: Intact   Lungs: clear, diminished in the bases   HD: VSS   Edema: 1+ peripheral   GI: Active bowel sounds X4   : good urine output    Beta-Blocker: yes  ASA: yes  Plavix: no  Eliquis: yes  GI: yes  Statin: yes  Coumadin: no  ACE-I: no  EF: 70% pre-operative     Oxygen as needed to maintain SpO2 > 92%  o Wean as toleated   Chest x-ray daily   Staples removed and steri strips applied   Pacing wires clipped   Remove central line   Prevena removed and ok to remain CAMRYN   Encourage incentive spirometry, acapella and ambulation once extubated   Replace electrolytes as needed per sliding scale and recheck per policy   Case Management consult for discharge planning- plan for  to SNF today at 10 AM     The above recommendations including medications and orders were discussed and agreed upon with Dr. Boubacar Fall, the attending on service for the cardiothoracic surgery group today.      Electronically signed by CYNDY Baez CNP on 11/10/2021 at 6:21 AM    On this date 11/10/2021 I have spent 34 minutes reviewing previous notes, test results and face to face with the patient discussing the diagnosis and importance of compliance with the treatment plan as well as documenting on the day of the visit. At least 50% of the time documented was spent with the patient to provide counseling and/or coordination of care. This note was created with the assistance of a speech-recognition program.  Although the intention is to generate a document that actually reflects the content of the visit, no guarantees can be provided that every mistake has been identified and corrected by editing. Note was updated later by me after  physical examination and  completion of the assessment.

## 2021-11-10 NOTE — PLAN OF CARE
Pacing wires clipped and incisions assessed in preparation for discharge. Incisions covered with steri strips, are well approximated with no erythema, edema or drainage noted. Steri-strips to chest tube sites and lower leg SVG sites. Incisions may remain open to air.       Nayana Galindo, APRN - CNP

## 2021-11-10 NOTE — PROGRESS NOTES
[facility]  Office: [address], DO, Nathaniel Anderson, DO, Rakansilvestre Hastings, DO, Blayne Catherine Albright, DO, Alphonse Carrera MD, Addie Shannon MD, Reggie Dockery MD, Nir Maza MD, Suresh Short MD, Axel Atkinson MD, Kimberly Wilson MD, Ashley Hill MD, Walker Davenport, DO, Addie Clifford, DO, Bert Olmos MD,  Melissa Galeano, DO, Connie Hinds MD, Clyde Lao MD, Qing Wheat MD, Bill Raymundo MD, Regla Dockery MD, Alondra Lyons MD, Carmita [facility], [facility], CNP, Tricia Cancel, CNP, Yarely Fret, CNS, Esme Lacey, CNP, Melissa Rebollarry, CNP, Camille Brain, CNP, Anderson Pickering, CNP, Marisol Hill, CNP, Fernando Anand, CNP, Nayan Beckwith PA-C, Janette Alejandre, [facility], Jono Haywood, CNP, Lorrie Ledesma, CNP, Kaykay Horner, CNP, Crissy Mccrary, CNP, Chilo Gaspar, CNP, Jesus Manuel Bowles, CNP, [facility]    Progress Note    11/10/2021    10:19 AM    Name:   Stone Rodriguez  MRN:     [id number]     [place]:      [de-identified]   Room:   2028/202801   Day:  16  Admit Date:  10/24/2021  6:48 AM    PCP:   Crissy Mccrary MD  Code Status:  Full Code    Subjective:     C/C:   Chief Complaint   Patient presents with    Back Pain     Interval History Status: improved. Patient is sitting up in bed, VSS, blood sugars stable. No issues overnight. She is being discharged to [facility] this morning    Brief History:     Patient is a 59-year-old female who presented to the emergency department on 10/24/2021 complaining of back and chest pain. The patient was admitted to internal medicine for further management of NSTEMI. Ischemic workup was remarkable for multivessel disease. Cardiothoracic surgery was consulted and performed a CABG on 11/2.      Review of Systems:     Constitutional:  negative for chills, fevers, sweats  Respiratory:  negative for cough, dyspnea on exertion, shortness of breath, wheezing  Cardiovascular: dextrose, albuterol, naloxone, calcium gluconate **OR** calcium gluconate **OR** calcium gluconate **OR** calcium gluconate, benzonatate    Data:     Past Medical History:   has a past medical history of Asthma, Diabetes mellitus ([facility] [address] Fibromyalgia, Headache, Lymphedema of both lower extremities, and Myasthenia gravis ([facility] [address] Social History:   reports that she has never smoked. She has never used smokeless tobacco. She reports previous alcohol use. She reports previous drug use. Family History:   Family History   Problem Relation Age of Onset    Coronary Art Dis Mother     Kidney Disease Mother        Vitals:  BP (!) 117/54   Pulse 64   Temp 97.6 °F (36.4 °C) (Temporal)   Resp 18   Ht 5' 5\" (1.651 m)   Wt 184 lb (83.5 kg)   SpO2 90%   BMI 30.62 kg/m²   Temp (24hrs), Av.2 °F (36.2 °C), Min:96.8 °F (36 °C), Max:97.6 °F (36.4 °C)    Recent Labs     21  1134 21  1637 21  1927 11/10/21  0720   POCGLU 122* 185* 170* 165*       I/O (24Hr):     Intake/Output Summary (Last 24 hours) at 11/10/2021 1019  Last data filed at 11/10/2021 0400  Gross per 24 hour   Intake 120 ml   Output 900 ml   Net -780 ml       Labs:  Hematology:  Recent Labs     21  0446 21  0325 11/10/21  0348   WBC 14.6* 19.1* 16.1*   RBC 3.43* 3.64* 3.36*   HGB 8.7* 9.1* 8.5*   HCT 30.0* 31.4* 29.5*   MCV 87.5 86.3 87.8   MCH 25.4 25.0* 25.3   MCHC 29.0 29.0 28.8   RDW 16.3* 16.0* 16.3*    452 387   MPV 10.1 10.3 10.3   INR 1.2 1.2 1.2     Chemistry:  Recent Labs     21  0446 21  0325 11/10/21  0348    138 137   K 4.3 4.1 4.4    100 96*   CO2 26 28 30   GLUCOSE 110* 45* 187*   BUN 25* 26* 27*   CREATININE 1.25* 1.36* 1.59*   MG 2.3 2.3 2.3   ANIONGAP 11 10 11   LABGLOM 43* 39* 33*   GFRAA 52* 47* 40*   CALCIUM 9.7 9.6 9.3     Recent Labs     21  0602 21  0730 21  1134 21  1637 21  1927 11/10/21  0720   POCGLU 123* 111* 122* 185* 170* 165* ABG:  Lab Results   Component Value Date    POCPH 7.284 11/04/2021    POCPCO2 42.6 11/04/2021    POCPO2 87.1 11/04/2021    POCHCO3 20.2 11/04/2021    NBEA 6 11/04/2021    PBEA NOT REPORTED 11/04/2021    KVB1TMF NOT REPORTED 11/04/2021    ILIE5XJY 95 11/04/2021    FIO2 40.0 11/04/2021     Lab Results   Component Value Date/Time    SPECIAL NOT REPORTED 10/28/2021 11:30 AM     Lab Results   Component Value Date/Time    CULTURE KLEBSIELLA PNEUMONIAE >861744 CFU/ML (A) 10/28/2021 11:30 AM    CULTURE ENTEROCOCCUS FAECALIS >855531 CFU/ML (A) 10/28/2021 11:30 AM       Radiology:  XR CHEST PORTABLE    Result Date: 11/5/2021  Interval removal of endotracheal and enteric tubes. Shallow inflation with findings of vascular congestion, subsegmental atelectasis and probable small effusions again demonstrated. No new airspace disease or evidence for pneumothorax. XR CHEST PORTABLE    Result Date: 11/4/2021  1. Support lines and tubes are relatively stable in position. 2.  Increased vascular congestion and bibasilar opacities, likely pleural effusion with atelectasis. XR CHEST PORTABLE    Result Date: 11/3/2021  1. New changes of median sternotomy. 2. Persistence of at least small right and trace left pleural effusions with underlying bibasilar passive atelectasis. Superimposed pneumonia and aspiration are not excluded on the left. 3. Pulmonary vascular congestion with questionable perihilar edema, potentially congestive heart failure given recent heart surgery, the above pleural effusions, and suspected mild cardiomegaly. XR CHEST PORTABLE    Result Date: 11/3/2021  Stable postop findings with low lung volumes, bibasilar atelectasis and small effusions, greater on the left. XR CHEST PORTABLE    Result Date: 11/2/2021  Small bilateral pleural effusions and bibasilar atelectasis.        Physical Examination:        General appearance:  alert, cooperative and no distress  Mental Status:  oriented to person, place and time and normal affect  Lungs:  clear to auscultation bilaterally, normal effort  Heart:  Sternal incision present.  Wound vac in place  Abdomen:  soft, nontender, nondistended, normal bowel sounds, no masses, hepatomegaly, splenomegaly  Extremities:  1+ edema in bilateral lower extremities   Skin:  no gross lesions, rashes, induration    Assessment:        Hospital Problems           Last Modified POA    * (Principal) NSTEMI (non-ST elevated myocardial infarction) ([address] 11/10/2021 Yes    Type 2 diabetes mellitus with hyperglycemia, without long-term current use of insulin ([address] 11/10/2021 Yes    Class 1 obesity due to excess calories with serious comorbidity and body mass index (BMI) of 33.0 to 33.9 in adult 11/10/2021 Yes    Lymphedema of both lower extremities 11/10/2021 Yes    Acute on chronic diastolic congestive heart failure ([address] 11/10/2021 Yes    Pathological fracture of thoracic vertebra due to secondary osteoporosis ([address] 10/26/2021 Yes    Pathological fracture of lumbar vertebra due to secondary osteoporosis ([address] 10/26/2021 Yes    Intractable back pain 10/26/2021 Yes    Age-related osteoporosis with current pathological fracture 10/26/2021 Yes    History of kyphoplasty 10/26/2021 Yes    Facet arthritis, degenerative, lumbar spine 10/26/2021 Yes    Degenerative spondylolisthesis 10/26/2021 Yes    DDD (degenerative disc disease), thoracolumbar 10/26/2021 Yes    Acute on chronic congestive heart failure ([address] 10/27/2021 Yes    Chronic bilateral low back pain without sciatica 10/27/2021 Yes    Allergic conjunctivitis of both eyes 10/31/2021 Yes    CAD in native artery 11/10/2021 Yes    S/P CABG x 2 (Chronic) 11/10/2021 Yes    Overview Signed 11/4/2021  6:16 AM by CYNDY Box - CNP     SLIMA to LAD and RSVG1 to large high Diag, OM exploration (not graftable)                Plan:        CAD s/p CABG   -Management per cardiothoracic surgery   -POD #8  -Continue amiodarone   -Continue aspirin NAKITA   -Creatinine stable   -Nephrology following, ok for discharge    -continue Lasix to 20 mg daily   -Retroperitoneal ultrasound suggestive of CKD, otherwise unremarkable      DM2   -Continue Lantus to 25 units nightly and medium dose ISS    HFpEF   -Not in acute exacerbation   -on demadex    HTN   -Continue metoprolol 25 mg bid     HLD  -Continue rosuvastatin 40 mg daily     Depression   -Continue home fluoxetine 40 mg daily     Hx of multiple sclerosis   -S/P stress-dose steroids   -Continue home prednisone 10 mg daily     Obesity 2/2 excessive caloric intake   -Will  on dietary modifications when appropriate     Discharge to ECF today, med rec updated   Plan discussed with patient and staff     CYNDY Bill NP  11/10/2021  10:19 AM

## 2021-11-10 NOTE — PROGRESS NOTES
Section of Cardiology  Progress Note      Date:  11/10/2021  Patient: Jose Luis Fitzgerald  Admission:  10/24/2021  6:48 AM  Admit DX: NSTEMI (non-ST elevated myocardial infarction) ([facility] [I21.4]  Non-ST elevation MI (NSTEMI) ([facility]) [I21.4]  Lymphedema of both lower extremities [I89.0]  Chronic bilateral low back pain without sciatica [M54.50, G89.29]  Acute on chronic congestive heart failure, unspecified heart failure type ([facility] [I50.9]  CAD in native artery [I25.10]  Age:  61 y.o., 1955     LOS: 17 days     Reason for evaluation:   NSTEMI and CAD      SUBJECTIVE:     Notes and chart reviewed. Pt doing well post operatively. No acute issues overnight. Pt working on getting stronger. Currently d/c planning for today    OBJECTIVE:      EXAM:   Vitals:    VITALS:  BP (!) 117/54   Pulse 64   Temp 97.6 °F (36.4 °C) (Temporal)   Resp 18   Ht 5' 5\" (1.651 m)   Wt 184 lb (83.5 kg)   SpO2 90%   BMI 30.62 kg/m²   24HR INTAKE/OUTPUT:      Intake/Output Summary (Last 24 hours) at 11/10/2021 0855  Last data filed at 11/10/2021 0400  Gross per 24 hour   Intake 120 ml   Output 1300 ml   Net -1180 ml       CONSTITUTIONAL: Alert, awake, no signs of acute distress  HEENT: No JVD  LUNGS: Clear  CARDIOVASCULAR:  regular rate and rhythm, normal S1 and S2, no S3 or S4, and no rub noted. MSK: Chest wall tenderness, wearing brace  SKIN: Warm and dry. EXTREMITIES: No leg edema.      current Inpatient Medications:   amiodarone  200 mg Oral BID    apixaban  5 mg Oral BID    insulin glargine  25 Units SubCUTAneous Nightly    insulin lispro  0-12 Units SubCUTAneous TID WC    insulin lispro  0-6 Units SubCUTAneous Nightly    torsemide  20 mg Oral Daily    metoclopramide  10 mg Oral 4x Daily AC & HS    rosuvastatin  40 mg Oral Nightly    predniSONE  10 mg Oral Daily    sodium chloride flush  10 mL IntraVENous 2 times per day    aspirin  81 mg Oral Daily    polyethylene glycol  17 g Oral Daily    metoprolol tartrate  25 mg others  · Peripheral vascular disease, managed by others  · Dyslipidemia, treated  · Lumbar compression fracture, managed by others  · NAKITA - managed by others. Stable  · DVT - on chronic home Eliquis    PLAN:  1. Vitals stable. No more chest pain. Cont. Current medications  2. Continue cardiac rehab  3. Stable for DC today  4.  Cont Eliquis for DVT and follow up as OP with primary    Discussed with pt and nurse     Cherry Savage MD

## 2021-11-10 NOTE — PLAN OF CARE
improve  11/10/2021 1139 by Drake Govea RN  Outcome: Completed  11/10/2021 0203 by Mere Donovan RN  Outcome: Ongoing  Goal: Circulatory function within specified parameters  Description: Circulatory function within specified parameters  11/10/2021 1139 by Drake Govea RN  Outcome: Completed  11/10/2021 0203 by Mere Donovan RN  Outcome: Ongoing  Goal: Absence of angina  Description: Absence of angina  11/10/2021 1139 by Drake Govea RN  Outcome: Completed  11/10/2021 0203 by Mere Donovan RN  Outcome: Ongoing  Goal: Will show no evidence of cardiac arrhythmias  Description: Will show no evidence of cardiac arrhythmias  11/10/2021 1139 by Drake Govea RN  Outcome: Completed  11/10/2021 0203 by Mere Donovan RN  Outcome: Ongoing     Problem: Skin Integrity:  Goal: Will show no infection signs and symptoms  Description: Will show no infection signs and symptoms  11/10/2021 1139 by Drake Govea RN  Outcome: Completed  11/10/2021 0203 by Mere Donovan RN  Outcome: Ongoing  Goal: Absence of new skin breakdown  Description: Absence of new skin breakdown  11/10/2021 1139 by Drake Govea RN  Outcome: Completed  11/10/2021 0203 by Mere Donovan RN  Outcome: Ongoing     Problem: Pain:  Description: Pain management should include both nonpharmacologic and pharmacologic interventions.   Goal: Pain level will decrease  Description: Pain level will decrease  11/10/2021 1139 by Drake Govea RN  Outcome: Completed  11/10/2021 0203 by Mere Donovan RN  Outcome: Ongoing  Goal: Control of acute pain  Description: Control of acute pain  11/10/2021 1139 by Drake Govea RN  Outcome: Completed  11/10/2021 0203 by Mere Donovan RN  Outcome: Ongoing  Goal: Control of chronic pain  Description: Control of chronic pain  11/10/2021 1139 by Drake Govea RN  Outcome: Completed  11/10/2021 0203 by Mere Donovan RN  Outcome: Ongoing     Problem: IP BALANCE  Goal: LTG - patient will maintain standing ventilation will improve  11/10/2021 1139 by Sridhar Smith RN  Outcome: Completed  11/10/2021 0203 by Eddie Kaba RN  Outcome: Ongoing     Problem: Tobacco Use:  Goal: Will participate in inpatient tobacco-use cessation counseling  Description: Will participate in inpatient tobacco-use cessation counseling  11/10/2021 1139 by Sridhar Smith RN  Outcome: Completed  11/10/2021 0203 by Eddie Kaba RN  Outcome: Ongoing

## 2021-11-10 NOTE — CARE COORDINATION
Spoke to patient's cousin Grand Riversilvestre Rivas. Informed here that the patient will be discharged at [phone], going to [address] [place] [address].

## 2021-11-22 NOTE — OP NOTE
[id number] [facility] [address]                [address] [place] [address]                                OPERATIVE REPORT    PATIENT NAME: Sid Hickman                        :        1955  MED REC NO:   [id number]                             ROOM:         ACCOUNT NO:   [de-identified]                           ADMIT DATE: 10/24/2021  PROVIDER:     Ayesha Walker MD    DATE OF PROCEDURE:  2021    PREOPERATIVE DIAGNOSES:  Temporary sternal closure status post CABG x2,  high-risk cardiac surgery, CHF, volume overload, chronically debilitated  and malnourished, chronic steroid use. POSTOPERATIVE DIAGNOSES:  Temporary sternal closure status post CABG x2,  high-risk cardiac surgery, CHF, volume overload, chronically debilitated  and malnourished, chronic steroid use. OPERATIONS PERFORMED:  Mediastinal wash and irrigation with Pulsavac  , removal of packing, delayed sternal closure with a SternaLock  360 sternal closure system. SURGEON:  Ayesha Walker MD    OTHER ASSISTANTS:  Include Shama Ewing PA-C    ANESTHESIA:  General endotracheal.    COMPLICATIONS:  None. CONDITION:  Stable. DISPOSITION:  CVICU. ESTIMATED BLOOD LOSS:  Not applicable. INDICATIONS FOR SURGERY:  The patient is a 72-year-old female who was  extremely high risk, who was taken to the surgery the prior day and  underwent successful catheterization, which was compromised by  coagulopathy for which I performed a temporary sternal closure. The  bleeding had completely ceased and her coagulopathy was corrected, and I  thus took her back to Surgery for delayed sternal closure. FINDINGS AT SURGERY:  Her sternum was extremely osteoporotic and thin,  but there was no sternal bleeding. I therefore chose a SternaLock 360  closure system for her closure. This was uneventful and she was  returned to the CVICU in stable condition.     SURGERY IN DETAIL:  The patient

## 2021-11-24 ENCOUNTER — OFFICE VISIT (OUTPATIENT)
Dept: CARDIOTHORACIC SURGERY | Age: 66
End: 2021-11-24

## 2021-11-24 VITALS
TEMPERATURE: 97.8 F | HEIGHT: 61 IN | SYSTOLIC BLOOD PRESSURE: 131 MMHG | DIASTOLIC BLOOD PRESSURE: 66 MMHG | HEART RATE: 80 BPM | BODY MASS INDEX: 36.82 KG/M2 | RESPIRATION RATE: 17 BRPM | WEIGHT: 195 LBS | OXYGEN SATURATION: 100 %

## 2021-11-24 DIAGNOSIS — Z95.1 S/P CABG X 2: Primary | ICD-10-CM

## 2021-11-24 PROCEDURE — 99024 POSTOP FOLLOW-UP VISIT: CPT | Performed by: NURSE PRACTITIONER

## 2021-11-24 NOTE — PROGRESS NOTES
[facility]  Office Visit      Subjective:  Ms. Chad Anderson is a 61 y.o. female   Patient came back to clinic today after CABG x2 done at [facility]. Patient is currently still at rehab facility. She denies any chest pain or shortness of breath nausea vomiting diarrhea. Patient still on 2 L nasal cannula due to lungs being atelectatic. She is currently still getting diuretics. Taking STS appropriate medications. All questions and concerns answered    Physical Exam  Vitals:  Vitals:    11/24/21 1114   BP: 131/66   Pulse: 80   Resp: 17   Temp: 97.8 °F (36.6 °C)   SpO2: 100%       General: Alert and Oriented x3. Ambulatory. No apparent distress. Chest:  No abnormality. Equal and symmetric expansion with respiration. Lungs:  Clear to auscultation. Cardiac:  Regular rate and rhythm without murmurs, rubs or gallops. Abdomen:  Soft, non-tender, normoactive bowel sounds. Extremities: 1+ bilateral lower extremities. Psychiatric: Mood and affect are appropriate. Sternal incision is clean dry intact no sign of discharge or bleeding harvest site has mild erythema and leg is 1+ pitting edema. No pain. Current Medications:    Current Outpatient Medications:     amiodarone (CORDARONE) 200 MG tablet, Take 1 tablet by mouth 2 times daily, Disp: 40 tablet, Rfl: 0    aspirin 81 MG EC tablet, Take 1 tablet by mouth daily, Disp: 30 tablet, Rfl: 3    torsemide (DEMADEX) 20 MG tablet, Take 1 tablet by mouth daily, Disp: 30 tablet, Rfl: 3    gabapentin (NEURONTIN) 300 MG capsule, Take 300 mg by mouth 3 times daily. , Disp: , Rfl:     insulin aspart protamine-insulin aspart (NOVOLOG MIX 70/30 FLEXPEN) (70-30) 100 UNIT/ML injection, Inject into the skin 2 times daily (with meals) Inject 55 units at breakfast and 45 units at dinner subcutaneously, Disp: , Rfl:     predniSONE (DELTASONE) 10 MG tablet, Take 10 mg by mouth daily , Disp: , Rfl:     albuterol sulfate HFA (VENTOLIN HFA) 108 (90 Base) MCG/ACT inhaler, Inhale 2 puffs into the lungs every 4 hours as needed for Wheezing or Shortness of Breath, Disp: , Rfl:     FLUoxetine (PROZAC) 40 MG capsule, Take 40 mg by mouth every morning, Disp: , Rfl:     fluticasone (FLONASE) 50 MCG/ACT nasal spray, 1 spray by Each Nostril route daily, Disp: , Rfl:     metoprolol succinate (TOPROL XL) 25 MG extended release tablet, Take 25 mg by mouth daily, Disp: , Rfl:     nystatin (MYCOSTATIN) 222767 UNIT/GM powder, Apply topically 2 times daily Apply to affected area twice daily, Disp: , Rfl:     acetaminophen (TYLENOL) 325 MG tablet, Take 650 mg by mouth every 6 hours as needed for Pain, Disp: , Rfl:     metFORMIN (GLUCOPHAGE) 1000 MG tablet, Take 1,000 mg by mouth 2 times daily (with meals), Disp: , Rfl:     famotidine (PEPCID) 40 MG tablet, Take 40 mg by mouth nightly, Disp: , Rfl:     vitamin D (CHOLECALCIFEROL) 50 MCG (2000 UT) TABS tablet, Take 2,000 Units by mouth daily, Disp: , Rfl:     ascorbic acid (VITAMIN C) 500 MG tablet, Take 500 mg by mouth daily, Disp: , Rfl:     apixaban (ELIQUIS) 5 MG TABS tablet, Take 5 mg by mouth 2 times daily, Disp: , Rfl:     rosuvastatin (CRESTOR) 10 MG tablet, Take 10 mg by mouth daily, Disp: , Rfl:     Past Surgical History:   Procedure Laterality Date    CARPAL TUNNEL RELEASE      CORONARY ARTERY BYPASS GRAFT N/A 11/2/2021    CABG CORONARY ARTERY BYPASS  LESLIE performed by Clint Escalante MD at [facility] N/A 11/3/2021    POST OP CHEST CLOSURE performed by Clint Escalante MD at [address] Hx: reports that she has never smoked.  She has never used smokeless tobacco.    Assessment & Plan:   Use IncentiveSpiromtry around the clock to open up lungs  Needs PT around the clock to open op lobes  Follow up with nephrology regarding NAKITA  CTS still recommends facility PT at this time  ACE wrap lower legs throughout the day          DEAN ALEJANDRO CYNDY DUNCAN NP,CYNDY RUIZ

## 2021-11-24 NOTE — PATIENT INSTRUCTIONS
Use IncentiveSpiromtry around the clock to open up lungs  Needs PT around the clock to open op lobes  Follow up with nephrology regarding NAKITA  Cardiothoracic surgery still recommends facility PT at this time  ACE wrap lower legs throughout the day

## 2022-09-24 NOTE — PROGRESS NOTES
@CT [id number]   Patient is unable to do EZPAP at this time. Pt is experiencing increase in pain and is extremely sleepy at this time.
